# Patient Record
Sex: FEMALE | Race: WHITE | Employment: OTHER | URBAN - METROPOLITAN AREA
[De-identification: names, ages, dates, MRNs, and addresses within clinical notes are randomized per-mention and may not be internally consistent; named-entity substitution may affect disease eponyms.]

---

## 2020-01-26 ENCOUNTER — APPOINTMENT (OUTPATIENT)
Dept: CT IMAGING | Age: 70
DRG: 347 | End: 2020-01-26
Attending: INTERNAL MEDICINE
Payer: COMMERCIAL

## 2020-01-26 ENCOUNTER — APPOINTMENT (OUTPATIENT)
Dept: GENERAL RADIOLOGY | Age: 70
DRG: 347 | End: 2020-01-26
Attending: INTERNAL MEDICINE
Payer: COMMERCIAL

## 2020-01-26 ENCOUNTER — APPOINTMENT (OUTPATIENT)
Dept: ULTRASOUND IMAGING | Age: 70
DRG: 347 | End: 2020-01-26
Attending: INTERNAL MEDICINE
Payer: COMMERCIAL

## 2020-01-26 ENCOUNTER — HOSPITAL ENCOUNTER (INPATIENT)
Age: 70
LOS: 24 days | Discharge: NURSING, REHAB OR LONG-TERM CARE | DRG: 347 | End: 2020-02-19
Attending: INTERNAL MEDICINE | Admitting: INTERNAL MEDICINE
Payer: COMMERCIAL

## 2020-01-26 PROBLEM — K76.9 LIVER LESION: Status: ACTIVE | Noted: 2020-01-26

## 2020-01-26 PROBLEM — D64.9 ANEMIA: Status: ACTIVE | Noted: 2020-01-26

## 2020-01-26 PROBLEM — A41.9 SEPTIC SHOCK (HCC): Status: ACTIVE | Noted: 2020-01-26

## 2020-01-26 PROBLEM — R65.21 SEPTIC SHOCK (HCC): Status: ACTIVE | Noted: 2020-01-26

## 2020-01-26 LAB
ABSOLUTE EOS #: 0 K/UL (ref 0–0.4)
ABSOLUTE IMMATURE GRANULOCYTE: 0.54 K/UL (ref 0–0.3)
ABSOLUTE LYMPH #: 1.22 K/UL (ref 1–4.8)
ABSOLUTE MONO #: 0.81 K/UL (ref 0.1–0.8)
ANION GAP SERPL CALCULATED.3IONS-SCNC: 11 MMOL/L (ref 9–17)
BASOPHILS # BLD: 0 % (ref 0–2)
BASOPHILS ABSOLUTE: 0 K/UL (ref 0–0.2)
BILIRUBIN URINE: NEGATIVE
BUN BLDV-MCNC: 28 MG/DL (ref 8–23)
BUN/CREAT BLD: ABNORMAL (ref 9–20)
CALCIUM SERPL-MCNC: 8 MG/DL (ref 8.6–10.4)
CHLORIDE BLD-SCNC: 104 MMOL/L (ref 98–107)
CO2: 20 MMOL/L (ref 20–31)
COLOR: YELLOW
COMMENT UA: ABNORMAL
CREAT SERPL-MCNC: 0.61 MG/DL (ref 0.5–0.9)
DIFFERENTIAL TYPE: ABNORMAL
EOSINOPHILS RELATIVE PERCENT: 0 % (ref 1–4)
GFR AFRICAN AMERICAN: >60 ML/MIN
GFR NON-AFRICAN AMERICAN: >60 ML/MIN
GFR SERPL CREATININE-BSD FRML MDRD: ABNORMAL ML/MIN/{1.73_M2}
GFR SERPL CREATININE-BSD FRML MDRD: ABNORMAL ML/MIN/{1.73_M2}
GLUCOSE BLD-MCNC: 201 MG/DL (ref 65–105)
GLUCOSE BLD-MCNC: 297 MG/DL (ref 65–105)
GLUCOSE BLD-MCNC: 378 MG/DL (ref 70–99)
GLUCOSE URINE: ABNORMAL
HCT VFR BLD CALC: 22.4 % (ref 36.3–47.1)
HCT VFR BLD CALC: 24.6 % (ref 36.3–47.1)
HEMOGLOBIN: 7.4 G/DL (ref 11.9–15.1)
HEMOGLOBIN: 7.9 G/DL (ref 11.9–15.1)
IMMATURE GRANULOCYTES: 4 %
KETONES, URINE: NEGATIVE
LACTIC ACID, WHOLE BLOOD: 1.6 MMOL/L (ref 0.7–2.1)
LEUKOCYTE ESTERASE, URINE: NEGATIVE
LYMPHOCYTES # BLD: 9 % (ref 24–44)
MCH RBC QN AUTO: 28.8 PG (ref 25.2–33.5)
MCHC RBC AUTO-ENTMCNC: 32.1 G/DL (ref 28.4–34.8)
MCV RBC AUTO: 89.8 FL (ref 82.6–102.9)
MONOCYTES # BLD: 6 % (ref 1–7)
MORPHOLOGY: ABNORMAL
MORPHOLOGY: ABNORMAL
NITRITE, URINE: NEGATIVE
NRBC AUTOMATED: 1.5 PER 100 WBC
PDW BLD-RTO: 17.8 % (ref 11.8–14.4)
PH UA: 5 (ref 5–8)
PLATELET # BLD: 216 K/UL (ref 138–453)
PLATELET ESTIMATE: ABNORMAL
PMV BLD AUTO: 10.4 FL (ref 8.1–13.5)
POTASSIUM SERPL-SCNC: 4.1 MMOL/L (ref 3.7–5.3)
PROTEIN UA: NEGATIVE
RBC # BLD: 2.74 M/UL (ref 3.95–5.11)
RBC # BLD: ABNORMAL 10*6/UL
SEG NEUTROPHILS: 81 % (ref 36–66)
SEGMENTED NEUTROPHILS ABSOLUTE COUNT: 10.93 K/UL (ref 1.8–7.7)
SODIUM BLD-SCNC: 135 MMOL/L (ref 135–144)
SPECIFIC GRAVITY UA: 1.03 (ref 1–1.03)
TURBIDITY: CLEAR
URINE HGB: NEGATIVE
UROBILINOGEN, URINE: NORMAL
WBC # BLD: 13.5 K/UL (ref 3.5–11.3)
WBC # BLD: ABNORMAL 10*3/UL

## 2020-01-26 PROCEDURE — 85018 HEMOGLOBIN: CPT

## 2020-01-26 PROCEDURE — 81003 URINALYSIS AUTO W/O SCOPE: CPT

## 2020-01-26 PROCEDURE — 2580000003 HC RX 258: Performed by: STUDENT IN AN ORGANIZED HEALTH CARE EDUCATION/TRAINING PROGRAM

## 2020-01-26 PROCEDURE — 6360000002 HC RX W HCPCS: Performed by: STUDENT IN AN ORGANIZED HEALTH CARE EDUCATION/TRAINING PROGRAM

## 2020-01-26 PROCEDURE — 6370000000 HC RX 637 (ALT 250 FOR IP): Performed by: EMERGENCY MEDICINE

## 2020-01-26 PROCEDURE — 6360000004 HC RX CONTRAST MEDICATION: Performed by: INTERNAL MEDICINE

## 2020-01-26 PROCEDURE — 2000000000 HC ICU R&B

## 2020-01-26 PROCEDURE — 76770 US EXAM ABDO BACK WALL COMP: CPT

## 2020-01-26 PROCEDURE — 82947 ASSAY GLUCOSE BLOOD QUANT: CPT

## 2020-01-26 PROCEDURE — 6360000002 HC RX W HCPCS: Performed by: EMERGENCY MEDICINE

## 2020-01-26 PROCEDURE — 36415 COLL VENOUS BLD VENIPUNCTURE: CPT

## 2020-01-26 PROCEDURE — 74177 CT ABD & PELVIS W/CONTRAST: CPT

## 2020-01-26 PROCEDURE — 87641 MR-STAPH DNA AMP PROBE: CPT

## 2020-01-26 PROCEDURE — 85025 COMPLETE CBC W/AUTO DIFF WBC: CPT

## 2020-01-26 PROCEDURE — 80048 BASIC METABOLIC PNL TOTAL CA: CPT

## 2020-01-26 PROCEDURE — 71045 X-RAY EXAM CHEST 1 VIEW: CPT

## 2020-01-26 PROCEDURE — 6360000004 HC RX CONTRAST MEDICATION: Performed by: EMERGENCY MEDICINE

## 2020-01-26 PROCEDURE — 85014 HEMATOCRIT: CPT

## 2020-01-26 PROCEDURE — 2580000003 HC RX 258: Performed by: EMERGENCY MEDICINE

## 2020-01-26 PROCEDURE — 83605 ASSAY OF LACTIC ACID: CPT

## 2020-01-26 PROCEDURE — C9113 INJ PANTOPRAZOLE SODIUM, VIA: HCPCS | Performed by: EMERGENCY MEDICINE

## 2020-01-26 PROCEDURE — 6370000000 HC RX 637 (ALT 250 FOR IP): Performed by: STUDENT IN AN ORGANIZED HEALTH CARE EDUCATION/TRAINING PROGRAM

## 2020-01-26 RX ORDER — FENTANYL CITRATE 50 UG/ML
50 INJECTION, SOLUTION INTRAMUSCULAR; INTRAVENOUS ONCE
Status: COMPLETED | OUTPATIENT
Start: 2020-01-26 | End: 2020-01-26

## 2020-01-26 RX ORDER — NICOTINE POLACRILEX 4 MG
15 LOZENGE BUCCAL PRN
Status: DISCONTINUED | OUTPATIENT
Start: 2020-01-26 | End: 2020-02-19 | Stop reason: HOSPADM

## 2020-01-26 RX ORDER — LISINOPRIL 2.5 MG/1
2.5 TABLET ORAL DAILY
COMMUNITY

## 2020-01-26 RX ORDER — BISACODYL 10 MG
10 SUPPOSITORY, RECTAL RECTAL DAILY PRN
Status: DISCONTINUED | OUTPATIENT
Start: 2020-01-26 | End: 2020-02-19 | Stop reason: HOSPADM

## 2020-01-26 RX ORDER — ONDANSETRON 2 MG/ML
4 INJECTION INTRAMUSCULAR; INTRAVENOUS EVERY 6 HOURS PRN
Status: DISCONTINUED | OUTPATIENT
Start: 2020-01-26 | End: 2020-02-19 | Stop reason: HOSPADM

## 2020-01-26 RX ORDER — LEVOTHYROXINE SODIUM 0.1 MG/1
100 TABLET ORAL DAILY
COMMUNITY

## 2020-01-26 RX ORDER — PANTOPRAZOLE SODIUM 40 MG/10ML
40 INJECTION, POWDER, LYOPHILIZED, FOR SOLUTION INTRAVENOUS ONCE
Status: COMPLETED | OUTPATIENT
Start: 2020-01-26 | End: 2020-01-26

## 2020-01-26 RX ORDER — SODIUM CHLORIDE 0.9 % (FLUSH) 0.9 %
10 SYRINGE (ML) INJECTION PRN
Status: DISCONTINUED | OUTPATIENT
Start: 2020-01-26 | End: 2020-02-19 | Stop reason: HOSPADM

## 2020-01-26 RX ORDER — 0.9 % SODIUM CHLORIDE 0.9 %
500 INTRAVENOUS SOLUTION INTRAVENOUS ONCE
Status: COMPLETED | OUTPATIENT
Start: 2020-01-26 | End: 2020-01-26

## 2020-01-26 RX ORDER — OXYCODONE HYDROCHLORIDE 5 MG/1
5 TABLET ORAL EVERY 4 HOURS PRN
Status: DISCONTINUED | OUTPATIENT
Start: 2020-01-26 | End: 2020-02-19 | Stop reason: HOSPADM

## 2020-01-26 RX ORDER — SODIUM CHLORIDE 9 MG/ML
10 INJECTION INTRAVENOUS ONCE
Status: COMPLETED | OUTPATIENT
Start: 2020-01-26 | End: 2020-01-26

## 2020-01-26 RX ORDER — VENLAFAXINE HYDROCHLORIDE 150 MG/1
150 CAPSULE, EXTENDED RELEASE ORAL DAILY
COMMUNITY

## 2020-01-26 RX ORDER — OXYCODONE HYDROCHLORIDE 5 MG/1
10 TABLET ORAL EVERY 4 HOURS PRN
Status: DISCONTINUED | OUTPATIENT
Start: 2020-01-26 | End: 2020-02-19 | Stop reason: HOSPADM

## 2020-01-26 RX ORDER — CARVEDILOL 6.25 MG/1
6.25 TABLET ORAL 2 TIMES DAILY WITH MEALS
COMMUNITY

## 2020-01-26 RX ORDER — GLIMEPIRIDE 4 MG/1
4 TABLET ORAL 2 TIMES DAILY
COMMUNITY

## 2020-01-26 RX ORDER — SODIUM CHLORIDE 0.9 % (FLUSH) 0.9 %
10 SYRINGE (ML) INJECTION EVERY 12 HOURS SCHEDULED
Status: DISCONTINUED | OUTPATIENT
Start: 2020-01-26 | End: 2020-02-19 | Stop reason: HOSPADM

## 2020-01-26 RX ORDER — DEXTROSE MONOHYDRATE 50 MG/ML
100 INJECTION, SOLUTION INTRAVENOUS PRN
Status: DISCONTINUED | OUTPATIENT
Start: 2020-01-26 | End: 2020-02-19 | Stop reason: HOSPADM

## 2020-01-26 RX ORDER — ACETAMINOPHEN 325 MG/1
650 TABLET ORAL EVERY 4 HOURS PRN
Status: DISCONTINUED | OUTPATIENT
Start: 2020-01-26 | End: 2020-02-19 | Stop reason: HOSPADM

## 2020-01-26 RX ORDER — DEXTROSE MONOHYDRATE 25 G/50ML
12.5 INJECTION, SOLUTION INTRAVENOUS PRN
Status: DISCONTINUED | OUTPATIENT
Start: 2020-01-26 | End: 2020-02-19 | Stop reason: HOSPADM

## 2020-01-26 RX ORDER — POTASSIUM CHLORIDE 7.45 MG/ML
10 INJECTION INTRAVENOUS PRN
Status: DISCONTINUED | OUTPATIENT
Start: 2020-01-26 | End: 2020-02-14

## 2020-01-26 RX ORDER — SODIUM CHLORIDE 9 MG/ML
INJECTION, SOLUTION INTRAVENOUS CONTINUOUS
Status: DISCONTINUED | OUTPATIENT
Start: 2020-01-26 | End: 2020-01-28

## 2020-01-26 RX ADMIN — IOHEXOL 75 ML: 350 INJECTION, SOLUTION INTRAVENOUS at 17:36

## 2020-01-26 RX ADMIN — SODIUM CHLORIDE, PRESERVATIVE FREE 10 ML: 5 INJECTION INTRAVENOUS at 21:08

## 2020-01-26 RX ADMIN — SODIUM CHLORIDE: 9 INJECTION, SOLUTION INTRAVENOUS at 16:41

## 2020-01-26 RX ADMIN — OXYCODONE HYDROCHLORIDE 5 MG: 5 TABLET ORAL at 21:08

## 2020-01-26 RX ADMIN — IOHEXOL 50 ML: 240 INJECTION, SOLUTION INTRATHECAL; INTRAVASCULAR; INTRAVENOUS; ORAL at 16:05

## 2020-01-26 RX ADMIN — SODIUM CHLORIDE 500 ML: 9 INJECTION, SOLUTION INTRAVENOUS at 18:20

## 2020-01-26 RX ADMIN — PANTOPRAZOLE SODIUM 40 MG: 40 INJECTION, POWDER, FOR SOLUTION INTRAVENOUS at 16:37

## 2020-01-26 RX ADMIN — INSULIN LISPRO 6 UNITS: 100 INJECTION, SOLUTION INTRAVENOUS; SUBCUTANEOUS at 16:24

## 2020-01-26 RX ADMIN — INSULIN LISPRO 4 UNITS: 100 INJECTION, SOLUTION INTRAVENOUS; SUBCUTANEOUS at 21:15

## 2020-01-26 RX ADMIN — FENTANYL CITRATE 50 MCG: 50 INJECTION, SOLUTION INTRAMUSCULAR; INTRAVENOUS at 18:20

## 2020-01-26 RX ADMIN — PIPERACILLIN AND TAZOBACTAM 4.5 G: 4; .5 INJECTION, POWDER, LYOPHILIZED, FOR SOLUTION INTRAVENOUS; PARENTERAL at 16:41

## 2020-01-26 RX ADMIN — Medication 10 ML: at 16:38

## 2020-01-26 RX ADMIN — PIPERACILLIN AND TAZOBACTAM 4.5 G: 4; .5 INJECTION, POWDER, LYOPHILIZED, FOR SOLUTION INTRAVENOUS; PARENTERAL at 21:08

## 2020-01-26 ASSESSMENT — PAIN SCALES - GENERAL
PAINLEVEL_OUTOF10: 6
PAINLEVEL_OUTOF10: 6
PAINLEVEL_OUTOF10: 0
PAINLEVEL_OUTOF10: 0

## 2020-01-26 NOTE — H&P
change in vision or speech. She is asking for a meal and states she did eat this morning breakfast.  She states she does feel better since she has been admitted at the outside hospital.    Patient denies any smoking, drug use or alcohol use. Past Medical History:  No past medical history on file. Past Surgical History:  No past surgical history on file. Allergies:    No Known Allergies      Home Meds:   Prior to Admission medications    Not on File       Social History:   TOBACCO:   has no history on file for tobacco.  ETOH:   has no history on file for alcohol. DRUGS:  has no history on file for drug. OCCUPATION:      Family History:   No family history on file. REVIEW OF SYSTEMS (ROS):  Review of Systems -   General ROS: Positive for generalized weakness and fatigue  Psychological ROS: negative  Ophthalmic ROS: negative  ENT ROS: negative  Allergy and Immunology ROS: negative  Hematological and Lymphatic ROS: negative  Endocrine ROS: negative  Breast ROS: negative  Respiratory ROS: no cough, shortness of breath, or wheezing  Cardiovascular ROS: no chest pain or dyspnea on exertion  Gastrointestinal ROS:negative  Genito-Urinary ROS: negative  Musculoskeletal ROS: negative  Neurological ROS: negative  Dermatological ROS: negative      Physical Exam:    Vitals: BP (!) 112/50   Pulse 104   Temp 99 °F (37.2 °C) (Oral)   Resp 19   Ht 5' 2\" (1.575 m)   Wt 183 lb 13.8 oz (83.4 kg)   SpO2 94%   BMI 33.63 kg/m²     Last Body weight:   Wt Readings from Last 3 Encounters:   01/26/20 183 lb 13.8 oz (83.4 kg)       Body Mass Index : Body mass index is 33.63 kg/m².         PHYSICAL EXAMINATION :  Constitutional: Appears fatigued, mildly ill but not toxic whatsoever  EENT: PERRLA, EOMI, sclera clear  Neck: Supple, symmetrical, trachea midline, no adenopathy, thyroid symmetric  Respiratory: clear to auscultation, no wheezes or rales and unlabored breathing  Cardiovascular: Tachycardic rate and regular rhythm, normal S1, S2, no murmur noted and 2+ pulses throughout  Abdomen: soft, nondistended, tender in the epigastric region no rebound or guarding  Extremities:  peripheral pulses normal, no pedal edema, no clubbing or cyanosis    Any additional physical findings:      Laboratory findings:-    CBC:   Recent Labs     01/26/20  1429   WBC 13.5*   HGB 7.9*        BMP:    Recent Labs     01/26/20  1429      K 4.1      CO2 20   BUN 28*   CREATININE 0.61   GLUCOSE 378*     S. Calcium:  Recent Labs     01/26/20  1429   CALCIUM 8.0*     S. Ionized Calcium:No results for input(s): IONCA in the last 72 hours. S. Magnesium:No results for input(s): MG in the last 72 hours. S. Phosphorus:No results for input(s): PHOS in the last 72 hours. S. Glucose:No results for input(s): POCGLU in the last 72 hours. Glycosylated hemoglobin A1C: No results for input(s): LABA1C in the last 72 hours. INR: No results for input(s): INR in the last 72 hours. Hepatic functions: No results for input(s): ALKPHOS, ALT, AST, PROT, BILITOT, BILIDIR, LABALBU in the last 72 hours. Pancreatic functions:No results for input(s): LACTA, AMYLASE in the last 72 hours. S. Lactic Acid: No results for input(s): LACTA in the last 72 hours. Cardiac enzymes:No results for input(s): CKTOTAL, CKMB, CKMBINDEX, TROPONINI in the last 72 hours. BNP:No results for input(s): BNP in the last 72 hours. Lipid profile: No results for input(s): CHOL, TRIG, HDL, LDLCALC in the last 72 hours.     Invalid input(s): LDL  Blood Gases: No results found for: PH, PCO2, PO2, HCO3, O2SAT  Thyroid functions: No results found for: TSH     Urinalysis:     Microbiology:    Cultures during this admission:     Blood cultures:                 [] None drawn      [] Negative             []  Positive (Details:  )  Urine Culture:                   [] None drawn      [] Negative             []  Positive (Details:  )  Sputum Culture:               [] None drawn       [] MADDIE Castellon of Internal Medicine/ Critical care  [address] [facility] ([place]             1/26/2020, 3:42 PM

## 2020-01-26 NOTE — LETTER
Beneficiary Notification Letter  BPCI Advanced     Your Doctor or [address],    We wanted to let you know that your health care provider, Ha Castillo [facility], has volunteered to take part in our [facility] for [facility] Medicaid Services (CMS) Bundled Payments for [address] (BPCI Advanced). This doesnt change your Medicare rights or benefits and you dont need to do anything. What are bundled payments? A bundled payment combines, or bundles together, payments that Medicare makes to your health care providers for the many different kinds of medical services you might get in a specific time period. In BPCI Advanced, this time period could include a hospital inpatient stay or outpatient procedure, plus 90 days. Why would Medicare bundle payments? Bundled payments are thought of as a value-based way to pay because health care providers are responsible for both the quality and cost of medical care they give. This is a relatively new way of paying health care providers compared to thefee-for-service way Medicare has traditionally paid, where providers are paid separately for each service they provide. Bundled payments encourage these providers to work together to provide better, more coordinated care during your hospital stay, or outpatient procedure, and through your recovery. What does BPCI Advance mean for me? Youre more likely to get even better care when hospitals, doctors, and other health care providers work together. In BPCI Advanced, hospitals, doctors, and other health care providers may be rewarded for providing better, more coordinated health care. Medicare will watch BPCI Advanced participants closely to make sure that you and other patients keep getting efficient, high quality care. What do I need to know about BPCI Advanced? Whats most important for you to know is that your Medicare rights and benefits wont change because your health care provider is participating in [address]. Medicare will keep covering all of your medically necessary services. Even though Medicare will pay your doctor in a different way under BPCI Advanced, how much you have to pay wont change. Health care providers and suppliers who are enrolled in Medicare will submit their Medicare claims like they always have. Youll have all the same Medicare rights and protections, including the right to choose which hospital, doctor, or other health care provider you see. If you dont want to get care from a health care provider whos participating in [address], then youll have to choose a different health care provider whos not participating in the Model. How can I give feedback about my health care? Medicare might ask you to take a voluntary survey about the services and care you received from Psychiatric during your hospital stay or outpatient procedure and for a specific period of time afterwards. You can decide whether you want to take the voluntary survey, but if you do, itll help Medicare make BPCI Advanced and the care of other Medicare patients better. If you have concerns or complaints about your care, you can:   · Talk to your doctor or health care provider. · Contact your Beneficiary and Family Centered Care Quality Improvement   Organization ANGELO STEWARD [facility]). You can get your BFCC-QIOs phone number  at  Medicare.gov/contacts or by calling 1-800-MEDICARE. TTY users can call  3-446.379.7388. Where can I learn more about BPCI Advanced? Learn more about BPCI Advanced at https://innovation.cms.gov/initiatives/bpci-advanced/:  · A list of all the hospitals and physician group practices in the country participating in [address] [place] [address].   · All of the inpatient and outpatient Clinical Episodes that are currently

## 2020-01-26 NOTE — PROGRESS NOTES
SHIN GUZMAN, [facility]. Septic shock ([facility] [address] [A41.9, R65.21] . Vitals:    01/26/20 1530   BP: (!) 112/50   Pulse: 104   Resp: 19   Temp:    SpO2: 94%   . Patients home meds are   Prior to Admission medications    Not on File   .   Recent Surgical History: None = 0     Assessment     Peak Flow (asthma only)    Predicted: na  Personal Best: na  PEF na  % Predicted na  Peak Flow : not applicable = 0    LMH5/BAJ    FEV1 Predicted na      FEV1 na    FEV1 % Predicted na  FVC na  IS volume na  IBW na    RR 19  Breath Sounds: clear and diminished      · Bronchodilator assessment at level  1  No protocol needed  · Hyperinflation assessment at level   · Secretion Management assessment at level    ·   · []    Bronchodilator Assessment  BRONCHODILATOR ASSESSMENT SCORE  Score 0 1 2 3 4 5   Breath Sounds   [x]  Patient Baseline []  No Wheeze good aeration []  Faint, scattered wheezing, good aeration []  Expiratory Wheezing and or moderately diminished []  Insp/Exp wheeze and/or very diminished []  Insp/Exp and/ or marked distress   Respiratory Rate   [x]  Patient Baseline []  Less than 20 []  Less than 20 []  20-25 []  Greater than 25 []  Greater than 25   Peak flow % of Pred or PB []  NA   []  Greater than 90%  []  81-90% []  71-80% []  Less than or equal to 70%  or unable to perform []  Unable due to Respiratory Distress   Dyspnea re [x]  Patient Baseline []  No SOB []  No SOB []  SOB on exertion []  SOB min activity []  At rest/acute   e FEV% Predicted       []  NA []  Above 69%  []  Unable []  Above 60-69%  []  Unable []  Above 50-59%  []  Unable []  Above 35-49%  []  Unable []  Less than 35%  []  Unable                 []  Hyperinflation Assessment  Score 1 2 3   CXR and Breath Sounds   []  Clear []  No atelectasis  Basilar aeration []  Atelectasis or absent basilar breath sounds   Incentive Spirometry Volume  (Per IBW)   []  Greater than or equal to 15ml/Kg []  less than 15ml/Kg []  less than 15ml/Kg

## 2020-01-27 ENCOUNTER — APPOINTMENT (OUTPATIENT)
Dept: ULTRASOUND IMAGING | Age: 70
DRG: 347 | End: 2020-01-27
Attending: INTERNAL MEDICINE
Payer: COMMERCIAL

## 2020-01-27 PROBLEM — Z85.3 HISTORY OF BREAST CANCER: Status: ACTIVE | Noted: 2020-01-27

## 2020-01-27 LAB
ABSOLUTE EOS #: 0.27 K/UL (ref 0–0.44)
ABSOLUTE EOS #: 0.32 K/UL (ref 0–0.44)
ABSOLUTE IMMATURE GRANULOCYTE: 0.31 K/UL (ref 0–0.3)
ABSOLUTE IMMATURE GRANULOCYTE: 0.32 K/UL (ref 0–0.3)
ABSOLUTE LYMPH #: 1.93 K/UL (ref 1.1–3.7)
ABSOLUTE LYMPH #: 2.28 K/UL (ref 1.1–3.7)
ABSOLUTE MONO #: 1.2 K/UL (ref 0.1–1.2)
ABSOLUTE MONO #: 1.25 K/UL (ref 0.1–1.2)
ABSOLUTE RETIC #: 0.2 M/UL (ref 0.03–0.08)
AFP: 0.8 UG/L
ALBUMIN SERPL-MCNC: 2.3 G/DL (ref 3.5–5.2)
ALBUMIN/GLOBULIN RATIO: 1 (ref 1–2.5)
ALP BLD-CCNC: 91 U/L (ref 35–104)
ALT SERPL-CCNC: 11 U/L (ref 5–33)
ANION GAP SERPL CALCULATED.3IONS-SCNC: 10 MMOL/L (ref 9–17)
AST SERPL-CCNC: 19 U/L
BASOPHILS # BLD: 0 % (ref 0–2)
BASOPHILS # BLD: 0 % (ref 0–2)
BASOPHILS ABSOLUTE: 0.04 K/UL (ref 0–0.2)
BASOPHILS ABSOLUTE: 0.05 K/UL (ref 0–0.2)
BILIRUB SERPL-MCNC: 0.24 MG/DL (ref 0.3–1.2)
BILIRUBIN DIRECT: <0.08 MG/DL
BILIRUBIN, INDIRECT: ABNORMAL MG/DL (ref 0–1)
BUN BLDV-MCNC: 16 MG/DL (ref 8–23)
BUN/CREAT BLD: ABNORMAL (ref 9–20)
CA 125: 195 U/ML
CA 19-9: 6 U/ML (ref 0–35)
CALCIUM SERPL-MCNC: 7.6 MG/DL (ref 8.6–10.4)
CARCINOEMBRYONIC ANTIGEN: 52.7 NG/ML
CHLORIDE BLD-SCNC: 105 MMOL/L (ref 98–107)
CO2: 19 MMOL/L (ref 20–31)
CREAT SERPL-MCNC: 0.63 MG/DL (ref 0.5–0.9)
DIFFERENTIAL TYPE: ABNORMAL
DIFFERENTIAL TYPE: ABNORMAL
EOSINOPHILS RELATIVE PERCENT: 2 % (ref 1–4)
EOSINOPHILS RELATIVE PERCENT: 3 % (ref 1–4)
FERRITIN: 64 UG/L (ref 13–150)
FOLATE: 13 NG/ML
GFR AFRICAN AMERICAN: >60 ML/MIN
GFR NON-AFRICAN AMERICAN: >60 ML/MIN
GFR SERPL CREATININE-BSD FRML MDRD: ABNORMAL ML/MIN/{1.73_M2}
GFR SERPL CREATININE-BSD FRML MDRD: ABNORMAL ML/MIN/{1.73_M2}
GLOBULIN: ABNORMAL G/DL (ref 1.5–3.8)
GLUCOSE BLD-MCNC: 213 MG/DL (ref 65–105)
GLUCOSE BLD-MCNC: 219 MG/DL (ref 65–105)
GLUCOSE BLD-MCNC: 230 MG/DL (ref 65–105)
GLUCOSE BLD-MCNC: 232 MG/DL (ref 70–99)
GLUCOSE BLD-MCNC: 259 MG/DL (ref 65–105)
GLUCOSE BLD-MCNC: 310 MG/DL (ref 65–105)
HCT VFR BLD CALC: 22.2 % (ref 36.3–47.1)
HCT VFR BLD CALC: 23.5 % (ref 36.3–47.1)
HEMOGLOBIN: 7.4 G/DL (ref 11.9–15.1)
HEMOGLOBIN: 7.6 G/DL (ref 11.9–15.1)
IMMATURE GRANULOCYTES: 2 %
IMMATURE GRANULOCYTES: 3 %
IMMATURE RETIC FRACT: 41.1 % (ref 2.7–18.3)
IRON SATURATION: 8 % (ref 20–55)
IRON: 16 UG/DL (ref 37–145)
LYMPHOCYTES # BLD: 16 % (ref 24–43)
LYMPHOCYTES # BLD: 18 % (ref 24–43)
MCH RBC QN AUTO: 28.5 PG (ref 25.2–33.5)
MCH RBC QN AUTO: 28.9 PG (ref 25.2–33.5)
MCHC RBC AUTO-ENTMCNC: 32.3 G/DL (ref 28.4–34.8)
MCHC RBC AUTO-ENTMCNC: 33.3 G/DL (ref 28.4–34.8)
MCV RBC AUTO: 86.7 FL (ref 82.6–102.9)
MCV RBC AUTO: 88 FL (ref 82.6–102.9)
MONOCYTES # BLD: 10 % (ref 3–12)
MONOCYTES # BLD: 10 % (ref 3–12)
MRSA, DNA, NASAL: NORMAL
NRBC AUTOMATED: 0.8 PER 100 WBC
NRBC AUTOMATED: 0.8 PER 100 WBC
PDW BLD-RTO: 17.9 % (ref 11.8–14.4)
PDW BLD-RTO: 18.3 % (ref 11.8–14.4)
PLATELET # BLD: 204 K/UL (ref 138–453)
PLATELET # BLD: 256 K/UL (ref 138–453)
PLATELET ESTIMATE: ABNORMAL
PLATELET ESTIMATE: ABNORMAL
PMV BLD AUTO: 11 FL (ref 8.1–13.5)
PMV BLD AUTO: 11.2 FL (ref 8.1–13.5)
POTASSIUM SERPL-SCNC: 3.9 MMOL/L (ref 3.7–5.3)
RBC # BLD: 2.56 M/UL (ref 3.95–5.11)
RBC # BLD: 2.67 M/UL (ref 3.95–5.11)
RBC # BLD: ABNORMAL 10*6/UL
RBC # BLD: ABNORMAL 10*6/UL
RETIC %: 6.8 % (ref 0.5–1.9)
RETIC HEMOGLOBIN: 22.6 PG (ref 28.2–35.7)
SEG NEUTROPHILS: 67 % (ref 36–65)
SEG NEUTROPHILS: 68 % (ref 36–65)
SEGMENTED NEUTROPHILS ABSOLUTE COUNT: 8.1 K/UL (ref 1.5–8.1)
SEGMENTED NEUTROPHILS ABSOLUTE COUNT: 8.49 K/UL (ref 1.5–8.1)
SODIUM BLD-SCNC: 134 MMOL/L (ref 135–144)
SPECIMEN DESCRIPTION: NORMAL
TOTAL IRON BINDING CAPACITY: 211 UG/DL (ref 250–450)
TOTAL PROTEIN: 4.6 G/DL (ref 6.4–8.3)
UNSATURATED IRON BINDING CAPACITY: 195 UG/DL (ref 112–347)
VITAMIN B-12: 904 PG/ML (ref 232–1245)
WBC # BLD: 11.9 K/UL (ref 3.5–11.3)
WBC # BLD: 12.7 K/UL (ref 3.5–11.3)
WBC # BLD: ABNORMAL 10*3/UL
WBC # BLD: ABNORMAL 10*3/UL

## 2020-01-27 PROCEDURE — 86301 IMMUNOASSAY TUMOR CA 19-9: CPT

## 2020-01-27 PROCEDURE — 6370000000 HC RX 637 (ALT 250 FOR IP): Performed by: STUDENT IN AN ORGANIZED HEALTH CARE EDUCATION/TRAINING PROGRAM

## 2020-01-27 PROCEDURE — 85025 COMPLETE CBC W/AUTO DIFF WBC: CPT

## 2020-01-27 PROCEDURE — 76705 ECHO EXAM OF ABDOMEN: CPT

## 2020-01-27 PROCEDURE — 6360000002 HC RX W HCPCS: Performed by: EMERGENCY MEDICINE

## 2020-01-27 PROCEDURE — 84156 ASSAY OF PROTEIN URINE: CPT

## 2020-01-27 PROCEDURE — 82607 VITAMIN B-12: CPT

## 2020-01-27 PROCEDURE — 84165 PROTEIN E-PHORESIS SERUM: CPT

## 2020-01-27 PROCEDURE — 2580000003 HC RX 258: Performed by: EMERGENCY MEDICINE

## 2020-01-27 PROCEDURE — 82947 ASSAY GLUCOSE BLOOD QUANT: CPT

## 2020-01-27 PROCEDURE — 80076 HEPATIC FUNCTION PANEL: CPT

## 2020-01-27 PROCEDURE — 84155 ASSAY OF PROTEIN SERUM: CPT

## 2020-01-27 PROCEDURE — 99222 1ST HOSP IP/OBS MODERATE 55: CPT | Performed by: INTERNAL MEDICINE

## 2020-01-27 PROCEDURE — 84166 PROTEIN E-PHORESIS/URINE/CSF: CPT

## 2020-01-27 PROCEDURE — 83540 ASSAY OF IRON: CPT

## 2020-01-27 PROCEDURE — 2000000000 HC ICU R&B

## 2020-01-27 PROCEDURE — 86304 IMMUNOASSAY TUMOR CA 125: CPT

## 2020-01-27 PROCEDURE — 82105 ALPHA-FETOPROTEIN SERUM: CPT

## 2020-01-27 PROCEDURE — 6370000000 HC RX 637 (ALT 250 FOR IP): Performed by: EMERGENCY MEDICINE

## 2020-01-27 PROCEDURE — 83550 IRON BINDING TEST: CPT

## 2020-01-27 PROCEDURE — 6370000000 HC RX 637 (ALT 250 FOR IP): Performed by: INTERNAL MEDICINE

## 2020-01-27 PROCEDURE — 82728 ASSAY OF FERRITIN: CPT

## 2020-01-27 PROCEDURE — 36415 COLL VENOUS BLD VENIPUNCTURE: CPT

## 2020-01-27 PROCEDURE — 80048 BASIC METABOLIC PNL TOTAL CA: CPT

## 2020-01-27 PROCEDURE — 82378 CARCINOEMBRYONIC ANTIGEN: CPT

## 2020-01-27 PROCEDURE — 99223 1ST HOSP IP/OBS HIGH 75: CPT | Performed by: INTERNAL MEDICINE

## 2020-01-27 PROCEDURE — 85045 AUTOMATED RETICULOCYTE COUNT: CPT

## 2020-01-27 PROCEDURE — 82746 ASSAY OF FOLIC ACID SERUM: CPT

## 2020-01-27 RX ORDER — VENLAFAXINE HYDROCHLORIDE 150 MG/1
150 CAPSULE, EXTENDED RELEASE ORAL DAILY
Status: DISCONTINUED | OUTPATIENT
Start: 2020-01-27 | End: 2020-02-19 | Stop reason: HOSPADM

## 2020-01-27 RX ORDER — MIDODRINE HYDROCHLORIDE 2.5 MG/1
2.5 TABLET ORAL
Status: DISCONTINUED | OUTPATIENT
Start: 2020-01-27 | End: 2020-02-06

## 2020-01-27 RX ORDER — SENNA PLUS 8.6 MG/1
1 TABLET ORAL 2 TIMES DAILY
Status: DISCONTINUED | OUTPATIENT
Start: 2020-01-27 | End: 2020-02-19 | Stop reason: HOSPADM

## 2020-01-27 RX ORDER — LANOLIN ALCOHOL/MO/W.PET/CERES
325 CREAM (GRAM) TOPICAL 2 TIMES DAILY WITH MEALS
Status: DISCONTINUED | OUTPATIENT
Start: 2020-01-27 | End: 2020-02-19 | Stop reason: HOSPADM

## 2020-01-27 RX ORDER — ASPIRIN 81 MG/1
81 TABLET ORAL DAILY
Status: DISCONTINUED | OUTPATIENT
Start: 2020-01-27 | End: 2020-02-06

## 2020-01-27 RX ORDER — INSULIN GLARGINE 100 [IU]/ML
10 INJECTION, SOLUTION SUBCUTANEOUS NIGHTLY
Status: DISCONTINUED | OUTPATIENT
Start: 2020-01-27 | End: 2020-02-14

## 2020-01-27 RX ORDER — DOCUSATE SODIUM 100 MG/1
100 CAPSULE, LIQUID FILLED ORAL 2 TIMES DAILY
Status: DISCONTINUED | OUTPATIENT
Start: 2020-01-27 | End: 2020-02-19 | Stop reason: HOSPADM

## 2020-01-27 RX ORDER — LEVOTHYROXINE SODIUM 0.1 MG/1
100 TABLET ORAL DAILY
Status: DISCONTINUED | OUTPATIENT
Start: 2020-01-27 | End: 2020-02-19 | Stop reason: HOSPADM

## 2020-01-27 RX ADMIN — OXYCODONE HYDROCHLORIDE 10 MG: 5 TABLET ORAL at 20:11

## 2020-01-27 RX ADMIN — INSULIN LISPRO 4 UNITS: 100 INJECTION, SOLUTION INTRAVENOUS; SUBCUTANEOUS at 10:11

## 2020-01-27 RX ADMIN — PIPERACILLIN AND TAZOBACTAM 4.5 G: 4; .5 INJECTION, POWDER, LYOPHILIZED, FOR SOLUTION INTRAVENOUS; PARENTERAL at 21:24

## 2020-01-27 RX ADMIN — MIDODRINE HYDROCHLORIDE 2.5 MG: 2.5 TABLET ORAL at 13:47

## 2020-01-27 RX ADMIN — OXYCODONE HYDROCHLORIDE 5 MG: 5 TABLET ORAL at 03:15

## 2020-01-27 RX ADMIN — SODIUM CHLORIDE, PRESERVATIVE FREE 10 ML: 5 INJECTION INTRAVENOUS at 09:41

## 2020-01-27 RX ADMIN — LEVOTHYROXINE SODIUM 100 MCG: 100 TABLET ORAL at 13:46

## 2020-01-27 RX ADMIN — INSULIN GLARGINE 10 UNITS: 100 INJECTION, SOLUTION SUBCUTANEOUS at 21:24

## 2020-01-27 RX ADMIN — ENOXAPARIN SODIUM 40 MG: 40 INJECTION SUBCUTANEOUS at 12:31

## 2020-01-27 RX ADMIN — INSULIN LISPRO 4 UNITS: 100 INJECTION, SOLUTION INTRAVENOUS; SUBCUTANEOUS at 03:21

## 2020-01-27 RX ADMIN — SODIUM CHLORIDE: 9 INJECTION, SOLUTION INTRAVENOUS at 03:18

## 2020-01-27 RX ADMIN — PIPERACILLIN AND TAZOBACTAM 4.5 G: 4; .5 INJECTION, POWDER, LYOPHILIZED, FOR SOLUTION INTRAVENOUS; PARENTERAL at 09:49

## 2020-01-27 RX ADMIN — PIPERACILLIN AND TAZOBACTAM 4.5 G: 4; .5 INJECTION, POWDER, LYOPHILIZED, FOR SOLUTION INTRAVENOUS; PARENTERAL at 15:58

## 2020-01-27 RX ADMIN — DOCUSATE SODIUM 100 MG: 100 CAPSULE, LIQUID FILLED ORAL at 18:11

## 2020-01-27 RX ADMIN — INSULIN LISPRO 6 UNITS: 100 INJECTION, SOLUTION INTRAVENOUS; SUBCUTANEOUS at 21:24

## 2020-01-27 RX ADMIN — Medication 81 MG: at 13:46

## 2020-01-27 RX ADMIN — SODIUM CHLORIDE: 9 INJECTION, SOLUTION INTRAVENOUS at 05:02

## 2020-01-27 RX ADMIN — OXYCODONE HYDROCHLORIDE 10 MG: 5 TABLET ORAL at 09:42

## 2020-01-27 RX ADMIN — FERROUS SULFATE TAB EC 325 MG (65 MG FE EQUIVALENT) 325 MG: 325 (65 FE) TABLET DELAYED RESPONSE at 18:16

## 2020-01-27 RX ADMIN — INSULIN LISPRO 8 UNITS: 100 INJECTION, SOLUTION INTRAVENOUS; SUBCUTANEOUS at 16:51

## 2020-01-27 RX ADMIN — SODIUM CHLORIDE, PRESERVATIVE FREE 10 ML: 5 INJECTION INTRAVENOUS at 20:28

## 2020-01-27 RX ADMIN — SENNOSIDES 8.6 MG: 8.6 TABLET, FILM COATED ORAL at 18:13

## 2020-01-27 RX ADMIN — PIPERACILLIN AND TAZOBACTAM 4.5 G: 4; .5 INJECTION, POWDER, LYOPHILIZED, FOR SOLUTION INTRAVENOUS; PARENTERAL at 03:17

## 2020-01-27 RX ADMIN — MIDODRINE HYDROCHLORIDE 2.5 MG: 2.5 TABLET ORAL at 18:12

## 2020-01-27 ASSESSMENT — PAIN SCALES - GENERAL
PAINLEVEL_OUTOF10: 2
PAINLEVEL_OUTOF10: 9
PAINLEVEL_OUTOF10: 6
PAINLEVEL_OUTOF10: 8

## 2020-01-27 NOTE — CONSULTS
Relationship status: Not on file    Intimate partner violence:     Fear of current or ex partner: Not on file     Emotionally abused: Not on file     Physically abused: Not on file     Forced sexual activity: Not on file   Other Topics Concern    Not on file   Social History Narrative    Not on file       FAMILY HISTORY:   No family history on file. REVIEW OF SYSTEMS:  10 out of 14 systems were reviewed and otherwise negative per patient recall. PHYSICAL EXAM:    BP (!) 141/59   Pulse 102   Temp 99 °F (37.2 °C)   Resp 21   Ht 5' 2\" (1.575 m)   Wt 183 lb 13.8 oz (83.4 kg)   SpO2 94%   BMI 33.63 kg/m²     General: chronically ill apearing    Skin:   Pale, bruise on fight AC from blood draws       HEENT:  Sclera are anicteric and conjunctiva are normal.  Hearing is adequate. Oropharynx moist without thrush. Oral ulcers are not seen. Neck is supple without masses. There is no supraclavicular wasting    Heart:     Lightly tachycardic, regular rate and rhythm, no murmurs   lungs:    Clear to auscultation, with no rales, wheezes, or rhonchi. Unlabored breathing pattern with adequate aeration. Abdomen: Bowel sounds are normal.  The abdomen is soft and non distended. There is no tenderness, guarding, rebound, or rigidity. There are no masses, hepatosplenomegaly, or hernias. Peritoneal signs are not appreciated. Extemities: There is no clubbing or edema. Pulses are palpable. LABS and IMAGING:     CBC  Recent Labs     01/26/20  1429 01/26/20 2057 01/27/20  0541   WBC 13.5*  --  11.9*   HGB 7.9* 7.4* 7.4*   MCV 89.8  --  86.7   RDW 17.8*  --  17.9*     --  204       ANEMIA STUDIES  No results for input(s): LABIRON, TIBC, FERRITIN, [id number], FOLATE, OCCULTBLD in the last 72 hours.     BMP  Recent Labs     01/26/20  1429 01/27/20  0541    134*   K 4.1 3.9    105   CO2 20 19*   BUN 28* 16   CREATININE 0.61 0.63   GLUCOSE 378* 232*   CALCIUM 8.0* 7.6*       LFTS  No results for input(s): ALKPHOS, ALT, AST, BILITOT, BILIDIR, LABALBU in the last 72 hours. AMYLASE/LIPASE/AMMONIA  No results for input(s): AMYLASE, LIPASE, AMMONIA in the last 72 hours. PT/INR  No results for input(s): PROTIME, INR in the last 72 hours. IMPRESSION/PLAN  Sepsis  Anemia secondary to sepsis vs bone marrow infiltration  Malignancy concern- Ovarian? Breast?  History of breast cancer  Hyperglycemia  Distended gall bladder on CT      1. Follow up hematology/oncology consult  2. Hemoglobin appears stable at this time, no signs of overt GI bleed. No hematemesis, no dark tarry stools, no blood seen in any BM. 3. No immediate plan for EGD or colonoscopy, but can perform inpatient if requested by Hematology oncology. 4. 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 5. Follow-up right upper quadrant ultrasound due to gallbladder distention concern for possible cholecystitis. But labs are normal. If noral RUQ no objection to clear liquid diet. 6. Hyperglycemia per primary team  7. No active signs of bleeding at this time. 8. Currently  not on pressors during evaluation. 9. Sepsis per primary currently on zosyn, reportedly blood cultures were drawn prior to patient arriving. 10.  elevated 195, CEA elevated 52.7    Will discuss with attending    Rodri Cid MD          Department of Internal Medicine  [facility]         1/27/2020, 9:26 AM      Attending Physician Statement  I have discussed the care of Tabitha Collins and   I have examined the patient myselft independently, and taken ros and hpi , including pertinent history and exam findings,  with the author of this note . I have reviewed the key elements of all parts of the encounter with the nurse practitioner/resident.     I agree with the assessment, plan and orders as documented by the above health care provider     Case discussed with Dr. Ian Alston from oncology, plan for biopsy from the lytic lesions of the bone tomorrow, EGD colonoscopy the following day or so or even as an outpatient to rule out synchronous tumor, will discuss with the family and the patient  Electronically signed by María Artis MD

## 2020-01-27 NOTE — PROGRESS NOTES
Smoking Cessation - topics covered   []  Health Risks  []  Benefits of Quitting   []  Smoking Cessation  []  Patient has no history of tobacco use per note in significant history. [x]  Patient is currently does not smoke. Unsure as to if there is a history. [x]  No need for tobacco cessation education. []  Booklet given  []  Patient verbalizes understanding. []  Patient denies need for tobacco cessation education. []  Unable to meet with patient today. Will follow up as able.   Mary Johnson  7:34 AM

## 2020-01-27 NOTE — CARE COORDINATION
Case Management Initial Discharge Plan  Tabitha Collins,             Met with:patient to discuss discharge plans. Information verified: address, contacts, phone number, , insurance Yes  PCP: Liv Sousa MD  Date of last visit: 2019    Insurance Provider: Medicare    Discharge Planning    Living Arrangements:  Children   Support Systems:  Spouse/Significant Other, Family Members    Home has 2 stories  few stairs to climb to get into front door, stairs to climb to reach second floor  Location of bedroom/bathroom in home main    Patient able to perform ADL's:Independent    Current Services (outpatient & in home) none  DME equipment: walker  DME provider:       Potential Assistance Needed:  Home Care    Patient agreeable to home care: Yes  Freedom of choice provided:  yes    Prior SNF/Rehab Placement and Facility: no  Agreeable to SNF/Rehab: No  [place] of choice provided: no   Evaluation: n/a    Expected Discharge date:  20  Patient expects to be discharged to:  home with gonzalez  Follow Up Appointment: Best Day/ Time: Monday PM    Transportation provider: daughter  Transportation arrangements needed for discharge: No    Readmission Risk              Risk of Unplanned Readmission:        11             Does patient have a readmission risk score greater than 14?: No  If yes, follow-up appointment must be made within 7 days of discharge.      Goals of Care: to feel better    Discharge Plan: home with daughter, agreeable to [facility] [place]          Electronically signed by Cayden Amanda RN on 20 at 12:58 PM

## 2020-01-27 NOTE — PROGRESS NOTES
[id number] 24 Hour Total   INTAKE   I.V.(mL/kg) 1075(12.9)   1075(12.9)   Shift Total(mL/kg) 2329(39.0)   1075(12.9)   OUTPUT   Urine(mL/kg/hr) 750   750   Shift Total(mL/kg) 750(9)   750(9)   Weight (kg) 83.4 83.4 83.4 83.4     Wt Readings from Last 3 Encounters:   01/26/20 183 lb 13.8 oz (83.4 kg)     Body mass index is 33.63 kg/m². PHYSICAL EXAM:  GEN:  awake, alert, cooperative, no apparent distress and appears stated age  EYES:  pupils equal, round, and reactive to light, extra-ocular muscles intact  HEENT:  Normocepalic, without obvious abnormality  LUNGS:  no increased work of breathing, good air exchange, clear to auscultation and no crackles or wheezing  CV:    normal apical impulse, regular rate and rhythm and normal S1 and S2  ABDOMEN:   normal bowel sounds, soft, non-distended, non-tender and no masses palpated  NEURO[de-identified]   Moves all extremities,awake,alert,oriented to name, place and time  EXTREMITIES:  No pedal or leg edema, no calf tenderness/swelling, no erythema, distal pulses intact       MEDICATIONS:  Scheduled Meds:   sodium chloride flush  10 mL Intravenous 2 times per day    enoxaparin  40 mg Subcutaneous Daily    piperacillin-tazobactam  4.5 g Intravenous Q6H    insulin lispro  0-12 Units Subcutaneous Q6H     Continuous Infusions:   norepinephrine      sodium chloride 125 mL/hr at 01/27/20 0502    dextrose       PRN Meds:   sodium chloride flush, 10 mL, PRN  magnesium hydroxide, 30 mL, Daily PRN  ondansetron, 4 mg, Q6H PRN  potassium chloride, 10 mEq, PRN  bisacodyl, 10 mg, Daily PRN  acetaminophen, 650 mg, Q4H PRN  glucose, 15 g, PRN  dextrose, 12.5 g, PRN  glucagon (rDNA), 1 mg, PRN  dextrose, 100 mL/hr, PRN  oxyCODONE, 5 mg, Q4H PRN    Or  oxyCODONE, 10 mg, Q4H PRN        SUPPORT DEVICES: [] Ventilator [] BIPAP  [] Nasal Cannula [x] Room Air    VENT SETTINGS (Comprehensive) (if applicable):        Additional Respiratory  Assessments  Pulse: 96  Resp: 27  SpO2: 94 %    ABGs:     No

## 2020-01-27 NOTE — CONSULTS
Today's Date: 1/27/2020  Patient Name: Kami Bardales  Date of admission: 1/26/2020  1:38 PM  Patient's age: 77 y. o., 1950  Ad[facility] Dx: [facility] [A41.9, R65.21]    Reason for Consult: management recommendations  Requesting Physician: Davis Tamayo MD    CHIEF COMPLAINT: Abdominal pain. History of breast cancer. Back pain. History Obtained From:  patient, electronic medical record    HISTORY OF PRESENT ILLNESS:      The patient is a 77 y.o.  female who is admitted to the hospital for chief complaints of abdominal pain. According to the records patient has remote history of breast cancer diagnosed in 2007. There are no records available regarding treatment. Per patient she underwent left mastectomy and had around 13 lymph nodes involved. Subsequently patient underwent chemotherapy which she completed in 2008. Patient did not receive any radiation therapy. Patient was then given Arimidex for many years likely 5 years. Patient has not seen a oncologist for quite a few years now. Patient has been having back pain for a while likely many months. Patient started having bowel pain with a lot of nausea and vomiting around Reyna time which persisted and got worse eventually patient presented to the ER at [facility] CT scan done per report shows possible duodenitis and concern regarding bowel perforation. Subsequently patient was transferred to [facility].  Repeat CT does not show any concerning findings in the duodenum but does show blastic lesions involving bone especially T10. Patient denies any lower extremity weakness. Denies any saddle anesthesia. Denies any loss of control of bowel or bladder. Additional work-up also shows anemia and iron deficiency. Patient has never had a EGD or colonoscopy done.     Past Medical History: Breast cancer    Past Surgical History: Left mastectomy    Medications:    Prior to Admission medications    Medication Sig Start Marek Parra MD        [facility]) injection 4 mg  4 mg Intravenous Q6H PRN Marek Parra MD        enoxaparin (LOVENOX) injection 40 mg  40 mg Subcutaneous Daily Marek Parra MD   40 mg at 01/27/20 1231    0.9 % sodium chloride infusion   Intravenous Continuous Marek Parra  mL/hr at 01/27/20 0502      potassium chloride 10 mEq/100 mL IVPB (Peripheral Line)  10 mEq Intravenous PRN Marek Parra MD        bisacodyl (DULCOLAX) suppository 10 mg  10 mg Rectal Daily PRN Marek Parra MD        acetaminophen (TYLENOL) tablet 650 mg  650 mg Oral Q4H PRN Marek Parra MD        piperacillin-tazobactam (ZOSYN) 4.5 g in dextrose 5 % 100 mL IVPB (mini-bag)  4.5 g Intravenous Q6H aMrek Parra  mL/hr at 01/27/20 1558 4.5 g at 01/27/20 1558    glucose (GLUTOSE) 40 % oral gel 15 g  15 g Oral PRN Marek Parra MD        dextrose 50 % IV solution  12.5 g Intravenous PRN Marek Parra MD        glucagon (rDNA) injection 1 mg  1 mg Intramuscular PRN Marek Parra MD        dextrose 5 % solution  100 mL/hr Intravenous PRN Marek Parra MD        insulin lispro (HUMALOG) injection vial 0-12 Units  0-12 Units Subcutaneous Q6H Marek Parra MD   4 Units at 01/27/20 1011    oxyCODONE (ROXICODONE) immediate release tablet 5 mg  5 mg Oral Q4H PRN Laverne Van MD   5 mg at 01/27/20 0315    Or    oxyCODONE (ROXICODONE) immediate release tablet 10 mg  10 mg Oral Q4H PRN Laverne Van MD   10 mg at 01/27/20 5608       Allergies:  Patient has no known allergies. Social History:    Denies smoking. Denies taking alcohol. Family History: Hypertension and diabetes    REVIEW OF SYSTEMS:      Constitutional: No fever or chills.  No night sweats, no weight loss   Eyes: No eye discharge, double vision, or eye pain   HEENT: negative for sore mouth, sore throat, hoarseness and voice change   Respiratory: negative for cough , sputum, dyspnea, wheezing, disease or hepatic steatosis. Xr Chest Portable    Result Date: 1/26/2020  EXAMINATION: ONE XRAY VIEW OF THE CHEST 1/26/2020 2:53 pm COMPARISON: None. HISTORY: ORDERING SYSTEM PROVIDED HISTORY: PICC line placement TECHNOLOGIST PROVIDED HISTORY: PICC line placement FINDINGS: The lungs are without consolidation effusion. There is no pneumothorax. The heart is prominent. The upper abdomen is unremarkable. The extrathoracic soft tissues are unremarkable. There is a right subclavian port with the tip in the mid SVC. There is a right-sided PICC line with the tip in the distal mid aspect of the SVC. Mild cardiomegaly without acute pulmonary process. Right-sided PICC line with the tip in the distal mid aspect of the SVC. IMAGING DATA:          IMPRESSION:     Primary Problem  <principal problem not specified>    Active Hospital Problems    Diagnosis Date Noted    History of breast cancer [Z85.3] 01/27/2020    Sept[facility] shock ([facility] [A41.9, R65.21] 01/26/2020    Anemia [D64.9] 01/26/2020    Liver lesion [K76.9] 01/26/2020     History of breast cancer likely hormone receptor positive. Status post left mastectomy and chemotherapy and aromatase inhibitor therapy. Iron deficiency  Hypoproliferative anemia  Bone lesions concerning for malignancy  Back pain second to bone lesions      RECOMMENDATIONS:  I had a detailed discussion with the patient and her family members. Reviewed results of lab work-up and other relevant clinical data. Reviewed images of CT scans available  Given patient's history of hormone receptor positive breast cancer I am concerned about recurrent disease however patient will need biopsy to confirm diagnosis. We will also obtain CT chest if it does not been done. Patient denies any neurological symptoms from the T10 lesion. We will obtain MRI of his thoracic spine to further characterize relationship with the cord. We will also order IR guided biopsy.   Discussed with GI team. Given iron deficiency and severe anemia patient will also benefit from GI evaluation for etiology of iron deficiency. GI malignancy is also in the differential  Pain control  Symptom management  We will order iron supplemet        Discussed with patient and family and Nurse. Thank you for asking us to see this patient. Brien Aden MD        This note is created with the assistance of a speech recognition program.  While intending to generate a document that actually reflects the content of the visit, the document can still have some errors including those of syntax and sound a like substitutions which may escape proof reading. It such instances, actual meaning can be extrapolated by contextual diversion.

## 2020-01-28 ENCOUNTER — APPOINTMENT (OUTPATIENT)
Dept: CT IMAGING | Age: 70
DRG: 347 | End: 2020-01-28
Attending: INTERNAL MEDICINE
Payer: COMMERCIAL

## 2020-01-28 ENCOUNTER — APPOINTMENT (OUTPATIENT)
Dept: GENERAL RADIOLOGY | Age: 70
DRG: 347 | End: 2020-01-28
Attending: INTERNAL MEDICINE
Payer: COMMERCIAL

## 2020-01-28 ENCOUNTER — APPOINTMENT (OUTPATIENT)
Dept: MRI IMAGING | Age: 70
DRG: 347 | End: 2020-01-28
Attending: INTERNAL MEDICINE
Payer: COMMERCIAL

## 2020-01-28 PROBLEM — A41.9 SEPTIC SHOCK (HCC): Status: RESOLVED | Noted: 2020-01-26 | Resolved: 2020-01-28

## 2020-01-28 PROBLEM — R65.21 SEPTIC SHOCK (HCC): Status: RESOLVED | Noted: 2020-01-26 | Resolved: 2020-01-28

## 2020-01-28 LAB
ABSOLUTE EOS #: 0.32 K/UL (ref 0–0.44)
ABSOLUTE IMMATURE GRANULOCYTE: 0.16 K/UL (ref 0–0.3)
ABSOLUTE LYMPH #: 2.02 K/UL (ref 1.1–3.7)
ABSOLUTE MONO #: 1.21 K/UL (ref 0.1–1.2)
ALBUMIN (CALCULATED): 2.3 G/DL (ref 3.2–5.2)
ALBUMIN PERCENT: 51 % (ref 45–65)
ALPHA 1 PERCENT: 6 % (ref 3–6)
ALPHA 2 PERCENT: 16 % (ref 6–13)
ALPHA-1-GLOBULIN: 0.3 G/DL (ref 0.1–0.4)
ALPHA-2-GLOBULIN: 0.7 G/DL (ref 0.5–0.9)
ANION GAP SERPL CALCULATED.3IONS-SCNC: 9 MMOL/L (ref 9–17)
BASOPHILS # BLD: 0 % (ref 0–2)
BASOPHILS ABSOLUTE: 0.04 K/UL (ref 0–0.2)
BETA GLOBULIN: 0.7 G/DL (ref 0.5–1.1)
BETA PERCENT: 15 % (ref 11–19)
BUN BLDV-MCNC: 11 MG/DL (ref 8–23)
BUN/CREAT BLD: ABNORMAL (ref 9–20)
CALCIUM SERPL-MCNC: 7.5 MG/DL (ref 8.6–10.4)
CHLORIDE BLD-SCNC: 107 MMOL/L (ref 98–107)
CO2: 19 MMOL/L (ref 20–31)
CREAT SERPL-MCNC: 0.8 MG/DL (ref 0.5–0.9)
DIFFERENTIAL TYPE: ABNORMAL
EOSINOPHILS RELATIVE PERCENT: 3 % (ref 1–4)
GAMMA GLOBULIN %: 12 % (ref 9–20)
GAMMA GLOBULIN: 0.5 G/DL (ref 0.5–1.5)
GFR AFRICAN AMERICAN: >60 ML/MIN
GFR NON-AFRICAN AMERICAN: >60 ML/MIN
GFR SERPL CREATININE-BSD FRML MDRD: ABNORMAL ML/MIN/{1.73_M2}
GFR SERPL CREATININE-BSD FRML MDRD: ABNORMAL ML/MIN/{1.73_M2}
GLUCOSE BLD-MCNC: 164 MG/DL (ref 65–105)
GLUCOSE BLD-MCNC: 186 MG/DL (ref 65–105)
GLUCOSE BLD-MCNC: 190 MG/DL (ref 70–99)
GLUCOSE BLD-MCNC: 272 MG/DL (ref 65–105)
GLUCOSE BLD-MCNC: 285 MG/DL (ref 65–105)
HCT VFR BLD CALC: 24.1 % (ref 36.3–47.1)
HEMOGLOBIN: 7.7 G/DL (ref 11.9–15.1)
IMMATURE GRANULOCYTES: 2 %
LYMPHOCYTES # BLD: 19 % (ref 24–43)
MCH RBC QN AUTO: 28.3 PG (ref 25.2–33.5)
MCHC RBC AUTO-ENTMCNC: 32 G/DL (ref 28.4–34.8)
MCV RBC AUTO: 88.6 FL (ref 82.6–102.9)
MONOCYTES # BLD: 11 % (ref 3–12)
NRBC AUTOMATED: 0.7 PER 100 WBC
PATHOLOGIST: ABNORMAL
PDW BLD-RTO: 18.5 % (ref 11.8–14.4)
PLATELET # BLD: 253 K/UL (ref 138–453)
PLATELET ESTIMATE: ABNORMAL
PMV BLD AUTO: 10.9 FL (ref 8.1–13.5)
POTASSIUM SERPL-SCNC: 4.5 MMOL/L (ref 3.7–5.3)
PROTEIN ELECTROPHORESIS, SERUM: ABNORMAL
RBC # BLD: 2.72 M/UL (ref 3.95–5.11)
RBC # BLD: ABNORMAL 10*6/UL
SEG NEUTROPHILS: 65 % (ref 36–65)
SEGMENTED NEUTROPHILS ABSOLUTE COUNT: 7.04 K/UL (ref 1.5–8.1)
SODIUM BLD-SCNC: 135 MMOL/L (ref 135–144)
SURGICAL PATHOLOGY REPORT: NORMAL
TOTAL PROT. SUM,%: 100 % (ref 98–102)
TOTAL PROT. SUM: 4.5 G/DL (ref 6.3–8.2)
TOTAL PROTEIN: 4.5 G/DL (ref 6.4–8.3)
WBC # BLD: 10.8 K/UL (ref 3.5–11.3)
WBC # BLD: ABNORMAL 10*3/UL

## 2020-01-28 PROCEDURE — 6370000000 HC RX 637 (ALT 250 FOR IP): Performed by: STUDENT IN AN ORGANIZED HEALTH CARE EDUCATION/TRAINING PROGRAM

## 2020-01-28 PROCEDURE — 6360000002 HC RX W HCPCS: Performed by: EMERGENCY MEDICINE

## 2020-01-28 PROCEDURE — 85025 COMPLETE CBC W/AUTO DIFF WBC: CPT

## 2020-01-28 PROCEDURE — 0QB23ZX EXCISION OF RIGHT PELVIC BONE, PERCUTANEOUS APPROACH, DIAGNOSTIC: ICD-10-PCS | Performed by: RADIOLOGY

## 2020-01-28 PROCEDURE — 73552 X-RAY EXAM OF FEMUR 2/>: CPT

## 2020-01-28 PROCEDURE — 6360000002 HC RX W HCPCS: Performed by: RADIOLOGY

## 2020-01-28 PROCEDURE — 6370000000 HC RX 637 (ALT 250 FOR IP): Performed by: INTERNAL MEDICINE

## 2020-01-28 PROCEDURE — 2580000003 HC RX 258: Performed by: EMERGENCY MEDICINE

## 2020-01-28 PROCEDURE — 6370000000 HC RX 637 (ALT 250 FOR IP): Performed by: EMERGENCY MEDICINE

## 2020-01-28 PROCEDURE — 88311 DECALCIFY TISSUE: CPT

## 2020-01-28 PROCEDURE — 36415 COLL VENOUS BLD VENIPUNCTURE: CPT

## 2020-01-28 PROCEDURE — 1200000000 HC SEMI PRIVATE

## 2020-01-28 PROCEDURE — 80048 BASIC METABOLIC PNL TOTAL CA: CPT

## 2020-01-28 PROCEDURE — 99232 SBSQ HOSP IP/OBS MODERATE 35: CPT | Performed by: INTERNAL MEDICINE

## 2020-01-28 PROCEDURE — 73502 X-RAY EXAM HIP UNI 2-3 VIEWS: CPT

## 2020-01-28 PROCEDURE — 88341 IMHCHEM/IMCYTCHM EA ADD ANTB: CPT

## 2020-01-28 PROCEDURE — 82947 ASSAY GLUCOSE BLOOD QUANT: CPT

## 2020-01-28 PROCEDURE — 88305 TISSUE EXAM BY PATHOLOGIST: CPT

## 2020-01-28 PROCEDURE — 94761 N-INVAS EAR/PLS OXIMETRY MLT: CPT

## 2020-01-28 PROCEDURE — 2709999900 CT BIOPSY SUPERFICIAL BONE PERCUTANEOUS

## 2020-01-28 PROCEDURE — 99233 SBSQ HOSP IP/OBS HIGH 50: CPT | Performed by: INTERNAL MEDICINE

## 2020-01-28 PROCEDURE — 72190 X-RAY EXAM OF PELVIS: CPT

## 2020-01-28 PROCEDURE — 88342 IMHCHEM/IMCYTCHM 1ST ANTB: CPT

## 2020-01-28 RX ORDER — TRAMADOL HYDROCHLORIDE 50 MG/1
100 TABLET ORAL EVERY 6 HOURS PRN
Status: DISCONTINUED | OUTPATIENT
Start: 2020-01-28 | End: 2020-01-28

## 2020-01-28 RX ORDER — TRAMADOL HYDROCHLORIDE 50 MG/1
50 TABLET ORAL EVERY 6 HOURS PRN
Status: DISCONTINUED | OUTPATIENT
Start: 2020-01-28 | End: 2020-01-28

## 2020-01-28 RX ORDER — ACETAMINOPHEN 325 MG/1
650 TABLET ORAL EVERY 4 HOURS PRN
Status: DISCONTINUED | OUTPATIENT
Start: 2020-01-28 | End: 2020-02-19 | Stop reason: HOSPADM

## 2020-01-28 RX ORDER — FENTANYL CITRATE 50 UG/ML
INJECTION, SOLUTION INTRAMUSCULAR; INTRAVENOUS
Status: COMPLETED | OUTPATIENT
Start: 2020-01-28 | End: 2020-01-28

## 2020-01-28 RX ADMIN — OXYCODONE HYDROCHLORIDE 10 MG: 5 TABLET ORAL at 20:52

## 2020-01-28 RX ADMIN — INSULIN LISPRO 2 UNITS: 100 INJECTION, SOLUTION INTRAVENOUS; SUBCUTANEOUS at 09:11

## 2020-01-28 RX ADMIN — SENNOSIDES 8.6 MG: 8.6 TABLET, FILM COATED ORAL at 08:55

## 2020-01-28 RX ADMIN — FENTANYL CITRATE 50 MCG: 50 INJECTION INTRAMUSCULAR; INTRAVENOUS at 11:51

## 2020-01-28 RX ADMIN — PIPERACILLIN AND TAZOBACTAM 4.5 G: 4; .5 INJECTION, POWDER, LYOPHILIZED, FOR SOLUTION INTRAVENOUS; PARENTERAL at 09:08

## 2020-01-28 RX ADMIN — MIDODRINE HYDROCHLORIDE 2.5 MG: 2.5 TABLET ORAL at 08:55

## 2020-01-28 RX ADMIN — FERROUS SULFATE TAB EC 325 MG (65 MG FE EQUIVALENT) 325 MG: 325 (65 FE) TABLET DELAYED RESPONSE at 17:40

## 2020-01-28 RX ADMIN — DOCUSATE SODIUM 100 MG: 100 CAPSULE, LIQUID FILLED ORAL at 08:55

## 2020-01-28 RX ADMIN — INSULIN LISPRO 6 UNITS: 100 INJECTION, SOLUTION INTRAVENOUS; SUBCUTANEOUS at 20:52

## 2020-01-28 RX ADMIN — FERROUS SULFATE TAB EC 325 MG (65 MG FE EQUIVALENT) 325 MG: 325 (65 FE) TABLET DELAYED RESPONSE at 08:55

## 2020-01-28 RX ADMIN — LEVOTHYROXINE SODIUM 100 MCG: 100 TABLET ORAL at 08:55

## 2020-01-28 RX ADMIN — OXYCODONE HYDROCHLORIDE 10 MG: 5 TABLET ORAL at 14:12

## 2020-01-28 RX ADMIN — SENNOSIDES 8.6 MG: 8.6 TABLET, FILM COATED ORAL at 20:58

## 2020-01-28 RX ADMIN — DOCUSATE SODIUM 100 MG: 100 CAPSULE, LIQUID FILLED ORAL at 20:58

## 2020-01-28 RX ADMIN — INSULIN LISPRO 6 UNITS: 100 INJECTION, SOLUTION INTRAVENOUS; SUBCUTANEOUS at 17:39

## 2020-01-28 RX ADMIN — SODIUM CHLORIDE, PRESERVATIVE FREE 10 ML: 5 INJECTION INTRAVENOUS at 21:00

## 2020-01-28 RX ADMIN — INSULIN LISPRO 2 UNITS: 100 INJECTION, SOLUTION INTRAVENOUS; SUBCUTANEOUS at 03:26

## 2020-01-28 RX ADMIN — VENLAFAXINE HYDROCHLORIDE 150 MG: 150 CAPSULE, EXTENDED RELEASE ORAL at 08:55

## 2020-01-28 RX ADMIN — MIDODRINE HYDROCHLORIDE 2.5 MG: 2.5 TABLET ORAL at 17:40

## 2020-01-28 RX ADMIN — PIPERACILLIN AND TAZOBACTAM 4.5 G: 4; .5 INJECTION, POWDER, LYOPHILIZED, FOR SOLUTION INTRAVENOUS; PARENTERAL at 03:31

## 2020-01-28 RX ADMIN — INSULIN GLARGINE 10 UNITS: 100 INJECTION, SOLUTION SUBCUTANEOUS at 20:53

## 2020-01-28 RX ADMIN — OXYCODONE HYDROCHLORIDE 5 MG: 5 TABLET ORAL at 09:10

## 2020-01-28 ASSESSMENT — PAIN SCALES - GENERAL
PAINLEVEL_OUTOF10: 9
PAINLEVEL_OUTOF10: 4
PAINLEVEL_OUTOF10: 6
PAINLEVEL_OUTOF10: 9
PAINLEVEL_OUTOF10: 5

## 2020-01-28 ASSESSMENT — ENCOUNTER SYMPTOMS
COUGH: 0
EYE DISCHARGE: 0
NAUSEA: 0
VOMITING: 0
ABDOMINAL DISTENTION: 0
EYE PAIN: 0
BACK PAIN: 0
APNEA: 0
DIARRHEA: 0
CONSTIPATION: 0

## 2020-01-28 NOTE — PROGRESS NOTES
Diabetes Education  Rounded on patient - Patient stated not wanting to talk about diabetes self care this time. Going for testing and proc dues today. Education folder and contact phone number left at bedside.   Rebel Aguilera RN CDE

## 2020-01-28 NOTE — FLOWSHEET NOTE
MRI checklist completed with pt and family. Checklist faxed to MRI. Pt stated that she would like to wait till AM to go to MRI because she is tired and would like to rest. MRI notified.

## 2020-01-28 NOTE — PROGRESS NOTES
Gastroenterology Progress Note      Patient:   Socrates Collins   :    1950   Facility:   [address] [place] [address]   Date:     2020  Consultant:   Rodri Cid, Resident      Subjective:     77 y.o. female admitted 2020 with Septic shock ([facility] [A41.9, R65.21] and seen. No acute issues overnight. Review of Systems   Constitutional: Negative for activity change, chills and unexpected weight change. HENT: Negative for congestion and hearing loss. Eyes: Negative for pain and discharge. Respiratory: Negative for apnea and cough. Cardiovascular: Negative for chest pain and palpitations. Gastrointestinal: Negative for abdominal distention, constipation, diarrhea, nausea and vomiting. Genitourinary: Negative for difficulty urinating, dysuria and flank pain. Musculoskeletal: Negative for arthralgias and back pain. Neurological: Negative for dizziness and numbness. Psychiatric/Behavioral: Negative for agitation and behavioral problems. Objective:   Vital Signs:  BP (!) 132/54   Pulse 109   Temp 99.9 °F (37.7 °C) (Oral)   Resp 18   Ht 5' 2\" (1.575 m)   Wt 183 lb 13.8 oz (83.4 kg)   SpO2 94%   BMI 33.63 kg/m²      Physical Exam:   General appearance: Alert, NAD,   HEENT:atraumatic, normocephalic no scleral icterus  Lungs: CTA bilaterally    Heart:S1S2 tachycardic  Abdomen: Soft, NT, ND +BS, no masses  Skin:  No jaundice, no stigmata of chronic liver disease.   Neuro: moving upper extremities with no issue  Musculoskeletal: Grossly normal ROM  Psychiatitric: appears pleasant, not tearful, no agitation    LABS and IMAGING:     CBC  Recent Labs     20  1429 20  2057 20  0541 20  1207 20  0556   WBC 13.5*  --  11.9* 12.7* 10.8   HGB 7.9* 7.4* 7.4* 7.6* 7.7*   MCV 89.8  --  86.7 88.0 88.6   RDW 17.8*  --  17.9* 18.3* 18.5*     --  204 256 253       ANEMIA STUDIES  Recent Labs     20  1207   LABIRON 8*   TIBC 211* FERRITIN 64   [id number] 904   FOLATE 13.0       BMP  Recent Labs     01/26/20  1429 01/27/20  0541 01/28/20  0556    134* 135   K 4.1 3.9 4.5    105 107   CO2 20 19* 19*   BUN 28* 16 11   CREATININE 0.61 0.63 0.80   GLUCOSE 378* 232* 190*   CALCIUM 8.0* 7.6* 7.5*       LFTS  Recent Labs     01/27/20  0541   ALKPHOS 91   ALT 11   AST 19   BILITOT 0.24*   BILIDIR <0.08   LABALBU 2.3*       AMYLASE/LIPASE/AMMONIA  No results for input(s): AMYLASE, LIPASE, AMMONIA in the last 72 hours. PT/INR  No results for input(s): PROTIME, INR in the last 72 hours. Assessment/Plan   Active Problems:    Septic shock (HCC)    Anemia    Liver lesion    History of breast cancer  Resolved Problems:    * No resolved hospital problems. *    1. Plan for T10 biopsy per Heme Onc  2. Will plan for colonoscopy after biopsy, can be done also as outpatient  3. Iron deficiency anemia  4. Remains on Zosyn per pirmary  5. Venlafaxine started yesterday  6. Hypothyroidism per primary on synthroid 100 mcg  7. Proamitine 2.5 TID for hypotension. Per primary  8. Sinus tachycardia per primary  9. Plan EGD colonoscopy on Thursday. Clear liquid diet tomorrow NPO after midnight on Thursday 1/30/20, [place] for tomorrow      Steffany Waddell MD      Department of Internal Medicine  [facility]         1/28/2020, 7:00 AM    Attending Physician Statement  I have discussed the care of Elgin Collins and   I have examined the patient myselft independently, and taken ros and hpi , including pertinent history and exam findings,  with the author of this note . I have reviewed the key elements of all parts of the encounter with the nurse practitioner/resident.     I agree with the assessment, plan and orders as documented by the above health care provider         Electronically signed by Rafael Fagan MD

## 2020-01-28 NOTE — FLOWSHEET NOTE
[facility] was contacted in regards to results of CT imaging and Blood cultures. Results were faxed to MICU and were placed in pt chart.

## 2020-01-28 NOTE — CARE COORDINATION
Patient/family seen: Yes    Informed patient/family of [facility]PCI-A Medical Bundle Program with potential outreach by either Care Transitions Team or [facility]Health Team based on hospital admission and location. BPCI-A Notification Letter given: Yes    Current discharge plan: Home with Lonnie Rosas with Case Management: Yes  Spoke with Ros BRISCOE. Currently Naif Hammer to discharge home with home care, [facility].

## 2020-01-28 NOTE — PROGRESS NOTES
INTENSIVE CARE UNIT  Resident Physician Progress Note    Patient - Lindsey Collins  Date of Admission -  2020  1:38 PM  Date of Evaluation -  2020  Room and Bed Number -  0126/0126-01   Hospital Day - 2  Reason for ICU admission: Hypotension  CODE STATUS: Full code    SUBJECTIVE:     OVERNIGHT EVENTS:    No acute events reported. Tolerated general diet yesterday. No reports of melena or hematochezia, hematemesis. Blood pressure within acceptable limits. Made good amounts of urine. TODAY: Vital signs within normal limits. WBC trending down. Bicarb 19. Patient scheduled for spinal biopsy exam.  GI plans EGD/colonoscopy after biopsy and possibly as outpatient to rule out synchronous tumor.     AWAKE & FOLLOWING COMMANDS:  [] No   [x] Yes    SECRETIONS Amount:  [] Small [] Moderate  [] Large  [x] None  Color:     [] White [] Colored  [] Bloody    SEDATION:  RAAS Score:  [] Propofol gtt  [] Versed gtt  [] Ativan gtt   [x] No Sedation    PARALYZED:  [x] No    [] Yes    VASOPRESSORS:  [x] No    [] Yes  [] Levophed [] Dopamine [] Vasopressin  [] Dobutamine [] Phenylephrine [] Epinephrine      OBJECTIVE:     VITAL SIGNS:  BP (!) 109/58   Pulse 110   Temp 99.2 °F (37.3 °C) (Oral)   Resp 25   Ht 5' 2\" (1.575 m)   Wt 183 lb 13.8 oz (83.4 kg)   SpO2 99%   BMI 33.63 kg/m²   Tmax over 24 hours:  Temp (24hrs), Av.2 °F (37.3 °C), Min:98.6 °F (37 °C), Max:99.9 °F (37.7 °C)      Patient Vitals for the past 8 hrs:   BP Temp Temp src Pulse Resp SpO2   20 0858 -- -- -- -- 25 99 %   20 0800 (!) 109/58 99.2 °F (37.3 °C) Oral 110 (!) 51 96 %   20 0700 (!) 125/55 -- -- 112 18 97 %   20 0600 (!) 132/54 -- -- 109 18 --   20 0500 (!) 115/45 -- -- 106 18 94 %   20 0400 (!) 119/48 99.9 °F (37.7 °C) Oral 105 (!) 35 93 %         Intake/Output Summary (Last 24 hours) at 2020 1104  Last data filed at 2020 0900  Gross per 24 hour   Intake 4175 ml   Output 2620 ml   Net 1555 ml 650 mg, Q4H PRN  glucose, 15 g, PRN  dextrose, 12.5 g, PRN  glucagon (rDNA), 1 mg, PRN  dextrose, 100 mL/hr, PRN  oxyCODONE, 5 mg, Q4H PRN    Or  oxyCODONE, 10 mg, Q4H PRN        SUPPORT DEVICES: [] Ventilator [] BIPAP  [] Nasal Cannula [x] Room Air    VENT SETTINGS (Comprehensive) (if applicable):        Additional Respiratory  Assessments  Pulse: 110  Resp: 25  SpO2: 99 %    ABGs:     No results found for: PHART, PH, DNI1OYA, PCO2, PO2ART, PO2, FTB3MYC, HCO3, BEART, BE, THGBART, THB, VUY9UHC, S1HNTOIO, O2SAT, FIO2      DATA:  Complete Blood Count:   Recent Labs     01/27/20  0541 01/27/20  1207 01/28/20  0556   WBC 11.9* 12.7* 10.8   RBC 2.56* 2.67* 2.72*   HGB 7.4* 7.6* 7.7*   HCT 22.2* 23.5* 24.1*   MCV 86.7 88.0 88.6   MCH 28.9 28.5 28.3   MCHC 33.3 32.3 32.0   RDW 17.9* 18.3* 18.5*    256 253   MPV 11.2 11.0 10.9        Last 3 Blood Glucose:   Recent Labs     01/26/20  1429 01/27/20  0541 01/28/20  0556   GLUCOSE 378* 232* 190*        PT/INR:  No results found for: PROTIME, INR  PTT:  No results found for: APTT, PTT    Comprehensive Metabolic Profile:   Recent Labs     01/26/20  1429 01/27/20  0541 01/27/20  1207 01/28/20  0556    134*  --  135   K 4.1 3.9  --  4.5    105  --  107   CO2 20 19*  --  19*   BUN 28* 16  --  11   CREATININE 0.61 0.63  --  0.80   GLUCOSE 378* 232*  --  190*   CALCIUM 8.0* 7.6*  --  7.5*   PROT  --  4.6* 4.5*  --    LABALBU  --  2.3*  --   --    BILITOT  --  0.24*  --   --    ALKPHOS  --  91  --   --    AST  --  19  --   --    ALT  --  11  --   --       Magnesium: No results found for: MG  Phosphorus: No results found for: PHOS  Ionized Calcium: No results found for: CAION     Urinalysis:   Lab Results   Component Value Date    NITRU NEGATIVE 01/26/2020    COLORU YELLOW 01/26/2020    PHUR 5.0 01/26/2020    SPECGRAV 1.027 01/26/2020    LEUKOCYTESUR NEGATIVE 01/26/2020    UROBILINOGEN Normal 01/26/2020    BILIRUBINUR NEGATIVE 01/26/2020    GLUCOSEU 3+ 01/26/2020 [address] NEGATIVE 01/26/2020       HgBA1c:  No results found for: LABA1C  TSH:  No results found for: TSH  Lactic Acid: No results found for: LACTA   Troponin: No results for input(s): TROPONINI in the last 72 hours. Microbiology:  No growth on cultures      Other Labs:  CBC with Differential:    Lab Results   Component Value Date    WBC 10.8 01/28/2020    RBC 2.72 01/28/2020    HGB 7.7 01/28/2020    HCT 24.1 01/28/2020     01/28/2020    MCV 88.6 01/28/2020    MCH 28.3 01/28/2020    MCHC 32.0 01/28/2020    RDW 18.5 01/28/2020    LYMPHOPCT 19 01/28/2020    MONOPCT 11 01/28/2020    BASOPCT 0 01/28/2020    MONOSABS 1.21 01/28/2020    LYMPHSABS 2.02 01/28/2020    EOSABS 0.32 01/28/2020    BASOSABS 0.04 01/28/2020    DIFFTYPE NOT REPORTED 01/28/2020     BMP:    Lab Results   Component Value Date     01/28/2020    K 4.5 01/28/2020     01/28/2020    CO2 19 01/28/2020    BUN 11 01/28/2020    LABALBU 2.3 01/27/2020    CREATININE 0.80 01/28/2020    CALCIUM 7.5 01/28/2020    GFRAA >60 01/28/2020    LABGLOM >60 01/28/2020    GLUCOSE 190 01/28/2020         Radiology/Imaging:      ASSESSMENT:     Patient Active Problem List    Diagnosis Date Noted    History of breast cancer 01/27/2020    Anemia 01/26/2020    Liver lesion 01/26/2020          PLAN:     WEAN PER PROTOCOL:  [] No   [] Yes  [x] N/A    ICU PROPHYLAXIS:  Stress ulcer:  [] PPI Agent  [] [id number] [] Sucralfate  [] Other:  VTE:   [x] Enoxaparin  [] Unfract. Heparin Subcut  [] EPC Cuffs    NUTRITION:  [] NPO [] Tube Feeding (Specify: ) [] TPN  [x] PO    HOME MEDS RECONCILED: [] No  [x] Yes    CONSULTATION NEEDED:  [] No  [x] Yes    FAMILY UPDATED:    [x] No  [] Yes    TRANSFER OUT OF ICU:   [x] No  [] Yes            Plan:  · Antibiotics discontinued. Will monitor off antibiotics. Septic shock ruled out. · Heme-onc following. Recommendations appreciated. IR image guided biopsy of T10 vertebra today. Lovenox and aspirin held.   · Monitor hemoglobin daily.  Currently stable. · Blood pressure improved. Continue midodrine 2.5 mg 3 times daily. · Diet as tolerated. · Sugars improved. Continue Lantus 10 units nightly and continue insulin correctional scale. · Okay for transfer to [address] bed.       Sen Marrero MD  PGY-2 Resident  Internal Medicine  [facility]  1/28/2020 11:04 AM

## 2020-01-28 NOTE — PROGRESS NOTES
Oral Q4H PRN Kala Shook MD   10 mg at 20       Allergies:  Patient has no known allergies. Social History:    Denies smoking. Denies taking alcohol. Family History: Hypertension and diabetes    REVIEW OF SYSTEMS:      Constitutional: No fever or chills. No night sweats, no weight loss   Eyes: No eye discharge, double vision, or eye pain   HEENT: negative for sore mouth, sore throat, hoarseness and voice change   Respiratory: negative for cough , sputum, dyspnea, wheezing, hemoptysis, chest pain   Cardiovascular: negative for chest pain, dyspnea, palpitations, orthopnea, PND   Gastrointestinal: negative for nausea, vomiting, diarrhea, constipation, abdominal pain, Dysphagia, hematemesis and hematochezia   Genitourinary: negative for frequency, dysuria, nocturia, urinary incontinence, and hematuria   Integument: negative for rash, skin lesions, bruises.    Hematologic/Lymphatic: negative for easy bruising, bleeding, lymphadenopathy, or petechiae   Endocrine: negative for heat or cold intolerance,weight changes, change in bowel habits and hair loss   Musculoskeletal: Positive back pain  Neurological: negative for headaches, dizziness, seizures, weakness, numbness    PHYSICAL EXAM:        BP (!) 146/78   Pulse 120   Temp 99 °F (37.2 °C) (Oral)   Resp 22   Ht 5' 2\" (1.575 m)   Wt 183 lb 13.8 oz (83.4 kg)   SpO2 93%   BMI 33.63 kg/m²    Temp (24hrs), Av.2 °F (37.3 °C), Min:98.6 °F (37 °C), Max:99.9 °F (37.7 °C)      General appearance - well appearing, no in pain or distress   Mental status - alert and cooperative   Eyes - pupils equal and reactive, extraocular eye movements intact   Ears - bilateral TM's and external ear canals normal   Mouth - mucous membranes moist, pharynx normal without lesions   Neck - supple, no significant adenopathy   Lymphatics - no palpable lymphadenopathy, no hepatosplenomegaly   Chest - clear to auscultation, no wheezes, rales or rhonchi, symmetric air entry   Heart - normal rate, regular rhythm, normal S1, S2, no murmurs  Abdomen - soft, nontender, nondistended, no masses or organomegaly   Neurological - alert, oriented, normal speech, no focal findings or movement disorder noted   Musculoskeletal - no joint tenderness, deformity or swelling   Extremities - peripheral pulses normal, no pedal edema, no clubbing or cyanosis   Skin - normal coloration and turgor, no rashes, no suspicious skin lesions noted ,      DATA:      Labs:     CBC:   Recent Labs     01/27/20  1207 01/28/20  0556   WBC 12.7* 10.8   HGB 7.6* 7.7*   HCT 23.5* 24.1*    253     BMP:   Recent Labs     01/27/20  0541 01/28/20  0556   * 135   K 3.9 4.5   CO2 19* 19*   BUN 16 11   CREATININE 0.63 0.80   LABGLOM >60 >60   GLUCOSE 232* 190*     PT/INR: No results for input(s): PROTIME, INR in the last 72 hours. APTT:No results for input(s): APTT in the last 72 hours. LIVER PROFILE:  Recent Labs     01/27/20  0541   AST 19   ALT 11   LABALBU 2.3*   Us Renal Complete    Result Date: 1/26/2020  EXAMINATION: RETROPERITONEAL ULTRASOUND OF THE KIDNEYS AND URINARY BLADDER 1/26/2020 COMPARISON: None HISTORY: ORDERING SYSTEM PROVIDED HISTORY: left sided hydroureteronephrosis, eval kidneys TECHNOLOGIST PROVIDED HISTORY: left sided hydroureteronephrosis, eval kidneys FINDINGS: Kidneys: The right kidney measures 12.5 cm in length and the left kidney measures 12 cm in length. Kidneys demonstrate normal cortical echogenicity. Probable vascular calcifications are noted bilaterally. Mild left hydronephrosis. . Bladder: Bladder is decompressed with a Matt catheter.      Mild left hydronephrosis     Ct Abdomen Pelvis W Iv Contrast Additional Contrast? Oral    Result Date: 1/26/2020  EXAMINATION: CT OF THE ABDOMEN AND PELVIS WITH CONTRAST 1/26/2020 5:25 pm TECHNIQUE: CT of the abdomen and pelvis was performed with the administration of intravenous contrast. Multiplanar reformatted images are provided for

## 2020-01-29 ENCOUNTER — APPOINTMENT (OUTPATIENT)
Dept: MRI IMAGING | Age: 70
DRG: 347 | End: 2020-01-29
Attending: INTERNAL MEDICINE
Payer: COMMERCIAL

## 2020-01-29 ENCOUNTER — APPOINTMENT (OUTPATIENT)
Dept: CT IMAGING | Age: 70
DRG: 347 | End: 2020-01-29
Attending: INTERNAL MEDICINE
Payer: COMMERCIAL

## 2020-01-29 PROBLEM — E11.69 DIABETES MELLITUS TYPE 2 IN OBESE (HCC): Chronic | Status: ACTIVE | Noted: 2020-01-29

## 2020-01-29 PROBLEM — I10 ESSENTIAL HYPERTENSION: Chronic | Status: ACTIVE | Noted: 2020-01-29

## 2020-01-29 PROBLEM — E03.9 ACQUIRED HYPOTHYROIDISM: Chronic | Status: ACTIVE | Noted: 2020-01-29

## 2020-01-29 PROBLEM — C79.9 METASTATIC CARCINOMA (HCC): Status: ACTIVE | Noted: 2020-01-29

## 2020-01-29 PROBLEM — E66.9 DIABETES MELLITUS TYPE 2 IN OBESE (HCC): Chronic | Status: ACTIVE | Noted: 2020-01-29

## 2020-01-29 PROBLEM — F33.40 RECURRENT MAJOR DEPRESSIVE DISORDER, IN REMISSION (HCC): Chronic | Status: ACTIVE | Noted: 2020-01-29

## 2020-01-29 LAB
ABSOLUTE EOS #: 0.23 K/UL (ref 0–0.44)
ABSOLUTE IMMATURE GRANULOCYTE: 0.16 K/UL (ref 0–0.3)
ABSOLUTE LYMPH #: 1.63 K/UL (ref 1.1–3.7)
ABSOLUTE MONO #: 1.2 K/UL (ref 0.1–1.2)
ANION GAP SERPL CALCULATED.3IONS-SCNC: 10 MMOL/L (ref 9–17)
BASOPHILS # BLD: 1 % (ref 0–2)
BASOPHILS ABSOLUTE: 0.05 K/UL (ref 0–0.2)
BLOOD BANK SPECIMEN: NORMAL
BUN BLDV-MCNC: 11 MG/DL (ref 8–23)
BUN/CREAT BLD: ABNORMAL (ref 9–20)
CALCIUM SERPL-MCNC: 8 MG/DL (ref 8.6–10.4)
CHLORIDE BLD-SCNC: 106 MMOL/L (ref 98–107)
CO2: 19 MMOL/L (ref 20–31)
CREAT SERPL-MCNC: 0.47 MG/DL (ref 0.5–0.9)
DIFFERENTIAL TYPE: ABNORMAL
EOSINOPHILS RELATIVE PERCENT: 2 % (ref 1–4)
GFR AFRICAN AMERICAN: >60 ML/MIN
GFR NON-AFRICAN AMERICAN: >60 ML/MIN
GFR SERPL CREATININE-BSD FRML MDRD: ABNORMAL ML/MIN/{1.73_M2}
GFR SERPL CREATININE-BSD FRML MDRD: ABNORMAL ML/MIN/{1.73_M2}
GLUCOSE BLD-MCNC: 168 MG/DL (ref 65–105)
GLUCOSE BLD-MCNC: 189 MG/DL (ref 65–105)
GLUCOSE BLD-MCNC: 197 MG/DL (ref 65–105)
GLUCOSE BLD-MCNC: 226 MG/DL (ref 65–105)
GLUCOSE BLD-MCNC: 239 MG/DL (ref 70–99)
HCT VFR BLD CALC: 21.6 % (ref 36.3–47.1)
HCT VFR BLD CALC: 25.1 % (ref 36.3–47.1)
HEMOGLOBIN: 6.3 G/DL (ref 11.9–15.1)
HEMOGLOBIN: 7.7 G/DL (ref 11.9–15.1)
IMMATURE GRANULOCYTES: 2 %
LYMPHOCYTES # BLD: 16 % (ref 24–43)
MCH RBC QN AUTO: 27.9 PG (ref 25.2–33.5)
MCHC RBC AUTO-ENTMCNC: 29.2 G/DL (ref 28.4–34.8)
MCV RBC AUTO: 95.6 FL (ref 82.6–102.9)
MONOCYTES # BLD: 12 % (ref 3–12)
NRBC AUTOMATED: 0.6 PER 100 WBC
PATHOLOGIST REVIEW: NORMAL
PDW BLD-RTO: 18.5 % (ref 11.8–14.4)
PLATELET # BLD: 327 K/UL (ref 138–453)
PLATELET ESTIMATE: ABNORMAL
PMV BLD AUTO: 10.7 FL (ref 8.1–13.5)
POTASSIUM SERPL-SCNC: 4 MMOL/L (ref 3.7–5.3)
RBC # BLD: 2.26 M/UL (ref 3.95–5.11)
RBC # BLD: ABNORMAL 10*6/UL
SEG NEUTROPHILS: 68 % (ref 36–65)
SEGMENTED NEUTROPHILS ABSOLUTE COUNT: 6.99 K/UL (ref 1.5–8.1)
SODIUM BLD-SCNC: 135 MMOL/L (ref 135–144)
WBC # BLD: 10.3 K/UL (ref 3.5–11.3)
WBC # BLD: ABNORMAL 10*3/UL

## 2020-01-29 PROCEDURE — 1200000000 HC SEMI PRIVATE

## 2020-01-29 PROCEDURE — 6370000000 HC RX 637 (ALT 250 FOR IP): Performed by: STUDENT IN AN ORGANIZED HEALTH CARE EDUCATION/TRAINING PROGRAM

## 2020-01-29 PROCEDURE — 86920 COMPATIBILITY TEST SPIN: CPT

## 2020-01-29 PROCEDURE — A9576 INJ PROHANCE MULTIPACK: HCPCS | Performed by: INTERNAL MEDICINE

## 2020-01-29 PROCEDURE — 86901 BLOOD TYPING SEROLOGIC RH(D): CPT

## 2020-01-29 PROCEDURE — 85018 HEMOGLOBIN: CPT

## 2020-01-29 PROCEDURE — 80048 BASIC METABOLIC PNL TOTAL CA: CPT

## 2020-01-29 PROCEDURE — 99232 SBSQ HOSP IP/OBS MODERATE 35: CPT | Performed by: INTERNAL MEDICINE

## 2020-01-29 PROCEDURE — 6370000000 HC RX 637 (ALT 250 FOR IP): Performed by: EMERGENCY MEDICINE

## 2020-01-29 PROCEDURE — 86900 BLOOD TYPING SEROLOGIC ABO: CPT

## 2020-01-29 PROCEDURE — P9040 RBC LEUKOREDUCED IRRADIATED: HCPCS

## 2020-01-29 PROCEDURE — 36415 COLL VENOUS BLD VENIPUNCTURE: CPT

## 2020-01-29 PROCEDURE — 82947 ASSAY GLUCOSE BLOOD QUANT: CPT

## 2020-01-29 PROCEDURE — 85025 COMPLETE CBC W/AUTO DIFF WBC: CPT

## 2020-01-29 PROCEDURE — 36430 TRANSFUSION BLD/BLD COMPNT: CPT

## 2020-01-29 PROCEDURE — 2580000003 HC RX 258: Performed by: STUDENT IN AN ORGANIZED HEALTH CARE EDUCATION/TRAINING PROGRAM

## 2020-01-29 PROCEDURE — 6360000004 HC RX CONTRAST MEDICATION: Performed by: INTERNAL MEDICINE

## 2020-01-29 PROCEDURE — 86850 RBC ANTIBODY SCREEN: CPT

## 2020-01-29 PROCEDURE — 94761 N-INVAS EAR/PLS OXIMETRY MLT: CPT

## 2020-01-29 PROCEDURE — 6370000000 HC RX 637 (ALT 250 FOR IP): Performed by: INTERNAL MEDICINE

## 2020-01-29 PROCEDURE — 2580000003 HC RX 258: Performed by: EMERGENCY MEDICINE

## 2020-01-29 PROCEDURE — 85014 HEMATOCRIT: CPT

## 2020-01-29 PROCEDURE — 99223 1ST HOSP IP/OBS HIGH 75: CPT | Performed by: INTERNAL MEDICINE

## 2020-01-29 PROCEDURE — 72157 MRI CHEST SPINE W/O & W/DYE: CPT

## 2020-01-29 RX ORDER — 0.9 % SODIUM CHLORIDE 0.9 %
20 INTRAVENOUS SOLUTION INTRAVENOUS ONCE
Status: COMPLETED | OUTPATIENT
Start: 2020-01-29 | End: 2020-01-29

## 2020-01-29 RX ORDER — SODIUM CHLORIDE 0.9 % (FLUSH) 0.9 %
10 SYRINGE (ML) INJECTION PRN
Status: DISCONTINUED | OUTPATIENT
Start: 2020-01-29 | End: 2020-02-19 | Stop reason: HOSPADM

## 2020-01-29 RX ADMIN — INSULIN LISPRO 6 UNITS: 100 INJECTION, SOLUTION INTRAVENOUS; SUBCUTANEOUS at 08:45

## 2020-01-29 RX ADMIN — FERROUS SULFATE TAB EC 325 MG (65 MG FE EQUIVALENT) 325 MG: 325 (65 FE) TABLET DELAYED RESPONSE at 08:43

## 2020-01-29 RX ADMIN — SENNOSIDES 8.6 MG: 8.6 TABLET, FILM COATED ORAL at 08:44

## 2020-01-29 RX ADMIN — FERROUS SULFATE TAB EC 325 MG (65 MG FE EQUIVALENT) 325 MG: 325 (65 FE) TABLET DELAYED RESPONSE at 19:54

## 2020-01-29 RX ADMIN — LEVOTHYROXINE SODIUM 100 MCG: 100 TABLET ORAL at 08:43

## 2020-01-29 RX ADMIN — INSULIN LISPRO 2 UNITS: 100 INJECTION, SOLUTION INTRAVENOUS; SUBCUTANEOUS at 03:53

## 2020-01-29 RX ADMIN — SODIUM CHLORIDE 20 ML: 9 INJECTION, SOLUTION INTRAVENOUS at 08:44

## 2020-01-29 RX ADMIN — OXYCODONE HYDROCHLORIDE 10 MG: 5 TABLET ORAL at 21:28

## 2020-01-29 RX ADMIN — DOCUSATE SODIUM 100 MG: 100 CAPSULE, LIQUID FILLED ORAL at 08:44

## 2020-01-29 RX ADMIN — POLYETHYLENE GLYCOL 3350, SODIUM SULFATE ANHYDROUS, SODIUM BICARBONATE, SODIUM CHLORIDE, POTASSIUM CHLORIDE 4000 ML: 236; 22.74; 6.74; 5.86; 2.97 POWDER, FOR SOLUTION ORAL at 18:00

## 2020-01-29 RX ADMIN — OXYCODONE HYDROCHLORIDE 10 MG: 5 TABLET ORAL at 13:00

## 2020-01-29 RX ADMIN — OXYCODONE HYDROCHLORIDE 10 MG: 5 TABLET ORAL at 03:46

## 2020-01-29 RX ADMIN — OXYCODONE HYDROCHLORIDE 10 MG: 5 TABLET ORAL at 08:52

## 2020-01-29 RX ADMIN — OXYCODONE HYDROCHLORIDE 10 MG: 5 TABLET ORAL at 17:30

## 2020-01-29 RX ADMIN — VENLAFAXINE HYDROCHLORIDE 150 MG: 150 CAPSULE, EXTENDED RELEASE ORAL at 08:43

## 2020-01-29 RX ADMIN — DOCUSATE SODIUM 100 MG: 100 CAPSULE, LIQUID FILLED ORAL at 21:28

## 2020-01-29 RX ADMIN — SODIUM CHLORIDE, PRESERVATIVE FREE 10 ML: 5 INJECTION INTRAVENOUS at 21:33

## 2020-01-29 RX ADMIN — MIDODRINE HYDROCHLORIDE 2.5 MG: 2.5 TABLET ORAL at 08:43

## 2020-01-29 RX ADMIN — GADOTERIDOL 16 ML: 279.3 INJECTION, SOLUTION INTRAVENOUS at 18:53

## 2020-01-29 ASSESSMENT — PAIN SCALES - GENERAL
PAINLEVEL_OUTOF10: 8
PAINLEVEL_OUTOF10: 9
PAINLEVEL_OUTOF10: 8
PAINLEVEL_OUTOF10: 2
PAINLEVEL_OUTOF10: 7
PAINLEVEL_OUTOF10: 7

## 2020-01-29 ASSESSMENT — ENCOUNTER SYMPTOMS
DIARRHEA: 0
EYE ITCHING: 0
EYE DISCHARGE: 0
COLOR CHANGE: 0
CONSTIPATION: 0
BLOOD IN STOOL: 0
COUGH: 0
APNEA: 0
CHOKING: 0
RECTAL PAIN: 0
ABDOMINAL DISTENTION: 0
NAUSEA: 0
VOMITING: 0

## 2020-01-29 NOTE — PROGRESS NOTES
Today's Date: 1/29/2020  Patient Name: Deshaun Horner  Date of admission: 1/26/2020  1:38 PM  Patient's age: 77 y. o., 1950  Admission Dx: Septic shock ([facility] [A41.9, R65.21]    Reason for Consult: management recommendations  Requesting Physician: Arelis Iraheta MD    CHIEF COMPLAINT: Abdominal pain. History of breast cancer. Back pain. History Obtained From:  patient, electronic medical record    Interval history:    Patient seen and examined. Labs and vitals reviewed. Patient transferred out of the ICU. Hemoglobin dropped below 7. Patient noted to be somewhat confused. Denies fever chills. HISTORY OF PRESENT ILLNESS:      The patient is a 77 y.o.  female who is admitted to the hospital for chief complaints of abdominal pain. According to the records patient has remote history of breast cancer diagnosed in 2007. There are no records available regarding treatment. Per patient she underwent left mastectomy and had around 13 lymph nodes involved. Subsequently patient underwent chemotherapy which she completed in 2008. Patient did not receive any radiation therapy. Patient was then given Arimidex for many years likely 5 years. Patient has not seen a oncologist for quite a few years now. Patient has been having back pain for a while likely many months. Patient started having bowel pain with a lot of nausea and vomiting around [place] time which persisted and got worse eventually patient presented to the ER at [facility] CT scan done per report shows possible duodenitis and concern regarding bowel perforation. Subsequently patient was transferred to [place].  Repeat CT does not show any concerning findings in the duodenum but does show blastic lesions involving bone especially T10. Patient denies any lower extremity weakness. Denies any saddle anesthesia. Denies any loss of control of bowel or bladder. Additional work-up also shows anemia and iron deficiency. right-sided hydronephrosis. Nonvisualization of the pancreas. No gallbladder wall thickening. No gallstones. No sonographic Nunez sign. Common bile duct is within normal limits measuring 5 mm in diameter. Minimal amount of free fluid within the right upper quadrant. Minimal amount of free fluid within the upper abdomen. Heterogeneous increased liver echogenicity could be on the basis of hepatocellular disease or hepatic steatosis. Xr Chest Portable    Result Date: 1/26/2020  EXAMINATION: ONE XRAY VIEW OF THE CHEST 1/26/2020 2:53 pm COMPARISON: None. HISTORY: ORDERING SYSTEM PROVIDED HISTORY: PICC line placement TECHNOLOGIST PROVIDED HISTORY: PICC line placement FINDINGS: The lungs are without consolidation effusion. There is no pneumothorax. The heart is prominent. The upper abdomen is unremarkable. The extrathoracic soft tissues are unremarkable. There is a right subclavian port with the tip in the mid SVC. There is a right-sided PICC line with the tip in the distal mid aspect of the SVC. Mild cardiomegaly without acute pulmonary process. Right-sided PICC line with the tip in the distal mid aspect of the SVC. IMAGING DATA:          IMPRESSION:     Primary Problem  Anemia    Active Hospital Problems    Diagnosis Date Noted    Diabetes mellitus type 2 in obese ([facility] [E11.69, E66.9] 01/29/2020    Essential hypertension [I10] 01/29/2020    Acquired hypothyroidism [E03.9] 01/29/2020    Recurrent major depressive disorder, in remission ([facility] [F33.40] 01/29/2020    Metastatic carcinoma ([facility] [C79.9] 01/29/2020    History of breast cancer [Z85.3] 01/27/2020    Anemia [D64.9] 01/26/2020    Liver lesion [K76.9] 01/26/2020     History of breast cancer likely hormone receptor positive. Status post left mastectomy and chemotherapy and aromatase inhibitor therapy.   Iron deficiency  Hypoproliferative anemia  Bone lesions concerning for malignancy  Back pain second to bone

## 2020-01-29 NOTE — PROGRESS NOTES
Mattie  Occupational Therapy Not Seen Note    DATE: 2020  Name: Micheline Collins  : 1950  MRN: [id number]    Patient not available for Occupational Therapy due to:    [] Testing:    [] Hemodialysis    [] Blood Transfusion in Progress    []Refusal by Patient:    [] Surgery/Procedure:    [] Strict Bedrest    [] Sedation    [] Spine Precautions     [] Pt being transferred to palliative care at this time. Spoke with pt/family and OT services to be defered. [] Pt independent with functional mobility and functional tasks.  Pt with no OT acute care needs at this time, will defer OT eval.    [x] Other: likely need of stabilization/fixaiton to the left femoral neck lesion per Ortho note; Ck in pm to determine if surgical plan determined    Next Scheduled Treatment: Ck in pm as able or      JESSICA Bourgeois/ALONDRA

## 2020-01-29 NOTE — H&P
Distended gallbladder.  No radiodense gallstones noted but ultrasound is   recommended.       Extensive metastatic disease is a risk for fracture at T10.      Bone marrow biopsy was done yesterday.      GI consulted for anemia, EGD tomorrow     Ortho for femur fracture. \"    Past Medical History:     Past Medical History:   Diagnosis Date    Breast cancer ([facility] 2007    CAD (coronary atherosclerotic disease)     [facility]     Essential hypertension     Hypothyroidism (acquired)         Past Surgical History:     Past Surgical History:   Procedure Laterality Date    CORONARY ARTERY BYPASS GRAFT  2007    JOINT REPLACEMENT  2008    MASTECTOMY  2007        Medications Prior to Admission:     Prior to Admission medications    Medication Sig Start Date End Date Taking? Authorizing Provider   venlafaxine (EFFEXOR XR) 150 MG extended release capsule Take 150 mg by mouth daily   Yes Historical Provider, MD   metFORMIN (GLUCOPHAGE) 1000 MG tablet Take 1,000 mg by mouth 2 times daily (with meals)   Yes Historical Provider, MD   levothyroxine (SYNTHROID) 100 MCG tablet Take 100 mcg by mouth Daily   Yes Historical Provider, MD   glimepiride (AMARYL) 4 MG tablet Take 4 mg by mouth 2 times daily   Yes Historical Provider, MD   lisinopril (PRINIVIL;ZESTRIL) 2.5 MG tablet Take 2.5 mg by mouth daily   Yes Historical Provider, MD   carvedilol (COREG) 6.25 MG tablet Take 6.25 mg by mouth 2 times daily (with meals)   Yes Historical Provider, MD   SITagliptin (JANUVIA) 100 MG tablet Take 100 mg by mouth daily   Yes Historical Provider, MD   aspirin 81 MG tablet Take 81 mg by mouth daily   Yes Historical Provider, MD        Allergies:     Patient has no known allergies. Social History:     Tobacco:    has no history on file for tobacco.  Alcohol:      has no history on file for alcohol. Drug Use:  has no history on file for drug.     Family History:     Family History   Problem Relation Age of Onset    Hypertension Mother     Hypertension Father        Review of Systems:     Negative except for those highlighted:    CONSTITUTIONAL:  fevers, chills, sweats, fatigue, weight loss, generalized weakness  HEENT:  Vision changes, hearing changes, runny nose, throat pain  RESPIRATORY:  shortness of breath, cough, congestion, wheezing.   CARDIOVASCULAR:  chest pain, palpitations  GASTROINTESTINAL:  nausea, vomiting, diarrhea, constipation, change in bowel habits, abdominal pain   GENITOURINARY:  difficulty of urination, burning with urination, frequency   INTEGUMENT:  rash, skin lesions, easy bruising   HEMATOLOGIC/LYMPHATIC:  swelling/edema   ALLERGIC/IMMUNOLOGIC:  urticaria , itching  ENDOCRINE:  increase in drinking, increase in urination, hot or cold intolerance  MUSCULOSKELETAL:  joint pains, muscle aches, swelling of joints  NEUROLOGICAL:  headaches, dizziness, lightheadedness, numbness, pain, tingling extremities  BEHAVIOR/PSYCH:  depression, anxiety    Physical Exam:   /69   Pulse 71   Temp 98.4 °F (36.9 °C) (Oral)   Resp 15   Ht 5' 2\" (1.575 m)   Wt 183 lb 13.8 oz (83.4 kg)   SpO2 96%   BMI 33.63 kg/m²   Temp (24hrs), Av.6 °F (37 °C), Min:98.2 °F (36.8 °C), Max:99 °F (37.2 °C)    Recent Labs     20  1738 20  0225 20  0805   POCGLU 285* 272* 168* 226*       Intake/Output Summary (Last 24 hours) at 2020 1615  Last data filed at 2020 0925  Gross per 24 hour   Intake 800 ml   Output 100 ml   Net 700 ml       General Appearance: alert, pale in appearance, weak and fatigued  Mental status: oriented to person, place, and time, but occasionally becomes confused  Head: normocephalic, atraumatic  Eye: no icterus, redness, pupils equal and reactive, extraocular eye movements intact, conjunctiva clear  Ear: normal external ear, no discharge, hearing intact  Nose: no drainage noted  Mouth: mucous membranes moist  Neck: supple, no carotid bruits, thyroid not palpable  Lungs: Bilateral equal air entry, clear to ausculation, no wheezing, rales or rhonchi, normal effort  Cardiovascular: normal rate, regular rhythm, no murmur, gallop, rub  Abdomen: Soft, nontender, nondistended, normal bowel sounds, no hepatomegaly or splenomegaly  Neurologic: There are no new focal motor or sensory deficits, normal muscle tone and bulk, no abnormal sensation, normal speech, cranial nerves II through XII grossly intact  Skin: No gross lesions, rashes, bruising or bleeding on exposed skin area  Extremities: peripheral pulses palpable, no pedal edema or calf pain with palpation  Psych: normal affect    Investigations:      Laboratory Testing:  Recent Results (from the past 24 hour(s))   POC Glucose Fingerstick    Collection Time: 01/28/20  5:38 PM   Result Value Ref Range    POC Glucose 285 (H) 65 - 105 mg/dL   POC Glucose Fingerstick    Collection Time: 01/28/20  8:02 PM   Result Value Ref Range    POC Glucose 272 (H) 65 - 105 mg/dL   POC Glucose Fingerstick    Collection Time: 01/29/20  2:25 AM   Result Value Ref Range    POC Glucose 168 (H) 65 - 105 mg/dL   Basic Metabolic Panel w/ Reflex to MG    Collection Time: 01/29/20  5:02 AM   Result Value Ref Range    Glucose 239 (H) 70 - 99 mg/dL    BUN 11 8 - 23 mg/dL    CREATININE 0.47 (L) 0.50 - 0.90 mg/dL    Bun/Cre Ratio NOT REPORTED 9 - 20    Calcium 8.0 (L) 8.6 - 10.4 mg/dL    Sodium 135 135 - 144 mmol/L    Potassium 4.0 3.7 - 5.3 mmol/L    Chloride 106 98 - 107 mmol/L    CO2 19 (L) 20 - 31 mmol/L    Anion Gap 10 9 - 17 mmol/L    GFR Non-African American >60 >60 mL/min    GFR African American >60 >60 mL/min    GFR Comment          GFR Staging NOT REPORTED    CBC WITH AUTO DIFFERENTIAL    Collection Time: 01/29/20  5:02 AM   Result Value Ref Range    WBC 10.3 3.5 - 11.3 k/uL    RBC 2.26 (L) 3.95 - 5.11 m/uL    Hemoglobin 6.3 (LL) 11.9 - 15.1 g/dL    Hematocrit 21.6 (L) 36.3 - 47.1 %    MCV 95.6 82.6 - 102.9 fL    MCH 27.9 25.2 - 33.5 pg osseous abnormality of the right femur or the left femur     Xr Femur Left (min 2 Views)    Result Date: 1/28/2020  There is no acute fracture of the left hip There is no acute fracture in the pelvis. Detail of the sacrum is markedly limited. There is subtle double density along the margin of the superior pubic ramus on the left without definitive fracture line at patient's pain persists, consider CT exam No acute osseous abnormality of the right femur or the left femur     Xr Femur Right (min 2 Views)    Result Date: 1/28/2020  There is no acute fracture of the left hip There is no acute fracture in the pelvis. Detail of the sacrum is markedly limited. There is subtle double density along the margin of the superior pubic ramus on the left without definitive fracture line at patient's pain persists, consider CT exam No acute osseous abnormality of the right femur or the left femur     Us Renal Complete    Result Date: 1/26/2020  Mild left hydronephrosis     Ct Abdomen Pelvis W Iv Contrast Additional Contrast? Oral    Result Date: 1/26/2020  Distended gallbladder. No radiodense gallstones noted but ultrasound is recommended. Extensive metastatic disease is a risk for fracture at T10. Us Gallbladder Ruq    Result Date: 1/27/2020  Minimal amount of free fluid within the upper abdomen. Heterogeneous increased liver echogenicity could be on the basis of hepatocellular disease or hepatic steatosis. Xr Chest Portable    Result Date: 1/26/2020  Mild cardiomegaly without acute pulmonary process. Right-sided PICC line with the tip in the distal mid aspect of the SVC. Xr Pelvis (min 3 Views)    Result Date: 1/28/2020  There is no acute fracture of the left hip There is no acute fracture in the pelvis. Detail of the sacrum is markedly limited.   There is subtle double density along the margin of the superior pubic ramus on the left without definitive fracture line at patient's pain persists, consider CT exam No

## 2020-01-29 NOTE — PROGRESS NOTES
Critical care team - Resident sign-out to medicine service      Date and time: 1/29/2020 9:43 AM  Patient's name:  Perfecto Tobin  Medical Record Number: [id number]  Patient's account/billing number: [de-identified]  Patient's YOB: 1950  Age: 77 y.o. Date of Admission: 1/26/2020  1:38 PM  Length of stay during current admission: 3    Primary Care Physician: Autumn Fuentes MD    Code Status: Full Code    Mode of physician to physician communication:        [x] Via telephone   [] In person     Date and time of sign-out: 1/29/2020 9:43 AM    Accepting Internal Medicine resident: Dr. Yazmin Cornejo    Accepting Medicine team: intermed    Accepting team's attending: Dr. Edgar Andrews    Patient's current ICU Bed:  126     Patient's assigned bed on floor:  164        [] Med-Surg Monitored [x] Step-down       [] Psychiatry ICU       [] Psych floor     Reason for ICU admission:     77 y.o. female with history of diabetes, hypothyroidism, CAD status post CABG in 2008, breast cancer on the left side status post chemotherapy and mastectomy in 2007 that presented to outside [facility] on 1/23/2020 for complaints of generalized weakness and fatigue and flulike illness for about 1 month. She was admitted there for anemia and hyperglycemia and hyponatremia however developed hypotension and had CT imaging which showed concern for duodenitis versus contained duodenal perforation. Concern for intra-abdominal infection there and antibiotics were narrowed down to Zosyn, initially was on vancomycin as well. CT chest showed extensive osteoblastic lesions and numerous soft tissue nodules in the greater omentum and mesentery suspicious for carcinomatosis.   Also showed mild/moderate left side hydroureteronephrosis without any obstructing stone or mass and recommended CT urogram.     Ct abdomen-   Distended gallbladder.  No radiodense gallstones noted but ultrasound is   recommended.       Extensive metastatic disease is a risk for fracture at T10. Bone marrow biopsy was done yesterday. GI consulted for anemia, EGD tomorrow    Ortho for femur fracture. ICU course summary:         Procedures during patient's ICU stay:     none    Current Vitals:     BP (!) 146/58   Pulse 101   Temp 98.4 °F (36.9 °C) (Oral)   Resp 16   Ht 5' 2\" (1.575 m)   Wt 183 lb 13.8 oz (83.4 kg)   SpO2 97%   BMI 33.63 kg/m²       Cultures:     Blood cultures:                 [] None drawn      [] Negative             []  Positive (Details:  )  Urine Culture:                   [] None drawn      [] Negative             []  Positive (Details:  )  Sputum Culture:               [] None drawn       [] Negative             []  Positive (Details:  )   Endotracheal aspirate:     [] None drawn       [] Negative             []  Positive (Details:  )       Consults:     1. Ortho  2. GI  3. Heme onc      Assessment:     Patient Active Problem List    Diagnosis Date Noted    History of breast cancer 01/27/2020    Anemia 01/26/2020    Liver lesion 01/26/2020       Additional assessment:    1. Recommended Follow-up:     · Antibiotics discontinued. Will monitor off antibiotics. Septic shock ruled out. · Heme-onc following. Recommendations appreciated. IR image guided biopsy of T10 vertebra today. Lovenox and aspirin held. · Monitor hemoglobin daily. Currently stable. · Blood pressure improved. Continue midodrine 2.5 mg 3 times daily. · Diet as tolerated. · Sugars improved. Continue Lantus 10 units nightly and continue insulin correctional scale. Above mentioned assessment and plan was discussed by me with the admitting medicine resident. The medicine team assigned to the patient by medicine admitting resident will be following up the patient from now onwards on the floor. Moise Hopson M.D.   Critical care resident,  Department of Internal Medicine/ Critical care  [facility]) 1/29/2020, 9:43 AM

## 2020-01-29 NOTE — PROGRESS NOTES
[id number] 24 Hour Total   INTAKE   I.V.(mL/kg) 0(0)   0(0)   Blood(mL/kg)  500(6)  500(6)   IV Piggyback(mL/kg) 0(0)   0(0)   Shift Total(mL/kg) 0(0) 500(6)  500(6)   OUTPUT   Shift Total(mL/kg)       Weight (kg) 83.4 83.4 83.4 83.4     Wt Readings from Last 3 Encounters:   01/26/20 183 lb 13.8 oz (83.4 kg)     Body mass index is 33.63 kg/m².         PHYSICAL EXAM:  GEN:  awake, alert, cooperative, no apparent distress   EYES:     pupils equal, round, and reactive to light, extra-ocular muscles intact  HEENT:  Normocepalic, without obvious abnormality  LUNGS:  no increased work of breathing, clear to auscultation and no crackles or wheezing  CV:    regular rate and rhythm and normal S1 and S2  ABDOMEN:   soft, non-distended, non-tender and no masses palpated  NEURO[de-identified]   Moves all extremities,awake,alert,oriented to name, place and time  Extremities:     No pedal or leg edema, no calf tenderness/swelling, no erythema, distal pulses intact       MEDICATIONS:  Scheduled Meds:   polyethylene glycol  4,000 mL Oral Once    midodrine  2.5 mg Oral TID WC    [Held by provider] aspirin  81 mg Oral Daily    levothyroxine  100 mcg Oral Daily    venlafaxine  150 mg Oral Daily    insulin glargine  10 Units Subcutaneous Nightly    docusate sodium  100 mg Oral BID    senna  1 tablet Oral BID    ferrous sulfate  325 mg Oral BID     sodium chloride flush  10 mL Intravenous 2 times per day    [Held by provider] enoxaparin  40 mg Subcutaneous Daily    insulin lispro  0-12 Units Subcutaneous Q6H     Continuous Infusions:   dextrose       PRN Meds:   acetaminophen, 650 mg, Q4H PRN  sodium chloride flush, 10 mL, PRN  magnesium hydroxide, 30 mL, Daily PRN  ondansetron, 4 mg, Q6H PRN  potassium chloride, 10 mEq, PRN  bisacodyl, 10 mg, Daily PRN  acetaminophen, 650 mg, Q4H PRN  glucose, 15 g, PRN  dextrose, 12.5 g, PRN  glucagon (rDNA), 1 mg, PRN  dextrose, 100 mL/hr, PRN  oxyCODONE, 5 mg, Q4H PRN    Or  oxyCODONE, 10 mg, Q4H results found for: TSH  Lactic Acid: No results found for: LACTA   Troponin: No results for input(s): TROPONINI in the last 72 hours. Microbiology:  No growth on cultures      Other Labs:  CBC with Differential:    Lab Results   Component Value Date    WBC 10.3 01/29/2020    RBC 2.26 01/29/2020    HGB 7.7 01/29/2020    HCT 25.1 01/29/2020     01/29/2020    MCV 95.6 01/29/2020    MCH 27.9 01/29/2020    MCHC 29.2 01/29/2020    RDW 18.5 01/29/2020    LYMPHOPCT 16 01/29/2020    MONOPCT 12 01/29/2020    BASOPCT 1 01/29/2020    MONOSABS 1.20 01/29/2020    LYMPHSABS 1.63 01/29/2020    EOSABS 0.23 01/29/2020    BASOSABS 0.05 01/29/2020    DIFFTYPE NOT REPORTED 01/29/2020     BMP:    Lab Results   Component Value Date     01/29/2020    K 4.0 01/29/2020     01/29/2020    CO2 19 01/29/2020    BUN 11 01/29/2020    LABALBU 2.3 01/27/2020    CREATININE 0.47 01/29/2020    CALCIUM 8.0 01/29/2020    GFRAA >60 01/29/2020    LABGLOM >60 01/29/2020    GLUCOSE 239 01/29/2020         Radiology/Imaging:      ASSESSMENT:     Patient Active Problem List    Diagnosis Date Noted    History of breast cancer 01/27/2020    Anemia 01/26/2020    Liver lesion 01/26/2020          PLAN:     WEAN PER PROTOCOL:  [] No   [] Yes  [x] N/A    ICU PROPHYLAXIS:  Stress ulcer:  [] PPI Agent  [] [id number] [] Sucralfate  [] Other:  VTE:   [x] Enoxaparin (held today)  [] Unfract. Heparin Subcut  [] EPC Cuffs    NUTRITION:  [x] NPO [] Tube Feeding (Specify: ) [] TPN  [] PO    HOME MEDS RECONCILED: [] No  [x] Yes    CONSULTATION NEEDED:  [] No  [x] Yes    FAMILY UPDATED:    [x] No  [] Yes    TRANSFER OUT OF ICU:   [] No  [x] Yes      Plan:  · Continue to monitor off antibiotics. · Heme-onc following. IR biopsied right iliac bone yesterday, follow up results  · Monitor hemoglobin, s/p 1 u prbc this AM.   · Blood pressure improved. Continue midodrine 2.5 mg 3 times daily.   · Diet NPO as orthopedics may take to OR for left femoral neck lesion, CT pending for surgical guidance. Lovenox and asa held. · GI following for anemia with no source of acute bleeding, EGD likely tomorrow  · Continue Lantus 10 units nightly and continue insulin sliding scale. · Okay for transfer to [address] bed.       Ermias Barcenas MD  PGY-2 Resident  Internal Medicine  [facility]  1/29/2020 12:08 PM

## 2020-01-29 NOTE — PROGRESS NOTES
Orthopedic Progress Note    Patient:  Temo Callejas  YOB: 1950     77 y.o. female    Subjective:  Patient seen and examined  Complaining of pain in her left hip/leg this AM  No issue overnight, per nursing patient is more confused overnight  Pain relatively controlled  Denies fever, HA, CP, SOB, N/V, dysuria    Vitals reviewed, afebrile    Objective:   Vitals:    01/29/20 0200   BP: 133/66   Pulse: 108   Resp: 19   Temp:    SpO2:      Gen: NAD, cooperative    Cardiovascular: Regular rate, no dependent edema, distal pulses 2+    Respiratory: Chest symmetric, no accessory muscle use, normal respirations, no audible wheezes    LLE: mild pain with log roll, patient cannot perform a straight leg raise due to discomfort. Compartments soft and compressible. EHL/FHL/TA/GSC motor complex intact grossly. Sural/saphenous/SP/DP/Plantar sensory nerve SILT grossly. DP 2+. Recent Labs     01/28/20  0556 01/29/20  0502   WBC 10.8 10.3   HGB 7.7* 6.3*   HCT 24.1* 21.6*    327     --    K 4.5  --    BUN 11  --    CREATININE 0.80  --    GLUCOSE 190*  --       Meds: Lovenox, ASA,  See rec for complete list    Impression/plan: 77 y.o. female with multiple pelvic and left femur lesions concerning for metastatic disease    -Patient will likely require stabilization/fixaiton to the left femoral neck lesion  -Will obtain CT of the left femur to ensure lesions do not extend into the femoral diaphysis  -Critical care on, appreciate recommendations and surgical optimization  -Hgb 6.3 this AM  -Non-weightbearing to the left leg  -NPO now  -Pain control PO/IV Medication. Attempt to Wean IV medications.    -DVT ppx: Lovenox, ASA, please hold this AM  -Will discuss with Dr. Isabelle Dexter this AM once imaging is done and determination of surgical timing  -Please page DO ortho with any questions      Maddy Almodovar,    Orthopedic Surgery Resident PGY-3  [place], [place], [place]

## 2020-01-29 NOTE — PROGRESS NOTES
services. 9. Currently made n.p.o. by orthopedics. Rodri Cid MD      Department of Internal Medicine  [address] [facility]         1/29/2020, 8:19 AM  Attending Physician Statement  I have discussed the care of Socrates Collins and   I have examined the patient myselft independently, and taken ros and hpi , including pertinent history and exam findings,  with the author of this note . I have reviewed the key elements of all parts of the encounter with the nurse practitioner/resident.     I agree with the assessment, plan and orders as documented by the above health care provider       Discussed with Dr. Vanessa Moses, although the metastases most likely from breast cancer, but with the significant anemia this patient has we will rule out other source of bleeding or rule out synchronous tumors of the GI tract for which EGD colonoscopy tomorrow  Electronically signed by Art Rashid MD

## 2020-01-29 NOTE — CONSULTS
Orthopedic Surgery Consult      CC/Reason for consult: Blastic lesions in pelvis and left femur    HPI:    The patient is a 77 y.o. female admitted to the ICU for work up and evaluation of multiple metastatic lesions found on imaging studies. Patient presented as a transfer from [address] [place] [address] [address] on 1/26 for evaluation of weakness and fatigue. Patient was found to be anemic and transfused 3 uPRBC. Subsequent Imaging studies demonstrates multiple blastic lesions in the spine, pelvis, and left femur. Patient went for ilium bone biopsy today with IR. The patient reports that she does have a history of breast cancer s/p mastectomy in 2007. She notes she was in remission and has not seen an oncologist in many years. The patient notes that she has had bilateral hip pain for many months with the R being worse then the left. She ambulatory [place] [employer]. She had a right total hip approximately 10 years ago. Denies any falls or injuries. Past Medical History:    No past medical history on file. Past Surgical History:    No past surgical history on file. Medications Prior to Admission:   Prior to Admission medications    Medication Sig Start Date End Date Taking?  Authorizing Provider   venlafaxine (EFFEXOR XR) 150 MG extended release capsule Take 150 mg by mouth daily   Yes Historical Provider, MD   metFORMIN (GLUCOPHAGE) 1000 MG tablet Take 1,000 mg by mouth 2 times daily (with meals)   Yes Historical Provider, MD   levothyroxine (SYNTHROID) 100 MCG tablet Take 100 mcg by mouth Daily   Yes Historical Provider, MD   glimepiride (AMARYL) 4 MG tablet Take 4 mg by mouth 2 times daily   Yes Historical Provider, MD   lisinopril (PRINIVIL;ZESTRIL) 2.5 MG tablet Take 2.5 mg by mouth daily   Yes Historical Provider, MD   carvedilol (COREG) 6.25 MG tablet Take 6.25 mg by mouth 2 times daily (with meals)   Yes Historical Provider, MD   SITagliptin (JANUVIA) 100 MG tablet Take 100 mg by mouth daily   Yes Historical Provider, MD

## 2020-01-30 ENCOUNTER — APPOINTMENT (OUTPATIENT)
Dept: CT IMAGING | Age: 70
DRG: 347 | End: 2020-01-30
Attending: INTERNAL MEDICINE
Payer: COMMERCIAL

## 2020-01-30 ENCOUNTER — ANESTHESIA EVENT (OUTPATIENT)
Dept: ENDOSCOPY | Age: 70
DRG: 347 | End: 2020-01-30
Payer: COMMERCIAL

## 2020-01-30 ENCOUNTER — ANESTHESIA (OUTPATIENT)
Dept: ENDOSCOPY | Age: 70
DRG: 347 | End: 2020-01-30
Payer: COMMERCIAL

## 2020-01-30 VITALS
RESPIRATION RATE: 17 BRPM | SYSTOLIC BLOOD PRESSURE: 141 MMHG | DIASTOLIC BLOOD PRESSURE: 127 MMHG | OXYGEN SATURATION: 97 %

## 2020-01-30 PROBLEM — C50.919 METASTATIC BREAST CANCER (HCC): Status: ACTIVE | Noted: 2020-01-29

## 2020-01-30 LAB
-: NORMAL
ABSOLUTE EOS #: 0.25 K/UL (ref 0–0.44)
ABSOLUTE IMMATURE GRANULOCYTE: 0.17 K/UL (ref 0–0.3)
ABSOLUTE LYMPH #: 1.73 K/UL (ref 1.1–3.7)
ABSOLUTE MONO #: 1.03 K/UL (ref 0.1–1.2)
ANION GAP SERPL CALCULATED.3IONS-SCNC: 11 MMOL/L (ref 9–17)
BASOPHILS # BLD: 1 % (ref 0–2)
BASOPHILS ABSOLUTE: 0.05 K/UL (ref 0–0.2)
BUN BLDV-MCNC: 10 MG/DL (ref 8–23)
BUN/CREAT BLD: ABNORMAL (ref 9–20)
CALCIUM SERPL-MCNC: 8.6 MG/DL (ref 8.6–10.4)
CHLORIDE BLD-SCNC: 102 MMOL/L (ref 98–107)
CO2: 24 MMOL/L (ref 20–31)
CREAT SERPL-MCNC: 0.41 MG/DL (ref 0.5–0.9)
DIFFERENTIAL TYPE: ABNORMAL
EKG ATRIAL RATE: 112 BPM
EKG P AXIS: 15 DEGREES
EKG P-R INTERVAL: 134 MS
EKG Q-T INTERVAL: 326 MS
EKG QRS DURATION: 90 MS
EKG QTC CALCULATION (BAZETT): 444 MS
EKG R AXIS: -11 DEGREES
EKG T AXIS: 90 DEGREES
EKG VENTRICULAR RATE: 112 BPM
EOSINOPHILS RELATIVE PERCENT: 3 % (ref 1–4)
GFR AFRICAN AMERICAN: >60 ML/MIN
GFR NON-AFRICAN AMERICAN: >60 ML/MIN
GFR SERPL CREATININE-BSD FRML MDRD: ABNORMAL ML/MIN/{1.73_M2}
GFR SERPL CREATININE-BSD FRML MDRD: ABNORMAL ML/MIN/{1.73_M2}
GLUCOSE BLD-MCNC: 184 MG/DL (ref 65–105)
GLUCOSE BLD-MCNC: 220 MG/DL (ref 65–105)
GLUCOSE BLD-MCNC: 230 MG/DL (ref 65–105)
GLUCOSE BLD-MCNC: 243 MG/DL (ref 70–99)
GLUCOSE BLD-MCNC: 262 MG/DL (ref 65–105)
HCT VFR BLD CALC: 24.1 % (ref 36.3–47.1)
HCT VFR BLD CALC: 25.3 % (ref 36.3–47.1)
HCT VFR BLD CALC: 32.5 % (ref 36.3–47.1)
HEMOGLOBIN: 10 G/DL (ref 11.9–15.1)
HEMOGLOBIN: 7.3 G/DL (ref 11.9–15.1)
HEMOGLOBIN: 7.9 G/DL (ref 11.9–15.1)
IMMATURE GRANULOCYTES: 2 %
LYMPHOCYTES # BLD: 17 % (ref 24–43)
MCH RBC QN AUTO: 28.6 PG (ref 25.2–33.5)
MCHC RBC AUTO-ENTMCNC: 31.2 G/DL (ref 28.4–34.8)
MCV RBC AUTO: 91.7 FL (ref 82.6–102.9)
MONOCYTES # BLD: 10 % (ref 3–12)
NRBC AUTOMATED: 0.3 PER 100 WBC
PDW BLD-RTO: 17.4 % (ref 11.8–14.4)
PLATELET # BLD: ABNORMAL K/UL (ref 138–453)
PLATELET ESTIMATE: ABNORMAL
PLATELET, FLUORESCENCE: NORMAL K/UL (ref 138–453)
PMV BLD AUTO: ABNORMAL FL (ref 8.1–13.5)
POTASSIUM SERPL-SCNC: 4.3 MMOL/L (ref 3.7–5.3)
RBC # BLD: 2.76 M/UL (ref 3.95–5.11)
RBC # BLD: ABNORMAL 10*6/UL
REASON FOR REJECTION: NORMAL
SEG NEUTROPHILS: 68 % (ref 36–65)
SEGMENTED NEUTROPHILS ABSOLUTE COUNT: 6.69 K/UL (ref 1.5–8.1)
SODIUM BLD-SCNC: 137 MMOL/L (ref 135–144)
SURGICAL PATHOLOGY REPORT: NORMAL
WBC # BLD: 9.9 K/UL (ref 3.5–11.3)
WBC # BLD: ABNORMAL 10*3/UL
ZZ NTE CLEAN UP: ORDERED TEST: NORMAL
ZZ NTE WITH NAME CLEAN UP: SPECIMEN SOURCE: NORMAL

## 2020-01-30 PROCEDURE — 85014 HEMATOCRIT: CPT

## 2020-01-30 PROCEDURE — 99233 SBSQ HOSP IP/OBS HIGH 50: CPT | Performed by: INTERNAL MEDICINE

## 2020-01-30 PROCEDURE — 43255 EGD CONTROL BLEEDING ANY: CPT | Performed by: INTERNAL MEDICINE

## 2020-01-30 PROCEDURE — 85018 HEMOGLOBIN: CPT

## 2020-01-30 PROCEDURE — 6370000000 HC RX 637 (ALT 250 FOR IP): Performed by: INTERNAL MEDICINE

## 2020-01-30 PROCEDURE — 88305 TISSUE EXAM BY PATHOLOGIST: CPT

## 2020-01-30 PROCEDURE — 6370000000 HC RX 637 (ALT 250 FOR IP): Performed by: STUDENT IN AN ORGANIZED HEALTH CARE EDUCATION/TRAINING PROGRAM

## 2020-01-30 PROCEDURE — 3700000000 HC ANESTHESIA ATTENDED CARE: Performed by: INTERNAL MEDICINE

## 2020-01-30 PROCEDURE — 43239 EGD BIOPSY SINGLE/MULTIPLE: CPT | Performed by: INTERNAL MEDICINE

## 2020-01-30 PROCEDURE — 99232 SBSQ HOSP IP/OBS MODERATE 35: CPT | Performed by: INTERNAL MEDICINE

## 2020-01-30 PROCEDURE — 2720000010 HC SURG SUPPLY STERILE: Performed by: INTERNAL MEDICINE

## 2020-01-30 PROCEDURE — 73700 CT LOWER EXTREMITY W/O DYE: CPT

## 2020-01-30 PROCEDURE — 3609012400 HC EGD TRANSORAL BIOPSY SINGLE/MULTIPLE: Performed by: INTERNAL MEDICINE

## 2020-01-30 PROCEDURE — 3609013000 HC EGD TRANSORAL CONTROL BLEEDING ANY METHOD: Performed by: INTERNAL MEDICINE

## 2020-01-30 PROCEDURE — 3E0G8GC INTRODUCTION OF OTHER THERAPEUTIC SUBSTANCE INTO UPPER GI, VIA NATURAL OR ARTIFICIAL OPENING ENDOSCOPIC: ICD-10-PCS | Performed by: INTERNAL MEDICINE

## 2020-01-30 PROCEDURE — 85055 RETICULATED PLATELET ASSAY: CPT

## 2020-01-30 PROCEDURE — 2580000003 HC RX 258: Performed by: INTERNAL MEDICINE

## 2020-01-30 PROCEDURE — 7100000010 HC PHASE II RECOVERY - FIRST 15 MIN: Performed by: INTERNAL MEDICINE

## 2020-01-30 PROCEDURE — 88312 SPECIAL STAINS GROUP 1: CPT

## 2020-01-30 PROCEDURE — 6360000002 HC RX W HCPCS: Performed by: INTERNAL MEDICINE

## 2020-01-30 PROCEDURE — 80048 BASIC METABOLIC PNL TOTAL CA: CPT

## 2020-01-30 PROCEDURE — 93005 ELECTROCARDIOGRAM TRACING: CPT | Performed by: ANESTHESIOLOGY

## 2020-01-30 PROCEDURE — 2580000003 HC RX 258: Performed by: NURSE ANESTHETIST, CERTIFIED REGISTERED

## 2020-01-30 PROCEDURE — 1200000000 HC SEMI PRIVATE

## 2020-01-30 PROCEDURE — 2500000003 HC RX 250 WO HCPCS: Performed by: NURSE ANESTHETIST, CERTIFIED REGISTERED

## 2020-01-30 PROCEDURE — 85025 COMPLETE CBC W/AUTO DIFF WBC: CPT

## 2020-01-30 PROCEDURE — 3700000001 HC ADD 15 MINUTES (ANESTHESIA): Performed by: INTERNAL MEDICINE

## 2020-01-30 PROCEDURE — 93010 ELECTROCARDIOGRAM REPORT: CPT | Performed by: INTERNAL MEDICINE

## 2020-01-30 PROCEDURE — 2709999900 HC NON-CHARGEABLE SUPPLY: Performed by: INTERNAL MEDICINE

## 2020-01-30 PROCEDURE — 0DB38ZX EXCISION OF LOWER ESOPHAGUS, VIA NATURAL OR ARTIFICIAL OPENING ENDOSCOPIC, DIAGNOSTIC: ICD-10-PCS | Performed by: INTERNAL MEDICINE

## 2020-01-30 PROCEDURE — 36415 COLL VENOUS BLD VENIPUNCTURE: CPT

## 2020-01-30 PROCEDURE — 6360000002 HC RX W HCPCS: Performed by: NURSE ANESTHETIST, CERTIFIED REGISTERED

## 2020-01-30 PROCEDURE — 6370000000 HC RX 637 (ALT 250 FOR IP): Performed by: NURSE PRACTITIONER

## 2020-01-30 PROCEDURE — 7100000011 HC PHASE II RECOVERY - ADDTL 15 MIN: Performed by: INTERNAL MEDICINE

## 2020-01-30 PROCEDURE — 82947 ASSAY GLUCOSE BLOOD QUANT: CPT

## 2020-01-30 RX ORDER — FLUCONAZOLE 100 MG/1
100 TABLET ORAL DAILY
Status: DISCONTINUED | OUTPATIENT
Start: 2020-01-31 | End: 2020-02-03

## 2020-01-30 RX ORDER — PROPOFOL 10 MG/ML
INJECTION, EMULSION INTRAVENOUS PRN
Status: DISCONTINUED | OUTPATIENT
Start: 2020-01-30 | End: 2020-01-30 | Stop reason: SDUPTHER

## 2020-01-30 RX ORDER — FLUCONAZOLE 200 MG/1
200 TABLET ORAL ONCE
Status: COMPLETED | OUTPATIENT
Start: 2020-01-30 | End: 2020-01-30

## 2020-01-30 RX ORDER — PANTOPRAZOLE SODIUM 40 MG/1
40 TABLET, DELAYED RELEASE ORAL
Status: DISCONTINUED | OUTPATIENT
Start: 2020-01-30 | End: 2020-02-11

## 2020-01-30 RX ORDER — SODIUM CHLORIDE 9 MG/ML
INJECTION, SOLUTION INTRAVENOUS CONTINUOUS PRN
Status: DISCONTINUED | OUTPATIENT
Start: 2020-01-30 | End: 2020-01-30 | Stop reason: SDUPTHER

## 2020-01-30 RX ORDER — LIDOCAINE HYDROCHLORIDE 10 MG/ML
INJECTION, SOLUTION EPIDURAL; INFILTRATION; INTRACAUDAL; PERINEURAL PRN
Status: DISCONTINUED | OUTPATIENT
Start: 2020-01-30 | End: 2020-01-30 | Stop reason: SDUPTHER

## 2020-01-30 RX ORDER — MORPHINE SULFATE 15 MG/1
15 TABLET, FILM COATED, EXTENDED RELEASE ORAL EVERY 12 HOURS SCHEDULED
Status: DISCONTINUED | OUTPATIENT
Start: 2020-01-30 | End: 2020-02-19 | Stop reason: HOSPADM

## 2020-01-30 RX ADMIN — OXYCODONE HYDROCHLORIDE 10 MG: 5 TABLET ORAL at 18:18

## 2020-01-30 RX ADMIN — SODIUM CHLORIDE, PRESERVATIVE FREE 10 ML: 5 INJECTION INTRAVENOUS at 20:42

## 2020-01-30 RX ADMIN — PROPOFOL 100 MG: 10 INJECTION, EMULSION INTRAVENOUS at 08:24

## 2020-01-30 RX ADMIN — FLUCONAZOLE 200 MG: 200 TABLET ORAL at 16:29

## 2020-01-30 RX ADMIN — INSULIN LISPRO 3 UNITS: 100 INJECTION, SOLUTION INTRAVENOUS; SUBCUTANEOUS at 13:46

## 2020-01-30 RX ADMIN — MIDODRINE HYDROCHLORIDE 2.5 MG: 2.5 TABLET ORAL at 16:29

## 2020-01-30 RX ADMIN — OXYCODONE HYDROCHLORIDE 10 MG: 5 TABLET ORAL at 03:01

## 2020-01-30 RX ADMIN — SENNOSIDES 8.6 MG: 8.6 TABLET, FILM COATED ORAL at 20:41

## 2020-01-30 RX ADMIN — INSULIN LISPRO 9 UNITS: 100 INJECTION, SOLUTION INTRAVENOUS; SUBCUTANEOUS at 20:42

## 2020-01-30 RX ADMIN — PANTOPRAZOLE SODIUM 40 MG: 40 TABLET, DELAYED RELEASE ORAL at 16:28

## 2020-01-30 RX ADMIN — DOCUSATE SODIUM 100 MG: 100 CAPSULE, LIQUID FILLED ORAL at 20:41

## 2020-01-30 RX ADMIN — BISACODYL 20 MG: 5 TABLET, COATED ORAL at 16:29

## 2020-01-30 RX ADMIN — SODIUM CHLORIDE: 9 INJECTION, SOLUTION INTRAVENOUS at 08:16

## 2020-01-30 RX ADMIN — PROPOFOL 100 MG: 10 INJECTION, EMULSION INTRAVENOUS at 08:34

## 2020-01-30 RX ADMIN — INSULIN GLARGINE 10 UNITS: 100 INJECTION, SOLUTION SUBCUTANEOUS at 20:42

## 2020-01-30 RX ADMIN — OXYCODONE HYDROCHLORIDE 10 MG: 5 TABLET ORAL at 13:46

## 2020-01-30 RX ADMIN — PROPOFOL 100 MG: 10 INJECTION, EMULSION INTRAVENOUS at 08:29

## 2020-01-30 RX ADMIN — Medication 10 ML: at 20:42

## 2020-01-30 RX ADMIN — POLYETHYLENE GLYCOL 3350, SODIUM SULFATE ANHYDROUS, SODIUM BICARBONATE, SODIUM CHLORIDE, POTASSIUM CHLORIDE 4000 ML: 236; 22.74; 6.74; 5.86; 2.97 POWDER, FOR SOLUTION ORAL at 16:28

## 2020-01-30 RX ADMIN — INSULIN LISPRO 6 UNITS: 100 INJECTION, SOLUTION INTRAVENOUS; SUBCUTANEOUS at 17:52

## 2020-01-30 RX ADMIN — PROPOFOL 50 MG: 10 INJECTION, EMULSION INTRAVENOUS at 08:38

## 2020-01-30 RX ADMIN — LEVOTHYROXINE SODIUM 100 MCG: 100 TABLET ORAL at 06:40

## 2020-01-30 RX ADMIN — LIDOCAINE HYDROCHLORIDE 50 MG: 10 INJECTION, SOLUTION EPIDURAL; INFILTRATION; INTRACAUDAL at 08:23

## 2020-01-30 RX ADMIN — FERROUS SULFATE TAB EC 325 MG (65 MG FE EQUIVALENT) 325 MG: 325 (65 FE) TABLET DELAYED RESPONSE at 10:58

## 2020-01-30 RX ADMIN — MIDODRINE HYDROCHLORIDE 2.5 MG: 2.5 TABLET ORAL at 10:58

## 2020-01-30 ASSESSMENT — PAIN SCALES - GENERAL
PAINLEVEL_OUTOF10: 4
PAINLEVEL_OUTOF10: 0
PAINLEVEL_OUTOF10: 2
PAINLEVEL_OUTOF10: 0
PAINLEVEL_OUTOF10: 7
PAINLEVEL_OUTOF10: 2
PAINLEVEL_OUTOF10: 0
PAINLEVEL_OUTOF10: 8
PAINLEVEL_OUTOF10: 7
PAINLEVEL_OUTOF10: 8

## 2020-01-30 ASSESSMENT — PAIN - FUNCTIONAL ASSESSMENT: PAIN_FUNCTIONAL_ASSESSMENT: 0-10

## 2020-01-30 ASSESSMENT — PAIN DESCRIPTION - PAIN TYPE: TYPE: ACUTE PAIN;CHRONIC PAIN

## 2020-01-30 ASSESSMENT — PAIN DESCRIPTION - ORIENTATION: ORIENTATION: RIGHT

## 2020-01-30 ASSESSMENT — PAIN DESCRIPTION - ONSET: ONSET: ON-GOING

## 2020-01-30 ASSESSMENT — PAIN DESCRIPTION - LOCATION: LOCATION: BACK;HIP

## 2020-01-30 ASSESSMENT — PAIN DESCRIPTION - DESCRIPTORS: DESCRIPTORS: ACHING;DULL

## 2020-01-30 ASSESSMENT — PAIN DESCRIPTION - FREQUENCY: FREQUENCY: CONTINUOUS

## 2020-01-30 NOTE — PROGRESS NOTES
T10.  Patient denies any lower extremity weakness. Denies any saddle anesthesia. Denies any loss of control of bowel or bladder. Additional work-up also shows anemia and iron deficiency. Patient has never had a EGD or colonoscopy done. Past Medical History: Breast cancer    Past Surgical History: Left mastectomy    Medications:    Prior to Admission medications    Medication Sig Start Date End Date Taking?  Authorizing Provider   venlafaxine (EFFEXOR XR) 150 MG extended release capsule Take 150 mg by mouth daily   Yes Historical Provider, MD   metFORMIN (GLUCOPHAGE) 1000 MG tablet Take 1,000 mg by mouth 2 times daily (with meals)   Yes Historical Provider, MD   levothyroxine (SYNTHROID) 100 MCG tablet Take 100 mcg by mouth Daily   Yes Historical Provider, MD   glimepiride (AMARYL) 4 MG tablet Take 4 mg by mouth 2 times daily   Yes Historical Provider, MD   lisinopril (PRINIVIL;ZESTRIL) 2.5 MG tablet Take 2.5 mg by mouth daily   Yes Historical Provider, MD   carvedilol (COREG) 6.25 MG tablet Take 6.25 mg by mouth 2 times daily (with meals)   Yes Historical Provider, MD   SITagliptin (JANUVIA) 100 MG tablet Take 100 mg by mouth daily   Yes Historical Provider, MD   aspirin 81 MG tablet Take 81 mg by mouth daily   Yes Historical Provider, MD     Current Facility-Administered Medications   Medication Dose Route Frequency Provider Last Rate Last Dose    [START ON 1/31/2020] fluconazole (DIFLUCAN) tablet 100 mg  100 mg Oral Daily SERAFIN Simmons CNP        pantoprazole (PROTONIX) tablet 40 mg  40 mg Oral BID AC SERAFIN Simmons CNP   40 mg at 01/30/20 1628    insulin lispro (HUMALOG) injection vial 0-18 Units  0-18 Units Subcutaneous Q4H Comfort Tnog MD   6 Units at 01/30/20 1752    sodium chloride flush 0.9 % injection 10 mL  10 mL Intravenous PRN Comfort Tong MD        acetaminophen (TYLENOL) tablet 650 mg  650 mg Oral Q4H PRN Comfort Tong MD        midodrine (PROAMATINE) tablet 2.5 mg  2.5 mg Oral TID  Comfort Tong MD   2.5 mg at 01/30/20 1629    [Held by provider] aspirin EC tablet 81 mg  81 mg Oral Daily Jack Catalan MD   81 mg at 01/27/20 1346    levothyroxine (SYNTHROID) tablet 100 mcg  100 mcg Oral Daily Comfort Tong MD   100 mcg at 01/30/20 0640    venlafaxine (EFFEXOR XR) extended release capsule 150 mg  150 mg Oral Daily Comfort Tong MD   150 mg at 01/29/20 0843    insulin glargine (LANTUS) injection vial 10 Units  10 Units Subcutaneous Nightly Comfort Tong MD   10 Units at 01/28/20 2053    docusate sodium (COLACE) capsule 100 mg  100 mg Oral BID Comfort Tong MD   100 mg at 01/29/20 2128    senna (SENOKOT) tablet 8.6 mg  1 tablet Oral BID Comfort Tong MD   8.6 mg at 01/29/20 0844    ferrous sulfate EC tablet 325 mg  325 mg Oral BID  Comfort Tong MD   325 mg at 01/30/20 1058    sodium chloride flush 0.9 % injection 10 mL  10 mL Intravenous 2 times per day Naida Cortes MD   10 mL at 01/29/20 2133    sodium chloride flush 0.9 % injection 10 mL  10 mL Intravenous PRN Comfort Tong MD        magnesium hydroxide (MILK OF MAGNESIA) 400 MG/5ML suspension 30 mL  30 mL Oral Daily PRN Comfort Tong MD        ondansetron (ZOFRAN) injection 4 mg  4 mg Intravenous Q6H PRN Naida Cortes MD        [Held by provider] enoxaparin (LOVENOX) injection 40 mg  40 mg Subcutaneous Daily No James MD   40 mg at 01/27/20 1231    potassium chloride 10 mEq/100 mL IVPB (Peripheral Line)  10 mEq Intravenous PRN Comfort Tong MD        bisacodyl (DULCOLAX) suppository 10 mg  10 mg Rectal Daily PRN Comfort Tong MD        acetaminophen (TYLENOL) tablet 650 mg  650 mg Oral Q4H PRN Comfort Tong MD        glucose (GLUTOSE) 40 % oral gel 15 g  15 g Oral PRN Comfort Tong MD        dextrose 50 % IV solution  12.5 g Intravenous PRN Comfort Tong MD        glucagon (rDNA) injection 1 mg  1 mg Intramuscular PRN Comfort Tong MD        dextrose 5 % solution  100 mL/hr Intravenous PRN MD Karrie Spears oxyCODONE (ROXICODONE) immediate release tablet 5 mg  5 mg Oral Q4H PRN Comfort Tong MD   5 mg at 20 3669    Or    oxyCODONE (ROXICODONE) immediate release tablet 10 mg  10 mg Oral Q4H PRN Comfort Tong MD   10 mg at 20 1818       Allergies:  Patient has no known allergies. Social History:   reports that she has never smoked. She has never used smokeless tobacco. She reports that she does not use drugs. Denies smoking. Denies taking alcohol. Family History: Hypertension and diabetes    REVIEW OF SYSTEMS:      Constitutional: No fever or chills. No night sweats, no weight loss   Eyes: No eye discharge, double vision, or eye pain   HEENT: negative for sore mouth, sore throat, hoarseness and voice change   Respiratory: negative for cough , sputum, dyspnea, wheezing, hemoptysis, chest pain   Cardiovascular: negative for chest pain, dyspnea, palpitations, orthopnea, PND   Gastrointestinal: negative for nausea, vomiting, diarrhea, constipation, abdominal pain, Dysphagia, hematemesis and hematochezia   Genitourinary: negative for frequency, dysuria, nocturia, urinary incontinence, and hematuria   Integument: negative for rash, skin lesions, bruises.    Hematologic/Lymphatic: negative for easy bruising, bleeding, lymphadenopathy, or petechiae   Endocrine: negative for heat or cold intolerance,weight changes, change in bowel habits and hair loss   Musculoskeletal: Positive back pain  Neurological: negative for headaches, dizziness, seizures, weakness, numbness    PHYSICAL EXAM:        BP (!) 105/51   Pulse 108   Temp 97.1 °F (36.2 °C) (Oral)   Resp 16   Ht 5' 2\" (1.575 m)   Wt 183 lb 13.8 oz (83.4 kg)   SpO2 95%   BMI 33.63 kg/m²    Temp (24hrs), Av.9 °F (36.6 °C), Min:97.1 °F (36.2 °C), Max:98.2 °F (36.8 °C)      General appearance - well appearing, no in pain or distress   Mental status - alert and cooperative   Eyes - pupils equal and reactive, extraocular eye movements intact   Ears - bilateral TM's and external ear canals normal   Mouth - mucous membranes moist, pharynx normal without lesions   Neck - supple, no significant adenopathy   Lymphatics - no palpable lymphadenopathy, no hepatosplenomegaly   Chest - clear to auscultation, no wheezes, rales or rhonchi, symmetric air entry   Heart - normal rate, regular rhythm, normal S1, S2, no murmurs  Abdomen - soft, nontender, nondistended, no masses or organomegaly   Neurological - alert, oriented, normal speech, no focal findings or movement disorder noted   Musculoskeletal - no joint tenderness, deformity or swelling   Extremities - peripheral pulses normal, no pedal edema, no clubbing or cyanosis   Skin - normal coloration and turgor, no rashes, no suspicious skin lesions noted ,      DATA:      Labs:     CBC:   Recent Labs     01/29/20  0502  01/30/20  0037 01/30/20  0438   WBC 10.3  --   --  9.9   HGB 6.3*   < > 10.0* 7.9*   HCT 21.6*   < > 32.5* 25.3*     --   --  See Reflexed IPF Result    < > = values in this interval not displayed. BMP:   Recent Labs     01/29/20  0502 01/30/20  0438    137   K 4.0 4.3   CO2 19* 24   BUN 11 10   CREATININE 0.47* 0.41*   LABGLOM >60 >60   GLUCOSE 239* 243*     PT/INR: No results for input(s): PROTIME, INR in the last 72 hours. APTT:No results for input(s): APTT in the last 72 hours. LIVER PROFILE:  No results for input(s): AST, ALT, LABALBU in the last 72 hours. Us Renal Complete    Result Date: 1/26/2020  EXAMINATION: RETROPERITONEAL ULTRASOUND OF THE KIDNEYS AND URINARY BLADDER 1/26/2020 COMPARISON: None HISTORY: ORDERING SYSTEM PROVIDED HISTORY: left sided hydroureteronephrosis, eval kidneys TECHNOLOGIST PROVIDED HISTORY: left sided hydroureteronephrosis, eval kidneys FINDINGS: Kidneys: The right kidney measures 12.5 cm in length and the left kidney measures 12 cm in length. Kidneys demonstrate normal cortical echogenicity. Probable vascular calcifications are noted bilaterally.   Mild left hydronephrosis. . Bladder: Bladder is decompressed with a Matt catheter. Mild left hydronephrosis     Ct Abdomen Pelvis W Iv Contrast Additional Contrast? Oral    Result Date: 1/26/2020  EXAMINATION: CT OF THE ABDOMEN AND PELVIS WITH CONTRAST 1/26/2020 5:25 pm TECHNIQUE: CT of the abdomen and pelvis was performed with the administration of intravenous contrast. Multiplanar reformatted images are provided for review. Dose modulation, iterative reconstruction, and/or weight based adjustment of the mA/kV was utilized to reduce the radiation dose to as low as reasonably achievable. COMPARISON: January 25, 2020 CT abdomen and pelvis HISTORY: ORDERING SYSTEM PROVIDED HISTORY: possible duodenitis versus contained bowel perforation seen on CT chest at [address] [place] [address] on 1/25 TECHNOLOGIST PROVIDED HISTORY: possible duodenitis versus contained bowel perforation seen on CT chest at [address] [place] [address] on 1/25 Reason for Exam: possible duodenitis versus contained bowel perforation seen on CT chest at [address] [place] [address] on 1/25 Acuity: Acute Type of Exam: Subsequent/Follow-up FINDINGS: Lower Chest: Cardiomegaly. Calcific coronary artery disease. Pacer wire right heart. Organs: Fat containing umbilical hernia. The liver, spleen, pancreas, and adrenals appear normal.  Distended gallbladder. No radiodense gallstones. Kidneys appear normal. Matt catheter decompresses the bladder. GI/Bowel: The stomach,small bowel, and colon appear normal. Increased stool throughout the colon. Oral contrast is noted in the stomach, small bowel and colon. The appendix is not identified. Pelvis: Uterus normal. Peritoneum/Retroperitoneum: The abdominal aorta and iliac arteries are normal in caliber. There is no pathologic adenopathy. 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 Right total hip arthroplasty. Distended gallbladder.   No radiodense gallstones noted but ultrasound is recommended. Extensive metastatic disease is a risk for fracture at T10. Us Gallbladder Ruq    Result Date: 1/27/2020  EXAMINATION: RIGHT UPPER QUADRANT ULTRASOUND 1/27/2020 10:23 am COMPARISON: CT abdomen and pelvis January 26, 2020 HISTORY: ORDERING SYSTEM PROVIDED HISTORY: Rule out liver cirrhosis, concern for aclaclulus cholecystitis TECHNOLOGIST PROVIDED HISTORY: Rule out liver cirrhosis, concern for aclaclulus cholecystitis FINDINGS: Heterogeneous increased liver echotexture. No focal liver lesion. No right-sided hydronephrosis. Nonvisualization of the pancreas. No gallbladder wall thickening. No gallstones. No sonographic Nunez sign. Common bile duct is within normal limits measuring 5 mm in diameter. Minimal amount of free fluid within the right upper quadrant. Minimal amount of free fluid within the upper abdomen. Heterogeneous increased liver echogenicity could be on the basis of hepatocellular disease or hepatic steatosis. Xr Chest Portable    Result Date: 1/26/2020  EXAMINATION: ONE XRAY VIEW OF THE CHEST 1/26/2020 2:53 pm COMPARISON: None. HISTORY: ORDERING SYSTEM PROVIDED HISTORY: PICC line placement TECHNOLOGIST PROVIDED HISTORY: PICC line placement FINDINGS: The lungs are without consolidation effusion. There is no pneumothorax. The heart is prominent. The upper abdomen is unremarkable. The extrathoracic soft tissues are unremarkable. There is a right subclavian port with the tip in the mid SVC. There is a right-sided PICC line with the tip in the distal mid aspect of the SVC. Mild cardiomegaly without acute pulmonary process. Right-sided PICC line with the tip in the distal mid aspect of the SVC.          IMAGING DATA:          IMPRESSION:     Primary Problem  Metastatic breast cancer [facility])    Active Hospital Problems    Diagnosis Date Noted    Diabetes mellitus type 2 in obese ([facility] [address] [E11.69, E66.9] 01/29/2020    Essential hypertension [I10] 01/29/2020    Acquired

## 2020-01-30 NOTE — OP NOTE
descending duodenum. Post sedation note : The patient's SPO2 remained above 90% throughout the procedure. the vital signs remained stable , and no immediate complication form the procedure noted, patient will be ready for d/c when criteria is met . Findings:    Retropharyngeal area was grossly normal appearing    Significant inflammation and ulceration of the distal third of the esophagus with white thick discharge suspicious for Candida  Although malignancy is less likely but could not be ruled out gentle biopsies were taken        Deep 1/2 cm ulcer in the duodenal bulb just beyond the pyloric orifice, with edema causing some gastric outlet narrowing, was covered with large clotted blood iWatch most of the clotted blood, but there is adherent clot to the crater base which I could not clear I could not see any vessel protruding for which we went ahead and injected epinephrine around it 6 cc of the 1-10,000 epi, at this point there is no fresh blood to indicate any more bleeding which indicate that the bleeding has subsided    Retained food in the stomach related to the gastric outlet narrowing    The scope was removed and the patient tolerated the procedure well. Recommendations/Plan:   1. F/U Biopsies  2. PPI  3. Diflucan  4.  Colonoscopy in am ( not finished the prep )    Electronically signed by María Artis MD  on 1/30/2020 at 8:42 AM

## 2020-01-30 NOTE — PROGRESS NOTES
[facility]    Progress Note    1/30/2020    11:45 AM    Name:   Ashley Collins  MRN:     [id number]     Acct:      [de-identified]   Room:   [address] [place] [address]  IP Day:  4  Admit Date:  1/26/2020  1:38 PM    PCP:   Jcarlos Hernandez MD  Code Status:  Full Code    Subjective:     C/C: fatigue  Interval History Status: improved. Doesn't feel as confused today  Having hip pain-at first she told me right side then when I examined her left side hurt more    Still having back pain also    Brief History:     ICU course, per documentation:     \"69 y. o. female with history of diabetes, hypothyroidism, CAD status post CABG in 2008, breast cancer on the left side status post chemotherapy and mastectomy in 2007 that presented to outside hospital [facility] on 1/23/2020 for complaints of generalized weakness and fatigue and flulike illness for about 1 month.  She was admitted there for anemia and hyperglycemia and hyponatremia however developed hypotension and had CT imaging which showed concern for duodenitis versus contained duodenal perforation.  Concern for intra-abdominal infection there and antibiotics were narrowed down to Zosyn, initially was on vancomycin as well.      CT chest showed extensive osteoblastic lesions and numerous soft tissue nodules in the greater omentum and mesentery suspicious for carcinomatosis. Johanna Kallman showed mild/moderate left side hydroureteronephrosis without any obstructing stone or mass and recommended CT urogram.      Ct abdomen-   Distended gallbladder.  No radiodense gallstones noted but ultrasound is   recommended.       Extensive metastatic disease is a risk for fracture at T10.          Review of Systems:     Constitutional:  negative for chills, fevers, sweats  Respiratory:  negative for cough, dyspnea on exertion, hemoptysis, shortness of breath, wheezing  Cardiovascular:  negative for chest pain, chest pressure/discomfort, lower extremity edema, palpitations  Gastrointestinal:  negative for abdominal pain, constipation, diarrhea, nausea, vomiting  Neurological:  negative for dizziness, headache    Medications: Allergies:  No Known Allergies    Current Meds:   Scheduled Meds:    polyethylene glycol  4,000 mL Oral Once    bisacodyl  20 mg Oral Once    fluconazole  200 mg Oral Once    [START ON 2020] fluconazole  100 mg Oral Daily    insulin lispro  0-18 Units Subcutaneous Q4H    midodrine  2.5 mg Oral TID WC    [Held by provider] aspirin  81 mg Oral Daily    levothyroxine  100 mcg Oral Daily    venlafaxine  150 mg Oral Daily    insulin glargine  10 Units Subcutaneous Nightly    docusate sodium  100 mg Oral BID    senna  1 tablet Oral BID    ferrous sulfate  325 mg Oral BID WC    sodium chloride flush  10 mL Intravenous 2 times per day    [Held by provider] enoxaparin  40 mg Subcutaneous Daily     Continuous Infusions:    dextrose       PRN Meds: sodium chloride flush, acetaminophen, sodium chloride flush, magnesium hydroxide, ondansetron, potassium chloride, bisacodyl, acetaminophen, glucose, dextrose, glucagon (rDNA), dextrose, oxyCODONE **OR** oxyCODONE    Data:     Past Medical History:   has a past medical history of Breast cancer ([facility] [address] CAD (coronary atherosclerotic disease), Diabetes mellitus type 2 in [facility]), Essential hypertension, and Hypothyroidism (acquired). Social History:   reports that she has never smoked. She has never used smokeless tobacco. She reports that she does not use drugs.      Family History:   Family History   Problem Relation Age of Onset    Hypertension Mother     Hypertension Father        Vitals:  /63   Pulse 107   Temp 98.1 °F (36.7 °C) (Oral)   Resp 16   Ht 5' 2\" (1.575 m)   Wt 183 lb 13.8 oz (83.4 kg)   SpO2 95%   BMI 33.63 kg/m²   Temp (24hrs), Av °F (36.7 °C), Min:97.7 °F (36.5 °C), Max:98.2 °F (36.8 °C)    Recent Labs     20  1736 20 subtle double density along the margin of the superior pubic ramus on the left without definitive fracture line at patient's pain persists, consider CT exam No acute osseous abnormality of the right femur or the left femur     Ct Femur Left Wo Contrast    Result Date: 1/30/2020  Multiple osseous metastatic lesions in the left ischium, likely anterior left acetabular wall, and probably in the intertrochanteric and lesser trochanteric portions of the left femur. These could be further characterized with bone scan or MRI with contrast.     Ct Biopsy Superficial Bone Percutaneous    Addendum Date: 1/28/2020    ADDENDUM: Addendum is for billing purposes only. Automated dose modulation and/ or weight based adjustment of the mA/kv was utilized to reduce the radiation dose to as low as reasonably achievable. Result Date: 1/28/2020  Technically successful CT-guided targeted right iliac bone core biopsy. Physical Examination:        General appearance:  alert, cooperative and no distress  Mental Status:  oriented to person, place and time and normal affect  Lungs:  clear to auscultation bilaterally, normal effort  Heart:  regular rate and rhythm, no murmur  Abdomen:  soft, nontender, nondistended, normal bowel sounds, no masses, hepatomegaly, splenomegaly  Extremities:  no edema, redness, tenderness in the calves; og hip ttp  Skin:  no gross lesions, rashes, induration    Assessment:        Hospital Problems           Last Modified POA    * (Principal) Anemia 1/29/2020 Yes    Liver lesion 1/26/2020 Yes    History of breast cancer 1/27/2020 Yes    Diabetes mellitus type 2 in obese ([address] (Chronic) 1/29/2020 Yes    Essential hypertension (Chronic) 1/29/2020 Yes    Acquired hypothyroidism (Chronic) 1/29/2020 Yes    Recurrent major depressive disorder, in remission ([address] (Chronic) 1/29/2020 Yes    Metastatic carcinoma ([address] 1/29/2020 Yes      ?candida esophagitis    Plan:        1.  Get mri brain since she had confusion and has now documented mets to bone from prior breast ca  2. Clears today  3. Retry colonoscopy tomorrow  4. Will likely need prophylactic orthopedic stabilization of femur  5. Monitor hgb  6.  Started on antifungals per GI    Micheal MONTE Blood, DO  1/30/2020  11:45 AM

## 2020-01-30 NOTE — ANESTHESIA PRE PROCEDURE
Department of Anesthesiology  Preprocedure Note       Name:  Kami Bardales   Age:  77 y.o.  :  1950                                          MRN:  [id number]         Date:  2020      Surgeon: Gilda Carbajal):  Priyank Varner MD    Procedure: EGD DIAGNOSTIC ONLY (N/A )    Medications prior to admission:   Prior to Admission medications    Medication Sig Start Date End Date Taking?  Authorizing Provider   venlafaxine (EFFEXOR XR) 150 MG extended release capsule Take 150 mg by mouth daily   Yes Historical Provider, MD   metFORMIN (GLUCOPHAGE) 1000 MG tablet Take 1,000 mg by mouth 2 times daily (with meals)   Yes Historical Provider, MD   levothyroxine (SYNTHROID) 100 MCG tablet Take 100 mcg by mouth Daily   Yes Historical Provider, MD   glimepiride (AMARYL) 4 MG tablet Take 4 mg by mouth 2 times daily   Yes Historical Provider, MD   lisinopril (PRINIVIL;ZESTRIL) 2.5 MG tablet Take 2.5 mg by mouth daily   Yes Historical Provider, MD   carvedilol (COREG) 6.25 MG tablet Take 6.25 mg by mouth 2 times daily (with meals)   Yes Historical Provider, MD   SITagliptin (JANUVIA) 100 MG tablet Take 100 mg by mouth daily   Yes Historical Provider, MD   aspirin 81 MG tablet Take 81 mg by mouth daily   Yes Historical Provider, MD       Current medications:    Current Facility-Administered Medications   Medication Dose Route Frequency Provider Last Rate Last Dose    insulin lispro (HUMALOG) injection vial 0-18 Units  0-18 Units Subcutaneous Q4H Chikis Rea MD        sodium chloride flush 0.9 % injection 10 mL  10 mL Intravenous PRN Jose Alfredo Castellanos MD        acetaminophen (TYLENOL) tablet 650 mg  650 mg Oral Q4H PRN Sana Katz MD        midodrine (PROAMATINE) tablet 2.5 mg  2.5 mg Oral TID WC Amaya Olivares MD   2.5 mg at 20 0843    [Held by provider] aspirin EC tablet 81 mg  81 mg Oral Daily Amaya Olivares MD   81 mg at 20 1346    levothyroxine (SYNTHROID) tablet 100 mcg  100 mcg Oral Daily Vira Joshi Risa Padilla MD   100 mcg at 01/30/20 0640    venlafaxine (EFFEXOR XR) extended release capsule 150 mg  150 mg Oral Daily Amanda Zelaya MD   150 mg at 01/29/20 0843    insulin glargine (LANTUS) injection vial 10 Units  10 Units Subcutaneous Nightly Amanda Zealya MD   10 Units at 01/28/20 2053    docusate sodium (COLACE) capsule 100 mg  100 mg Oral BID Corona Momin MD   100 mg at 01/29/20 2128    senna (SENOKOT) tablet 8.6 mg  1 tablet Oral BID Corona Momin MD   8.6 mg at 01/29/20 0844    ferrous sulfate EC tablet 325 mg  325 mg Oral BID WC Courtney Infante MD   325 mg at 01/29/20 1954    sodium chloride flush 0.9 % injection 10 mL  10 mL Intravenous 2 times per day Corona Momin MD   10 mL at 01/29/20 2133    sodium chloride flush 0.9 % injection 10 mL  10 mL Intravenous PRN Corona Momin MD        magnesium hydroxide (MILK OF MAGNESIA) 400 MG/5ML suspension 30 mL  30 mL Oral Daily PRN Corona Momin MD        ondansetron [facility]) injection 4 mg  4 mg Intravenous Q6H PRN Corona Momin MD        [Held by provider] enoxaparin (LOVENOX) injection 40 mg  40 mg Subcutaneous Daily Corona Momin MD   40 mg at 01/27/20 1231    potassium chloride 10 mEq/100 mL IVPB (Peripheral Line)  10 mEq Intravenous PRN Corona Momin MD        bisacodyl (DULCOLAX) suppository 10 mg  10 mg Rectal Daily PRN Corona Momin MD        acetaminophen (TYLENOL) tablet 650 mg  650 mg Oral Q4H PRN Corona Momin MD        glucose (GLUTOSE) 40 % oral gel 15 g  15 g Oral PRN Corona Momin MD        dextrose 50 % IV solution  12.5 g Intravenous PRN Corona Momin MD        glucagon (rDNA) injection 1 mg  1 mg Intramuscular PRN Corona Momin MD        dextrose 5 % solution  100 mL/hr Intravenous PRN Corona Momin MD        oxyCODONE (ROXICODONE) immediate release tablet 5 mg  5 mg Oral Q4H PRN Stefani Perez MD   5 mg at 01/28/20 0910    Or    oxyCODONE (ROXICODONE) immediate release tablet 10 mg  10 mg Oral Q4H PRN Norma De Jesus MD   10 mg at 01/30/20 0301       Allergies:  No Known Allergies    Problem List:    Patient Active Problem List   Diagnosis Code    Anemia D64.9    Liver lesion K76.9    History of breast cancer Z85.3    Diabetes mellitus type 2 in obese ([facility] E11.69, E66.9    Essential hypertension I10    Acquired hypothyroidism E03.9    Recurrent major depressive disorder, [facility] ([facility] F33.40    Metastatic carcinoma ([facility] C79.9       Past Medical History:        Diagnosis Date    [facility] cancer ([facility] 2007    CAD (coronary atherosclerotic disease)     Diabetes mellitus type 2 in obese ([facility]     Essential hypertension     Hypothyroidism (acquired)        Past Surgical History:        Procedure Laterality Date    CORONARY ARTERY BYPASS GRAFT  2007    JOINT REPLACEMENT  2008    MASTECTOMY  2007       Social History:    Social History     Tobacco Use    Smoking status: Unknown If Ever Smoked   Substance Use Topics    Alcohol use: Not on file                                Counseling given: Not Answered      Vital Signs (Current):   Vitals:    01/29/20 1000 01/29/20 1103 01/29/20 1915 01/30/20 0045   BP: 106/69  (!) 124/59 (!) 133/57   Pulse: 71  115 122   Resp: 20 15 22 22   Temp:   97.7 °F (36.5 °C) 98.1 °F (36.7 °C)   TempSrc:   Oral Oral   SpO2:  96% 93%    Weight:       Height:                                                  BP Readings from Last 3 Encounters:   01/30/20 (!) 133/57       NPO Status:                                                                                 BMI:   Wt Readings from Last 3 Encounters:   01/26/20 183 lb 13.8 oz (83.4 kg)     Body mass index is 33.63 kg/m².     CBC:   Lab Results   Component Value Date    WBC 9.9 01/30/2020    RBC 2.76 01/30/2020    HGB 7.9 01/30/2020    HCT 25.3 01/30/2020    MCV 91.7 01/30/2020    RDW 17.4 01/30/2020    PLT See Reflexed IPF Result 01/30/2020       CMP:   Lab Results   Component Value Date

## 2020-01-30 NOTE — PROGRESS NOTES
Report called to RN for room 164  Informed of procdure, vital signs, and sedation of propofol 350 mg, lidocaine 50 mg and iv fluids 200 ml. Plan to have pt go to c t scan post egd.   Praveena Mathur RN

## 2020-01-30 NOTE — PROGRESS NOTES
Orthopedic Progress Note    Patient:  Marlen Collins  YOB: 1950     77 y.o. female    Subjective:  Patient seen and examined  Complaining of right hip pain this AM, states this has been persistent since surgery many years ago. Left hip pain mild this AM  No issue overnight, patient did receive 1u PRBC yesterday for low Hgb  Pain controlled on current regimen    Vitals reviewed, afebrile    Objective:   Vitals:    01/30/20 0045   BP: (!) 133/57   Pulse: 122   Resp: 22   Temp: 98.1 °F (36.7 °C)   SpO2:      Gen: NAD, cooperative    RLE: no erythema, warmth or swelling around the right hip, patient moves toes to command. Hesitant to move the right leg. Comparments soft and compressible. EHL/FHL/TA/GSC motor complex intact grossly. Sural/saphenous/SP/DP/Plantar sensory nerves SILT grossly. Foot warm and well perfused. LLE: no TTP to the left hip, no pain with log roll. Compartments soft and compressible. EHL/FHL/TS/GSC motor complex intact grossly. Sural/saphenous/SP/DP/Plantar sensory nerves SILT grossly. Foot warm and well perfused. Recent Labs     01/29/20  0502  01/30/20  0037   WBC 10.3  --   --    HGB 6.3*   < > 10.0*   HCT 21.6*   < > 32.5*     --   --      --   --    K 4.0  --   --    BUN 11  --   --    CREATININE 0.47*  --   --    GLUCOSE 239*  --   --     < > = values in this interval not displayed.       Meds: Lovenox   See rec for complete list    Impression/plan: 77 y.o. female with multiple pelvic and left femur lesions concerning for metastatic disease    -Patient will likely require stabilization/fixaiton to the left femoral neck lesion  -Please to btain CT of the left femur to ensure lesions do not extend into the femoral diaphysis today  -IM on as primary team, appreciate recommendations and continued optimization for possible surgery  -Oncology following, awaiting result of right pelvic lesion biopsy  -Hgb 10 this AM after 1u PRBC  -GI on and planning colonoscopy this AM  -Non-weightbearing to the left leg  -Pain control PO/IV Medication. Attempt to Wean IV medications.    -DVT ppx: Lovenox  -Will attempt discussion today with family regarding patient's care, prognosis if appropriate, and timing of intervention  -Consider brain MRI if patient's AMS continues due to concern for metastatic disease  -Please page DO ortho with any questions      Pam Miller DO   Orthopedic Surgery Resident PGY-3  [facility]

## 2020-01-31 ENCOUNTER — APPOINTMENT (OUTPATIENT)
Dept: MRI IMAGING | Age: 70
DRG: 347 | End: 2020-01-31
Attending: INTERNAL MEDICINE
Payer: COMMERCIAL

## 2020-01-31 ENCOUNTER — ANESTHESIA EVENT (OUTPATIENT)
Dept: OPERATING ROOM | Age: 70
DRG: 347 | End: 2020-01-31
Payer: COMMERCIAL

## 2020-01-31 LAB
ABSOLUTE EOS #: 0.28 K/UL (ref 0–0.44)
ABSOLUTE IMMATURE GRANULOCYTE: 0.2 K/UL (ref 0–0.3)
ABSOLUTE LYMPH #: 2.71 K/UL (ref 1.1–3.7)
ABSOLUTE MONO #: 1.24 K/UL (ref 0.1–1.2)
ANION GAP SERPL CALCULATED.3IONS-SCNC: 12 MMOL/L (ref 9–17)
BASOPHILS # BLD: 1 % (ref 0–2)
BASOPHILS ABSOLUTE: 0.06 K/UL (ref 0–0.2)
BUN BLDV-MCNC: 14 MG/DL (ref 8–23)
BUN/CREAT BLD: ABNORMAL (ref 9–20)
CALCIUM SERPL-MCNC: 8.5 MG/DL (ref 8.6–10.4)
CHLORIDE BLD-SCNC: 100 MMOL/L (ref 98–107)
CO2: 24 MMOL/L (ref 20–31)
CREAT SERPL-MCNC: 0.37 MG/DL (ref 0.5–0.9)
DIFFERENTIAL TYPE: ABNORMAL
EOSINOPHILS RELATIVE PERCENT: 2 % (ref 1–4)
GFR AFRICAN AMERICAN: >60 ML/MIN
GFR NON-AFRICAN AMERICAN: >60 ML/MIN
GFR SERPL CREATININE-BSD FRML MDRD: ABNORMAL ML/MIN/{1.73_M2}
GFR SERPL CREATININE-BSD FRML MDRD: ABNORMAL ML/MIN/{1.73_M2}
GLUCOSE BLD-MCNC: 142 MG/DL (ref 65–105)
GLUCOSE BLD-MCNC: 183 MG/DL (ref 65–105)
GLUCOSE BLD-MCNC: 192 MG/DL (ref 65–105)
GLUCOSE BLD-MCNC: 196 MG/DL (ref 65–105)
GLUCOSE BLD-MCNC: 203 MG/DL (ref 65–105)
GLUCOSE BLD-MCNC: 206 MG/DL (ref 70–99)
GLUCOSE BLD-MCNC: 223 MG/DL (ref 65–105)
HCT VFR BLD CALC: 22.2 % (ref 36.3–47.1)
HCT VFR BLD CALC: 24.2 % (ref 36.3–47.1)
HCT VFR BLD CALC: 26.2 % (ref 36.3–47.1)
HEMOGLOBIN: 6.6 G/DL (ref 11.9–15.1)
HEMOGLOBIN: 7.5 G/DL (ref 11.9–15.1)
HEMOGLOBIN: 8.1 G/DL (ref 11.9–15.1)
IMMATURE GRANULOCYTES: 2 %
LYMPHOCYTES # BLD: 21 % (ref 24–43)
MCH RBC QN AUTO: 27.8 PG (ref 25.2–33.5)
MCHC RBC AUTO-ENTMCNC: 29.7 G/DL (ref 28.4–34.8)
MCV RBC AUTO: 93.7 FL (ref 82.6–102.9)
MONOCYTES # BLD: 9 % (ref 3–12)
NRBC AUTOMATED: 0.4 PER 100 WBC
PDW BLD-RTO: 17.3 % (ref 11.8–14.4)
PLATELET # BLD: 423 K/UL (ref 138–453)
PLATELET ESTIMATE: ABNORMAL
PMV BLD AUTO: 9.9 FL (ref 8.1–13.5)
POTASSIUM SERPL-SCNC: 4.4 MMOL/L (ref 3.7–5.3)
RBC # BLD: 2.37 M/UL (ref 3.95–5.11)
RBC # BLD: ABNORMAL 10*6/UL
SEG NEUTROPHILS: 66 % (ref 36–65)
SEGMENTED NEUTROPHILS ABSOLUTE COUNT: 8.71 K/UL (ref 1.5–8.1)
SODIUM BLD-SCNC: 136 MMOL/L (ref 135–144)
SURGICAL PATHOLOGY REPORT: NORMAL
WBC # BLD: 13.2 K/UL (ref 3.5–11.3)
WBC # BLD: ABNORMAL 10*3/UL

## 2020-01-31 PROCEDURE — 6370000000 HC RX 637 (ALT 250 FOR IP): Performed by: INTERNAL MEDICINE

## 2020-01-31 PROCEDURE — 36415 COLL VENOUS BLD VENIPUNCTURE: CPT

## 2020-01-31 PROCEDURE — 85018 HEMOGLOBIN: CPT

## 2020-01-31 PROCEDURE — 2580000003 HC RX 258: Performed by: INTERNAL MEDICINE

## 2020-01-31 PROCEDURE — 99232 SBSQ HOSP IP/OBS MODERATE 35: CPT | Performed by: INTERNAL MEDICINE

## 2020-01-31 PROCEDURE — P9016 RBC LEUKOCYTES REDUCED: HCPCS

## 2020-01-31 PROCEDURE — 1200000000 HC SEMI PRIVATE

## 2020-01-31 PROCEDURE — 80048 BASIC METABOLIC PNL TOTAL CA: CPT

## 2020-01-31 PROCEDURE — 36430 TRANSFUSION BLD/BLD COMPNT: CPT

## 2020-01-31 PROCEDURE — 86900 BLOOD TYPING SEROLOGIC ABO: CPT

## 2020-01-31 PROCEDURE — 2580000003 HC RX 258: Performed by: NURSE PRACTITIONER

## 2020-01-31 PROCEDURE — 82947 ASSAY GLUCOSE BLOOD QUANT: CPT

## 2020-01-31 PROCEDURE — 85025 COMPLETE CBC W/AUTO DIFF WBC: CPT

## 2020-01-31 PROCEDURE — 6370000000 HC RX 637 (ALT 250 FOR IP): Performed by: NURSE PRACTITIONER

## 2020-01-31 PROCEDURE — 85014 HEMATOCRIT: CPT

## 2020-01-31 RX ORDER — 0.9 % SODIUM CHLORIDE 0.9 %
20 INTRAVENOUS SOLUTION INTRAVENOUS ONCE
Status: COMPLETED | OUTPATIENT
Start: 2020-01-31 | End: 2020-01-31

## 2020-01-31 RX ORDER — CALCIUM CARBONATE/VITAMIN D3 250-3.125
2 TABLET ORAL DAILY
Status: DISCONTINUED | OUTPATIENT
Start: 2020-01-31 | End: 2020-02-19 | Stop reason: HOSPADM

## 2020-01-31 RX ADMIN — SODIUM CHLORIDE 20 ML: 0.9 INJECTION, SOLUTION INTRAVENOUS at 06:07

## 2020-01-31 RX ADMIN — INSULIN LISPRO 3 UNITS: 100 INJECTION, SOLUTION INTRAVENOUS; SUBCUTANEOUS at 18:10

## 2020-01-31 RX ADMIN — CALCIUM CARBONATE-CHOLECALCIFEROL TAB 250 MG-125 UNIT 500 MG: 250-125 TAB at 20:42

## 2020-01-31 RX ADMIN — INSULIN GLARGINE 10 UNITS: 100 INJECTION, SOLUTION SUBCUTANEOUS at 20:43

## 2020-01-31 RX ADMIN — SENNOSIDES 8.6 MG: 8.6 TABLET, FILM COATED ORAL at 20:42

## 2020-01-31 RX ADMIN — MORPHINE SULFATE 15 MG: 15 TABLET, EXTENDED RELEASE ORAL at 11:49

## 2020-01-31 RX ADMIN — FERROUS SULFATE TAB EC 325 MG (65 MG FE EQUIVALENT) 325 MG: 325 (65 FE) TABLET DELAYED RESPONSE at 18:10

## 2020-01-31 RX ADMIN — MORPHINE SULFATE 15 MG: 15 TABLET, EXTENDED RELEASE ORAL at 20:42

## 2020-01-31 RX ADMIN — INSULIN LISPRO 3 UNITS: 100 INJECTION, SOLUTION INTRAVENOUS; SUBCUTANEOUS at 20:43

## 2020-01-31 RX ADMIN — DOCUSATE SODIUM 100 MG: 100 CAPSULE, LIQUID FILLED ORAL at 20:42

## 2020-01-31 RX ADMIN — PANTOPRAZOLE SODIUM 40 MG: 40 TABLET, DELAYED RELEASE ORAL at 18:10

## 2020-01-31 RX ADMIN — MIDODRINE HYDROCHLORIDE 2.5 MG: 2.5 TABLET ORAL at 18:10

## 2020-01-31 RX ADMIN — OXYCODONE HYDROCHLORIDE 10 MG: 5 TABLET ORAL at 00:17

## 2020-01-31 RX ADMIN — MORPHINE SULFATE 15 MG: 15 TABLET, EXTENDED RELEASE ORAL at 00:17

## 2020-01-31 RX ADMIN — OXYCODONE HYDROCHLORIDE 10 MG: 5 TABLET ORAL at 18:10

## 2020-01-31 RX ADMIN — INSULIN LISPRO 6 UNITS: 100 INJECTION, SOLUTION INTRAVENOUS; SUBCUTANEOUS at 13:59

## 2020-01-31 RX ADMIN — SODIUM CHLORIDE, PRESERVATIVE FREE 10 ML: 5 INJECTION INTRAVENOUS at 20:54

## 2020-01-31 ASSESSMENT — PAIN DESCRIPTION - ORIENTATION: ORIENTATION: RIGHT;LEFT

## 2020-01-31 ASSESSMENT — PAIN SCALES - GENERAL
PAINLEVEL_OUTOF10: 2
PAINLEVEL_OUTOF10: 2
PAINLEVEL_OUTOF10: 8
PAINLEVEL_OUTOF10: 7
PAINLEVEL_OUTOF10: 8
PAINLEVEL_OUTOF10: 2

## 2020-01-31 ASSESSMENT — PAIN DESCRIPTION - ONSET: ONSET: GRADUAL

## 2020-01-31 ASSESSMENT — PAIN DESCRIPTION - PAIN TYPE
TYPE: ACUTE PAIN;CHRONIC PAIN
TYPE: ACUTE PAIN

## 2020-01-31 ASSESSMENT — PAIN DESCRIPTION - FREQUENCY: FREQUENCY: CONTINUOUS

## 2020-01-31 ASSESSMENT — PAIN DESCRIPTION - LOCATION: LOCATION: BACK;HIP

## 2020-01-31 ASSESSMENT — PAIN DESCRIPTION - DESCRIPTORS: DESCRIPTORS: ACHING;DULL

## 2020-01-31 NOTE — PROGRESS NOTES
[facility] [address]    Progress Note    1/31/2020    2:13 PM    Name:   Macarena Collins  MRN:     [id number]     Acct:      [de-identified]   Room:   [address] [place] [address]  IP Day:  5  Admit Date:  1/26/2020  1:38 PM    PCP:   Korina Lopez MD  Code Status:  Full Code    Subjective:     C/C: anemia  Interval History Status: not changed. Tired today  Denies pain  Poor historian    Brief History:     ICU course, per documentation:     \"69 y. o. female with history of diabetes, hypothyroidism, CAD status post CABG in 2008, breast cancer on the left side status post chemotherapy and mastectomy in 2007 that presented to outside hospital [facility] on 1/23/2020 for complaints of generalized weakness and fatigue and flulike illness for about 1 month.  She was admitted there for anemia and hyperglycemia and hyponatremia however developed hypotension and had CT imaging which showed concern for duodenitis versus contained duodenal perforation.  Concern for intra-abdominal infection there and antibiotics were narrowed down to Zosyn, initially was on vancomycin as well.      CT chest showed extensive osteoblastic lesions and numerous soft tissue nodules in the greater omentum and mesentery suspicious for carcinomatosis. Gato Blase showed mild/moderate left side hydroureteronephrosis without any obstructing stone or mass and recommended CT urogram.      Ct abdomen-   Distended gallbladder.  No radiodense gallstones noted but ultrasound is   recommended.       Extensive metastatic disease is a risk for fracture at T10.          Review of Systems:     Constitutional:  negative for chills, fevers, sweats  Respiratory:  negative for cough, dyspnea on exertion, hemoptysis, shortness of breath, wheezing  Cardiovascular:  negative for chest pain, chest pressure/discomfort, lower extremity edema, palpitations  Gastrointestinal:  negative for abdominal pain, constipation, diarrhea, nausea, vomiting  Neurological:  negative for dizziness, headache    Medications: Allergies:  No Known Allergies    Current Meds:   Scheduled Meds:    alteplase  2 mg Intracatheter Once    fluconazole  100 mg Oral Daily    pantoprazole  40 mg Oral BID AC    ceFAZolin  2 g Intravenous On Call to OR    morphine  15 mg Oral 2 times per day    insulin lispro  0-18 Units Subcutaneous Q4H    midodrine  2.5 mg Oral TID WC    [Held by provider] aspirin  81 mg Oral Daily    levothyroxine  100 mcg Oral Daily    venlafaxine  150 mg Oral Daily    insulin glargine  10 Units Subcutaneous Nightly    docusate sodium  100 mg Oral BID    senna  1 tablet Oral BID    ferrous sulfate  325 mg Oral BID WC    sodium chloride flush  10 mL Intravenous 2 times per day    [Held by provider] enoxaparin  40 mg Subcutaneous Daily     Continuous Infusions:    dextrose       PRN Meds: sodium chloride flush, acetaminophen, sodium chloride flush, magnesium hydroxide, ondansetron, potassium chloride, bisacodyl, acetaminophen, glucose, dextrose, glucagon (rDNA), dextrose, oxyCODONE **OR** oxyCODONE    Data:     Past Medical History:   has a past medical history of Breast cancer ([facility] CAD (coronary atherosclerotic disease), Diabetes mellitus type 2 in [facility]), Essential hypertension, and Hypothyroidism (acquired). Social History:   reports that she has never smoked. She has never used smokeless tobacco. She reports that she does not use drugs.      Family History:   Family History   Problem Relation Age of Onset    Hypertension Mother     Hypertension Father        Vitals:  /66   Pulse 110   Temp 97.1 °F (36.2 °C) (Oral)   Resp 17   Ht 5' 2\" (1.575 m)   Wt 182 lb 1.6 oz (82.6 kg)   SpO2 99%   BMI 33.31 kg/m²   Temp (24hrs), Av °F (36.7 °C), Min:96.9 °F (36.1 °C), Max:99.4 °F (37.4 °C)    Recent Labs     20  0016 20  0432 20  0752 20  1219   POCGLU 183* 203* 196* 223*       I/O fracture of the left hip There is no acute fracture in the pelvis. Detail of the sacrum is markedly limited. There is subtle double density along the margin of the superior pubic ramus on the left without definitive fracture line at patient's pain persists, consider CT exam No acute osseous abnormality of the right femur or the left femur     Xr Femur Right (min 2 Views)    Result Date: 1/28/2020  There is no acute fracture of the left hip There is no acute fracture in the pelvis. Detail of the sacrum is markedly limited. There is subtle double density along the margin of the superior pubic ramus on the left without definitive fracture line at patient's pain persists, consider CT exam No acute osseous abnormality of the right femur or the left femur     Mri Thoracic Spine W Wo Contrast    Result Date: 1/29/2020  Osseous metastatic disease with diffuse marrow infiltration of T10. Mild extraosseous extension of tumor suspected along the left neural foramina a T10. Us Renal Complete    Result Date: 1/26/2020  Mild left hydronephrosis     Ct Abdomen Pelvis W Iv Contrast Additional Contrast? Oral    Result Date: 1/26/2020  Distended gallbladder. No radiodense gallstones noted but ultrasound is recommended. Extensive metastatic disease is a risk for fracture at T10. Us Gallbladder Ruq    Result Date: 1/27/2020  Minimal amount of free fluid within the upper abdomen. Heterogeneous increased liver echogenicity could be on the basis of hepatocellular disease or hepatic steatosis. Xr Chest Portable    Result Date: 1/26/2020  Mild cardiomegaly without acute pulmonary process. Right-sided PICC line with the tip in the distal mid aspect of the SVC. Xr Pelvis (min 3 Views)    Result Date: 1/28/2020  There is no acute fracture of the left hip There is no acute fracture in the pelvis. Detail of the sacrum is markedly limited.   There is subtle double density along the margin of the superior pubic ramus on the colonoscopy but did not drink prep, again. 3. Pall care eval  4. Receiving 1u prbc today  5.  Needs orthopedic stabilization of femur    Micheal MONTE Blood, DO  1/31/2020  2:13 PM

## 2020-01-31 NOTE — ANESTHESIA PRE PROCEDURE
Department of Anesthesiology  Preprocedure Note       Name:  Kay Serrano   Age:  77 y.o.  :  1950                                          MRN:  [id number]         Date:  2020      Surgeon: Glenna Ibrahim):  Isaiah Arboleda MD    Procedure: TFN FEMUR FRACTURE (SYNTHES, FRACTURE TABLE, C-ARM) (Left )    Medications prior to admission:   Prior to Admission medications    Medication Sig Start Date End Date Taking?  Authorizing Provider   venlafaxine (EFFEXOR XR) 150 MG extended release capsule Take 150 mg by mouth daily   Yes Historical Provider, MD   metFORMIN (GLUCOPHAGE) 1000 MG tablet Take 1,000 mg by mouth 2 times daily (with meals)   Yes Historical Provider, MD   levothyroxine (SYNTHROID) 100 MCG tablet Take 100 mcg by mouth Daily   Yes Historical Provider, MD   glimepiride (AMARYL) 4 MG tablet Take 4 mg by mouth 2 times daily   Yes Historical Provider, MD   lisinopril (PRINIVIL;ZESTRIL) 2.5 MG tablet Take 2.5 mg by mouth daily   Yes Historical Provider, MD   carvedilol (COREG) 6.25 MG tablet Take 6.25 mg by mouth 2 times daily (with meals)   Yes Historical Provider, MD   SITagliptin (JANUVIA) 100 MG tablet Take 100 mg by mouth daily   Yes Historical Provider, MD   aspirin 81 MG tablet Take 81 mg by mouth daily   Yes Historical Provider, MD       Current medications:    Current Facility-Administered Medications   Medication Dose Route Frequency Provider Last Rate Last Dose    alteplase (CATHFLO) injection 2 mg  2 mg Intracatheter Once SERAFIN Degroot CNP        fluconazole (DIFLUCAN) tablet 100 mg  100 mg Oral Daily SERAFIN Simmons CNP        pantoprazole (PROTONIX) tablet 40 mg  40 mg Oral BID AC SERAFIN Simmons CNP   40 mg at 20 1628    ceFAZolin (ANCEF) 2 g in dextrose 5 % 50 mL IVPB  2 g Intravenous On Call to [address] [place] [address] DO Esa        morphine (MS CONTIN) extended release tablet 15 mg  15 mg Oral 2 times per day Alida Sicard, MD   15 mg at 20 1149    (TYLENOL) tablet 650 mg  650 mg Oral Q4H PRN Comfort Tong MD        glucose (GLUTOSE) 40 % oral gel 15 g  15 g Oral PRN Comfort Tong MD        dextrose 50 % IV solution  12.5 g Intravenous PRN Comfort Tong MD        glucagon (rDNA) injection 1 mg  1 mg Intramuscular PRN Comfort Tong MD        dextrose 5 % solution  100 mL/hr Intravenous PRN Comfort Tong MD        oxyCODONE (ROXICODONE) immediate release tablet 5 mg  5 mg Oral Q4H PRN Comfort Tong MD   5 mg at 01/28/20 7568    Or    oxyCODONE (ROXICODONE) immediate release tablet 10 mg  10 mg Oral Q4H PRN Comfort Tong MD   10 mg at 01/31/20 0017       Allergies:  No Known Allergies    Problem List:    Patient Active Problem List   Diagnosis Code    Anemia D64.9    Liver lesion K76.9    History of breast cancer Z85.3    [facility] E11.69, E66.9    Essential hypertension I10    Acquired hypothyroidism E03.9    Recurrent major depressive disorder, in remission ([facility] F33.40    Metastatic breast cancer ([facility] C50.919    Bone lesion M89.9    Iron deficiency E61.1       Past Medical History:        Diagnosis Date    Breast cancer ([facility] 2007    CAD (coronary atherosclerotic disease)     Diabetes mellitus type 2 in obese ([facility]     Essential hypertension     Hypothyroidism (acquired)        Past Surgical History:        Procedure Laterality Date    CORONARY ARTERY BYPASS GRAFT  2007    JOINT REPLACEMENT  2008    MASTECTOMY  2007    UPPER GASTROINTESTINAL ENDOSCOPY N/A 1/30/2020    EGD BIOPSY performed by Camille Moser MD at [facility]    UPPER GASTROINTESTINAL ENDOSCOPY  1/30/2020    EGD CONTROL HEMORRHAGE performed by Camille Moser MD at [facility]       Social History:    Social History     Tobacco Use    Smoking status: Never Smoker    Smokeless tobacco: Never Used   Substance Use Topics    Alcohol use: Not on file     Comment: social                                Counseling given: Not Answered      Vital Signs Pulmonary:Negative Pulmonary ROS and normal exam                               Cardiovascular:    (+) hypertension: moderate, CAD: no interval change,       ECG reviewed  Rhythm: regular  Rate: normal           Beta Blocker:  Not on Beta Blocker      ROS comment: 30-JAN-2020 08:08:19 [facility]- ROUTINE RETRIEVAL  Sinus tachycardia with occasional Premature ventricular complexes  Possible Left atrial enlargement  Nonspecific T wave abnormality  Abnormal ECG  No previous ECGs available     Neuro/Psych:   (+) psychiatric history:depression/anxiety             GI/Hepatic/Renal:        (-) liver disease and no renal disease       Endo/Other:    (+) DiabetesType II DM, no interval change, , hypothyroidism::., malignancy/cancer. Abdominal:           Vascular:                                Anesthesia Plan      general     ASA 3       Induction: intravenous. MIPS: Postoperative opioids intended and Prophylactic antiemetics administered. Anesthetic plan and risks discussed with patient. Use of blood products discussed with patient whom consented to blood products. Plan discussed with CRNA.               SERAFIN Moreno - KEILA   1/31/2020

## 2020-01-31 NOTE — PROGRESS NOTES
Orthopedic Progress Note    Patient:  Elgin Collins  YOB: 1950     77 y.o. female    Subjective:  Patient seen and examined  No issues overnight. Not oriented. Pain controlled left hip. Vitals reviewed, afebrile    Objective:   Vitals:    01/31/20 0645   BP: (!) 100/53   Pulse: 106   Resp: 12   Temp: 98 °F (36.7 °C)   SpO2: 95%     Gen: NAD, cooperative  Cardiovascular: Regular rate, no dependent edema, distal pulses 2+  Respiratory: Chest symmetric, no accessory muscle use, normal respirations    RLE: no erythema, warmth or swelling around the right hip, patient moves toes to command. Comparments soft and compressible. EHL/FHL/TA/GSC motor complex intact grossly. Sural/saphenous/SP/DP/Plantar sensory nerves SILT grossly. Foot warm and well perfused. LLE: no TTP to the left hip, no pain with log roll. Compartments soft and compressible. EHL/FHL/TS/GSC motor complex intact grossly. Sural/saphenous/SP/DP/Plantar sensory nerves SILT grossly. Foot warm and well perfused. Recent Labs     01/31/20  0442   WBC 13.2*   HGB 6.6*   HCT 22.2*         K 4.4   BUN 14   CREATININE 0.37*   GLUCOSE 206*      Meds: Lovenox   See rec for complete list    Impression/plan: 77 y.o. female with multiple pelvic and left femur lesions concerning for metastatic disease    -Patient will likely require stabilization/fixaiton to the left femoral neck lesion.  -Possible fixation today after discussion with family. NPO for now, abx on call to OR  -Hgb 6.6 this morning. Type and crossed [facility] for OR. -CT left femur reviewed  -MRI brain pending  -IM on as primary team, appreciate recommendations and continued optimization for possible surgery  -Oncology following, f/u recs  -GI on and planning colonoscopy this AM  -Non-weightbearing to the left leg  -Pain control PO/IV Medication. Attempt to Wean IV medications.    -DVT ppx: Lovenox  -Will attempt discussion today with family regarding patient's care, prognosis if appropriate, and timing of intervention.  Please contact ortho when family arrives this morning.   -Please page DO ortho with any questions      Jessika Cabello,    Orthopedic Surgery Resident PGY-2  [facility]

## 2020-01-31 NOTE — CARE COORDINATION
Spoke with [facility], if patient will d/c over the weekend please fax orders and [address] to [phone] and call [phone]

## 2020-01-31 NOTE — PROGRESS NOTES
Nutrition Assessment    Type and Reason for Visit: Initial(NPO x day 5)    Nutrition Recommendations:   - Continue NPO - as able, restart an oral diet with a goal Carb Control diet. - As diet restarted, will provide Glucerna oral supplements with meals to boost intakes. - Monitor labs and bowel function. Nutrition Assessment: Pt seen based on NPO diet order x day 5 of admission. Pt currenlty NPO for a colonoscopy and possible fixation of left femoral neck lesion. EGD completed yesterday. Admitted with c/o weakness, fatigue, and abdominal pain. Noted pt with multiple pelvic and left femur lesions present. RN reports has not been eating well and could benefit from an oral supplement. Pt reports appetite decreased. Malnutrition Assessment:  · Malnutrition Status: At risk for malnutrition  · Context: Acute illness or injury  · Findings of the 6 clinical characteristics of malnutrition (Minimum of 2 out of 6 clinical characteristics is required to make the diagnosis of moderate or severe Protein Calorie Malnutrition based on AND/ASPEN Guidelines):  1. Energy Intake-Less than or equal to 75% of estimated energy requirement, Greater than or equal to 5 days    2. Weight Loss-No significant weight loss  3. Fat Loss-Unable to assess  4. Muscle Loss-Unable to assess  5. Fluid Accumulation-(Mild to moderate fluid accumulation), Extremities    Nutrition Risk Level: High    Nutrient Needs:  · Estimated Daily Total Kcal: 0397-6464 kcal/day  · Estimated Daily Protein (g): 60-80 gm pro/day    Nutrition Diagnosis:   · Problem: Inadequate oral intake  · Etiology: related to (Current Condition)     Signs and symptoms:  as evidenced by NPO status due to medical condition(variable PO intakes)    Objective Information:  · Nutrition-Focused Physical Findings: No recorded BM since admission.   · Wound Type: None  · Current Nutrition Therapies:  · Oral Diet Orders: NPO   · Oral Diet intake: NPO(recorded intake of more

## 2020-01-31 NOTE — PROGRESS NOTES
[facility]  Occupational Therapy Not Seen Note    DATE: 2020  Name: Arelis Cruz  : 1950  MRN: [id number]    Patient not available for Occupational Therapy due to:    [] Testing:    [] Hemodialysis    [] Blood Transfusion in Progress    []Refusal by Patient:    [x] Surgery/Procedure: Possible fixation today after discussion with family    [] Strict Bedrest    [] Sedation    [] Spine Precautions     [] Pt being transferred to palliative care at this time. Spoke with pt/family and OT services to be defered. [] Pt independent with functional mobility and functional tasks.  Pt with no OT acute care needs at this time, will defer OT eval.    [] Other    Next Scheduled Treatment: CK in pm if POC changes, Ck 2/    Yaritza Moreno OTR/L

## 2020-01-31 NOTE — PROGRESS NOTES
THE [facility] [place] [facility] Progress Note    Lakisha Tillman is a 77 y.o. female patient. Hospitalization Day:5      Chief consult reason:   Anemia    Subjective:  Pt seen and examined. Pt was scheduled for colonoscopy but refused prep and so procedure had to be cancelled. No active bleeding overnight. Hgb did drop slightly from 7.3 last night to 6.6 this am for which she was given 1 unit PBC's     Pt did have EGD yesterday that showed significant inflammation and ulceration of the third esophagus with white thick discharge suspicious for Candida-Diflucan was started-malignancy not ruled out to surface appearance. Additionally, a deep duodenal bulb ulcer was noted at the pyloric orific-with adherant clot not able to be removed. VITALS:  /66   Pulse 110   Temp 96.9 °F (36.1 °C) (Oral)   Resp 17   Ht 5' 2\" (1.575 m)   Wt 182 lb 1.6 oz (82.6 kg)   SpO2 99%   BMI 33.31 kg/m²   TEMPERATURE:  Current - Temp: 96.9 °F (36.1 °C);  Max - Temp  Av.1 °F (36.7 °C)  Min: 96.9 °F (36.1 °C)  Max: 99.4 °F (37.4 °C)    Physical Assessment:  General appearance:  alert, uncooperative and no distress  Mental Status:  oriented to person, place and time and normal affect  Lungs:  clear to auscultation bilaterally, normal effort  Heart:  regular rate and rhythm, no murmur  Abdomen:  soft, nontender, nondistended, normal bowel sounds, no masses, hepatomegaly, splenomegaly  Extremities:  no edema, redness, tenderness in the calves  Skin:  no gross lesions, rashes, induration    Data Review:  LABS and IMAGING:     CBC  Recent Labs     20  0502 20  1110 20  0037 20  0438 20  1841 20  0442   WBC 10.3  --   --  9.9  --  13.2*   HGB 6.3* 7.7* 10.0* 7.9* 7.3* 6.6*   MCV 95.6  --   --  91.7  --  93.7   RDW 18.5*  --   --  17.4*  --  17.3*     --   --  See Reflexed IPF Result  --  423       ANEMIA STUDIES  No results for input(s): LABIRON, TIBC, FERRITIN, FYRVRCID79, FOLATE, OCCULTBLD in the last 72 hours. BMP  Recent Labs     01/29/20  0502 01/30/20  0438 01/31/20  0442    137 136   K 4.0 4.3 4.4    102 100   CO2 19* 24 24   BUN 11 10 14   CREATININE 0.47* 0.41* 0.37*   GLUCOSE 239* 243* 206*   CALCIUM 8.0* 8.6 8.5*       LFTS  No results for input(s): ALKPHOS, ALT, AST, BILITOT, BILIDIR, LABALBU in the last 72 hours. AMYLASE/LIPASE/AMMONIA  No results for input(s): AMYLASE, LIPASE, AMMONIA in the last 72 hours. Acute Hepatitis Panel   No results found for: HEPBSAG, HEPCAB, HEPBIGM, HEPAIGM    HCV Genotype   No results found for: HEPATITISCGENOTYPE    HCV Quantitative   No results found for: HCVQNT    LIVER WORK UP:    AFP  Lab Results   Component Value Date    AFP 0.8 01/27/2020       Alpha 1 antitrypsin   No results found for: A1A    Anti - Liver/Kidney Ab  No results found for: LIVER-KIDNEYMICROSOMALAB    MONICO  No results found for: MONICO    AMA  No results found for: MITOAB    ASMA  No results found for: SMOOTHMUSCAB    Ceruloplasmin  No results found for: CERULOPLSM    Celiac panel  No results found for: TISSTRNTIIGG, TTGIGA, IGA    PT/INR  No results for input(s): PROTIME, INR in the last 72 hours. Cancer Markers:  CEA:  No results for input(s): CEA in the last 72 hours. Ca 125:  No results for input(s):  in the last 72 hours. Ca 19-9:   Invalid input(s):   AFP: No results for input(s): AFP in the last 72 hours. Lactic acid:Invalid input(s): LACTIC ACID    Radiology Review:    No results found. Principal Problem:    [facility]  Active Problems:    Anemia    Liver lesion    History of breast cancer    Diabetes mellitus type 2 in [facility])    Essential hypertension    Acquired hypothyroidism    Recurrent major depressive disorder, in remission ([facility]    Bone lesion    Iron deficiency  Resolved Problems:    Septic shock (HCC)       GI Assessment:  1.  Candida Esophagitis s/p EGD yesterday per Dr. Nicole Willis that showed significant inflammation and ulceration of the third esophagus with white thick discharge suspicious for Candida-Diflucan was started-malignancy not ruled out to surface appearance. 2. Deep duodenal bulb ulcer s/p EGD was noted at the pyloric orific-with adherant clot not able to be removed. 3. Acute anemia-could be combination of GIB that is now resolved and metastatic disease    4. Sepsis-pt on Zosyn--mgt per primary    5. Elevated tumor markers-mgt per HemOnc    Pt has now refused colonoscopy prep. As severe as the duodenal ulcer is, we are confident this is the source of the active bleeding. Since treatment, no further bleeding. We rec colonoscopy but pt refused twice. Plan of care:  1. Diet as tolerated  2. Cont Protonix 40 mg BID  3. Pt will need Diflucan for 3 weeks as ordered upon discharge please  4. Will defer further management of anemia to HemOnc please  5. Pt will need to follow up in the office in 2 weeks with Dr. Johnny Vásquez MD  6. GI will s/o    Thank you for allowing me to participate in the care of your patient. Please feel free to contact me with any questions or concerns. Melecio Saeed, APRN - [address] [facility]  [phone] Physician Statement  I have discussed the care of Tan Collins and   I have examined the patient myselft independently, and taken ros and hpi , including pertinent history and exam findings,  with the author of this note . I have reviewed the key elements of all parts of the encounter with the nurse practitioner/resident.     I agree with the assessment, plan and orders as documented by the above health care provider         Electronically signed by Sonja Espinoza MD

## 2020-01-31 NOTE — PROGRESS NOTES
Physical Therapy  DATE: 2020    NAME: Don Collins  MRN: [id number]   : 1950    Patient not seen this date for Physical Therapy due to:  [] Blood transfusion in progress  [] Hemodialysis  []  Patient Declined  [] Spine Precautions   [] Strict Bedrest  [] Surgery/ Procedure  [] Testing      [x] Other: Plan for OR today, after discussion with family. Ck PM as able. [] PT being discontinued at this time. Patient independent. No further needs. [] PT being discontinued at this time as the patient has been transferred to palliative care. No further needs.     Francisco Vasquez, PT

## 2020-02-01 ENCOUNTER — ANESTHESIA (OUTPATIENT)
Dept: OPERATING ROOM | Age: 70
DRG: 347 | End: 2020-02-01
Payer: COMMERCIAL

## 2020-02-01 ENCOUNTER — APPOINTMENT (OUTPATIENT)
Dept: GENERAL RADIOLOGY | Age: 70
DRG: 347 | End: 2020-02-01
Attending: INTERNAL MEDICINE
Payer: COMMERCIAL

## 2020-02-01 ENCOUNTER — APPOINTMENT (OUTPATIENT)
Dept: MRI IMAGING | Age: 70
DRG: 347 | End: 2020-02-01
Attending: INTERNAL MEDICINE
Payer: COMMERCIAL

## 2020-02-01 VITALS — DIASTOLIC BLOOD PRESSURE: 70 MMHG | TEMPERATURE: 98.1 F | SYSTOLIC BLOOD PRESSURE: 121 MMHG | OXYGEN SATURATION: 100 %

## 2020-02-01 LAB
ABSOLUTE EOS #: 0.26 K/UL (ref 0–0.44)
ABSOLUTE IMMATURE GRANULOCYTE: 0.18 K/UL (ref 0–0.3)
ABSOLUTE LYMPH #: 2.37 K/UL (ref 1.1–3.7)
ABSOLUTE MONO #: 1.27 K/UL (ref 0.1–1.2)
ANION GAP SERPL CALCULATED.3IONS-SCNC: 13 MMOL/L (ref 9–17)
BASOPHILS # BLD: 1 % (ref 0–2)
BASOPHILS ABSOLUTE: 0.09 K/UL (ref 0–0.2)
BUN BLDV-MCNC: 11 MG/DL (ref 8–23)
BUN/CREAT BLD: ABNORMAL (ref 9–20)
CALCIUM SERPL-MCNC: 8.6 MG/DL (ref 8.6–10.4)
CHLORIDE BLD-SCNC: 98 MMOL/L (ref 98–107)
CO2: 25 MMOL/L (ref 20–31)
CREAT SERPL-MCNC: 0.39 MG/DL (ref 0.5–0.9)
DIFFERENTIAL TYPE: ABNORMAL
EOSINOPHILS RELATIVE PERCENT: 2 % (ref 1–4)
GFR AFRICAN AMERICAN: >60 ML/MIN
GFR NON-AFRICAN AMERICAN: >60 ML/MIN
GFR SERPL CREATININE-BSD FRML MDRD: ABNORMAL ML/MIN/{1.73_M2}
GFR SERPL CREATININE-BSD FRML MDRD: ABNORMAL ML/MIN/{1.73_M2}
GLUCOSE BLD-MCNC: 159 MG/DL (ref 65–105)
GLUCOSE BLD-MCNC: 159 MG/DL (ref 65–105)
GLUCOSE BLD-MCNC: 168 MG/DL (ref 65–105)
GLUCOSE BLD-MCNC: 189 MG/DL (ref 65–105)
GLUCOSE BLD-MCNC: 191 MG/DL (ref 70–99)
GLUCOSE BLD-MCNC: 197 MG/DL (ref 65–105)
HCT VFR BLD CALC: 23.1 % (ref 36.3–47.1)
HCT VFR BLD CALC: 26.1 % (ref 36.3–47.1)
HCT VFR BLD CALC: 30.8 % (ref 36.3–47.1)
HEMOGLOBIN: 7.1 G/DL (ref 11.9–15.1)
HEMOGLOBIN: 7.7 G/DL (ref 11.9–15.1)
HEMOGLOBIN: 9.7 G/DL (ref 11.9–15.1)
IMMATURE GRANULOCYTES: 2 %
LYMPHOCYTES # BLD: 20 % (ref 24–43)
MCH RBC QN AUTO: 28.5 PG (ref 25.2–33.5)
MCHC RBC AUTO-ENTMCNC: 29.5 G/DL (ref 28.4–34.8)
MCV RBC AUTO: 96.7 FL (ref 82.6–102.9)
MONOCYTES # BLD: 11 % (ref 3–12)
NRBC AUTOMATED: 0.2 PER 100 WBC
PDW BLD-RTO: 17 % (ref 11.8–14.4)
PLATELET # BLD: 357 K/UL (ref 138–453)
PLATELET ESTIMATE: ABNORMAL
PMV BLD AUTO: 9.8 FL (ref 8.1–13.5)
POTASSIUM SERPL-SCNC: 4.3 MMOL/L (ref 3.7–5.3)
RBC # BLD: 2.7 M/UL (ref 3.95–5.11)
RBC # BLD: ABNORMAL 10*6/UL
SEG NEUTROPHILS: 65 % (ref 36–65)
SEGMENTED NEUTROPHILS ABSOLUTE COUNT: 7.84 K/UL (ref 1.5–8.1)
SODIUM BLD-SCNC: 136 MMOL/L (ref 135–144)
WBC # BLD: 12 K/UL (ref 3.5–11.3)
WBC # BLD: ABNORMAL 10*3/UL

## 2020-02-01 PROCEDURE — 73552 X-RAY EXAM OF FEMUR 2/>: CPT

## 2020-02-01 PROCEDURE — 0QH704Z INSERTION OF INTERNAL FIXATION DEVICE INTO LEFT UPPER FEMUR, OPEN APPROACH: ICD-10-PCS | Performed by: ORTHOPAEDIC SURGERY

## 2020-02-01 PROCEDURE — 6360000004 HC RX CONTRAST MEDICATION: Performed by: INTERNAL MEDICINE

## 2020-02-01 PROCEDURE — 86900 BLOOD TYPING SEROLOGIC ABO: CPT

## 2020-02-01 PROCEDURE — 88307 TISSUE EXAM BY PATHOLOGIST: CPT

## 2020-02-01 PROCEDURE — 6360000002 HC RX W HCPCS: Performed by: STUDENT IN AN ORGANIZED HEALTH CARE EDUCATION/TRAINING PROGRAM

## 2020-02-01 PROCEDURE — 85025 COMPLETE CBC W/AUTO DIFF WBC: CPT

## 2020-02-01 PROCEDURE — 99232 SBSQ HOSP IP/OBS MODERATE 35: CPT | Performed by: INTERNAL MEDICINE

## 2020-02-01 PROCEDURE — 2709999900 HC NON-CHARGEABLE SUPPLY: Performed by: ORTHOPAEDIC SURGERY

## 2020-02-01 PROCEDURE — 36415 COLL VENOUS BLD VENIPUNCTURE: CPT

## 2020-02-01 PROCEDURE — 6370000000 HC RX 637 (ALT 250 FOR IP): Performed by: ORTHOPAEDIC SURGERY

## 2020-02-01 PROCEDURE — C1769 GUIDE WIRE: HCPCS | Performed by: ORTHOPAEDIC SURGERY

## 2020-02-01 PROCEDURE — 0QB70ZX EXCISION OF LEFT UPPER FEMUR, OPEN APPROACH, DIAGNOSTIC: ICD-10-PCS | Performed by: ORTHOPAEDIC SURGERY

## 2020-02-01 PROCEDURE — 2580000003 HC RX 258: Performed by: NURSE ANESTHETIST, CERTIFIED REGISTERED

## 2020-02-01 PROCEDURE — 1200000000 HC SEMI PRIVATE

## 2020-02-01 PROCEDURE — 94761 N-INVAS EAR/PLS OXIMETRY MLT: CPT

## 2020-02-01 PROCEDURE — 6360000002 HC RX W HCPCS: Performed by: ORTHOPAEDIC SURGERY

## 2020-02-01 PROCEDURE — 2580000003 HC RX 258: Performed by: ORTHOPAEDIC SURGERY

## 2020-02-01 PROCEDURE — 6360000002 HC RX W HCPCS: Performed by: NURSE ANESTHETIST, CERTIFIED REGISTERED

## 2020-02-01 PROCEDURE — 6360000002 HC RX W HCPCS

## 2020-02-01 PROCEDURE — 2700000000 HC OXYGEN THERAPY PER DAY

## 2020-02-01 PROCEDURE — 2500000003 HC RX 250 WO HCPCS: Performed by: NURSE ANESTHETIST, CERTIFIED REGISTERED

## 2020-02-01 PROCEDURE — P9016 RBC LEUKOCYTES REDUCED: HCPCS

## 2020-02-01 PROCEDURE — 88311 DECALCIFY TISSUE: CPT

## 2020-02-01 PROCEDURE — 70553 MRI BRAIN STEM W/O & W/DYE: CPT

## 2020-02-01 PROCEDURE — 85014 HEMATOCRIT: CPT

## 2020-02-01 PROCEDURE — 3600000014 HC SURGERY LEVEL 4 ADDTL 15MIN: Performed by: ORTHOPAEDIC SURGERY

## 2020-02-01 PROCEDURE — 82947 ASSAY GLUCOSE BLOOD QUANT: CPT

## 2020-02-01 PROCEDURE — 3700000001 HC ADD 15 MINUTES (ANESTHESIA): Performed by: ORTHOPAEDIC SURGERY

## 2020-02-01 PROCEDURE — 2580000003 HC RX 258: Performed by: STUDENT IN AN ORGANIZED HEALTH CARE EDUCATION/TRAINING PROGRAM

## 2020-02-01 PROCEDURE — 85018 HEMOGLOBIN: CPT

## 2020-02-01 PROCEDURE — 3600000004 HC SURGERY LEVEL 4 BASE: Performed by: ORTHOPAEDIC SURGERY

## 2020-02-01 PROCEDURE — 7100000000 HC PACU RECOVERY - FIRST 15 MIN: Performed by: ORTHOPAEDIC SURGERY

## 2020-02-01 PROCEDURE — 6360000002 HC RX W HCPCS: Performed by: ANESTHESIOLOGY

## 2020-02-01 PROCEDURE — 2720000010 HC SURG SUPPLY STERILE: Performed by: ORTHOPAEDIC SURGERY

## 2020-02-01 PROCEDURE — 80048 BASIC METABOLIC PNL TOTAL CA: CPT

## 2020-02-01 PROCEDURE — 3700000000 HC ANESTHESIA ATTENDED CARE: Performed by: ORTHOPAEDIC SURGERY

## 2020-02-01 PROCEDURE — C1713 ANCHOR/SCREW BN/BN,TIS/BN: HCPCS | Performed by: ORTHOPAEDIC SURGERY

## 2020-02-01 PROCEDURE — 7100000001 HC PACU RECOVERY - ADDTL 15 MIN: Performed by: ORTHOPAEDIC SURGERY

## 2020-02-01 PROCEDURE — 6370000000 HC RX 637 (ALT 250 FOR IP): Performed by: INTERNAL MEDICINE

## 2020-02-01 PROCEDURE — A9576 INJ PROHANCE MULTIPACK: HCPCS | Performed by: INTERNAL MEDICINE

## 2020-02-01 DEVICE — IMPLANTABLE DEVICE: Type: IMPLANTABLE DEVICE | Site: FEMUR | Status: FUNCTIONAL

## 2020-02-01 RX ORDER — LIDOCAINE HYDROCHLORIDE 10 MG/ML
INJECTION, SOLUTION EPIDURAL; INFILTRATION; INTRACAUDAL; PERINEURAL PRN
Status: DISCONTINUED | OUTPATIENT
Start: 2020-02-01 | End: 2020-02-01 | Stop reason: SDUPTHER

## 2020-02-01 RX ORDER — PROPOFOL 10 MG/ML
INJECTION, EMULSION INTRAVENOUS PRN
Status: DISCONTINUED | OUTPATIENT
Start: 2020-02-01 | End: 2020-02-01 | Stop reason: SDUPTHER

## 2020-02-01 RX ORDER — MIDAZOLAM HYDROCHLORIDE 1 MG/ML
1 INJECTION INTRAMUSCULAR; INTRAVENOUS EVERY 10 MIN PRN
Status: CANCELLED | OUTPATIENT
Start: 2020-02-01

## 2020-02-01 RX ORDER — GLYCOPYRROLATE 1 MG/5 ML
SYRINGE (ML) INTRAVENOUS PRN
Status: DISCONTINUED | OUTPATIENT
Start: 2020-02-01 | End: 2020-02-01 | Stop reason: SDUPTHER

## 2020-02-01 RX ORDER — FENTANYL CITRATE 50 UG/ML
25 INJECTION, SOLUTION INTRAMUSCULAR; INTRAVENOUS EVERY 5 MIN PRN
Status: CANCELLED | OUTPATIENT
Start: 2020-02-01

## 2020-02-01 RX ORDER — MAGNESIUM HYDROXIDE 1200 MG/15ML
LIQUID ORAL CONTINUOUS PRN
Status: COMPLETED | OUTPATIENT
Start: 2020-02-01 | End: 2020-02-01

## 2020-02-01 RX ORDER — SODIUM CHLORIDE 9 MG/ML
INJECTION, SOLUTION INTRAVENOUS CONTINUOUS PRN
Status: DISCONTINUED | OUTPATIENT
Start: 2020-02-01 | End: 2020-02-01 | Stop reason: SDUPTHER

## 2020-02-01 RX ORDER — ROCURONIUM BROMIDE 10 MG/ML
INJECTION, SOLUTION INTRAVENOUS PRN
Status: DISCONTINUED | OUTPATIENT
Start: 2020-02-01 | End: 2020-02-01 | Stop reason: SDUPTHER

## 2020-02-01 RX ORDER — SODIUM CHLORIDE 0.9 % (FLUSH) 0.9 %
10 SYRINGE (ML) INJECTION PRN
Status: CANCELLED | OUTPATIENT
Start: 2020-02-01

## 2020-02-01 RX ORDER — SODIUM CHLORIDE 0.9 % (FLUSH) 0.9 %
10 SYRINGE (ML) INJECTION PRN
Status: DISCONTINUED | OUTPATIENT
Start: 2020-02-01 | End: 2020-02-19 | Stop reason: HOSPADM

## 2020-02-01 RX ORDER — SODIUM CHLORIDE, SODIUM LACTATE, POTASSIUM CHLORIDE, CALCIUM CHLORIDE 600; 310; 30; 20 MG/100ML; MG/100ML; MG/100ML; MG/100ML
INJECTION, SOLUTION INTRAVENOUS CONTINUOUS
Status: CANCELLED | OUTPATIENT
Start: 2020-02-01

## 2020-02-01 RX ORDER — FENTANYL CITRATE 50 UG/ML
INJECTION, SOLUTION INTRAMUSCULAR; INTRAVENOUS PRN
Status: DISCONTINUED | OUTPATIENT
Start: 2020-02-01 | End: 2020-02-01 | Stop reason: SDUPTHER

## 2020-02-01 RX ORDER — NEOSTIGMINE METHYLSULFATE 5 MG/5 ML
SYRINGE (ML) INTRAVENOUS PRN
Status: DISCONTINUED | OUTPATIENT
Start: 2020-02-01 | End: 2020-02-01 | Stop reason: SDUPTHER

## 2020-02-01 RX ORDER — FENTANYL CITRATE 50 UG/ML
25 INJECTION, SOLUTION INTRAMUSCULAR; INTRAVENOUS EVERY 5 MIN PRN
Status: DISCONTINUED | OUTPATIENT
Start: 2020-02-01 | End: 2020-02-19 | Stop reason: HOSPADM

## 2020-02-01 RX ORDER — ONDANSETRON 2 MG/ML
INJECTION INTRAMUSCULAR; INTRAVENOUS PRN
Status: DISCONTINUED | OUTPATIENT
Start: 2020-02-01 | End: 2020-02-01 | Stop reason: SDUPTHER

## 2020-02-01 RX ORDER — FENTANYL CITRATE 50 UG/ML
INJECTION, SOLUTION INTRAMUSCULAR; INTRAVENOUS
Status: COMPLETED
Start: 2020-02-01 | End: 2020-02-01

## 2020-02-01 RX ORDER — SODIUM CHLORIDE 0.9 % (FLUSH) 0.9 %
10 SYRINGE (ML) INJECTION EVERY 12 HOURS SCHEDULED
Status: CANCELLED | OUTPATIENT
Start: 2020-02-01

## 2020-02-01 RX ORDER — LIDOCAINE HYDROCHLORIDE 10 MG/ML
1 INJECTION, SOLUTION EPIDURAL; INFILTRATION; INTRACAUDAL; PERINEURAL
Status: CANCELLED | OUTPATIENT
Start: 2020-02-01 | End: 2020-02-01

## 2020-02-01 RX ADMIN — PHENYLEPHRINE HYDROCHLORIDE 100 MCG: 10 INJECTION INTRAVENOUS at 10:45

## 2020-02-01 RX ADMIN — INSULIN GLARGINE 10 UNITS: 100 INJECTION, SOLUTION SUBCUTANEOUS at 21:53

## 2020-02-01 RX ADMIN — LIDOCAINE HYDROCHLORIDE 50 MG: 10 INJECTION, SOLUTION EPIDURAL; INFILTRATION; INTRACAUDAL; PERINEURAL at 10:27

## 2020-02-01 RX ADMIN — FENTANYL CITRATE 100 MCG: 50 INJECTION, SOLUTION INTRAMUSCULAR; INTRAVENOUS at 10:27

## 2020-02-01 RX ADMIN — FLUCONAZOLE 100 MG: 100 TABLET ORAL at 18:46

## 2020-02-01 RX ADMIN — ONDANSETRON 4 MG: 2 INJECTION, SOLUTION INTRAMUSCULAR; INTRAVENOUS at 11:31

## 2020-02-01 RX ADMIN — INSULIN LISPRO 3 UNITS: 100 INJECTION, SOLUTION INTRAVENOUS; SUBCUTANEOUS at 21:53

## 2020-02-01 RX ADMIN — MORPHINE SULFATE 15 MG: 15 TABLET, EXTENDED RELEASE ORAL at 08:53

## 2020-02-01 RX ADMIN — SODIUM CHLORIDE, PRESERVATIVE FREE 10 ML: 5 INJECTION INTRAVENOUS at 21:49

## 2020-02-01 RX ADMIN — FERROUS SULFATE TAB EC 325 MG (65 MG FE EQUIVALENT) 325 MG: 325 (65 FE) TABLET DELAYED RESPONSE at 18:47

## 2020-02-01 RX ADMIN — CALCIUM CARBONATE-CHOLECALCIFEROL TAB 250 MG-125 UNIT 500 MG: 250-125 TAB at 18:52

## 2020-02-01 RX ADMIN — FENTANYL CITRATE 50 MCG: 50 INJECTION, SOLUTION INTRAMUSCULAR; INTRAVENOUS at 11:27

## 2020-02-01 RX ADMIN — SODIUM CHLORIDE: 0.9 INJECTION, SOLUTION INTRAVENOUS at 10:42

## 2020-02-01 RX ADMIN — PHENYLEPHRINE HYDROCHLORIDE 100 MCG: 10 INJECTION INTRAVENOUS at 10:32

## 2020-02-01 RX ADMIN — Medication 4 MG: at 11:42

## 2020-02-01 RX ADMIN — ROCURONIUM BROMIDE 40 MG: 10 INJECTION INTRAVENOUS at 10:27

## 2020-02-01 RX ADMIN — FENTANYL CITRATE 25 MCG: 50 INJECTION, SOLUTION INTRAMUSCULAR; INTRAVENOUS at 12:29

## 2020-02-01 RX ADMIN — DEXTROSE MONOHYDRATE 2 G: 50 INJECTION, SOLUTION INTRAVENOUS at 18:46

## 2020-02-01 RX ADMIN — PHENYLEPHRINE HYDROCHLORIDE 100 MCG: 10 INJECTION INTRAVENOUS at 11:16

## 2020-02-01 RX ADMIN — DOCUSATE SODIUM 100 MG: 100 CAPSULE, LIQUID FILLED ORAL at 21:49

## 2020-02-01 RX ADMIN — MORPHINE SULFATE 15 MG: 15 TABLET, EXTENDED RELEASE ORAL at 21:49

## 2020-02-01 RX ADMIN — LINAGLIPTIN 5 MG: 5 TABLET, FILM COATED ORAL at 21:53

## 2020-02-01 RX ADMIN — PHENYLEPHRINE HYDROCHLORIDE 100 MCG: 10 INJECTION INTRAVENOUS at 11:03

## 2020-02-01 RX ADMIN — OXYCODONE HYDROCHLORIDE 10 MG: 5 TABLET ORAL at 19:08

## 2020-02-01 RX ADMIN — OXYCODONE HYDROCHLORIDE 10 MG: 5 TABLET ORAL at 03:12

## 2020-02-01 RX ADMIN — METFORMIN HYDROCHLORIDE 1000 MG: 500 TABLET ORAL at 18:46

## 2020-02-01 RX ADMIN — SENNOSIDES 8.6 MG: 8.6 TABLET, FILM COATED ORAL at 21:49

## 2020-02-01 RX ADMIN — PHENYLEPHRINE HYDROCHLORIDE 100 MCG: 10 INJECTION INTRAVENOUS at 10:52

## 2020-02-01 RX ADMIN — FENTANYL CITRATE 25 MCG: 50 INJECTION, SOLUTION INTRAMUSCULAR; INTRAVENOUS at 12:49

## 2020-02-01 RX ADMIN — OXYCODONE HYDROCHLORIDE 10 MG: 5 TABLET ORAL at 15:43

## 2020-02-01 RX ADMIN — Medication 0.6 MG: at 11:42

## 2020-02-01 RX ADMIN — PROPOFOL 100 MG: 10 INJECTION, EMULSION INTRAVENOUS at 10:27

## 2020-02-01 RX ADMIN — OXYCODONE HYDROCHLORIDE 10 MG: 5 TABLET ORAL at 23:48

## 2020-02-01 RX ADMIN — PHENYLEPHRINE HYDROCHLORIDE 100 MCG: 10 INJECTION INTRAVENOUS at 10:36

## 2020-02-01 RX ADMIN — PHENYLEPHRINE HYDROCHLORIDE 100 MCG: 10 INJECTION INTRAVENOUS at 11:09

## 2020-02-01 RX ADMIN — GADOTERIDOL 16 ML: 279.3 INJECTION, SOLUTION INTRAVENOUS at 16:38

## 2020-02-01 RX ADMIN — DEXTROSE MONOHYDRATE 2 G: 50 INJECTION, SOLUTION INTRAVENOUS at 10:44

## 2020-02-01 RX ADMIN — INSULIN LISPRO 2 UNITS: 100 INJECTION, SOLUTION INTRAVENOUS; SUBCUTANEOUS at 18:56

## 2020-02-01 RX ADMIN — SODIUM CHLORIDE: 9 INJECTION, SOLUTION INTRAVENOUS at 09:46

## 2020-02-01 ASSESSMENT — PULMONARY FUNCTION TESTS
PIF_VALUE: 21
PIF_VALUE: 25
PIF_VALUE: 24
PIF_VALUE: 23
PIF_VALUE: 26
PIF_VALUE: 28
PIF_VALUE: 24
PIF_VALUE: 28
PIF_VALUE: 23
PIF_VALUE: 22
PIF_VALUE: 23
PIF_VALUE: 24
PIF_VALUE: 15
PIF_VALUE: 24
PIF_VALUE: 23
PIF_VALUE: 23
PIF_VALUE: 2
PIF_VALUE: 24
PIF_VALUE: 23
PIF_VALUE: 24
PIF_VALUE: 15
PIF_VALUE: 24
PIF_VALUE: 25
PIF_VALUE: 22
PIF_VALUE: 23
PIF_VALUE: 15
PIF_VALUE: 24
PIF_VALUE: 0
PIF_VALUE: 24
PIF_VALUE: 23
PIF_VALUE: 26
PIF_VALUE: 24
PIF_VALUE: 27
PIF_VALUE: 22
PIF_VALUE: 25
PIF_VALUE: 23
PIF_VALUE: 15
PIF_VALUE: 23
PIF_VALUE: 29
PIF_VALUE: 25
PIF_VALUE: 23
PIF_VALUE: 23
PIF_VALUE: 25
PIF_VALUE: 23
PIF_VALUE: 23
PIF_VALUE: 25
PIF_VALUE: 16
PIF_VALUE: 23
PIF_VALUE: 14
PIF_VALUE: 23
PIF_VALUE: 27
PIF_VALUE: 24
PIF_VALUE: 24
PIF_VALUE: 23
PIF_VALUE: 24
PIF_VALUE: 26
PIF_VALUE: 24
PIF_VALUE: 23
PIF_VALUE: 0
PIF_VALUE: 25
PIF_VALUE: 23
PIF_VALUE: 19
PIF_VALUE: 24
PIF_VALUE: 23
PIF_VALUE: 23
PIF_VALUE: 24
PIF_VALUE: 23
PIF_VALUE: 27
PIF_VALUE: 15
PIF_VALUE: 23
PIF_VALUE: 24
PIF_VALUE: 1
PIF_VALUE: 23
PIF_VALUE: 24
PIF_VALUE: 25
PIF_VALUE: 23
PIF_VALUE: 24
PIF_VALUE: 23
PIF_VALUE: 21
PIF_VALUE: 23
PIF_VALUE: 1

## 2020-02-01 ASSESSMENT — PAIN DESCRIPTION - LOCATION
LOCATION: BACK
LOCATION: HIP

## 2020-02-01 ASSESSMENT — PAIN SCALES - GENERAL
PAINLEVEL_OUTOF10: 0
PAINLEVEL_OUTOF10: 2
PAINLEVEL_OUTOF10: 5
PAINLEVEL_OUTOF10: 4
PAINLEVEL_OUTOF10: 10
PAINLEVEL_OUTOF10: 6
PAINLEVEL_OUTOF10: 10
PAINLEVEL_OUTOF10: 7
PAINLEVEL_OUTOF10: 8
PAINLEVEL_OUTOF10: 8
PAINLEVEL_OUTOF10: 5
PAINLEVEL_OUTOF10: 2

## 2020-02-01 ASSESSMENT — PAIN DESCRIPTION - DESCRIPTORS
DESCRIPTORS: ACHING;DULL
DESCRIPTORS: ACHING;DULL

## 2020-02-01 ASSESSMENT — PAIN DESCRIPTION - FREQUENCY
FREQUENCY: CONTINUOUS
FREQUENCY: INTERMITTENT

## 2020-02-01 ASSESSMENT — PAIN DESCRIPTION - ONSET
ONSET: GRADUAL
ONSET: ON-GOING

## 2020-02-01 ASSESSMENT — PAIN DESCRIPTION - PAIN TYPE
TYPE: ACUTE PAIN;CHRONIC PAIN
TYPE: ACUTE PAIN;CHRONIC PAIN;SURGICAL PAIN
TYPE: ACUTE PAIN;CHRONIC PAIN

## 2020-02-01 ASSESSMENT — PAIN DESCRIPTION - ORIENTATION: ORIENTATION: LEFT

## 2020-02-01 NOTE — FLOWSHEET NOTE
Assessment:   responding to request from previous shift  for continued follow-up. Patient seemed to be coping but perhaps slightly altered in some way. Hard to assess.  and daughter were bedside and seemed to have a hard time coping with the multiple health issues the patient has faced.  went through a couple times how many various conditions and struggles the patient has been through health wise including cancer and internal bleeding. Family receptive to spiritual care and welcomed prayer. After prayer,  began crying and stated that he feels like he has to remain strong for the family in this situation and clearly is overwhelmed. Daughter was tearful as well.  states that they have to keep pushing along and keep up a \"fighting spirit\". Intervention:   provided space for family to express feelings, thoughts, and concerns. Determined that family is not coping well. Outcome:  Family may need further support from both spiritual and palliative care.      Electronically signed by Thomas Mensah on 1/31/2020 at 10:41 PM

## 2020-02-01 NOTE — ANESTHESIA POSTPROCEDURE EVALUATION
Department of Anesthesiology  Postprocedure Note    Patient: Tammy Jacobo  MRN: [id number]  YOB: 1950  Date of evaluation: 2/1/2020  Time:  12:15 PM     Procedure Summary     Date:  02/01/20 Room / Location:  [address]    Anesthesia Start:  1022 Anesthesia Stop:  1207    Procedure:  TFNA  FEMUR FRACTURE IM NAILING AND LEFT FEMUR BIOPSY (SYNTHES, , C-ARM) (Left ) Diagnosis:  (IMPENDING PATHOLOGIC LEFT FEMUR FRACTURE)    Surgeon:  Lisa Garcia MD Responsible Provider:  Tammie Boswell MD    Anesthesia Type:  general ASA Status:  3          Anesthesia Type: general    Cyn Phase I: Cyn Score: 5    Cyn Phase II: Cyn Score: 10    Last vitals: Reviewed and per EMR flowsheets.        Anesthesia Post Evaluation    Patient location during evaluation: PACU  Patient participation: complete - patient participated  Level of consciousness: awake and alert  Pain score: 3  Airway patency: patent  Nausea & Vomiting: no vomiting and no nausea  Complications: no  Cardiovascular status: hemodynamically stable  Respiratory status: acceptable  Hydration status: stable

## 2020-02-01 NOTE — PROGRESS NOTES
[facility]    Progress Note    2/1/2020    7:50 AM    Name:   Lona Brar  MRN:     [id number]     Acct:      [de-identified]   Room:   [address] [place] [address]  IP Day:  6  Admit Date:  1/26/2020  1:38 PM    PCP:   Mindy Alvarado MD  Code Status:  Full Code    Subjective:     C/C: metastatic breast cancer  Interval History Status: not changed. Feels about the same  Denies cp/sob/n/v  Does give a vague history of some quigley with some activities  Had an MI many years ago and had cabg per records (details unavailable) and has not really had any workup since that she is aware of, but she is a poor and unreliable historian    Brief History:     ICU course, per documentation:     \"69 y. o. female with history of diabetes, hypothyroidism, CAD status post CABG in 2008, breast cancer on the left side status post chemotherapy and mastectomy in 2007 that presented to outside hospital [facility] on 1/23/2020 for complaints of generalized weakness and fatigue and flulike illness for about 1 month.  She was admitted there for anemia and hyperglycemia and hyponatremia however developed hypotension and had CT imaging which showed concern for duodenitis versus contained duodenal perforation.  Concern for intra-abdominal infection there and antibiotics were narrowed down to Zosyn, initially was on vancomycin as well.      CT chest showed extensive osteoblastic lesions and numerous soft tissue nodules in the greater omentum and mesentery suspicious for carcinomatosis. Jerolyn Dryer showed mild/moderate left side hydroureteronephrosis without any obstructing stone or mass and recommended CT urogram.      Ct abdomen-   Distended gallbladder.  No radiodense gallstones noted but ultrasound is   recommended.       Extensive metastatic disease is a risk for fracture at T10.          Review of Systems:     Constitutional:  negative for chills, fevers, sweats  Respiratory:  negative for cough, (1.575 m)   Wt 182 lb 12.8 oz (82.9 kg)   SpO2 98%   BMI 33.43 kg/m²   Temp (24hrs), Av.5 °F (36.4 °C), Min:96.9 °F (36.1 °C), Max:97.9 °F (36.6 °C)    Recent Labs     20  1219 20  1741 20  0025   POCGLU 223* 142* 192* 168*       I/O (24Hr): Intake/Output Summary (Last 24 hours) at 2020 0750  Last data filed at 2020 0700  Gross per 24 hour   Intake 1016.67 ml   Output 300 ml   Net 716.67 ml       Labs:  Hematology:  Recent Labs     20  04320  1158 20  0404   WBC 9.9  --  13.2*  --   --  12.0*   RBC 2.76*  --  2.37*  --   --  2.70*   HGB 7.9*   < > 6.6* 7.5* 8.1* 7.7*   HCT 25.3*   < > 22.2* 24.2* 26.2* 26.1*   MCV 91.7  --  93.7  --   --  96.7   MCH 28.6  --  27.8  --   --  28.5   MCHC 31.2  --  29.7  --   --  29.5   RDW 17.4*  --  17.3*  --   --  17.0*   PLT See Reflexed IPF Result  --  423  --   --  357   MPV NOT REPORTED  --  9.9  --   --  9.8    < > = values in this interval not displayed. Chemistry:  Recent Labs     20  040    136 136   K 4.3 4.4 4.3    100 98   CO2 24 24 25   GLUCOSE 243* 206* 191*   BUN 10 14 11   CREATININE 0.41* 0.37* 0.39*   ANIONGAP 11 12 13   LABGLOM >60 >60 >60   GFRAA >60 >60 >60   CALCIUM 8.6 8.5* 8.6     Recent Labs     20  0432 20  0752 20  1219 20  1741 20  0025   POCGLU 203* 196* 223* 142* 192* 168*     ABG:No results found for: POCPH, PHART, PH, POCPCO2, KDJ2FHP, PCO2, POCPO2, PO2ART, PO2, POCHCO3, PXU8CKT, HCO3, NBEA, PBEA, BEART, BE, THGBART, THB, FJZ6WMD, ORLE3YDZ, K2TKOVHJ, O2SAT, FIO2  No results found for: SPECIAL  No results found for: CULTURE    Radiology:  Xr Hip Left (2-3 Views)    Result Date: 2020  There is no acute fracture of the left hip There is no acute fracture in the pelvis. Detail of the sacrum is markedly limited.   There is subtle double density along the margin of the superior pubic ramus on the left without definitive fracture line at patient's pain persists, consider CT exam No acute osseous abnormality of the right femur or the left femur     Xr Femur Left (min 2 Views)    Result Date: 1/28/2020  There is no acute fracture of the left hip There is no acute fracture in the pelvis. Detail of the sacrum is markedly limited. There is subtle double density along the margin of the superior pubic ramus on the left without definitive fracture line at patient's pain persists, consider CT exam No acute osseous abnormality of the right femur or the left femur     Xr Femur Right (min 2 Views)    Result Date: 1/28/2020  There is no acute fracture of the left hip There is no acute fracture in the pelvis. Detail of the sacrum is markedly limited. There is subtle double density along the margin of the superior pubic ramus on the left without definitive fracture line at patient's pain persists, consider CT exam No acute osseous abnormality of the right femur or the left femur     Mri Thoracic Spine W Wo Contrast    Result Date: 1/29/2020  Osseous metastatic disease with diffuse marrow infiltration of T10. Mild extraosseous extension of tumor suspected along the left neural foramina a T10. Us Renal Complete    Result Date: 1/26/2020  Mild left hydronephrosis     Ct Abdomen Pelvis W Iv Contrast Additional Contrast? Oral    Result Date: 1/26/2020  Distended gallbladder. No radiodense gallstones noted but ultrasound is recommended. Extensive metastatic disease is a risk for fracture at T10. Us Gallbladder Ruq    Result Date: 1/27/2020  Minimal amount of free fluid within the upper abdomen. Heterogeneous increased liver echogenicity could be on the basis of hepatocellular disease or hepatic steatosis. Xr Chest Portable    Result Date: 1/26/2020  Mild cardiomegaly without acute pulmonary process. Right-sided PICC line with the tip in the distal mid aspect of the SVC. Xr Pelvis (min 3 Views)    Result Date: 1/28/2020  There is no acute fracture of the left hip There is no acute fracture in the pelvis. Detail of the sacrum is markedly limited. There is subtle double density along the margin of the superior pubic ramus on the left without definitive fracture line at patient's pain persists, consider CT exam No acute osseous abnormality of the right femur or the left femur     Ct Femur Left Wo Contrast    Result Date: 1/31/2020  Multiple osseous metastatic lesions in the left ischium, likely anterior left acetabular wall, and probably in the intertrochanteric and lesser trochanteric portions of the left femur. These could be further characterized with bone scan or MRI with contrast.     Ct Biopsy Superficial Bone Percutaneous    Addendum Date: 1/28/2020    ADDENDUM: Addendum is for billing purposes only. Automated dose modulation and/ or weight based adjustment of the mA/kv was utilized to reduce the radiation dose to as low as reasonably achievable. Result Date: 1/28/2020  Technically successful CT-guided targeted right iliac bone core biopsy.      Ekg: sinus tach with pvc, nothing acute      Physical Examination:        General appearance:  alert, cooperative and no distress  Mental Status:  oriented to person, place and time and normal affect  Lungs:  clear to auscultation bilaterally, normal effort  Heart:  tachy and reg rhythm, no murmur  Abdomen:  soft, nontender, nondistended, normal bowel sounds, no masses, hepatomegaly, splenomegaly; obese  Extremities:  no redness, tenderness in the calves; 2+ ble edema  Skin:  no gross lesions, rashes, induration    Assessment:        Hospital Problems           Last Modified POA    * (Principal) Metastatic breast cancer ([facility] 1/30/2020 Yes    Anemia 1/30/2020 Yes    Liver lesion 1/26/2020 Yes    History of breast cancer 1/27/2020 Yes    Diabetes mellitus type 2 in obese ([address] (Chronic) 1/29/2020 Yes    Essential hypertension (Chronic) 1/29/2020 Yes    Acquired hypothyroidism (Chronic) 1/29/2020 Yes    Recurrent major depressive disorder, in remission ([facility] (Chronic) 1/29/2020 Yes    Bone lesion 1/30/2020 Yes    Iron deficiency 1/30/2020 Yes          Plan:        I have discussed with the patient that every surgery carries some risk, inherent in surgery itself. The surgery is moderate risk, the patient is moderate risk. Proceed as scheduled.   Monitor hgb levels  Now to have outpatient colonoscopy as she refuses to have it done her  To go for femur stabilization today    Micheal Waite, DO  2/1/2020  7:50 AM

## 2020-02-01 NOTE — PROGRESS NOTES
Today's Date: 1/31/2020  Patient Name: Bernardo Chatman  Date of admission: 1/26/2020  1:38 PM  Patient's age: 77 y. o., 1950  Ad[facility] Dx: [facility] [A41.9, R65.21]    Reason for Consult: management recommendations  Requesting Physician: No admitting provider for patient encounter. CHIEF COMPLAINT: Abdominal pain. History of breast cancer. Back pain. History Obtained From:  patient, electronic medical record    Interval history:    Patient seen and examined. Labs and vitals reviewed. Pain is somewhat better with MS Contin. Patient still confused at times. MRI not done as machine is broken. Patient not able to take prep for colonoscopy. Denies fever chills. HISTORY OF PRESENT ILLNESS:      The patient is a 77 y.o.  female who is admitted to the hospital for chief complaints of abdominal pain. According to the records patient has remote history of breast cancer diagnosed in 2007. There are no records available regarding treatment. Per patient she underwent left mastectomy and had around 13 lymph nodes involved. Subsequently patient underwent chemotherapy which she completed in 2008. Patient did not receive any radiation therapy. Patient was then given Arimidex for many years likely 5 years. Patient has not seen a oncologist for quite a few years now. Patient has been having back pain for a while likely many months. Patient started having bowel pain with a lot of nausea and vomiting around [place] time which persisted and got worse eventually patient presented to the ER at [facility] CT scan done per report shows possible duodenitis and concern regarding bowel perforation. Subsequently patient was transferred to [place].  Repeat CT does not show any concerning findings in the duodenum but does show blastic lesions involving bone especially T10. Patient denies any lower extremity weakness. Denies any saddle anesthesia.   Denies any loss of those of syntax and sound a like substitutions which may escape proof reading. It such instances, actual meaning can be extrapolated by contextual diversion.

## 2020-02-02 LAB
ABO/RH: NORMAL
ANTIBODY SCREEN: NEGATIVE
ARM BAND NUMBER: NORMAL
BLD PROD TYP BPU: NORMAL
CROSSMATCH RESULT: NORMAL
DISPENSE STATUS BLOOD BANK: NORMAL
EXPIRATION DATE: NORMAL
GLUCOSE BLD-MCNC: 132 MG/DL (ref 65–105)
GLUCOSE BLD-MCNC: 132 MG/DL (ref 65–105)
GLUCOSE BLD-MCNC: 177 MG/DL (ref 65–105)
GLUCOSE BLD-MCNC: 177 MG/DL (ref 65–105)
GLUCOSE BLD-MCNC: 199 MG/DL (ref 65–105)
HCT VFR BLD CALC: 33.2 % (ref 36.3–47.1)
HEMOGLOBIN: 10.8 G/DL (ref 11.9–15.1)
TRANSFUSION STATUS: NORMAL
UNIT DIVISION: 0
UNIT NUMBER: NORMAL

## 2020-02-02 PROCEDURE — 2700000000 HC OXYGEN THERAPY PER DAY

## 2020-02-02 PROCEDURE — 99233 SBSQ HOSP IP/OBS HIGH 50: CPT | Performed by: INTERNAL MEDICINE

## 2020-02-02 PROCEDURE — 6370000000 HC RX 637 (ALT 250 FOR IP): Performed by: ORTHOPAEDIC SURGERY

## 2020-02-02 PROCEDURE — 94761 N-INVAS EAR/PLS OXIMETRY MLT: CPT

## 2020-02-02 PROCEDURE — 97162 PT EVAL MOD COMPLEX 30 MIN: CPT

## 2020-02-02 PROCEDURE — 1200000000 HC SEMI PRIVATE

## 2020-02-02 PROCEDURE — 6360000002 HC RX W HCPCS: Performed by: ORTHOPAEDIC SURGERY

## 2020-02-02 PROCEDURE — 97166 OT EVAL MOD COMPLEX 45 MIN: CPT

## 2020-02-02 PROCEDURE — 97535 SELF CARE MNGMENT TRAINING: CPT

## 2020-02-02 PROCEDURE — 2580000003 HC RX 258: Performed by: ORTHOPAEDIC SURGERY

## 2020-02-02 PROCEDURE — 97530 THERAPEUTIC ACTIVITIES: CPT

## 2020-02-02 PROCEDURE — 99232 SBSQ HOSP IP/OBS MODERATE 35: CPT | Performed by: INTERNAL MEDICINE

## 2020-02-02 PROCEDURE — 93005 ELECTROCARDIOGRAM TRACING: CPT | Performed by: INTERNAL MEDICINE

## 2020-02-02 PROCEDURE — 2580000003 HC RX 258: Performed by: INTERNAL MEDICINE

## 2020-02-02 PROCEDURE — 82947 ASSAY GLUCOSE BLOOD QUANT: CPT

## 2020-02-02 RX ORDER — 0.9 % SODIUM CHLORIDE 0.9 %
500 INTRAVENOUS SOLUTION INTRAVENOUS ONCE
Status: COMPLETED | OUTPATIENT
Start: 2020-02-02 | End: 2020-02-02

## 2020-02-02 RX ORDER — SODIUM CHLORIDE 9 MG/ML
INJECTION, SOLUTION INTRAVENOUS CONTINUOUS
Status: DISCONTINUED | OUTPATIENT
Start: 2020-02-02 | End: 2020-02-05

## 2020-02-02 RX ORDER — 0.9 % SODIUM CHLORIDE 0.9 %
1000 INTRAVENOUS SOLUTION INTRAVENOUS ONCE
Status: COMPLETED | OUTPATIENT
Start: 2020-02-02 | End: 2020-02-02

## 2020-02-02 RX ADMIN — MORPHINE SULFATE 15 MG: 15 TABLET, EXTENDED RELEASE ORAL at 08:00

## 2020-02-02 RX ADMIN — DOCUSATE SODIUM 100 MG: 100 CAPSULE, LIQUID FILLED ORAL at 21:32

## 2020-02-02 RX ADMIN — DOCUSATE SODIUM 100 MG: 100 CAPSULE, LIQUID FILLED ORAL at 07:59

## 2020-02-02 RX ADMIN — PANTOPRAZOLE SODIUM 40 MG: 40 TABLET, DELAYED RELEASE ORAL at 06:15

## 2020-02-02 RX ADMIN — INSULIN LISPRO 3 UNITS: 100 INJECTION, SOLUTION INTRAVENOUS; SUBCUTANEOUS at 12:38

## 2020-02-02 RX ADMIN — PANTOPRAZOLE SODIUM 40 MG: 40 TABLET, DELAYED RELEASE ORAL at 16:27

## 2020-02-02 RX ADMIN — MIDODRINE HYDROCHLORIDE 2.5 MG: 2.5 TABLET ORAL at 12:48

## 2020-02-02 RX ADMIN — FLUCONAZOLE 100 MG: 100 TABLET ORAL at 08:00

## 2020-02-02 RX ADMIN — LEVOTHYROXINE SODIUM 100 MCG: 100 TABLET ORAL at 06:15

## 2020-02-02 RX ADMIN — INSULIN LISPRO 3 UNITS: 100 INJECTION, SOLUTION INTRAVENOUS; SUBCUTANEOUS at 08:45

## 2020-02-02 RX ADMIN — MORPHINE SULFATE 15 MG: 15 TABLET, EXTENDED RELEASE ORAL at 21:32

## 2020-02-02 RX ADMIN — SODIUM CHLORIDE: 9 INJECTION, SOLUTION INTRAVENOUS at 21:32

## 2020-02-02 RX ADMIN — SENNOSIDES 8.6 MG: 8.6 TABLET, FILM COATED ORAL at 08:00

## 2020-02-02 RX ADMIN — CALCIUM CARBONATE-CHOLECALCIFEROL TAB 250 MG-125 UNIT 500 MG: 250-125 TAB at 08:01

## 2020-02-02 RX ADMIN — SENNOSIDES 8.6 MG: 8.6 TABLET, FILM COATED ORAL at 21:32

## 2020-02-02 RX ADMIN — DEXTROSE MONOHYDRATE 2 G: 50 INJECTION, SOLUTION INTRAVENOUS at 03:46

## 2020-02-02 RX ADMIN — MIDODRINE HYDROCHLORIDE 2.5 MG: 2.5 TABLET ORAL at 16:27

## 2020-02-02 RX ADMIN — MIDODRINE HYDROCHLORIDE 2.5 MG: 2.5 TABLET ORAL at 08:00

## 2020-02-02 RX ADMIN — INSULIN LISPRO 3 UNITS: 100 INJECTION, SOLUTION INTRAVENOUS; SUBCUTANEOUS at 16:27

## 2020-02-02 RX ADMIN — OXYCODONE HYDROCHLORIDE 10 MG: 5 TABLET ORAL at 16:25

## 2020-02-02 RX ADMIN — METFORMIN HYDROCHLORIDE 1000 MG: 500 TABLET ORAL at 16:27

## 2020-02-02 RX ADMIN — FERROUS SULFATE TAB EC 325 MG (65 MG FE EQUIVALENT) 325 MG: 325 (65 FE) TABLET DELAYED RESPONSE at 16:27

## 2020-02-02 RX ADMIN — VENLAFAXINE HYDROCHLORIDE 150 MG: 150 CAPSULE, EXTENDED RELEASE ORAL at 08:01

## 2020-02-02 RX ADMIN — INSULIN GLARGINE 10 UNITS: 100 INJECTION, SOLUTION SUBCUTANEOUS at 21:50

## 2020-02-02 RX ADMIN — OXYCODONE HYDROCHLORIDE 10 MG: 5 TABLET ORAL at 06:15

## 2020-02-02 RX ADMIN — FERROUS SULFATE TAB EC 325 MG (65 MG FE EQUIVALENT) 325 MG: 325 (65 FE) TABLET DELAYED RESPONSE at 08:00

## 2020-02-02 RX ADMIN — SODIUM CHLORIDE: 9 INJECTION, SOLUTION INTRAVENOUS at 17:48

## 2020-02-02 RX ADMIN — LINAGLIPTIN 5 MG: 5 TABLET, FILM COATED ORAL at 08:00

## 2020-02-02 RX ADMIN — FENTANYL CITRATE 25 MCG: 50 INJECTION, SOLUTION INTRAMUSCULAR; INTRAVENOUS at 11:43

## 2020-02-02 RX ADMIN — SODIUM CHLORIDE 500 ML: 9 INJECTION, SOLUTION INTRAVENOUS at 10:23

## 2020-02-02 RX ADMIN — METFORMIN HYDROCHLORIDE 1000 MG: 500 TABLET ORAL at 08:00

## 2020-02-02 RX ADMIN — SODIUM CHLORIDE 1000 ML: 9 INJECTION, SOLUTION INTRAVENOUS at 14:27

## 2020-02-02 ASSESSMENT — PAIN DESCRIPTION - ONSET: ONSET: GRADUAL

## 2020-02-02 ASSESSMENT — PAIN SCALES - GENERAL
PAINLEVEL_OUTOF10: 2
PAINLEVEL_OUTOF10: 2
PAINLEVEL_OUTOF10: 0
PAINLEVEL_OUTOF10: 8
PAINLEVEL_OUTOF10: 0
PAINLEVEL_OUTOF10: 8
PAINLEVEL_OUTOF10: 6
PAINLEVEL_OUTOF10: 7
PAINLEVEL_OUTOF10: 8

## 2020-02-02 ASSESSMENT — PAIN DESCRIPTION - ORIENTATION
ORIENTATION: LEFT

## 2020-02-02 ASSESSMENT — PAIN DESCRIPTION - DESCRIPTORS
DESCRIPTORS: DISCOMFORT
DESCRIPTORS: ACHING;DISCOMFORT

## 2020-02-02 ASSESSMENT — PAIN DESCRIPTION - PAIN TYPE
TYPE: SURGICAL PAIN
TYPE: ACUTE PAIN

## 2020-02-02 ASSESSMENT — PAIN DESCRIPTION - LOCATION
LOCATION: HIP
LOCATION: HIP
LOCATION: LEG

## 2020-02-02 ASSESSMENT — PAIN SCALES - WONG BAKER: WONGBAKER_NUMERICALRESPONSE: 6

## 2020-02-02 ASSESSMENT — PAIN DESCRIPTION - FREQUENCY: FREQUENCY: INTERMITTENT

## 2020-02-02 NOTE — PROGRESS NOTES
pantoprazole (PROTONIX) tablet 40 mg  40 mg Oral BID AC Patrici Antu, DO   40 mg at 02/02/20 1627    morphine (MS CONTIN) extended release tablet 15 mg  15 mg Oral 2 times per day Patrici Antu, DO   15 mg at 02/02/20 0800    insulin lispro (HUMALOG) injection vial 0-18 Units  0-18 Units Subcutaneous Q4H Patrici Antu, DO   3 Units at 02/02/20 1627    sodium chloride flush 0.9 % injection 10 mL  10 mL Intravenous PRN Patrici Antu, DO        acetaminophen (TYLENOL) tablet 650 mg  650 mg Oral Q4H PRN Patrici Antu, DO        midodrine (PROAMATINE) tablet 2.5 mg  2.5 mg Oral TID WC Patrici Antu, DO   2.5 mg at 02/02/20 1627    aspirin EC tablet 81 mg  81 mg Oral Daily Patrici Antu, DO   81 mg at 01/27/20 1346    levothyroxine (SYNTHROID) tablet 100 mcg  100 mcg Oral Daily Patrici Antu, DO   100 mcg at 02/02/20 0615    venlafaxine (EFFEXOR XR) extended release capsule 150 mg  150 mg Oral Daily Patrici Antu, DO   150 mg at 02/02/20 0801    insulin glargine (LANTUS) injection vial 10 Units  10 Units Subcutaneous Nightly Patrici Antu, DO   10 Units at 02/01/20 2153    docusate sodium (COLACE) capsule 100 mg  100 mg Oral BID Patrici Antu, DO   100 mg at 02/02/20 6518    senna (SENOKOT) tablet 8.6 mg  1 tablet Oral BID Patrici Antu, DO   8.6 mg at 02/02/20 0800    ferrous sulfate EC tablet 325 mg  325 mg Oral BID WC Patrici Antu, DO   325 mg at 02/02/20 1627    sodium chloride flush 0.9 % injection 10 mL  10 mL Intravenous 2 times per day Patrici Antu, DO   10 mL at 02/01/20 2149    sodium chloride flush 0.9 % injection 10 mL  10 mL Intravenous PRN Patrici Antu, DO   10 mL at 01/30/20 2042    magnesium hydroxide (MILK OF MAGNESIA) 400 MG/5ML suspension 30 mL  30 mL Oral Daily PRN Patrici Antu, DO        ondansetron [facility]) injection 4 mg  4 mg Intravenous Q6H PRN Patrici Antu, DO        enoxaparin (LOVENOX) injection 40 mg  40 mg Subcutaneous Endocrine: negative for heat or cold intolerance,weight changes, change in bowel habits and hair loss   Musculoskeletal: Positive back pain  Neurological: negative for headaches, dizziness, seizures, weakness, numbness    PHYSICAL EXAM:        BP (!) 95/51   Pulse 108   Temp 97 °F (36.1 °C) (Oral)   Resp 13   Ht 5' 2\" (1.575 m)   Wt 188 lb 0.8 oz (85.3 kg)   SpO2 98%   Breastfeeding No   BMI 34.40 kg/m²    Temp (24hrs), Av.5 °F (36.4 °C), Min:97 °F (36.1 °C), Max:98.4 °F (36.9 °C)      General appearance -slightly  lethargic but able to answer questions. Eyes - pupils equal and reactive, extraocular eye movements intact   Ears - bilateral TM's and external ear canals normal   Mouth - mucous membranes moist, pharynx normal without lesions   Neck - supple, no significant adenopathy   Lymphatics - no palpable lymphadenopathy, no hepatosplenomegaly   Chest - clear to auscultation, no wheezes, rales or rhonchi, symmetric air entry   Heart - normal rate, regular rhythm, normal S1, S2, no murmurs  Abdomen - soft, nontender, nondistended, no masses or organomegaly   Neurological - alert, oriented, normal speech, no focal findings or movement disorder noted   Musculoskeletal - no joint tenderness, deformity or swelling   Extremities - peripheral pulses normal, no pedal edema, no clubbing or cyanosis   Skin - normal coloration and turgor, no rashes, no suspicious skin lesions noted ,  Breast left-sided mastectomy no signs of local recurrence    DATA:      Labs:     CBC:   Recent Labs     20  0404  20  1657 20  2355   WBC 13.2*  --  12.0*  --   --   --    HGB 6.6*   < > 7.7*   < > 9.7* 10.8*   HCT 22.2*   < > 26.1*   < > 30.8* 33.2*     --  357  --   --   --     < > = values in this interval not displayed.      BMP:   Recent Labs     20  0404    136   K 4.4 4.3   CO2 24 25   BUN 14 11   CREATININE 0.37* 0.39*   LABGLOM >60 >60   GLUCOSE 206* 191* The liver, spleen, pancreas, and adrenals appear normal.  Distended gallbladder. No radiodense gallstones. Kidneys appear normal. Matt catheter decompresses the bladder. GI/Bowel: The stomach,small bowel, and colon appear normal. Increased stool throughout the colon. Oral contrast is noted in the stomach, small bowel and colon. The appendix is not identified. Pelvis: Uterus normal. Peritoneum/Retroperitoneum: The abdominal aorta and iliac arteries are normal in caliber. There is no pathologic adenopathy. 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 Right total hip arthroplasty. Distended gallbladder. No radiodense gallstones noted but ultrasound is recommended. Extensive metastatic disease is a risk for fracture at T10. Us Gallbladder Ruq    Result Date: 1/27/2020  EXAMINATION: RIGHT UPPER QUADRANT ULTRASOUND 1/27/2020 10:23 am COMPARISON: CT abdomen and pelvis January 26, 2020 HISTORY: ORDERING SYSTEM PROVIDED HISTORY: Rule out liver cirrhosis, concern for aclaclulus cholecystitis TECHNOLOGIST PROVIDED HISTORY: Rule out liver cirrhosis, concern for aclaclulus cholecystitis FINDINGS: Heterogeneous increased liver echotexture. No focal liver lesion. No right-sided hydronephrosis. Nonvisualization of the pancreas. No gallbladder wall thickening. No gallstones. No sonographic Nunez sign. Common bile duct is within normal limits measuring 5 mm in diameter. Minimal amount of free fluid within the right upper quadrant. Minimal amount of free fluid within the upper abdomen. Heterogeneous increased liver echogenicity could be on the basis of hepatocellular disease or hepatic steatosis. Xr Chest Portable    Result Date: 1/26/2020  EXAMINATION: ONE XRAY VIEW OF THE CHEST 1/26/2020 2:53 pm COMPARISON: None.  HISTORY: ORDERING SYSTEM PROVIDED HISTORY: PICC line placement TECHNOLOGIST PROVIDED HISTORY: PICC line placement FINDINGS: The lungs are without consolidation effusion. There is no pneumothorax. The heart is prominent. The upper abdomen is unremarkable. The extrathoracic soft tissues are unremarkable. There is a right subclavian port with the tip in the mid SVC. There is a right-sided PICC line with the tip in the distal mid aspect of the SVC. Mild cardiomegaly without acute pulmonary process. Right-sided PICC line with the tip in the distal mid aspect of the SVC. IMAGING DATA:          IMPRESSION:     Primary Problem  Metastatic breast cancer [facility])    Active Hospital Problems    Diagnosis Date Noted    Bone lesion [M89.9]     Iron deficiency [E61.1]     Diabetes mellitus type 2 in obese ([facility] [address] [E11.69, E66.9] 01/29/2020    Essential hypertension [I10] 01/29/2020    Acquired hypothyroidism [E03.9] 01/29/2020    Recurrent major depressive disorder, in remission ([facility] [address] [facility] [F33.40] 01/29/2020    Metastatic breast cancer ([facility] [address] Lilliana Marine 01/29/2020    History of breast cancer [Z85.3] 01/27/2020    Anemia [D64.9] 01/26/2020    Liver lesion [K76.9] 01/26/2020     History of breast cancer likely hormone receptor positive. Status post left mastectomy and chemotherapy and aromatase inhibitor therapy. Iron deficiency  Hypoproliferative anemia  Bone lesions concerning for malignancy  Back pain second to bone lesions      RECOMMENDATIONS:  Reviewed results of lab work-up and other relevant clinical data. Status post prophylactic rodding of the left femur  Plan for radiation as an outpatient to be followed by systemic therapy.   Discussed the MRI, she is lethargic, we will try again tomorrow           Pat Cooper MD  Hematologist/Medical Oncologist  Cell: [phone]        This note is created with the assistance of a speech recognition program.  While intending to generate a document that actually reflects the content of the visit, the document can still have some errors including those of syntax and sound a like substitutions which may escape proof reading. It such instances, actual meaning can be extrapolated by contextual diversion.

## 2020-02-02 NOTE — PROGRESS NOTES
Physical Therapy    Facility/Department: [address] BURN UNIT  Initial Assessment    NAME: José Miguel Collins  : 1950  MRN: [id number]    Date of Service: 2020    Discharge Recommendations:    Further therapy recommended at discharge. PT Equipment Recommendations  Equipment Needed: (CTA pending progression of mobilty)    Assessment   Body structures, Functions, Activity limitations: Decreased functional mobility ; Decreased safe awareness;Decreased balance;Decreased endurance;Decreased ROM; Decreased strength; Increased pain;Decreased cognition;Decreased posture;Decreased sensation  Assessment: Pt required maxAx2 to perform bed mobility, unable to perform functional transfers limited due to pain, pt's cognition and agitation. Pt would be unsafe to perform functional mobility without skilled assistance. Recommending continued skilled physical therapy to address functional mobility deficits and return pt to prior level of independence. Prognosis: Fair  Decision Making: Medium Complexity  PT Education: Goals;PT Role;Plan of Care  REQUIRES PT FOLLOW UP: Yes  Activity Tolerance  Activity Tolerance: Patient limited by endurance; Patient limited by cognitive status; Patient limited by pain       Patient Diagnosis(es): The encounter diagnosis was Septic shock ([facility] [address] has a past medical history of Breast cancer ([facility] [address] CAD (coronary atherosclerotic disease), Diabetes mellitus type 2 in [facility]), Essential hypertension, and Hypothyroidism (acquired). has a past surgical history that includes Coronary artery bypass graft (); Mastectomy (); Upper gastrointestinal endoscopy (N/A, 2020); Upper gastrointestinal endoscopy (2020); joint replacement (Right, ); Tunneled venous port placement; and Femur Surgery (2020).     Restrictions  Restrictions/Precautions  Restrictions/Precautions: Weight Bearing  Required Braces or Orthoses?: No  Lower Extremity Weight Bearing Restrictions  Left Lower Extremity Weight Bearing: Weight Bearing As Tolerated  Vision/Hearing  Vision: (YVONNE d/t pt's agitation)  Hearing: Within functional limits     Subjective  General  Patient assessed for rehabilitation services?: Yes  Response To Previous Treatment: Not applicable  Family / Caregiver Present: No  Follows Commands: Within Functional Limits  Subjective  Subjective: RN and pt in agreement for PT eval; pt very lethargic upon writer's arrival, agitated throughout session limiting evaluation this date. Pt on 2L NC upon writer's arrival   Pain Screening  Patient Currently in Pain: Yes  Pain Assessment  Pain Assessment: Faces  Hughes-Baker Pain Rating: Hurts even more  Pain Type: Surgical pain  Pain Location: Leg  Pain Orientation: Left  Pain Descriptors: Discomfort  Non-Pharmaceutical Pain Intervention(s): Ambulation/Increased Activity;Repositioned  Response to Pain Intervention: Patient Satisfied  Multiple Pain Sites: No  Vital Signs  Patient Currently in Pain: Yes       Orientation  Orientation  Overall Orientation Status: (YVONNE d/t pt's agitation and cognition)  Social/Functional History  Social/Functional History  Additional Comments: unable to obtain home information this date, patient not responding to specific questions and agitated  Cognition   Cognition  Overall Cognitive Status: Exceptions  Arousal/Alertness: Delayed responses to stimuli  Following Commands: Inconsistently follows commands; Follows one step commands with increased time  Attention Span: Attends with cues to redirect  Safety Judgement: Decreased awareness of need for safety  Initiation: Requires cues for all  Sequencing: Requires cues for all    Objective  Joint Mobility  Spine: WFL  ROM RLE: Unable to formally assess pt's ROM d/t agitation- pt able to obtain 90deg flexion of BLE knees while seated EOB  ROM LLE: Unable to formally assess pt's ROM d/t agitation- pt able to obtain 90deg flexion of BLE knees while seated EOB  ROM RUE: See OT assessment- PT/ OT coeval  ROM LUE: See OT assessment- PT/ OT coeval   Strength RLE  Strength RLE: Exception  Comment: Unable to formally assess d/t pt agitation  Strength LLE  Strength LLE: Exception  Comment: Unable to formally assess d/t pt agitation  Strength RUE  Comment: See OT assessment- PT/ OT coeval  Strength LUE  Comment: See OT assessment- PT/ OT coeval   Motor Control  Gross Motor?: WFL  Sensation  Overall Sensation Status: WFL  Bed mobility  Supine to Sit: 2 Person assistance;Maximum assistance  Sit to Supine: 2 Person assistance;Maximum assistance  Comment: Pt able to sit EOB ~6 minutes requiring modA to sit EOB, pt demonstrates significant fear of falling and agitation while seated EOB. Transfers  Comment: Unable to assess due to pt's agitation   Ambulation  Ambulation?: No     Balance  Posture: Fair  Sitting - Static: Fair;-  Sitting - Dynamic: Poor;+  Comments: Pt able to sit EOB min-modA.  Unable to assess standing balance due to agitation        Plan   Plan  Times per week: 5-6x/week  Current Treatment Recommendations: Strengthening, Endurance Training, Transfer Training, Patient/Caregiver Education & Training, ROM, Equipment Evaluation, Education, & procurement, Balance Training, Gait Training, Home Exercise Program, Functional Mobility Training, Stair training, Safety Education & Training  Safety Devices  Type of devices: Gait belt, Left in bed, Nurse notified, Bed alarm in place, Patient at risk for falls, Call light within reach  Restraints  Initially in place: No    AM-PAC Score     AM-PAC Inpatient Mobility without Stair Climbing Raw Score : 8 (02/02/20 1349)  AM-PAC Inpatient without Stair Climbing T-Scale Score : 30.65 (02/02/20 1349)  Mobility Inpatient CMS 0-100% Score: 80.91 (02/02/20 1349)  Mobility Inpatient without Stair CMS G-Code Modifier : CM (02/02/20 1349)       Goals  Short term goals  Time Frame for Short term goals: 14  Short term goal 1: Pt to perform bed mobility modA   Short term goal 2:

## 2020-02-02 NOTE — PROGRESS NOTES
Orthopedic Progress Note    Patient:  Don Collins  YOB: 1950     77 y.o. female    Subjective:  Patient seen and examined at bedside  No complaints or concerns  Patient had more confusion overnight. Otherwise no other issues    Objective:   Vitals:    02/02/20 0351   BP: 110/62   Pulse: 111   Resp: 17   Temp: 97.1 °F (36.2 °C)   SpO2: 100%     Gen: NAD, cooperative, not participating in examination     LLE: Dressings c/d/i. Compartments soft. 2+ DP pulse. EHL/FHL motor intact. Deep and Superficial Peroneal/Saphenous/Sural SILT. Recent Labs     02/01/20  0404  02/01/20  2355   WBC 12.0*  --   --    HGB 7.7*   < > 10.8*   HCT 26.1*   < > 33.2*     --   --      --   --    K 4.3  --   --    BUN 11  --   --    CREATININE 0.39*  --   --    GLUCOSE 191*  --   --     < > = values in this interval not displayed. Meds: See rec for complete list    Impression/plan: 77 y.o. female with multiple pelvic and left femur lesions concerning for metastatic disease s/p L femur cephalomedullary nailing and intra-operative biopsy POD#1     -Post op abx completed   -Maintain dressings. Reinforce as needed.    -WBAT to LLE  -IM on as primary team, medical management and pain control per primary   -Oncology following  -Follow up intra-operative pathology   -Plan for dressing change tomorrow  -DVT ppx: Managed per primary Ok from ortho standpoint.   -Please page DO ortho with any questions     Maurice Cameron, DO   Orthopedic Surgery Resident PGY-2  [address]

## 2020-02-02 NOTE — PROGRESS NOTES
Occupational Therapy   Occupational Therapy Initial Assessment  Date: 2020   Patient Name: Bernardo Chatman  MRN: [id number]     : 1950    Date of Service: 2020    Discharge Recommendations:    Further therapy recommended at discharge. Assessment   Performance deficits / Impairments: Decreased functional mobility ; Decreased high-level IADLs;Decreased ADL status; Decreased cognition;Decreased endurance;Decreased strength;Decreased posture;Decreased balance;Decreased safe awareness  Assessment: Patient is expected to need acute OT services at this time to address functional deficits impacting performance in ADL/IADL tasks and functional transfer/mobility tasks impacting overall independence. Pt unsafe to return home without 24 hour support. Prognosis: Fair  Decision Making: Medium Complexity  Patient Education: OT role, OT POC, purpose of evaluation, importance of OOB activity, proper positioning to reduce pain, safety awareness - good/fair return   REQUIRES OT FOLLOW UP: Yes  Activity Tolerance  Activity Tolerance: Patient limited by fatigue;Treatment limited secondary to agitation  Safety Devices  Safety Devices in place: Yes  Type of devices: All fall risk precautions in place; Left in bed;Nurse notified;Call light within reach; Patient at risk for falls(RN notified of bed alarm not working )  Restraints  Initially in place: No           Patient Diagnosis(es): The encounter diagnosis was Septic shock ([facility] [address] has a past medical history of Breast cancer ([facility] [address] CAD (coronary atherosclerotic disease), Diabetes mellitus type 2 in [facility]), Essential hypertension, and Hypothyroidism (acquired). has a past surgical history that includes Coronary artery bypass graft (); Mastectomy (); Upper gastrointestinal endoscopy (N/A, 2020); Upper gastrointestinal endoscopy (2020); joint replacement (Right, 2009); Tunneled venous port placement; and Femur Surgery (2020). Restrictions  Restrictions/Precautions  Restrictions/Precautions: Weight Bearing  Required Braces or Orthoses?: No  Lower Extremity Weight Bearing Restrictions  Left Lower Extremity Weight Bearing: Weight Bearing As Tolerated    Subjective   General  Patient assessed for rehabilitation services?: Yes  Family / Caregiver Present: No  General Comment  Comments: RN ok'd patient for therapy this date. Patient cooperative throughout. Patient Currently in Pain: Yes  Pain Assessment  Pain Assessment: Faces  Pain Level: 6  Pain Location: Hip  Pain Orientation: Left  Non-Pharmaceutical Pain Intervention(s): Repositioned; Therapeutic presence;Distraction  Response to Pain Intervention: Patient Satisfied  Vital Signs  O2 Device: Nasal cannula  O2 Flow Rate (L/min): 2 L/min  Social/Functional History  Social/Functional History  Additional Comments: unable to obtain home information this date, patient not responding to specific questions and agitated       Objective              Balance  Sitting Balance: Maximum assistance(~5-6 minutes; pt needing support for UE for improved posture EOB)  Standing Balance: Unable to assess(comment)(pt easily agitated and unsafe to attempt )  ADL  Feeding: Increased time to complete;Setup;Verbal cueing; Moderate assistance  Grooming: Maximum assistance;Verbal cueing; Increased time to complete;Setup  UE Bathing: Maximum assistance; Increased time to complete;Verbal cueing;Setup  LE Bathing: Maximum assistance; Increased time to complete;Setup;Verbal cueing  UE Dressing: Maximum assistance; Increased time to complete;Verbal cueing;Setup  LE Dressing: Maximum assistance; Increased time to complete;Setup;Verbal cueing  Toileting: Maximum assistance; Increased time to complete;Setup  Tone RUE  RUE Tone: Normotonic  Tone LUE  LUE Tone: Normotonic  Coordination  Movements Are Fluid And Coordinated: No  Coordination and Movement description: Left UE;Right UE;Decreased speed;Decreased accuracy  Quality Min A to engage in ADL tasks EOB  Short term goal 5: demonstrate ~10 min of functional dynamic sitting balance at Min A to engage in ADL tasks        Therapy Time   Individual Concurrent Group Co-treatment   Time In 0743         Time Out 0813         Minutes 30                 Corinne Rubi, OTR/L

## 2020-02-02 NOTE — PROGRESS NOTES
[facility]    Progress Note    2/2/2020    12:22 PM    Name:   Lashay Collins  MRN:     [id number]     Acct:      [de-identified]   Room:   [address] [place] [address]  IP Day:  7  Admit Date:  1/26/2020  1:38 PM    PCP:   Elise Bermeo MD  Code Status:  Full Code    Subjective:     C/C: metastatic breast cancer  Interval History Status: not changed. Sleepy, cannot provide any history    Brief History:     ICU course, per documentation:     \"69 y. o. female with history of diabetes, hypothyroidism, CAD status post CABG in 2008, breast cancer on the left side status post chemotherapy and mastectomy in 2007 that presented to outside hospital [facility] on 1/23/2020 for complaints of generalized weakness and fatigue and flulike illness for about 1 month.  She was admitted there for anemia and hyperglycemia and hyponatremia however developed hypotension and had CT imaging which showed concern for duodenitis versus contained duodenal perforation.  Concern for intra-abdominal infection there and antibiotics were narrowed down to Zosyn, initially was on vancomycin as well.      CT chest showed extensive osteoblastic lesions and numerous soft tissue nodules in the greater omentum and mesentery suspicious for carcinomatosis. Cullom Lente showed mild/moderate left side hydroureteronephrosis without any obstructing stone or mass and recommended CT urogram.      Ct abdomen-   Distended gallbladder.  No radiodense gallstones noted but ultrasound is   recommended.       Extensive metastatic disease is a risk for fracture at T10. Review of Systems:     unobtainable      Medications:      Allergies:  No Known Allergies    Current Meds:   Scheduled Meds:    sodium chloride  500 mL Intravenous Once    metFORMIN  1,000 mg Oral BID WC    linagliptin  5 mg Oral Daily    alteplase  2 mg Intracatheter Once    oyster shell calcium/vitamin D  2 tablet Oral Daily    fluconazole probably in the intertrochanteric and lesser trochanteric portions of the left femur. These could be further characterized with bone scan or MRI with contrast.     Ct Biopsy Superficial Bone Percutaneous    Addendum Date: 1/28/2020    ADDENDUM: Addendum is for billing purposes only. Automated dose modulation and/ or weight based adjustment of the mA/kv was utilized to reduce the radiation dose to as low as reasonably achievable. Result Date: 1/28/2020  Technically successful CT-guided targeted right iliac bone core biopsy.      Ekg: sinus tach with pvc, nothing acute      Physical Examination:        General appearance:  sleepy, cooperative and no distress  Mental Status:  Cannot assess, falls asleep repeatedly during eval  Lungs:  clear to auscultation bilaterally, normal effort  Heart:  tachy and reg rhythm, no murmur  Abdomen:  soft, nontender, nondistended, normal bowel sounds, no masses, hepatomegaly, splenomegaly; obese  Extremities:  no redness, tenderness in the calves; 1+ ble edema  Skin:  no gross lesions, rashes, induration    Assessment:        Hospital Problems           Last Modified POA    * (Principal) Metastatic breast cancer ([address] 1/30/2020 Yes    Anemia 1/30/2020 Yes    Liver lesion 1/26/2020 Yes    History of breast cancer 1/27/2020 Yes    Diabetes mellitus type 2 in obese ([address] (Chronic) 1/29/2020 Yes    Essential hypertension (Chronic) 1/29/2020 Yes    Acquired hypothyroidism (Chronic) 1/29/2020 Yes    Recurrent major depressive disorder, in remission ([address] (Chronic) 1/29/2020 Yes    Bone lesion 1/30/2020 Yes    Iron deficiency 1/30/2020 Yes          Plan:        Had femur stabilization yesterday  Plan now is outpatient colonoscopy as she refused prep here  ivf bolus today; check ekg for tachycardia but this is likely physiologic; her extensive metastatic disease may very well be the reason for her persistent tachycardia  Add lopressor for tachycardia  ekg now: done and the [employer] infarct\" is really just lead placement differences from ekg 1/30    Rhonda Cody Blood, DO  2/2/2020  12:22 PM

## 2020-02-02 NOTE — PROGRESS NOTES
colonoscopy done. Past Medical History: Breast cancer    Past Surgical History: Left mastectomy    Medications:    Prior to Admission medications    Medication Sig Start Date End Date Taking?  Authorizing Provider   venlafaxine (EFFEXOR XR) 150 MG extended release capsule Take 150 mg by mouth daily   Yes Historical Provider, MD   metFORMIN (GLUCOPHAGE) 1000 MG tablet Take 1,000 mg by mouth 2 times daily (with meals)   Yes Historical Provider, MD   levothyroxine (SYNTHROID) 100 MCG tablet Take 100 mcg by mouth Daily   Yes Historical Provider, MD   glimepiride (AMARYL) 4 MG tablet Take 4 mg by mouth 2 times daily   Yes Historical Provider, MD   lisinopril (PRINIVIL;ZESTRIL) 2.5 MG tablet Take 2.5 mg by mouth daily   Yes Historical Provider, MD   carvedilol (COREG) 6.25 MG tablet Take 6.25 mg by mouth 2 times daily (with meals)   Yes Historical Provider, MD   SITagliptin (JANUVIA) 100 MG tablet Take 100 mg by mouth daily   Yes Historical Provider, MD   aspirin 81 MG tablet Take 81 mg by mouth daily   Yes Historical Provider, MD     Current Facility-Administered Medications   Medication Dose Route Frequency Provider Last Rate Last Dose    metFORMIN (GLUCOPHAGE) tablet 1,000 mg  1,000 mg Oral BID WC Brittany Rausch DO   1,000 mg at 02/01/20 1846    linagliptin (TRADJENTA) tablet 5 mg  5 mg Oral Daily Brittany Rausch DO        ceFAZolin (ANCEF) 2 g in dextrose 5 % 50 mL IVPB  2 g Intravenous Q8H Brittany Rausch DO   Stopped at 02/01/20 1922    fentaNYL (SUBLIMAZE) injection 25 mcg  25 mcg Intravenous Q5 Min PRN Brittany Rausch DO   25 mcg at 02/01/20 1249    sodium chloride flush 0.9 % injection 10 mL  10 mL Intravenous PRN Micheal Waite, DO        alteplase (CATHFLO) injection 2 mg  2 mg Intracatheter Once Brittany Rausch DO        oyster shell calcium/vitamin D 250-125 MG-UNIT per tablet 500 mg  2 tablet Oral Daily Brittany Rausch DO   500 mg at 02/01/20 1852    fluconazole (DIFLUCAN) tablet 100 mg  100 mg Oral Daily [facility], DO   100 mg at 02/01/20 1846    pantoprazole (PROTONIX) tablet 40 mg  40 mg Oral BID AC [facility], DO   40 mg at 01/31/20 1810    morphine (MS CONTIN) extended release tablet 15 mg  15 mg Oral 2 times per day [facility], DO   15 mg at 02/01/20 3107    insulin lispro (HUMALOG) injection vial 0-18 Units  0-18 Units Subcutaneous Q4H [facility], DO   2 Units at 02/01/20 1856    sodium chloride flush 0.9 % injection 10 mL  10 mL Intravenous PRN [facility], DO        acetaminophen (TYLENOL) tablet 650 mg  650 mg Oral Q4H PRN [facility], DO        midodrine (PROAMATINE) tablet 2.5 mg  2.5 mg Oral TID [facility], DO   2.5 mg at 01/31/20 1810    [Held by provider] aspirin EC tablet 81 mg  81 mg Oral Daily [facility], DO   81 mg at 01/27/20 1346    levothyroxine (SYNTHROID) tablet 100 mcg  100 mcg Oral Daily [facility], DO   100 mcg at 01/30/20 0640    venlafaxine (EFFEXOR XR) extended release capsule 150 mg  150 mg Oral Daily [facility], DO   150 mg at 01/29/20 5534    insulin glargine (LANTUS) injection vial 10 Units  10 Units Subcutaneous Nightly [facility], DO   10 Units at 01/31/20 2043    docusate sodium (COLACE) capsule 100 mg  100 mg Oral BID [facility], DO   100 mg at 01/31/20 2042    senna (SENOKOT) tablet 8.6 mg  1 tablet Oral BID [facility], DO   8.6 mg at 01/31/20 2042    ferrous sulfate EC tablet 325 mg  325 mg Oral BID [facility], DO   325 mg at 02/01/20 1847    sodium chloride flush 0.9 % injection 10 mL  10 mL Intravenous 2 times per day [facility], DO   10 mL at 01/31/20 2054    sodium chloride flush 0.9 % injection 10 mL  10 mL Intravenous PRN [facility], DO   10 mL at 01/30/20 2042    magnesium hydroxide (MILK OF MAGNESIA) 400 MG/5ML suspension 30 mL  30 mL Oral Daily PRN Lewis Whitfield DO        ondansetron [facility]) injection 4 mg  4 mg Intravenous Q6H PRN Aurther Jennifer, DO        [Held by provider] enoxaparin (LOVENOX) injection 40 mg  40 mg Subcutaneous Daily Aurther Jennifer, DO   40 mg at 01/27/20 1231    potassium chloride 10 mEq/100 mL IVPB (Peripheral Line)  10 mEq Intravenous PRN Aurther Jennifer, DO        bisacodyl (DULCOLAX) suppository 10 mg  10 mg Rectal Daily PRN Aurther Jennifer, DO        acetaminophen (TYLENOL) tablet 650 mg  650 mg Oral Q4H PRN Aurther Jennifer, DO        glucose (GLUTOSE) 40 % oral gel 15 g  15 g Oral PRN Aurther Jennifer, DO        dextrose 50 % IV solution  12.5 g Intravenous PRN Aurther Jennifer, DO        glucagon (rDNA) injection 1 mg  1 mg Intramuscular PRN Aurther Jennifer, DO        dextrose 5 % solution  100 mL/hr Intravenous PRN Aurther Jennifer, DO        oxyCODONE (ROXICODONE) immediate release tablet 5 mg  5 mg Oral Q4H PRN Aurther Jennifer, DO   5 mg at 01/28/20 1054    Or    oxyCODONE (ROXICODONE) immediate release tablet 10 mg  10 mg Oral Q4H PRN Aurther Jennifer, DO   10 mg at 02/01/20 1908       Allergies:  Patient has no known allergies. Social History:   reports that she has never smoked. She has never used smokeless tobacco. She reports that she does not use drugs. Denies smoking. Denies taking alcohol. Family History: Hypertension and diabetes    REVIEW OF SYSTEMS:      Constitutional: No fever or chills.  No night sweats, no weight loss   Eyes: No eye discharge, double vision, or eye pain   HEENT: negative for sore mouth, sore throat, hoarseness and voice change   Respiratory: negative for cough , sputum, dyspnea, wheezing, hemoptysis, chest pain   Cardiovascular: negative for chest pain, dyspnea, palpitations, orthopnea, PND   Gastrointestinal: negative for nausea, vomiting, diarrhea, constipation, abdominal pain, Dysphagia, hematemesis and hematochezia   Genitourinary: negative for frequency, dysuria, nocturia, urinary incontinence, and hematuria   Integument: negative for rash,  136   K 4.4 4.3   CO2 24 25   BUN 14 11   CREATININE 0.37* 0.39*   LABGLOM >60 >60   GLUCOSE 206* 191*     PT/INR: No results for input(s): PROTIME, INR in the last 72 hours. APTT:No results for input(s): APTT in the last 72 hours. LIVER PROFILE:  No results for input(s): AST, ALT, LABALBU in the last 72 hours. Us Renal Complete    Result Date: 1/26/2020  EXAMINATION: RETROPERITONEAL ULTRASOUND OF THE KIDNEYS AND URINARY BLADDER 1/26/2020 COMPARISON: None HISTORY: ORDERING SYSTEM PROVIDED HISTORY: left sided hydroureteronephrosis, eval kidneys TECHNOLOGIST PROVIDED HISTORY: left sided hydroureteronephrosis, eval kidneys FINDINGS: Kidneys: The right kidney measures 12.5 cm in length and the left kidney measures 12 cm in length. Kidneys demonstrate normal cortical echogenicity. Probable vascular calcifications are noted bilaterally. Mild left hydronephrosis. . Bladder: Bladder is decompressed with a Matt catheter. Mild left hydronephrosis     Ct Abdomen Pelvis W Iv Contrast Additional Contrast? Oral    Result Date: 1/26/2020  EXAMINATION: CT OF THE ABDOMEN AND PELVIS WITH CONTRAST 1/26/2020 5:25 pm TECHNIQUE: CT of the abdomen and pelvis was performed with the administration of intravenous contrast. Multiplanar reformatted images are provided for review. Dose modulation, iterative reconstruction, and/or weight based adjustment of the mA/kV was utilized to reduce the radiation dose to as low as reasonably achievable.  COMPARISON: January 25, 2020 CT abdomen and pelvis HISTORY: ORDERING SYSTEM PROVIDED HISTORY: possible duodenitis versus contained bowel perforation seen on CT chest at [address] [place] [address] on 1/25 TECHNOLOGIST PROVIDED HISTORY: possible duodenitis versus contained bowel perforation seen on CT chest at [address] [place] [address] on 1/25 Reason for Exam: possible duodenitis versus contained bowel perforation seen on CT chest at [address] [place] [address] on 1/25 Acuity: Acute Type of Exam: Subsequent/Follow-up FINDINGS: Lower Chest:

## 2020-02-03 ENCOUNTER — TELEPHONE (OUTPATIENT)
Dept: ONCOLOGY | Age: 70
End: 2020-02-03

## 2020-02-03 PROBLEM — K31.1 ACUTE DUODENAL ULCER WITH GASTRIC OUTLET OBSTRUCTION: Status: ACTIVE | Noted: 2020-02-03

## 2020-02-03 PROBLEM — K26.3 ACUTE DUODENAL ULCER WITH GASTRIC OUTLET OBSTRUCTION: Status: ACTIVE | Noted: 2020-01-30

## 2020-02-03 PROBLEM — K31.1 ACUTE DUODENAL ULCER WITH GASTRIC OUTLET OBSTRUCTION: Status: ACTIVE | Noted: 2020-01-30

## 2020-02-03 PROBLEM — K26.3 ACUTE DUODENAL ULCER WITH GASTRIC OUTLET OBSTRUCTION: Status: ACTIVE | Noted: 2020-02-03

## 2020-02-03 PROBLEM — K22.10 ULCER OF ESOPHAGUS WITHOUT BLEEDING: Status: ACTIVE | Noted: 2020-01-30

## 2020-02-03 LAB
ABSOLUTE EOS #: 0.11 K/UL (ref 0–0.4)
ABSOLUTE IMMATURE GRANULOCYTE: 0 K/UL (ref 0–0.3)
ABSOLUTE LYMPH #: 2.03 K/UL (ref 1–4.8)
ABSOLUTE MONO #: 1.02 K/UL (ref 0.1–0.8)
ANION GAP SERPL CALCULATED.3IONS-SCNC: 9 MMOL/L (ref 9–17)
BASOPHILS # BLD: 0 % (ref 0–2)
BASOPHILS ABSOLUTE: 0 K/UL (ref 0–0.2)
BILIRUBIN URINE: NEGATIVE
BLOOD BANK SPECIMEN: NORMAL
BUN BLDV-MCNC: 25 MG/DL (ref 8–23)
BUN/CREAT BLD: ABNORMAL (ref 9–20)
CALCIUM SERPL-MCNC: 7.8 MG/DL (ref 8.6–10.4)
CHLORIDE BLD-SCNC: 101 MMOL/L (ref 98–107)
CO2: 25 MMOL/L (ref 20–31)
COLOR: YELLOW
COMMENT UA: ABNORMAL
CREAT SERPL-MCNC: 0.84 MG/DL (ref 0.5–0.9)
DIFFERENTIAL TYPE: ABNORMAL
EOSINOPHILS RELATIVE PERCENT: 1 % (ref 1–4)
GFR AFRICAN AMERICAN: >60 ML/MIN
GFR NON-AFRICAN AMERICAN: >60 ML/MIN
GFR SERPL CREATININE-BSD FRML MDRD: ABNORMAL ML/MIN/{1.73_M2}
GFR SERPL CREATININE-BSD FRML MDRD: ABNORMAL ML/MIN/{1.73_M2}
GLUCOSE BLD-MCNC: 139 MG/DL (ref 65–105)
GLUCOSE BLD-MCNC: 151 MG/DL (ref 70–99)
GLUCOSE BLD-MCNC: 152 MG/DL (ref 65–105)
GLUCOSE BLD-MCNC: 162 MG/DL (ref 65–105)
GLUCOSE BLD-MCNC: 186 MG/DL (ref 65–105)
GLUCOSE BLD-MCNC: 196 MG/DL (ref 65–105)
GLUCOSE BLD-MCNC: 202 MG/DL (ref 65–105)
GLUCOSE URINE: NEGATIVE
HCT VFR BLD CALC: 21.2 % (ref 36.3–47.1)
HCT VFR BLD CALC: 22.7 % (ref 36.3–47.1)
HCT VFR BLD CALC: 25 % (ref 36.3–47.1)
HCT VFR BLD CALC: 26.6 % (ref 36.3–47.1)
HEMOGLOBIN: 6.3 G/DL (ref 11.9–15.1)
HEMOGLOBIN: 6.9 G/DL (ref 11.9–15.1)
HEMOGLOBIN: 7.4 G/DL (ref 11.9–15.1)
HEMOGLOBIN: 8.3 G/DL (ref 11.9–15.1)
IMMATURE GRANULOCYTES: 0 %
KETONES, URINE: ABNORMAL
LACTIC ACID, SEPSIS WHOLE BLOOD: 0.8 MMOL/L (ref 0.5–1.9)
LACTIC ACID, SEPSIS: NORMAL MMOL/L (ref 0.5–1.9)
LEUKOCYTE ESTERASE, URINE: NEGATIVE
LYMPHOCYTES # BLD: 18 % (ref 24–44)
MCH RBC QN AUTO: 28 PG (ref 25.2–33.5)
MCHC RBC AUTO-ENTMCNC: 29.7 G/DL (ref 28.4–34.8)
MCV RBC AUTO: 94.2 FL (ref 82.6–102.9)
MONOCYTES # BLD: 9 % (ref 1–7)
MORPHOLOGY: ABNORMAL
NITRITE, URINE: NEGATIVE
NRBC AUTOMATED: 0 PER 100 WBC
PDW BLD-RTO: 16.8 % (ref 11.8–14.4)
PH UA: 5 (ref 5–8)
PLATELET # BLD: 266 K/UL (ref 138–453)
PLATELET ESTIMATE: ABNORMAL
PMV BLD AUTO: 9.8 FL (ref 8.1–13.5)
POTASSIUM SERPL-SCNC: 4.3 MMOL/L (ref 3.7–5.3)
PROTEIN UA: NEGATIVE
RBC # BLD: 2.25 M/UL (ref 3.95–5.11)
RBC # BLD: ABNORMAL 10*6/UL
SEG NEUTROPHILS: 72 % (ref 36–66)
SEGMENTED NEUTROPHILS ABSOLUTE COUNT: 8.14 K/UL (ref 1.8–7.7)
SODIUM BLD-SCNC: 135 MMOL/L (ref 135–144)
SPECIFIC GRAVITY UA: 1.02 (ref 1–1.03)
TURBIDITY: CLEAR
URINE HGB: NEGATIVE
UROBILINOGEN, URINE: NORMAL
WBC # BLD: 11.3 K/UL (ref 3.5–11.3)
WBC # BLD: ABNORMAL 10*3/UL

## 2020-02-03 PROCEDURE — 6360000002 HC RX W HCPCS: Performed by: INTERNAL MEDICINE

## 2020-02-03 PROCEDURE — 85025 COMPLETE CBC W/AUTO DIFF WBC: CPT

## 2020-02-03 PROCEDURE — 86900 BLOOD TYPING SEROLOGIC ABO: CPT

## 2020-02-03 PROCEDURE — 36430 TRANSFUSION BLD/BLD COMPNT: CPT

## 2020-02-03 PROCEDURE — P9040 RBC LEUKOREDUCED IRRADIATED: HCPCS

## 2020-02-03 PROCEDURE — 97110 THERAPEUTIC EXERCISES: CPT

## 2020-02-03 PROCEDURE — 86850 RBC ANTIBODY SCREEN: CPT

## 2020-02-03 PROCEDURE — 6370000000 HC RX 637 (ALT 250 FOR IP): Performed by: ORTHOPAEDIC SURGERY

## 2020-02-03 PROCEDURE — 86901 BLOOD TYPING SEROLOGIC RH(D): CPT

## 2020-02-03 PROCEDURE — 1200000000 HC SEMI PRIVATE

## 2020-02-03 PROCEDURE — 86920 COMPATIBILITY TEST SPIN: CPT

## 2020-02-03 PROCEDURE — 80048 BASIC METABOLIC PNL TOTAL CA: CPT

## 2020-02-03 PROCEDURE — 83605 ASSAY OF LACTIC ACID: CPT

## 2020-02-03 PROCEDURE — 99498 ADVNCD CARE PLAN ADDL 30 MIN: CPT | Performed by: FAMILY MEDICINE

## 2020-02-03 PROCEDURE — 99222 1ST HOSP IP/OBS MODERATE 55: CPT | Performed by: FAMILY MEDICINE

## 2020-02-03 PROCEDURE — 85018 HEMOGLOBIN: CPT

## 2020-02-03 PROCEDURE — 36415 COLL VENOUS BLD VENIPUNCTURE: CPT

## 2020-02-03 PROCEDURE — 2580000003 HC RX 258: Performed by: NURSE PRACTITIONER

## 2020-02-03 PROCEDURE — 99233 SBSQ HOSP IP/OBS HIGH 50: CPT | Performed by: INTERNAL MEDICINE

## 2020-02-03 PROCEDURE — 99232 SBSQ HOSP IP/OBS MODERATE 35: CPT | Performed by: INTERNAL MEDICINE

## 2020-02-03 PROCEDURE — 87040 BLOOD CULTURE FOR BACTERIA: CPT

## 2020-02-03 PROCEDURE — P9016 RBC LEUKOCYTES REDUCED: HCPCS

## 2020-02-03 PROCEDURE — 2580000003 HC RX 258: Performed by: ORTHOPAEDIC SURGERY

## 2020-02-03 PROCEDURE — 85014 HEMATOCRIT: CPT

## 2020-02-03 PROCEDURE — 99497 ADVNCD CARE PLAN 30 MIN: CPT | Performed by: FAMILY MEDICINE

## 2020-02-03 PROCEDURE — 2580000003 HC RX 258: Performed by: INTERNAL MEDICINE

## 2020-02-03 PROCEDURE — 82947 ASSAY GLUCOSE BLOOD QUANT: CPT

## 2020-02-03 PROCEDURE — 81003 URINALYSIS AUTO W/O SCOPE: CPT

## 2020-02-03 RX ORDER — 0.9 % SODIUM CHLORIDE 0.9 %
250 INTRAVENOUS SOLUTION INTRAVENOUS ONCE
Status: DISCONTINUED | OUTPATIENT
Start: 2020-02-03 | End: 2020-02-19 | Stop reason: HOSPADM

## 2020-02-03 RX ORDER — 0.9 % SODIUM CHLORIDE 0.9 %
250 INTRAVENOUS SOLUTION INTRAVENOUS ONCE
Status: COMPLETED | OUTPATIENT
Start: 2020-02-03 | End: 2020-02-03

## 2020-02-03 RX ADMIN — CEFEPIME HYDROCHLORIDE 1 G: 1 INJECTION, POWDER, FOR SOLUTION INTRAMUSCULAR; INTRAVENOUS at 14:30

## 2020-02-03 RX ADMIN — INSULIN LISPRO 3 UNITS: 100 INJECTION, SOLUTION INTRAVENOUS; SUBCUTANEOUS at 01:23

## 2020-02-03 RX ADMIN — DOCUSATE SODIUM 100 MG: 100 CAPSULE, LIQUID FILLED ORAL at 20:53

## 2020-02-03 RX ADMIN — INSULIN LISPRO 6 UNITS: 100 INJECTION, SOLUTION INTRAVENOUS; SUBCUTANEOUS at 12:08

## 2020-02-03 RX ADMIN — SENNOSIDES 8.6 MG: 8.6 TABLET, FILM COATED ORAL at 20:53

## 2020-02-03 RX ADMIN — SODIUM CHLORIDE 250 ML: 9 INJECTION, SOLUTION INTRAVENOUS at 09:01

## 2020-02-03 RX ADMIN — INSULIN GLARGINE 10 UNITS: 100 INJECTION, SOLUTION SUBCUTANEOUS at 20:53

## 2020-02-03 RX ADMIN — VANCOMYCIN HYDROCHLORIDE 1250 MG: 10 INJECTION, POWDER, LYOPHILIZED, FOR SOLUTION INTRAVENOUS at 18:37

## 2020-02-03 RX ADMIN — SODIUM CHLORIDE, PRESERVATIVE FREE 10 ML: 5 INJECTION INTRAVENOUS at 09:00

## 2020-02-03 RX ADMIN — OXYCODONE HYDROCHLORIDE 5 MG: 5 TABLET ORAL at 18:42

## 2020-02-03 ASSESSMENT — PAIN SCALES - GENERAL
PAINLEVEL_OUTOF10: 7
PAINLEVEL_OUTOF10: 2
PAINLEVEL_OUTOF10: 7
PAINLEVEL_OUTOF10: 4

## 2020-02-03 NOTE — CONSULTS
Principal Problem:    Metastatic breast cancer ([facility] [address]  Active Problems:    Anemia    Liver lesion    History of breast cancer    Diabetes mellitus type 2 in obese [facility])    Essential hypertension    Acquired hypothyroidism    Recurrent major depressive disorder, in remission ([facility] [address]    Bone lesion    Iron deficiency    Acute duodenal ulcer with gastric outlet obstruction    Ulcer of esophagus without bleeding    1- Symptom management/ pain control     Pain Assessment:  Pain is controlled with current analgesics. Medication(s) being used: acetaminophen, narcotic analgesics including fentanyl IV, morphine, oxycodone               Anxiety:  fatigue                          Dyspnea:  none                          Fatigue:  tiredness    We feel the patient symptoms are being controlled. her current regimen is reviewed by myself and discussed with the staff. We will follow up with the family once they are here    2- Goals of care evaluation   The patient goals of care are improve or maintain function/quality of life, spiritual needs, remain at home, strengthening relationships, preserve independence/autonomy/control and support for family/caregiver   Goals of care discussed with:    [] Patient independently    [] Patient and Family    [x] Family or Healthcare DPOA independently    [] Unable to discuss with patient, family/DPOA not present    3- Code Status  Full Code    4- Other recommendations   - We will continue to provide comfort and support to the patient and the family  Please call with any palliative questions or concerns. Palliative Care Team is available via perfect serve or via phone. Palliative Care will continue to follow Ms. Collins's care as needed. Thank you for allowing Palliative Care to participate in the care of Ms. Collins . This note has been dictated by dragon, typing errors may be a possibility.     The total time I spent in seeing the patient, discussing goals of care, advanced directives, code status and other major issues was more than 60 minutes      Electronically signed by   Anjana German MD  Palliative Care Team  on 2/3/2020 at 9:24 AM    Palliative care office: [phone]

## 2020-02-03 NOTE — PROGRESS NOTES
kg)  · Admission Body Wt: 183 lb 13.8 oz (83.4 kg)  · % Weight Change: Variable wts since admission noted. · Ideal Body Wt: 110 lb (49.9 kg), % Ideal Body 166%  · BMI Classification: BMI 30.0 - 34.9 Obese Class I    Nutrition Interventions:   Continue current diet, Continue current ONS  Continued Inpatient Monitoring, Education Not Indicated    Nutrition Evaluation:   · Evaluation: Progressing toward goals   · Goals: Oral intakes to meet at least 75% of estimated nutrition needs.     · Monitoring: Meal Intake, Supplement Intake, Diet Tolerance, Skin Integrity, I&O, Mental Status/Confusion, Weight, Pertinent Labs, Monitor Hemodynamic Status, Monitor Bowel Function    Electronically signed by Marcio Pope RD, LD on 2/3/20 at 2:21 PM    Contact Number: [phone]

## 2020-02-03 NOTE — PROGRESS NOTES
years likely 5 years. Patient has not seen a oncologist for quite a few years now. Patient has been having back pain for a while likely many months. Patient started having bowel pain with a lot of nausea and vomiting around [place] time which persisted and got worse eventually patient presented to the ER at [facility] CT scan done per report shows possible duodenitis and concern regarding bowel perforation. Subsequently patient was transferred to [place].  Repeat CT does not show any concerning findings in the duodenum but does show blastic lesions involving bone especially T10. Patient denies any lower extremity weakness. Denies any saddle anesthesia. Denies any loss of control of bowel or bladder. Additional work-up also shows anemia and iron deficiency. Patient has never had a EGD or colonoscopy done. Past Medical History: Breast cancer    Past Surgical History: Left mastectomy    Medications:    Prior to Admission medications    Medication Sig Start Date End Date Taking?  Authorizing Provider   venlafaxine (EFFEXOR XR) 150 MG extended release capsule Take 150 mg by mouth daily   Yes Historical Provider, MD   metFORMIN (GLUCOPHAGE) 1000 MG tablet Take 1,000 mg by mouth 2 times daily (with meals)   Yes Historical Provider, MD   levothyroxine (SYNTHROID) 100 MCG tablet Take 100 mcg by mouth Daily   Yes Historical Provider, MD   glimepiride (AMARYL) 4 MG tablet Take 4 mg by mouth 2 times daily   Yes Historical Provider, MD   lisinopril (PRINIVIL;ZESTRIL) 2.5 MG tablet Take 2.5 mg by mouth daily   Yes Historical Provider, MD   carvedilol (COREG) 6.25 MG tablet Take 6.25 mg by mouth 2 times daily (with meals)   Yes Historical Provider, MD   SITagliptin (JANUVIA) 100 MG tablet Take 100 mg by mouth daily   Yes Historical Provider, MD   aspirin 81 MG tablet Take 81 mg by mouth daily   Yes Historical Provider, MD     Current Facility-Administered Medications   Medication Dose Route (COLACE) capsule 100 mg  100 mg Oral BID Alloway Arbour, DO   100 mg at 02/02/20 2132    senna (SENOKOT) tablet 8.6 mg  1 tablet Oral BID Nicole Arbour, DO   8.6 mg at 02/02/20 2132    ferrous sulfate EC tablet 325 mg  325 mg Oral BID WC Alloway Arbour, DO   325 mg at 02/02/20 1627    sodium chloride flush 0.9 % injection 10 mL  10 mL Intravenous 2 times per day Alloway Arbour, DO   10 mL at 02/03/20 0900    sodium chloride flush 0.9 % injection 10 mL  10 mL Intravenous PRN Alloway Arbour, DO   10 mL at 01/30/20 2042    magnesium hydroxide (MILK OF MAGNESIA) 400 MG/5ML suspension 30 mL  30 mL Oral Daily PRN Nicole Arbour, DO        ondansetron [facility]) injection 4 mg  4 mg Intravenous Q6H PRN Nicole Arbour, DO        [Held by provider] enoxaparin (LOVENOX) injection 40 mg  40 mg Subcutaneous Daily Nicole Arbour, DO   40 mg at 01/27/20 1231    potassium chloride 10 mEq/100 mL IVPB (Peripheral Line)  10 mEq Intravenous PRN Alloway Arbour, DO        bisacodyl (DULCOLAX) suppository 10 mg  10 mg Rectal Daily PRN Nicole Arbour, DO        acetaminophen (TYLENOL) tablet 650 mg  650 mg Oral Q4H PRN Nicole Arbour, DO        glucose (GLUTOSE) 40 % oral gel 15 g  15 g Oral PRN Alloway Arbour, DO        dextrose 50 % IV solution  12.5 g Intravenous PRN Nicole Arbour, DO        glucagon (rDNA) injection 1 mg  1 mg Intramuscular PRN Alloway Arbour, DO        dextrose 5 % solution  100 mL/hr Intravenous PRN Alloway Arbour, DO        oxyCODONE (ROXICODONE) immediate release tablet 5 mg  5 mg Oral Q4H PRN Alloway Arbour, DO   5 mg at 01/28/20 6935    Or    oxyCODONE (ROXICODONE) immediate release tablet 10 mg  10 mg Oral Q4H PRN Alloway Arbour, DO   10 mg at 02/02/20 1625       Allergies:  Patient has no known allergies. Social History:   reports that she has never smoked. She has never used smokeless tobacco. She reports that she does not use drugs. Denies smoking.   Denies normal speech, no focal findings or movement disorder noted   Musculoskeletal - no joint tenderness, deformity or swelling   Extremities - peripheral pulses normal, no pedal edema, no clubbing or cyanosis   Skin - normal coloration and turgor, no rashes, no suspicious skin lesions noted ,  Breast left-sided mastectomy no signs of local recurrence    DATA:      Labs:     CBC:   Recent Labs     02/01/20  0404  02/01/20  2355 02/03/20  0349   WBC 12.0*  --   --  11.3   HGB 7.7*   < > 10.8* 6.3*   HCT 26.1*   < > 33.2* 21.2*     --   --  266    < > = values in this interval not displayed. BMP:   Recent Labs     02/01/20  0404 02/03/20  0349    135   K 4.3 4.3   CO2 25 25   BUN 11 25*   CREATININE 0.39* 0.84   LABGLOM >60 >60   GLUCOSE 191* 151*     PT/INR: No results for input(s): PROTIME, INR in the last 72 hours. APTT:No results for input(s): APTT in the last 72 hours. LIVER PROFILE:  No results for input(s): AST, ALT, LABALBU in the last 72 hours. Us Renal Complete    Result Date: 1/26/2020  EXAMINATION: RETROPERITONEAL ULTRASOUND OF THE KIDNEYS AND URINARY BLADDER 1/26/2020 COMPARISON: None HISTORY: ORDERING SYSTEM PROVIDED HISTORY: left sided hydroureteronephrosis, eval kidneys TECHNOLOGIST PROVIDED HISTORY: left sided hydroureteronephrosis, eval kidneys FINDINGS: Kidneys: The right kidney measures 12.5 cm in length and the left kidney measures 12 cm in length. Kidneys demonstrate normal cortical echogenicity. Probable vascular calcifications are noted bilaterally. Mild left hydronephrosis. . Bladder: Bladder is decompressed with a Matt catheter. Mild left hydronephrosis     Ct Abdomen Pelvis W Iv Contrast Additional Contrast? Oral    Result Date: 1/26/2020  EXAMINATION: CT OF THE ABDOMEN AND PELVIS WITH CONTRAST 1/26/2020 5:25 pm TECHNIQUE: CT of the abdomen and pelvis was performed with the administration of intravenous contrast. Multiplanar reformatted images are provided for review.  Dose modulation, iterative reconstruction, and/or weight based adjustment of the mA/kV was utilized to reduce the radiation dose to as low as reasonably achievable. COMPARISON: January 25, 2020 CT abdomen and pelvis HISTORY: ORDERING SYSTEM PROVIDED HISTORY: possible duodenitis versus contained bowel perforation seen on CT chest at [address] [place] [address] on 1/25 TECHNOLOGIST PROVIDED HISTORY: possible duodenitis versus contained bowel perforation seen on CT chest at [address] [place] [address] on 1/25 Reason for Exam: possible duodenitis versus contained bowel perforation seen on CT chest at [address] [place] [address] on 1/25 Acuity: Acute Type of Exam: Subsequent/Follow-up FINDINGS: Lower Chest: Cardiomegaly. Calcific coronary artery disease. Pacer wire right heart. Organs: Fat containing umbilical hernia. The liver, spleen, pancreas, and adrenals appear normal.  Distended gallbladder. No radiodense gallstones. Kidneys appear normal. Matt catheter decompresses the bladder. GI/Bowel: The stomach,small bowel, and colon appear normal. Increased stool throughout the colon. Oral contrast is noted in the stomach, small bowel and colon. The appendix is not identified. Pelvis: Uterus normal. Peritoneum/Retroperitoneum: The abdominal aorta and iliac arteries are normal in caliber. There is no pathologic adenopathy. 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 Right total hip arthroplasty. Distended gallbladder. No radiodense gallstones noted but ultrasound is recommended. Extensive metastatic disease is a risk for fracture at T10.      Us Gallbladder Ruq    Result Date: 1/27/2020  EXAMINATION: RIGHT UPPER QUADRANT ULTRASOUND 1/27/2020 10:23 am COMPARISON: CT abdomen and pelvis January 26, 2020 HISTORY: ORDERING SYSTEM PROVIDED HISTORY: Rule out liver cirrhosis, concern for aclaclulus cholecystitis TECHNOLOGIST PROVIDED HISTORY: Rule out liver cirrhosis, concern for aclaclulus cholecystitis Liver lesion [K76.9] 01/26/2020     History of breast cancer likely hormone receptor positive. Status post left mastectomy and chemotherapy and aromatase inhibitor therapy. Iron deficiency  Hypoproliferative anemia  Bone lesions concerning for malignancy  Back pain second to bone lesions      RECOMMENDATIONS:  Reviewed results of lab work-up and other relevant clinical data. Status post prophylactic rodding of the left femur  Plan for radiation as an outpatient to be followed by systemic therapy. Discussed the MRI, she is lethargic, we will try again tomorrow. MRI brain done and shared above. Katie Messina MD  PGY-3, Internal Medicine  [facility]  Attending Physician Statement  The patient was seen and examined during rounds, I have discussed the care of [address] [place] [address], including pertinent history and exam findings with the resident. I have reviewed the key elements of all parts of the encounter with the resident. I agree with the assessment, and status of the problem list as documented. Additional assessment/ plan  Discussed with family  Metastatic breast cancer to bone  Confused, cause?? Prognosis is poor. On letrozole. Ioana Odell MD  Hematology Oncology  [phone]  Electronically signed by Gladys Lu MD on 2/3/2020 at 10:58 PM          This note is created with the assistance of a speech recognition program.  While intending to generate a document that actually reflects the content of the visit, the document can still have some errors including those of syntax and sound a like substitutions which may escape proof reading. It such instances, actual meaning can be extrapolated by contextual diversion.

## 2020-02-03 NOTE — PROGRESS NOTES
[address] Blood- patient did not take an PO medication this morning. Patient lethargic. Writer explained which medications patient has ordered and patient stated understanding. Then patient stated \"not right now\" for PO medications. Writer will continue to monitor.

## 2020-02-03 NOTE — PROGRESS NOTES
[address]    Progress Note    2/3/2020    8:27 AM    Name:   Marvin Collins  MRN:     [id number]     [place]:      [de-identified]   Room:   [facility]80[facility]  IP Day:  8  Admit Date:  1/26/2020  1:38 PM    PCP:   Layton Perez MD  Code Status:  Full Code    Subjective:     C/C: anemia  Interval History Status: not changed. Remains lethargic and is not answering questions  Yesterday she had just received ativan but not this am    Got tachy and hypotensive yesterday-remains tachy but not hypotensive    Brief History:     ICU course, per documentation:     \"69 y. o. female with history of diabetes, hypothyroidism, CAD status post CABG in 2008, breast cancer on the left side status post chemotherapy and mastectomy in 2007 that presented to outside hospital [facility] on 1/23/2020 for complaints of generalized weakness and fatigue and flulike illness for about 1 month.  She was admitted there for anemia and hyperglycemia and hyponatremia however developed hypotension and had CT imaging which showed concern for duodenitis versus contained duodenal perforation.  Concern for intra-abdominal infection there and antibiotics were narrowed down to Zosyn, initially was on vancomycin as well.      CT chest showed extensive osteoblastic lesions and numerous soft tissue nodules in the greater omentum and mesentery suspicious for carcinomatosis. Remberto Borden showed mild/moderate left side hydroureteronephrosis without any obstructing stone or mass     Ct abdomen-   Distended gallbladder.  No radiodense gallstones noted but ultrasound is   recommended.       Extensive metastatic disease is a risk for fracture at T10.      Had egd 1/30:    Significant inflammation and ulceration of the distal third of the esophagus with white thick discharge suspicious for Candida (path negative for candida)  Although malignancy is less likely but could not be ruled out gentle biopsies were acetaminophen, glucose, dextrose, glucagon (rDNA), dextrose, oxyCODONE **OR** oxyCODONE    Data:     Past Medical History:   has a past medical history of Breast cancer ([address] CAD (coronary atherosclerotic disease), Diabetes mellitus type 2 in obese [facility]), Essential hypertension, and Hypothyroidism (acquired). Social History:   reports that she has never smoked. She has never used smokeless tobacco. She reports that she does not use drugs. Family History:   Family History   Problem Relation Age of Onset    Hypertension Mother     Hypertension Father        Vitals:  BP (!) 103/39   Pulse 122   Temp 98.6 °F (37 °C) (Oral)   Resp 13   Ht 5' 2\" (1.575 m)   Wt 188 lb 0.8 oz (85.3 kg)   SpO2 93%   Breastfeeding No   BMI 34.40 kg/m²   Temp (24hrs), Av.3 °F (36.8 °C), Min:97 °F (36.1 °C), Max:100.7 °F (38.2 °C)    Recent Labs     20  1602 208 20  0042 20  0456   POCGLU 177* 132* 162* 139*       I/O (24Hr): Intake/Output Summary (Last 24 hours) at 2/3/2020 0827  Last data filed at 2/3/2020 0132  Gross per 24 hour   Intake 2763 ml   Output 200 ml   Net 2563 ml       Labs:  Hematology:  Recent Labs     20  0404  20  1657 20  2355 20  0349   WBC 12.0*  --   --   --  11.3   RBC 2.70*  --   --   --  2.25*   HGB 7.7*   < > 9.7* 10.8* 6.3*   HCT 26.1*   < > 30.8* 33.2* 21.2*   MCV 96.7  --   --   --  94.2   MCH 28.5  --   --   --  28.0   MCHC 29.5  --   --   --  29.7   RDW 17.0*  --   --   --  16.8*     --   --   --  266   MPV 9.8  --   --   --  9.8    < > = values in this interval not displayed.      Chemistry:  Recent Labs     20  0404 20  0349    135   K 4.3 4.3   CL 98 101   CO2 25 25   GLUCOSE 191* 151*   BUN 11 25*   CREATININE 0.39* 0.84   ANIONGAP 13 9   LABGLOM >60 >60   GFRAA >60 >60   CALCIUM 8.6 7.8*     Recent Labs     20  0825 20  1232 20  1602 20  2028 20  0042 20  0456   POCGLU murmur  Abdomen:  soft, nontender, nondistended, normal bowel sounds, no masses, hepatomegaly, splenomegaly  Extremities:  no redness, tenderness in the calves; mild ble edema  Skin:  no gross lesions, rashes, induration    Assessment:        Hospital Problems           Last Modified POA    * (Principal) Metastatic breast cancer ([facility] 2/2/2020 Yes    Overview Signed 2/2/2020  1:13 PM by Pardeep Waite, DO     To bones, calvarium, dura,          Anemia 1/30/2020 Yes    Liver lesion 1/26/2020 Yes    History of breast cancer 1/27/2020 Yes    Diabetes mellitus type 2 in obese ([facility] (Chronic) 1/29/2020 Yes    Essential hypertension (Chronic) 1/29/2020 Yes    Acquired hypothyroidism (Chronic) 1/29/2020 Yes    Recurrent major depressive disorder, in remission ([facility] (Chronic) 1/29/2020 Yes    Bone lesion 1/30/2020 Yes    Iron deficiency 1/30/2020 Yes    Acute duodenal ulcer with gastric outlet obstruction 2/3/2020 Yes    Ulcer of esophagus without bleeding 2/3/2020 Yes          Plan:        1. Hold ms contin-she is lethargic; also with low grade fever overnight and ams, panculture and workup for possible sepsis; empiric cefepime and vancomycin  2. Dc diflucan: pathology from esophageal biopsy showed no fungal elements  3. Hold lovenox with the acute drop in hgb  4. Receiving another unit prbc today  5. May need repeat egd if hgb cont to drop  6. Outpatient colonoscopy as she refused prep here  7. Guarded prognosis  8.  D/w  over phone    Pardeep Waite DO  2/3/2020  8:27 AM

## 2020-02-03 NOTE — PATIENT CARE CONFERENCE
Palliative Care Family Conference    Patient: Santigao Collins  Room: 4574/7196-48    Attended By: Dr. Cardoza [facility] attending, patient's  Clareakua Maki, patient's daughter Mary Castellanos    Reason for meeting:Routine meeting  Disease progression  Functional decline  Discuss goals of care  Provide clinical updates and answer questions  Share information and provider education for the family  Listen to patient/family concerns  Assess family understanding, concerns, and coping  Build trust  Provide emotional support to the family  Elicit patient's goals and values, and use these to establish or modify goals of care  Other major care decisions  Code status     Conference Summary:  The meeting was held in the patient's room on the first floor  The meeting was conducted by me and attended by patient's  Clare Maki and patient's daughter Mary Castellanos  I discussed patient's current medical conditions with the family  I explained to the family that patient's cancer has spread to different parts of her body  Patient's  told that they had met with patient's cancer doctor and he has told them that,\" the treatments have improved and now there are chemotherapy medications that can cure patient's cancer\"  I tried to explain to the family that patient has metastatic cancer disease and chemotherapy may be palliative and not curative  I felt that patient's family continues to believe that a chemotherapy treatment will cure the patient  I told the family that they can discuss this with patient's cancer doctors and they agreed with it  I explained the different types of codes to patient's  as well as daughter Mary Castellanos  Both patient's  Clare Maki and daughter Mary Oglesbyalcon of told that they wanted to know the results of patient's MRI scans and I told him that patient's cancer doctor can discuss these with them and they agreed with it    The family was very emotional and tearful and I offered emotional support to them  Patient's

## 2020-02-03 NOTE — PROGRESS NOTES
Patient too lethargic for informed consent for blood transfusion. Called  Janine Loredo for informed consent at 0700, and he confirmed consent for transfusion.

## 2020-02-04 LAB
ABO/RH: NORMAL
ABSOLUTE EOS #: 0.18 K/UL (ref 0–0.44)
ABSOLUTE IMMATURE GRANULOCYTE: 0.11 K/UL (ref 0–0.3)
ABSOLUTE LYMPH #: 1.75 K/UL (ref 1.1–3.7)
ABSOLUTE MONO #: 0.98 K/UL (ref 0.1–1.2)
ANION GAP SERPL CALCULATED.3IONS-SCNC: 9 MMOL/L (ref 9–17)
ANTIBODY SCREEN: NEGATIVE
ARM BAND NUMBER: NORMAL
BASOPHILS # BLD: 1 % (ref 0–2)
BASOPHILS ABSOLUTE: 0.08 K/UL (ref 0–0.2)
BLD PROD TYP BPU: NORMAL
BLD PROD TYP BPU: NORMAL
BUN BLDV-MCNC: 21 MG/DL (ref 8–23)
BUN/CREAT BLD: ABNORMAL (ref 9–20)
CALCIUM SERPL-MCNC: 8.5 MG/DL (ref 8.6–10.4)
CHLORIDE BLD-SCNC: 100 MMOL/L (ref 98–107)
CO2: 28 MMOL/L (ref 20–31)
CREAT SERPL-MCNC: 0.5 MG/DL (ref 0.5–0.9)
CROSSMATCH RESULT: NORMAL
CROSSMATCH RESULT: NORMAL
DIFFERENTIAL TYPE: ABNORMAL
DISPENSE STATUS BLOOD BANK: NORMAL
DISPENSE STATUS BLOOD BANK: NORMAL
EKG ATRIAL RATE: 125 BPM
EKG P AXIS: 41 DEGREES
EKG P-R INTERVAL: 132 MS
EKG Q-T INTERVAL: 314 MS
EKG QRS DURATION: 82 MS
EKG QTC CALCULATION (BAZETT): 453 MS
EKG R AXIS: -20 DEGREES
EKG T AXIS: 96 DEGREES
EKG VENTRICULAR RATE: 125 BPM
EOSINOPHILS RELATIVE PERCENT: 2 % (ref 1–4)
EXPIRATION DATE: NORMAL
GFR AFRICAN AMERICAN: >60 ML/MIN
GFR NON-AFRICAN AMERICAN: >60 ML/MIN
GFR SERPL CREATININE-BSD FRML MDRD: ABNORMAL ML/MIN/{1.73_M2}
GFR SERPL CREATININE-BSD FRML MDRD: ABNORMAL ML/MIN/{1.73_M2}
GLUCOSE BLD-MCNC: 105 MG/DL (ref 65–105)
GLUCOSE BLD-MCNC: 140 MG/DL (ref 65–105)
GLUCOSE BLD-MCNC: 148 MG/DL (ref 65–105)
GLUCOSE BLD-MCNC: 163 MG/DL (ref 65–105)
GLUCOSE BLD-MCNC: 200 MG/DL (ref 70–99)
HCT VFR BLD CALC: 25.9 % (ref 36.3–47.1)
HCT VFR BLD CALC: 26.6 % (ref 36.3–47.1)
HCT VFR BLD CALC: 28 % (ref 36.3–47.1)
HEMOGLOBIN: 8.2 G/DL (ref 11.9–15.1)
HEMOGLOBIN: 8.3 G/DL (ref 11.9–15.1)
HEMOGLOBIN: 8.7 G/DL (ref 11.9–15.1)
IMMATURE GRANULOCYTES: 1 %
LYMPHOCYTES # BLD: 17 % (ref 24–43)
MCH RBC QN AUTO: 28 PG (ref 25.2–33.5)
MCHC RBC AUTO-ENTMCNC: 32 G/DL (ref 28.4–34.8)
MCV RBC AUTO: 87.5 FL (ref 82.6–102.9)
MONOCYTES # BLD: 10 % (ref 3–12)
NRBC AUTOMATED: 0.2 PER 100 WBC
PDW BLD-RTO: 15.2 % (ref 11.8–14.4)
PLATELET # BLD: ABNORMAL K/UL (ref 138–453)
PLATELET ESTIMATE: ABNORMAL
PLATELET, FLUORESCENCE: NORMAL K/UL (ref 138–453)
PMV BLD AUTO: ABNORMAL FL (ref 8.1–13.5)
POTASSIUM SERPL-SCNC: 4 MMOL/L (ref 3.7–5.3)
RBC # BLD: 2.96 M/UL (ref 3.95–5.11)
RBC # BLD: ABNORMAL 10*6/UL
SEG NEUTROPHILS: 70 % (ref 36–65)
SEGMENTED NEUTROPHILS ABSOLUTE COUNT: 7.22 K/UL (ref 1.5–8.1)
SODIUM BLD-SCNC: 137 MMOL/L (ref 135–144)
SURGICAL PATHOLOGY REPORT: NORMAL
TRANSFUSION STATUS: NORMAL
TRANSFUSION STATUS: NORMAL
UNIT DIVISION: 0
UNIT DIVISION: 0
UNIT NUMBER: NORMAL
UNIT NUMBER: NORMAL
WBC # BLD: 10.3 K/UL (ref 3.5–11.3)
WBC # BLD: ABNORMAL 10*3/UL

## 2020-02-04 PROCEDURE — 93010 ELECTROCARDIOGRAM REPORT: CPT | Performed by: INTERNAL MEDICINE

## 2020-02-04 PROCEDURE — 82947 ASSAY GLUCOSE BLOOD QUANT: CPT

## 2020-02-04 PROCEDURE — 6370000000 HC RX 637 (ALT 250 FOR IP): Performed by: ORTHOPAEDIC SURGERY

## 2020-02-04 PROCEDURE — 2580000003 HC RX 258: Performed by: INTERNAL MEDICINE

## 2020-02-04 PROCEDURE — 85025 COMPLETE CBC W/AUTO DIFF WBC: CPT

## 2020-02-04 PROCEDURE — 80048 BASIC METABOLIC PNL TOTAL CA: CPT

## 2020-02-04 PROCEDURE — 99222 1ST HOSP IP/OBS MODERATE 55: CPT | Performed by: INTERNAL MEDICINE

## 2020-02-04 PROCEDURE — 97530 THERAPEUTIC ACTIVITIES: CPT

## 2020-02-04 PROCEDURE — 36415 COLL VENOUS BLD VENIPUNCTURE: CPT

## 2020-02-04 PROCEDURE — 97535 SELF CARE MNGMENT TRAINING: CPT

## 2020-02-04 PROCEDURE — 97110 THERAPEUTIC EXERCISES: CPT

## 2020-02-04 PROCEDURE — 99233 SBSQ HOSP IP/OBS HIGH 50: CPT | Performed by: INTERNAL MEDICINE

## 2020-02-04 PROCEDURE — 1200000000 HC SEMI PRIVATE

## 2020-02-04 PROCEDURE — 85014 HEMATOCRIT: CPT

## 2020-02-04 PROCEDURE — 6360000002 HC RX W HCPCS: Performed by: INTERNAL MEDICINE

## 2020-02-04 PROCEDURE — 2580000003 HC RX 258: Performed by: ORTHOPAEDIC SURGERY

## 2020-02-04 PROCEDURE — 85018 HEMOGLOBIN: CPT

## 2020-02-04 PROCEDURE — 85055 RETICULATED PLATELET ASSAY: CPT

## 2020-02-04 RX ADMIN — CALCIUM CARBONATE-CHOLECALCIFEROL TAB 250 MG-125 UNIT 500 MG: 250-125 TAB at 08:48

## 2020-02-04 RX ADMIN — OXYCODONE HYDROCHLORIDE 10 MG: 5 TABLET ORAL at 20:18

## 2020-02-04 RX ADMIN — INSULIN LISPRO 3 UNITS: 100 INJECTION, SOLUTION INTRAVENOUS; SUBCUTANEOUS at 08:58

## 2020-02-04 RX ADMIN — MIDODRINE HYDROCHLORIDE 2.5 MG: 2.5 TABLET ORAL at 16:56

## 2020-02-04 RX ADMIN — PANTOPRAZOLE SODIUM 40 MG: 40 TABLET, DELAYED RELEASE ORAL at 07:03

## 2020-02-04 RX ADMIN — METFORMIN HYDROCHLORIDE 1000 MG: 500 TABLET ORAL at 16:56

## 2020-02-04 RX ADMIN — OXYCODONE HYDROCHLORIDE 10 MG: 5 TABLET ORAL at 02:44

## 2020-02-04 RX ADMIN — SENNOSIDES 8.6 MG: 8.6 TABLET, FILM COATED ORAL at 20:19

## 2020-02-04 RX ADMIN — SODIUM CHLORIDE: 9 INJECTION, SOLUTION INTRAVENOUS at 17:40

## 2020-02-04 RX ADMIN — PANTOPRAZOLE SODIUM 40 MG: 40 TABLET, DELAYED RELEASE ORAL at 16:56

## 2020-02-04 RX ADMIN — METFORMIN HYDROCHLORIDE 1000 MG: 500 TABLET ORAL at 08:48

## 2020-02-04 RX ADMIN — INSULIN LISPRO 3 UNITS: 100 INJECTION, SOLUTION INTRAVENOUS; SUBCUTANEOUS at 16:54

## 2020-02-04 RX ADMIN — SODIUM CHLORIDE, PRESERVATIVE FREE 10 ML: 5 INJECTION INTRAVENOUS at 08:50

## 2020-02-04 RX ADMIN — FERROUS SULFATE TAB EC 325 MG (65 MG FE EQUIVALENT) 325 MG: 325 (65 FE) TABLET DELAYED RESPONSE at 08:48

## 2020-02-04 RX ADMIN — ACETAMINOPHEN 650 MG: 325 TABLET ORAL at 16:56

## 2020-02-04 RX ADMIN — DOCUSATE SODIUM 100 MG: 100 CAPSULE, LIQUID FILLED ORAL at 08:48

## 2020-02-04 RX ADMIN — DOCUSATE SODIUM 100 MG: 100 CAPSULE, LIQUID FILLED ORAL at 20:19

## 2020-02-04 RX ADMIN — FERROUS SULFATE TAB EC 325 MG (65 MG FE EQUIVALENT) 325 MG: 325 (65 FE) TABLET DELAYED RESPONSE at 16:56

## 2020-02-04 RX ADMIN — MIDODRINE HYDROCHLORIDE 2.5 MG: 2.5 TABLET ORAL at 13:00

## 2020-02-04 RX ADMIN — SENNOSIDES 8.6 MG: 8.6 TABLET, FILM COATED ORAL at 08:47

## 2020-02-04 RX ADMIN — MIDODRINE HYDROCHLORIDE 2.5 MG: 2.5 TABLET ORAL at 08:48

## 2020-02-04 RX ADMIN — VENLAFAXINE HYDROCHLORIDE 150 MG: 150 CAPSULE, EXTENDED RELEASE ORAL at 08:47

## 2020-02-04 RX ADMIN — CEFEPIME HYDROCHLORIDE 1 G: 1 INJECTION, POWDER, FOR SOLUTION INTRAMUSCULAR; INTRAVENOUS at 02:44

## 2020-02-04 RX ADMIN — LINAGLIPTIN 5 MG: 5 TABLET, FILM COATED ORAL at 08:48

## 2020-02-04 RX ADMIN — LEVOTHYROXINE SODIUM 100 MCG: 100 TABLET ORAL at 07:03

## 2020-02-04 RX ADMIN — SODIUM CHLORIDE, PRESERVATIVE FREE 10 ML: 5 INJECTION INTRAVENOUS at 20:19

## 2020-02-04 ASSESSMENT — ENCOUNTER SYMPTOMS
ABDOMINAL PAIN: 0
ABDOMINAL DISTENTION: 0
CHEST TIGHTNESS: 0
FACIAL SWELLING: 0
COLOR CHANGE: 0
EYE DISCHARGE: 0
APNEA: 0

## 2020-02-04 ASSESSMENT — PAIN DESCRIPTION - PAIN TYPE: TYPE: SURGICAL PAIN

## 2020-02-04 ASSESSMENT — PAIN DESCRIPTION - ORIENTATION
ORIENTATION: LEFT
ORIENTATION: LEFT

## 2020-02-04 ASSESSMENT — PAIN SCALES - GENERAL
PAINLEVEL_OUTOF10: 7
PAINLEVEL_OUTOF10: 6
PAINLEVEL_OUTOF10: 4
PAINLEVEL_OUTOF10: 5
PAINLEVEL_OUTOF10: 4

## 2020-02-04 ASSESSMENT — PAIN DESCRIPTION - FREQUENCY: FREQUENCY: INTERMITTENT

## 2020-02-04 ASSESSMENT — PAIN DESCRIPTION - LOCATION
LOCATION: BUTTOCKS
LOCATION: LEG
LOCATION: KNEE

## 2020-02-04 ASSESSMENT — PAIN DESCRIPTION - DESCRIPTORS: DESCRIPTORS: DISCOMFORT

## 2020-02-04 NOTE — PROGRESS NOTES
Assessment: 0-10  Pain Level: 4  Pain Type: Surgical   Pain Location: Hip  Pain Orientation: Left  Pain Descriptors: Ache, Discomfort, Sore  Non-Pharmaceutical Pain Intervention(s): Reposition, Distraction   General Comment     Orientation  Orientation  Overall Orientation Status: Impaired  Orientation Level: Oriented to person;Disoriented to place; Disoriented to time;Oriented to situation  Objective    ADL  Feeding: Increased time to complete;Setup;Verbal cueing(Pt sat on EOB to eat breakfast. )  Grooming: Set up, stand by assistance(Pt sat on EOB to wash face. )  Additional Comments: Pt sat on EOB to wash face and eat breakfast. Pt refused all other ADLs. Balance  Sitting Balance: Contact guard assistance(Pt demo slight posterior lean. Verbal/tactile cues for proper posture- good return. )  Standing Balance: Moderate assistance(Mod x2 using [facility]. )  Standing Balance  Time: 5 min   Activity: Pt stood in [facility] at [facility] mobility  Rolling to Left: Moderate assistance;2 Person assistance  Rolling to Right: Moderate assistance;2 Person assistance  Supine to Sit: Moderate assistance  Sit to Supine: Moderate assistance;2 Person assistance  Scooting: Stand by assistance  Comment: Pt sat on EOB ~ 10 min requiring verbal/tactile cues for initiation/sequencing and hand placement. Mod A required for LLE progression. Transfers  Sit to stand: Moderate assistance;2 Person assistance(Using [facility] )  Stand to sit: Moderate assistance;2 Person assistance  Transfer Comments: Pt stood in [facility] at [facility]. Cognition  Overall Cognitive Status: Exceptions  Arousal/Alertness: Delayed responses to stimuli  Following Commands: Inconsistently follows commands; Follows one step commands with increased time  Attention Span: Attends with cues to redirect  Safety Judgement: Decreased awareness of need for safety  Initiation: Requires cues for all  Sequencing: Requires cues for all    Plan   Plan  Times per week:

## 2020-02-04 NOTE — CONSULTS
[facility] -   Infectious diseases evaluation  admission date 1/26/2020    reason for consultation:   antibiotics management    Impression :   Current:  · Bone mets left pelvis / iliac - biopsy w ORIF done 2/1/20  · abd adeno carcinomatosis  · Duodenitis w ulcer  · EGD duodenal ulcer w clot  w gastric outlet narrowing  · EGD distal white esophagitis - candida neg on path  · Past breast CA post chemo and mastectomy   · DM  · Resolved septic shock  ·   Discussion / summary of stay / plan of care   ·   Recommendations   · Stop vancomycin   · Watch off antibiotics   · Palliative care on board    Infection Control Recommendations   · [place] Precautions      Antimicrobial Stewardship Recommendations   · Stop antibiotics     Coordination ofOutpatient Care:   · Estimated Length of IV antimicrobials: None  · Patient will need Midline / picc Catheter Insertion: No  · Patient will need SNF:   · Patient will need outpatient wound care: No    History of Present Illness:   Initial history:  Klaudia Lipscomb is a 77y.o.-year-old female with pmh of DM, hypothyroidism, CAD s/p CABG in 2008, breast cancer on left side s/p chemotherapy and mastectomy in 2007 who was admitted with abd pain, onset of vomiting for months. Found in septic shock that has resolved after 4 days of zosyn and on a week of vanco. Patient is no longer requiring antibiotic therapy. Pressors are all stopped - no fever or leukocytosis and no pos cx on record - CXR neg and CT AP showed possible perit carcinomatosis and Bone mets left pelvis / iliac and T10- -   biopsy w ORIF done 2/1/20 w nail in the femur to protect the bone. OR biopsy showed metastatic adenocarcinoma  CT in the other hospital suggested a possible duodenitis   Upon transfer to [address], EGD 1/30/20 showed ulcer with a clot and with gastric outlet narrowing. It also showed a white esophagitis yet no Candida on pathology.     ID is consulted for antibiotic management, she remains on vancomycin. On exam she has no abdominal pain and abdomen is benign. Otherwise her surgical wound on the left hip looks very dry and clean. No signs of active infection      Interval changes  2/4/2020   Seen and examined. Denies abd pain, N/V,D. Summary of relevant labs:  Labs:   WBC: 10.3     Micro:  Blood cx 2/3   no growth 23 hours. Imaging:    MRI Brain 2/1/20  1. Multiple calvarial metastases. 2. Abnormal dural thickening enhancement most evident overlying the left   parietal and occipital lobes concerning for metastatic involvement given   adjacent calvarial lesions.  Meningitis is in the differential diagnosis but   is thought unlikely. 3. Mild chronic white matter microvascular ischemic changes.  No acute   infarct or hemorrhage. I have personally reviewed the past medical history, past surgical history, medications, social history, and family history, and I haveupdated the database accordingly.   Past Medical History:     Past Medical History:   Diagnosis Date    [facility] [address] [address] 2007    CAD (coronary atherosclerotic disease)     Diabetes mellitus type 2 in obese ([facility]     Essential hypertension     Hypothyroidism (acquired)        Past Surgical  History:     Past Surgical History:   Procedure Laterality Date    CORONARY ARTERY BYPASS GRAFT  2007    FEMUR FRACTURE SURGERY Left 2/1/2020    [facility]  FEMUR FRACTURE IM NAILING AND LEFT FEMUR BIOPSY (SYNTHES, , C-ARM) performed by Ru Davis MD at [address] [place] [address]  02/01/2020    nailing    JOINT REPLACEMENT Right 2009    [facility]    MASTECTOMY  2007    TUNNELED VENOUS PORT PLACEMENT      UPPER GASTROINTESTINAL ENDOSCOPY N/A 1/30/2020    EGD BIOPSY performed by Art Rashid MD at [address] [place] [address]  1/30/2020    EGD CONTROL HEMORRHAGE performed by Art Rashid MD at [facility]       Medications:      sodium chloride  250 mL Intravenous Once    metFORMIN  1,000 mg Oral BID WC    linagliptin  5 mg Oral Daily    alteplase  2 mg Intracatheter Once    oyster shell calcium/vitamin D  2 tablet Oral Daily    pantoprazole  40 mg Oral BID AC    [Held by provider] morphine  15 mg Oral 2 times per day    insulin lispro  0-18 Units Subcutaneous Q4H    midodrine  2.5 mg Oral TID WC    [Held by provider] aspirin  81 mg Oral Daily    levothyroxine  100 mcg Oral Daily    venlafaxine  150 mg Oral Daily    insulin glargine  10 Units Subcutaneous Nightly    docusate sodium  100 mg Oral BID    senna  1 tablet Oral BID    ferrous sulfate  325 mg Oral BID     sodium chloride flush  10 mL Intravenous 2 times per day    [Held by provider] enoxaparin  40 mg Subcutaneous Daily       Social History:     Social History     Socioeconomic History    Marital status:      Spouse name: Not on file    Number of children: Not on file    Years of education: Not on file    Highest education level: Not on file   Occupational History    Not on file   Social Needs    Financial resource strain: Not on file    Food insecurity:     Worry: Not on file     Inability: Not on file    Transportation needs:     Medical: Not on file     Non-medical: Not on file   Tobacco Use    Smoking status: Never Smoker    Smokeless tobacco: Never Used   Substance and Sexual Activity    Alcohol use: Not on file     Comment: social    Drug use: Never    Sexual activity: Not on file   Lifestyle    Physical activity:     Days per week: Not on file     Minutes per session: Not on file    Stress: Not on file   Relationships    Social connections:     Talks on phone: Not on file     Gets together: Not on file     Attends Hinduism service: Not on file     Active member of club or organization: Not on file     Attends meetings of clubs or organizations: Not on file     Relationship status: Not on file    Intimate partner violence:     Fear of current or ex partner: Not on file Emotionally abused: Not on file     Physically abused: Not on file     Forced sexual activity: Not on file   Other Topics Concern    Not on file   Social History Narrative    Not on file       Family History:     Family History   Problem Relation Age of Onset    Hypertension Mother     Hypertension Father         Allergies:   Patient has no known allergies. Review of Systems:     Review of Systems   Constitutional: Negative for chills and diaphoresis. HENT: Negative for congestion and facial swelling. Eyes: Negative for discharge. Respiratory: Negative for apnea and chest tightness. Cardiovascular: Positive for leg swelling. Negative for chest pain. Gastrointestinal: Negative for abdominal distention and abdominal pain. Endocrine: Negative for cold intolerance. Genitourinary: Negative for difficulty urinating. Musculoskeletal: Positive for arthralgias. Negative for neck stiffness. Skin: Negative for color change. Neurological: Negative for dizziness and headaches. Psychiatric/Behavioral: Negative for agitation and confusion. Physical Examination :     Patient Vitals for the past 8 hrs:   BP Temp Temp src Pulse Resp SpO2   02/04/20 1200 (!) 124/55 97.3 °F (36.3 °C) Oral 101 14 96 %       Physical Exam  Constitutional:       General: She is not in acute distress. Appearance: Normal appearance. She is normal weight. She is not ill-appearing. HENT:      Head: Normocephalic and atraumatic. Nose: Nose normal. No congestion or rhinorrhea. Mouth/Throat:      Pharynx: No oropharyngeal exudate. Eyes:      Pupils: Pupils are equal, round, and reactive to light. Neck:      Musculoskeletal: Normal range of motion. No neck rigidity or muscular tenderness. Cardiovascular:      Rate and Rhythm: Normal rate and regular rhythm. Heart sounds: Normal heart sounds. No murmur. Pulmonary:      Effort: Pulmonary effort is normal. No respiratory distress.       Breath sounds: actually reflects the content of the visit, the document can still have some errors including those of syntax and sound a like substitutions which may escape proof reading. It such instances, actual meaningcan be extrapolated by contextual diversion. Bernardo Vazquez  Office: [phone]  Perfect serve / office [phone]      I have discussed the care of the patient, including pertinent history and exam findings,  with the student- I have seen and examined the patient and the key elements of all parts of the encounter have been performed by me. I agree with the assessment, plan and orders as documented by the student.     Kathleen Interiano, Infectious Diseases

## 2020-02-04 NOTE — PROGRESS NOTES
therapy. Patient was then given Arimidex for many years likely 5 years. Patient has not seen a oncologist for quite a few years now. Patient has been having back pain for a while likely many months. Patient started having bowel pain with a lot of nausea and vomiting around Reyna time which persisted and got worse eventually patient presented to the ER at [facility] CT scan done per report shows possible duodenitis and concern regarding bowel perforation. Subsequently patient was transferred to [place].  Repeat CT does not show any concerning findings in the duodenum but does show blastic lesions involving bone especially T10. Patient denies any lower extremity weakness. Denies any saddle anesthesia. Denies any loss of control of bowel or bladder. Additional work-up also shows anemia and iron deficiency. Patient has never had a EGD or colonoscopy done. Past Medical History: Breast cancer    Past Surgical History: Left mastectomy    Medications:    Prior to Admission medications    Medication Sig Start Date End Date Taking?  Authorizing Provider   venlafaxine (EFFEXOR XR) 150 MG extended release capsule Take 150 mg by mouth daily   Yes Historical Provider, MD   metFORMIN (GLUCOPHAGE) 1000 MG tablet Take 1,000 mg by mouth 2 times daily (with meals)   Yes Historical Provider, MD   levothyroxine (SYNTHROID) 100 MCG tablet Take 100 mcg by mouth Daily   Yes Historical Provider, MD   glimepiride (AMARYL) 4 MG tablet Take 4 mg by mouth 2 times daily   Yes Historical Provider, MD   lisinopril (PRINIVIL;ZESTRIL) 2.5 MG tablet Take 2.5 mg by mouth daily   Yes Historical Provider, MD   carvedilol (COREG) 6.25 MG tablet Take 6.25 mg by mouth 2 times daily (with meals)   Yes Historical Provider, MD   SITagliptin (JANUVIA) 100 MG tablet Take 100 mg by mouth daily   Yes Historical Provider, MD   aspirin 81 MG tablet Take 81 mg by mouth daily   Yes Historical Provider, MD     Current Facility-Administered Medications   Medication Dose Route Frequency Provider Last Rate Last Dose    cefepime (MAXIPIME) 1 g IVPB minibag  1 g Intravenous Q12H Micheal P Blood, DO   Stopped at 02/04/20 0620    vancomycin (VANCOCIN) intermittent dosing (placeholder)   Other RX Placeholder Micheal P Blood, DO        vancomycin (VANCOCIN) 1250 mg in dextrose 5 % 250 mL IVPB  1,250 mg Intravenous Q24H Micheal P Blood, DO   Stopped at 02/03/20 2030    0.9 % sodium chloride bolus  250 mL Intravenous Once Micheal P Blood, DO        0.9 % sodium chloride infusion   Intravenous Continuous Micheal P Blood, DO 50 mL/hr at 02/03/20 1408      metFORMIN (GLUCOPHAGE) tablet 1,000 mg  1,000 mg Oral BID WC Brittany Miracle, DO   1,000 mg at 02/04/20 0848    linagliptin (TRADJENTA) tablet 5 mg  5 mg Oral Daily Brittany Miracle, DO   5 mg at 02/04/20 0848    fentaNYL (SUBLIMAZE) injection 25 mcg  25 mcg Intravenous Q5 Min PRN Brittany Miracle, DO   25 mcg at 02/02/20 1143    sodium chloride flush 0.9 % injection 10 mL  10 mL Intravenous PRN Micheal P Blood, DO        alteplase (CATHFLO) injection 2 mg  2 mg Intracatheter Once Brittany Miracle, DO        oyster shell calcium/vitamin D 250-125 MG-UNIT per tablet 500 mg  2 tablet Oral Daily Brittany Miracle, DO   500 mg at 02/04/20 0848    pantoprazole (PROTONIX) tablet 40 mg  40 mg Oral BID AC Brittany Miracle, DO   40 mg at 02/04/20 0703    [Held by provider] morphine (MS CONTIN) extended release tablet 15 mg  15 mg Oral 2 times per day Brittany Miracle, DO   15 mg at 02/02/20 2132    insulin lispro (HUMALOG) injection vial 0-18 Units  0-18 Units Subcutaneous Q4H Brittany Miracle, DO   3 Units at 02/04/20 0858    sodium chloride flush 0.9 % injection 10 mL  10 mL Intravenous PRN Brittany Miracle, DO        acetaminophen (TYLENOL) tablet 650 mg  650 mg Oral Q4H PRN Brittany Miracle, DO        midodrine (PROAMATINE) tablet 2.5 mg  2.5 mg Oral TID  Brittany Rausch DO dextrose 5 % solution  100 mL/hr Intravenous PRN Leebrandon Mortimer, DO        oxyCODONE (ROXICODONE) immediate release tablet 5 mg  5 mg Oral Q4H PRN Brannonta Mortimer, DO   5 mg at 20 1842    Or    oxyCODONE (ROXICODONE) immediate release tablet 10 mg  10 mg Oral Q4H PRN Leeta Mortimer, DO   10 mg at 20 0244       Allergies:  Patient has no known allergies. Social History:   reports that she has never smoked. She has never used smokeless tobacco. She reports that she does not use drugs. Denies smoking. Denies taking alcohol. Family History: Hypertension and diabetes    REVIEW OF SYSTEMS:      Constitutional: No fever or chills. No night sweats, no weight loss   Eyes: No eye discharge, double vision, or eye pain   HEENT: negative for sore mouth, sore throat, hoarseness and voice change   Respiratory: negative for cough , sputum, dyspnea, wheezing, hemoptysis, chest pain   Cardiovascular: negative for chest pain, dyspnea, palpitations, orthopnea, PND   Gastrointestinal: negative for nausea, vomiting, diarrhea, constipation, abdominal pain, Dysphagia, hematemesis and hematochezia   Genitourinary: negative for frequency, dysuria, nocturia, urinary incontinence, and hematuria   Integument: negative for rash, skin lesions, bruises. Hematologic/Lymphatic: negative for easy bruising, bleeding, lymphadenopathy, or petechiae   Endocrine: negative for heat or cold intolerance,weight changes, change in bowel habits and hair loss   Musculoskeletal: Positive back pain  Neurological: negative for headaches, dizziness, seizures, weakness, numbness    PHYSICAL EXAM:        BP (!) 124/55   Pulse 101   Temp 97.3 °F (36.3 °C) (Oral)   Resp 14   Ht 5' 2\" (1.575 m)   Wt 188 lb 0.8 oz (85.3 kg)   SpO2 96%   Breastfeeding No   BMI 34.40 kg/m²    Temp (24hrs), Av.5 °F (36.9 °C), Min:97.3 °F (36.3 °C), Max:98.9 °F (37.2 °C)      General appearance -slightly  lethargic but able to answer questions.   Eyes - in length. Kidneys demonstrate normal cortical echogenicity. Probable vascular calcifications are noted bilaterally. Mild left hydronephrosis. . Bladder: Bladder is decompressed with a Matt catheter. Mild left hydronephrosis     Ct Abdomen Pelvis W Iv Contrast Additional Contrast? Oral    Result Date: 1/26/2020  EXAMINATION: CT OF THE ABDOMEN AND PELVIS WITH CONTRAST 1/26/2020 5:25 pm TECHNIQUE: CT of the abdomen and pelvis was performed with the administration of intravenous contrast. Multiplanar reformatted images are provided for review. Dose modulation, iterative reconstruction, and/or weight based adjustment of the mA/kV was utilized to reduce the radiation dose to as low as reasonably achievable. COMPARISON: January 25, 2020 CT abdomen and pelvis HISTORY: ORDERING SYSTEM PROVIDED HISTORY: possible duodenitis versus contained bowel perforation seen on CT chest at [address] [place] [address] on 1/25 TECHNOLOGIST PROVIDED HISTORY: possible duodenitis versus contained bowel perforation seen on CT chest at [address] [place] [address] on 1/25 Reason for Exam: possible duodenitis versus contained bowel perforation seen on CT chest at [address] [place] [address] on 1/25 Acuity: Acute Type of Exam: Subsequent/Follow-up FINDINGS: Lower Chest: Cardiomegaly. Calcific coronary artery disease. Pacer wire right heart. Organs: Fat containing umbilical hernia. The liver, spleen, pancreas, and adrenals appear normal.  Distended gallbladder. No radiodense gallstones. Kidneys appear normal. Matt catheter decompresses the bladder. GI/Bowel: The stomach,small bowel, and colon appear normal. Increased stool throughout the colon. Oral contrast is noted in the stomach, small bowel and colon. The appendix is not identified. Pelvis: Uterus normal. Peritoneum/Retroperitoneum: The abdominal aorta and iliac arteries are normal in caliber. There is no pathologic adenopathy.  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 Right total hip arthroplasty. Distended gallbladder. No radiodense gallstones noted but ultrasound is recommended. Extensive metastatic disease is a risk for fracture at T10. Us Gallbladder Ruq    Result Date: 1/27/2020  EXAMINATION: RIGHT UPPER QUADRANT ULTRASOUND 1/27/2020 10:23 am COMPARISON: CT abdomen and pelvis January 26, 2020 HISTORY: ORDERING SYSTEM PROVIDED HISTORY: Rule out liver cirrhosis, concern for aclaclulus cholecystitis TECHNOLOGIST PROVIDED HISTORY: Rule out liver cirrhosis, concern for aclaclulus cholecystitis FINDINGS: Heterogeneous increased liver echotexture. No focal liver lesion. No right-sided hydronephrosis. Nonvisualization of the pancreas. No gallbladder wall thickening. No gallstones. No sonographic Nunez sign. Common bile duct is within normal limits measuring 5 mm in diameter. Minimal amount of free fluid within the right upper quadrant. Minimal amount of free fluid within the upper abdomen. Heterogeneous increased liver echogenicity could be on the basis of hepatocellular disease or hepatic steatosis. Xr Chest Portable    Result Date: 1/26/2020  EXAMINATION: ONE XRAY VIEW OF THE CHEST 1/26/2020 2:53 pm COMPARISON: None. HISTORY: ORDERING SYSTEM PROVIDED HISTORY: PICC line placement TECHNOLOGIST PROVIDED HISTORY: PICC line placement FINDINGS: The lungs are without consolidation effusion. There is no pneumothorax. The heart is prominent. The upper abdomen is unremarkable. The extrathoracic soft tissues are unremarkable. There is a right subclavian port with the tip in the mid SVC. There is a right-sided PICC line with the tip in the distal mid aspect of the SVC. Mild cardiomegaly without acute pulmonary process. Right-sided PICC line with the tip in the distal mid aspect of the SVC.          IMAGING DATA:          IMPRESSION:     Primary Problem  Metastatic breast cancer [facility])    Active Hospital Problems    Diagnosis Date Noted    Encounter for palliative care [Z51.5]     Acute duodenal ulcer with gastric outlet obstruction [K26.3] 01/30/2020    Ulcer of esophagus without bleeding [K22.10] 01/30/2020    Bone lesion [M89.9]     Iron deficiency [E61.1]     Diabetes mellitus type 2 in obese ([facility] [E11.69, E66.9] 01/29/2020    Essential hypertension [I10] 01/29/2020    Acquired hypothyroidism [E03.9] 01/29/2020    Recurrent major depressive disorder, in remission ([facility] [F33.40] 01/29/2020    Metastatic breast cancer ([facility] Liv Gallegos 01/29/2020    History of breast cancer [Z85.3] 01/27/2020    Anemia [D64.9] 01/26/2020    Liver lesion [K76.9] 01/26/2020     History of breast cancer likely hormone receptor positive. Status post left mastectomy and chemotherapy and aromatase inhibitor therapy. Iron deficiency  Hypoproliferative anemia  Bone lesions 2/2 metastatic breast adenocarcinoma. Biopsy results showing adenocarcinoma  Back pain second to bone lesions. RECOMMENDATIONS:  Reviewed results of lab work-up and other relevant clinical data. Status post prophylactic rodding of the left femur  Plan for radiation as an outpatient to be followed by systemic therapy. Discussed the MRI, she is lethargic, we will try again tomorrow. MRI brain done and shared above. Discussed with family  Metastatic breast cancer to bone  Confused, cause?? Prognosis is poor. On letrozole. Reno Corado MD  PGY-3, Internal Medicine  [facility]      Attending Physician Statement  The patient was seen and examined during rounds, I have discussed the care of [address] [place] [address], including pertinent history and exam findings with the resident. I have reviewed the key elements of all parts of the encounter with the resident. I agree with the assessment, and status of the problem list as documented. Additional assessment/ plan  Family discussion today  Pt is doing better   Family had many questions.  About prognosis, management plans

## 2020-02-04 NOTE — PROGRESS NOTES
[facility]    Progress Note    2/4/2020    11:47 AM    Name:   Macarena Collins  MRN:     [id number]     Acct:      [de-identified]   Room:   [address] [place] [address]  IP Day:  9  Admit Date:  1/26/2020  1:38 PM    PCP:   Korina Lopez MD  Code Status:  Full Code    Subjective:     C/C: anemia  Interval History Status: not changed. Patient seen and examined  Patient feels weak  Patient denies fever chest pain   I am seeing the patient for extensive metastatic lesion    Brief History:     ICU course, per documentation:     \"69 y. o. female with history of diabetes, hypothyroidism, CAD status post CABG in 2008, breast cancer on the left side status post chemotherapy and mastectomy in 2007 that presented to outside hospital [facility] on 1/23/2020 for complaints of generalized weakness and fatigue and flulike illness for about 1 month.  She was admitted there for anemia and hyperglycemia and hyponatremia however developed hypotension and had CT imaging which showed concern for duodenitis versus contained duodenal perforation.  Concern for intra-abdominal infection there and antibiotics were narrowed down to Zosyn, initially was on vancomycin as well.      CT chest showed extensive osteoblastic lesions and numerous soft tissue nodules in the greater omentum and mesentery suspicious for carcinomatosis. Gato Blase showed mild/moderate left side hydroureteronephrosis without any obstructing stone or mass     Ct abdomen-   Distended gallbladder.  No radiodense gallstones noted but ultrasound is   recommended.       Extensive metastatic disease is a risk for fracture at T10.      Had egd 1/30:    Significant inflammation and ulceration of the distal third of the esophagus with white thick discharge suspicious for Candida (path negative for candida)  Although malignancy is less likely but could not be ruled out gentle biopsies were taken           Deep 1/2 cm ulcer in the CALCIUM 7.8* 8.5*     Recent Labs     02/03/20  0042 02/03/20  0456 02/03/20  0856 02/03/20  1114 02/03/20  1528 02/03/20  1929   POCGLU 162* 139* 186* 202* 152* 196*     ABG:No results found for: POCPH, PHART, PH, POCPCO2, OPV1GGJ, PCO2, POCPO2, PO2ART, PO2, POCHCO3, UTA2TBR, HCO3, NBEA, PBEA, BEART, BE, THGBART, THB, QQF2TIP, XAWK2HMU, P9ABHVVO, O2SAT, FIO2  Lab Results   Component Value Date/Time    SPECIAL R HAND 3MLS 02/03/2020 02:50 PM     Lab Results   Component Value Date/Time    CULTURE NO GROWTH 19 HOURS 02/03/2020 02:50 PM       Radiology:  Xr Hip Left (2-3 Views)    Result Date: 1/28/2020  There is no acute fracture of the left hip There is no acute fracture in the pelvis. Detail of the sacrum is markedly limited. There is subtle double density along the margin of the superior pubic ramus on the left without definitive fracture line at patient's pain persists, consider CT exam No acute osseous abnormality of the right femur or the left femur     Xr Femur Left (min 2 Views)    Result Date: 2/1/2020  Satisfactory postoperative appearance following ORIF of the left femur. No evident complication. Xr Femur Left (min 2 Views)    Result Date: 1/28/2020  There is no acute fracture of the left hip There is no acute fracture in the pelvis. Detail of the sacrum is markedly limited. There is subtle double density along the margin of the superior pubic ramus on the left without definitive fracture line at patient's pain persists, consider CT exam No acute osseous abnormality of the right femur or the left femur     Xr Femur Right (min 2 Views)    Result Date: 1/28/2020  There is no acute fracture of the left hip There is no acute fracture in the pelvis. Detail of the sacrum is markedly limited.   There is subtle double density along the margin of the superior pubic ramus on the left without definitive fracture line at patient's pain persists, consider CT exam No acute osseous abnormality of the right changes. No acute infarct or hemorrhage.        Physical Examination:        Physical exam     Alert  Chest: Clear to auscultation bilateral  Abdomen: Nontender nondistended  CVS: S1-S2  Neurology: Moves extremity    Assessment:        Hospital Problems           Last Modified POA    * (Principal) Metastatic breast cancer ([address] 2/2/2020 Yes    Overview Signed 2/2/2020  1:13 PM by Quintin Boeck Blood, DO     To bones, calvarium, dura,          Anemia 1/30/2020 Yes    Liver lesion 1/26/2020 Yes    History of breast cancer 1/27/2020 Yes    Diabetes mellitus type 2 in obese ([address] (Chronic) 1/29/2020 Yes    Essential hypertension (Chronic) 1/29/2020 Yes    Acquired hypothyroidism (Chronic) 1/29/2020 Yes    Recurrent major depressive disorder, in remission ([address] (Chronic) 1/29/2020 Yes    Bone lesion 1/30/2020 Yes    Iron deficiency 1/30/2020 Yes    Acute duodenal ulcer with gastric outlet obstruction 2/3/2020 Yes    Ulcer of esophagus without bleeding 2/3/2020 Yes    Encounter for palliative care 2/3/2020 Yes          Plan:          Principal Problem:  Probable bone metastatic cancer probably from the breast pathology oncology following  Active Problems:    Anemia    Liver lesion hematology following      History of breast cancer status post chemotherapy and mastectomy      Diabetes mellitus type 2 in obese (HCC) uncontrolled insulin sliding scale      Essential hypertension continue home medication      Acquired hypothyroidism levothyroxine      Recurrent major depressive disorder, in remission ([address] continue home medication    Acute blood loss anemia secondary to acute duodenal ulcer with gastric outlet obstruction status post endoscopy follow-up on biopsy results status post blood transfusion      Ulcer of esophagus without bleeding no evidence of fungal infection      Encounter for palliative care palliative care  Resolved Problems:    Septic shock developed during hospitalization ([address] status post IV antibiotics

## 2020-02-04 NOTE — PROGRESS NOTES
Physical Therapy  Facility/Department: [address] BURN UNIT  Daily Treatment Note  NAME: Santiago Collins  : 1950  MRN: [id number]    Date of Service: 2020    Discharge Recommendations:  Patient would benefit from continued therapy after discharge      Assessment   Body structures, Functions, Activity limitations: Decreased functional mobility ; Decreased safe awareness;Decreased balance;Decreased endurance;Decreased ROM; Decreased strength; Increased pain;Decreased cognition;Decreased posture;Decreased sensation  Assessment: Pt required modAx2 for bed mobility and STS transfers. VC's given for hand placement, HOB elevated and use of bed rails required to complete bed mobility and transfers. Pt able to sit ~20min EOB modA-CGA. Pt would be unsafe to perform functional mobility without skilled assistance. Recommending continued skilled physical therapy to address functional mobility deficits and return pt to prior level of independence. Prognosis: Fair  Decision Making: Medium Complexity  PT Education: Goals;PT Role;Plan of Care  REQUIRES PT FOLLOW UP: Yes  Activity Tolerance  Activity Tolerance: Patient limited by endurance; Patient limited by cognitive status; Patient limited by pain     Patient Diagnosis(es): The encounter diagnosis was Septic shock ([facility] [address] has a past medical history of Breast cancer ([facility] [address] CAD (coronary atherosclerotic disease), Diabetes mellitus type 2 in [facility]), Essential hypertension, and Hypothyroidism (acquired). has a past surgical history that includes Coronary artery bypass graft (); Mastectomy (); Upper gastrointestinal endoscopy (N/A, 2020); Upper gastrointestinal endoscopy (2020); joint replacement (Right, ); Tunneled venous port placement; Femur Surgery (2020); and Femur fracture surgery (Left, 2020).     Restrictions  Restrictions/Precautions  Restrictions/Precautions: Weight Bearing, Up as Tolerated  Required Braces or Orthoses?: No  Lower Plan  Times per week: 5-6x/week  Current Treatment Recommendations: Strengthening, Endurance Training, Transfer Training, Patient/Caregiver Education & Training, ROM, Equipment Evaluation, Education, & procurement, Balance Training, Gait Training, Home Exercise Program, Functional Mobility Training, Stair training, Safety Education & Training  Safety Devices  Type of devices: Gait belt, Left in bed, Nurse notified, Bed alarm in place, Patient at risk for falls, Call light within reach  Restraints  Initially in place: No     Therapy Time   Individual Concurrent Group Co-treatment   Time In       0920(co-treat with OT)   Time Out       1010   Minutes       50   Timed Code Treatment Minutes: 25 Minutes     Treatment performed by Student PTA under the supervision of co-signing PTA who agrees with all treatment and documentation.    PAOLA Landers  [place], [place]

## 2020-02-05 LAB
ABSOLUTE EOS #: 0.22 K/UL (ref 0–0.44)
ABSOLUTE IMMATURE GRANULOCYTE: 0.08 K/UL (ref 0–0.3)
ABSOLUTE LYMPH #: 1.96 K/UL (ref 1.1–3.7)
ABSOLUTE MONO #: 0.62 K/UL (ref 0.1–1.2)
ANION GAP SERPL CALCULATED.3IONS-SCNC: 11 MMOL/L (ref 9–17)
BASOPHILS # BLD: 1 % (ref 0–2)
BASOPHILS ABSOLUTE: 0.06 K/UL (ref 0–0.2)
BUN BLDV-MCNC: 14 MG/DL (ref 8–23)
BUN/CREAT BLD: ABNORMAL (ref 9–20)
CALCIUM SERPL-MCNC: 8.1 MG/DL (ref 8.6–10.4)
CHLORIDE BLD-SCNC: 103 MMOL/L (ref 98–107)
CO2: 24 MMOL/L (ref 20–31)
CREAT SERPL-MCNC: 0.43 MG/DL (ref 0.5–0.9)
DIFFERENTIAL TYPE: ABNORMAL
EOSINOPHILS RELATIVE PERCENT: 3 % (ref 1–4)
GFR AFRICAN AMERICAN: >60 ML/MIN
GFR NON-AFRICAN AMERICAN: >60 ML/MIN
GFR SERPL CREATININE-BSD FRML MDRD: ABNORMAL ML/MIN/{1.73_M2}
GFR SERPL CREATININE-BSD FRML MDRD: ABNORMAL ML/MIN/{1.73_M2}
GLUCOSE BLD-MCNC: 128 MG/DL (ref 65–105)
GLUCOSE BLD-MCNC: 145 MG/DL (ref 65–105)
GLUCOSE BLD-MCNC: 167 MG/DL (ref 65–105)
GLUCOSE BLD-MCNC: 84 MG/DL (ref 65–105)
GLUCOSE BLD-MCNC: 85 MG/DL (ref 65–105)
GLUCOSE BLD-MCNC: 89 MG/DL (ref 70–99)
GLUCOSE BLD-MCNC: 90 MG/DL (ref 65–105)
HCT VFR BLD CALC: 28.1 % (ref 36.3–47.1)
HCT VFR BLD CALC: 28.6 % (ref 36.3–47.1)
HCT VFR BLD CALC: 28.8 % (ref 36.3–47.1)
HEMOGLOBIN: 8.4 G/DL (ref 11.9–15.1)
HEMOGLOBIN: 8.6 G/DL (ref 11.9–15.1)
HEMOGLOBIN: 8.6 G/DL (ref 11.9–15.1)
IMMATURE GRANULOCYTES: 1 %
LYMPHOCYTES # BLD: 25 % (ref 24–43)
MCH RBC QN AUTO: 28 PG (ref 25.2–33.5)
MCHC RBC AUTO-ENTMCNC: 29.2 G/DL (ref 28.4–34.8)
MCV RBC AUTO: 96 FL (ref 82.6–102.9)
MONOCYTES # BLD: 8 % (ref 3–12)
NRBC AUTOMATED: 0 PER 100 WBC
PDW BLD-RTO: 15.5 % (ref 11.8–14.4)
PLATELET # BLD: ABNORMAL K/UL (ref 138–453)
PLATELET ESTIMATE: ABNORMAL
PLATELET, FLUORESCENCE: 193 K/UL (ref 138–453)
PLATELET, IMMATURE FRACTION: 3.8 % (ref 1.1–10.3)
PMV BLD AUTO: ABNORMAL FL (ref 8.1–13.5)
POTASSIUM SERPL-SCNC: 4.1 MMOL/L (ref 3.7–5.3)
RBC # BLD: 3 M/UL (ref 3.95–5.11)
RBC # BLD: ABNORMAL 10*6/UL
SEG NEUTROPHILS: 62 % (ref 36–65)
SEGMENTED NEUTROPHILS ABSOLUTE COUNT: 4.83 K/UL (ref 1.5–8.1)
SODIUM BLD-SCNC: 138 MMOL/L (ref 135–144)
WBC # BLD: 7.8 K/UL (ref 3.5–11.3)
WBC # BLD: ABNORMAL 10*3/UL

## 2020-02-05 PROCEDURE — 6370000000 HC RX 637 (ALT 250 FOR IP): Performed by: ORTHOPAEDIC SURGERY

## 2020-02-05 PROCEDURE — 6370000000 HC RX 637 (ALT 250 FOR IP): Performed by: INTERNAL MEDICINE

## 2020-02-05 PROCEDURE — 1200000000 HC SEMI PRIVATE

## 2020-02-05 PROCEDURE — 99232 SBSQ HOSP IP/OBS MODERATE 35: CPT | Performed by: INTERNAL MEDICINE

## 2020-02-05 PROCEDURE — 85018 HEMOGLOBIN: CPT

## 2020-02-05 PROCEDURE — 36415 COLL VENOUS BLD VENIPUNCTURE: CPT

## 2020-02-05 PROCEDURE — 85055 RETICULATED PLATELET ASSAY: CPT

## 2020-02-05 PROCEDURE — 80048 BASIC METABOLIC PNL TOTAL CA: CPT

## 2020-02-05 PROCEDURE — 97110 THERAPEUTIC EXERCISES: CPT

## 2020-02-05 PROCEDURE — 82947 ASSAY GLUCOSE BLOOD QUANT: CPT

## 2020-02-05 PROCEDURE — 2580000003 HC RX 258: Performed by: ORTHOPAEDIC SURGERY

## 2020-02-05 PROCEDURE — 2580000003 HC RX 258: Performed by: INTERNAL MEDICINE

## 2020-02-05 PROCEDURE — 85014 HEMATOCRIT: CPT

## 2020-02-05 PROCEDURE — 85025 COMPLETE CBC W/AUTO DIFF WBC: CPT

## 2020-02-05 RX ORDER — LACTULOSE 10 G/15ML
20 SOLUTION ORAL 3 TIMES DAILY
Status: DISCONTINUED | OUTPATIENT
Start: 2020-02-05 | End: 2020-02-16

## 2020-02-05 RX ADMIN — SENNOSIDES 8.6 MG: 8.6 TABLET, FILM COATED ORAL at 09:28

## 2020-02-05 RX ADMIN — DOCUSATE SODIUM 100 MG: 100 CAPSULE, LIQUID FILLED ORAL at 09:29

## 2020-02-05 RX ADMIN — PANTOPRAZOLE SODIUM 40 MG: 40 TABLET, DELAYED RELEASE ORAL at 09:28

## 2020-02-05 RX ADMIN — METFORMIN HYDROCHLORIDE 1000 MG: 500 TABLET ORAL at 16:53

## 2020-02-05 RX ADMIN — PANTOPRAZOLE SODIUM 40 MG: 40 TABLET, DELAYED RELEASE ORAL at 16:54

## 2020-02-05 RX ADMIN — INSULIN LISPRO 3 UNITS: 100 INJECTION, SOLUTION INTRAVENOUS; SUBCUTANEOUS at 14:00

## 2020-02-05 RX ADMIN — DOCUSATE SODIUM 100 MG: 100 CAPSULE, LIQUID FILLED ORAL at 20:23

## 2020-02-05 RX ADMIN — LACTULOSE 20 G: 10 SOLUTION ORAL at 20:24

## 2020-02-05 RX ADMIN — SENNOSIDES 8.6 MG: 8.6 TABLET, FILM COATED ORAL at 20:46

## 2020-02-05 RX ADMIN — OXYCODONE HYDROCHLORIDE 10 MG: 5 TABLET ORAL at 22:56

## 2020-02-05 RX ADMIN — LINAGLIPTIN 5 MG: 5 TABLET, FILM COATED ORAL at 09:29

## 2020-02-05 RX ADMIN — METFORMIN HYDROCHLORIDE 1000 MG: 500 TABLET ORAL at 09:28

## 2020-02-05 RX ADMIN — INSULIN LISPRO 3 UNITS: 100 INJECTION, SOLUTION INTRAVENOUS; SUBCUTANEOUS at 17:29

## 2020-02-05 RX ADMIN — OXYCODONE HYDROCHLORIDE 10 MG: 5 TABLET ORAL at 09:27

## 2020-02-05 RX ADMIN — CALCIUM CARBONATE-CHOLECALCIFEROL TAB 250 MG-125 UNIT 500 MG: 250-125 TAB at 09:28

## 2020-02-05 RX ADMIN — SODIUM CHLORIDE, PRESERVATIVE FREE 10 ML: 5 INJECTION INTRAVENOUS at 20:23

## 2020-02-05 RX ADMIN — OXYCODONE HYDROCHLORIDE 10 MG: 5 TABLET ORAL at 14:31

## 2020-02-05 RX ADMIN — SODIUM CHLORIDE: 9 INJECTION, SOLUTION INTRAVENOUS at 14:04

## 2020-02-05 RX ADMIN — SODIUM CHLORIDE, PRESERVATIVE FREE 10 ML: 5 INJECTION INTRAVENOUS at 09:29

## 2020-02-05 RX ADMIN — MIDODRINE HYDROCHLORIDE 2.5 MG: 2.5 TABLET ORAL at 16:54

## 2020-02-05 RX ADMIN — FERROUS SULFATE TAB EC 325 MG (65 MG FE EQUIVALENT) 325 MG: 325 (65 FE) TABLET DELAYED RESPONSE at 09:29

## 2020-02-05 RX ADMIN — LEVOTHYROXINE SODIUM 100 MCG: 100 TABLET ORAL at 09:29

## 2020-02-05 RX ADMIN — VENLAFAXINE HYDROCHLORIDE 150 MG: 150 CAPSULE, EXTENDED RELEASE ORAL at 09:28

## 2020-02-05 RX ADMIN — FERROUS SULFATE TAB EC 325 MG (65 MG FE EQUIVALENT) 325 MG: 325 (65 FE) TABLET DELAYED RESPONSE at 16:53

## 2020-02-05 ASSESSMENT — ENCOUNTER SYMPTOMS
APNEA: 0
ABDOMINAL DISTENTION: 0
CONSTIPATION: 0
COLOR CHANGE: 0
EYE DISCHARGE: 0
PHOTOPHOBIA: 1
ABDOMINAL PAIN: 0
CHEST TIGHTNESS: 0
SINUS PAIN: 0

## 2020-02-05 ASSESSMENT — PAIN SCALES - GENERAL
PAINLEVEL_OUTOF10: 8
PAINLEVEL_OUTOF10: 4
PAINLEVEL_OUTOF10: 7
PAINLEVEL_OUTOF10: 7
PAINLEVEL_OUTOF10: 4

## 2020-02-05 ASSESSMENT — PAIN DESCRIPTION - PAIN TYPE: TYPE: ACUTE PAIN;CHRONIC PAIN;SURGICAL PAIN

## 2020-02-05 ASSESSMENT — PAIN DESCRIPTION - PROGRESSION: CLINICAL_PROGRESSION: NOT CHANGED

## 2020-02-05 ASSESSMENT — PAIN DESCRIPTION - LOCATION
LOCATION: BACK;HIP
LOCATION: HIP

## 2020-02-05 ASSESSMENT — PAIN DESCRIPTION - ORIENTATION
ORIENTATION: LEFT
ORIENTATION: LEFT

## 2020-02-05 ASSESSMENT — PAIN DESCRIPTION - FREQUENCY
FREQUENCY: CONTINUOUS
FREQUENCY: CONTINUOUS

## 2020-02-05 ASSESSMENT — PAIN DESCRIPTION - DESCRIPTORS
DESCRIPTORS: ACHING
DESCRIPTORS: ACHING;DULL

## 2020-02-05 ASSESSMENT — PAIN DESCRIPTION - ONSET: ONSET: ON-GOING

## 2020-02-05 NOTE — PROGRESS NOTES
[facility]  Occupational Therapy Not Seen Note    DATE: 2020  Name: Ady Collins  : 1950  MRN: [id number]    Patient not available for Occupational Therapy due to:    [] Testing:    [] Hemodialysis    [] Blood Transfusion in Progress    [x]Refusal by Patient: reports just finishing with PT and her hip is bothering her. Requests OT checks back tomorrow     [] Surgery/Procedure:    [] Strict Bedrest    [] Sedation    [] Spine Precautions     [] Pt being transferred to palliative care at this time. Spoke with pt/family and OT services to be defered. [] Pt independent with functional mobility and functional tasks.  Pt with no OT acute care needs at this time, will defer OT eval.    [] Other    Next Scheduled Treatment: Ck       Amanda Snow, OTR/L

## 2020-02-05 NOTE — PROGRESS NOTES
duodenal bulb just beyond the pyloric orifice, with edema causing some gastric outlet narrowing, was covered with large clotted blood iWatch most of the clotted blood, but there is adherent clot to the crater base which I could not clear I could not see any vessel protruding for which we went ahead and injected epinephrine around it 6 cc of the 1-10,000 epi, at this point there is no fresh blood to indicate any more bleeding which indicate that the bleeding has subsided     Retained food in the stomach related to the gastric outlet narrowing    Had iliac biopsy which was positive for metastatic breast ca    On 2/1 had   Procedure(s):  1. Long TFN of the left femur  2. Femoral neck biopsy  For impending femoral neck fracture due to mets        Medications:      Allergies:  No Known Allergies    Current Meds:   Scheduled Meds:    sodium chloride  250 mL Intravenous Once    metFORMIN  1,000 mg Oral BID WC    linagliptin  5 mg Oral Daily    alteplase  2 mg Intracatheter Once    oyster shell calcium/vitamin D  2 tablet Oral Daily    pantoprazole  40 mg Oral BID AC    [Held by provider] morphine  15 mg Oral 2 times per day    insulin lispro  0-18 Units Subcutaneous Q4H    midodrine  2.5 mg Oral TID WC    [Held by provider] aspirin  81 mg Oral Daily    levothyroxine  100 mcg Oral Daily    venlafaxine  150 mg Oral Daily    insulin glargine  10 Units Subcutaneous Nightly    docusate sodium  100 mg Oral BID    senna  1 tablet Oral BID    ferrous sulfate  325 mg Oral BID WC    sodium chloride flush  10 mL Intravenous 2 times per day    [Held by provider] enoxaparin  40 mg Subcutaneous Daily     Continuous Infusions:    sodium chloride 50 mL/hr at 02/05/20 1404    dextrose       PRN Meds: fentaNYL, sodium chloride flush, sodium chloride flush, acetaminophen, sodium chloride flush, magnesium hydroxide, ondansetron, potassium chloride, bisacodyl, acetaminophen, glucose, dextrose, glucagon (rDNA), dextrose, oxyCODONE **OR** oxyCODONE    Data:     Past Medical History:   has a past medical history of Breast cancer ([address] CAD (coronary atherosclerotic disease), Diabetes mellitus type 2 in obese [facility]), Essential hypertension, and Hypothyroidism (acquired). Social History:   reports that she has never smoked. She has never used smokeless tobacco. She reports that she does not use drugs. Family History:   Family History   Problem Relation Age of Onset    Hypertension Mother     Hypertension Father        Vitals:  BP (!) 136/53   Pulse 102   Temp 97 °F (36.1 °C) (Oral)   Resp 15   Ht 5' 2\" (1.575 m)   Wt 188 lb 0.8 oz (85.3 kg)   SpO2 93%   Breastfeeding No   BMI 34.40 kg/m²   Temp (24hrs), Av.4 °F (36.3 °C), Min:97 °F (36.1 °C), Max:97.9 °F (36.6 °C)    Recent Labs     20  0009 20  0433 20  0727 20  1136   POCGLU 84 85 128* 167*       I/O (24Hr): Intake/Output Summary (Last 24 hours) at 2020 1453  Last data filed at 2020 1400  Gross per 24 hour   Intake 851 ml   Output 1350 ml   Net -499 ml       Labs:  Hematology:  Recent Labs     20  0349  20  0420 20  1646 20  0010 20  0433   WBC 11.3  --  10.3  --   --  7.8   RBC 2.25*  --  2.96*  --   --  3.00*   HGB 6.3*   < > 8.3* 8.7* 8.6* 8.4*   HCT 21.2*   < > 25.9* 28.0* 28.1* 28.8*   MCV 94.2  --  87.5  --   --  96.0   MCH 28.0  --  28.0  --   --  28.0   MCHC 29.7  --  32.0  --   --  29.2   RDW 16.8*  --  15.2*  --   --  15.5*     --  See Reflexed IPF Result  --   --  See Reflexed IPF Result   MPV 9.8  --  NOT REPORTED  --   --  NOT REPORTED    < > = values in this interval not displayed.      Chemistry:  Recent Labs     20  0349 20  0420 20  0433    137 138   K 4.3 4.0 4.1    100 103   CO2 25 28 24   GLUCOSE 151* 200* 89   BUN 25* 21 14   CREATININE 0.84 0.50 0.43*   ANIONGAP 9 9 11   LABGLOM >60 >60 >60   GFRAA >60 >60 >60   CALCIUM 7.8* 8.5* 8.1* Recent Labs     02/04/20  1633 02/04/20 2026 02/05/20  0009 02/05/20  0433 02/05/20  0727 02/05/20  1136   POCGLU 140* 105 84 85 128* 167*     ABG:No results found for: POCPH, PHART, PH, POCPCO2, XYN6FJS, PCO2, POCPO2, PO2ART, PO2, POCHCO3, OXN7BEC, HCO3, NBEA, PBEA, BEART, BE, THGBART, THB, OIS3XWZ, PJKO0KZT, Y1FGPHJZ, O2SAT, FIO2  Lab Results   Component Value Date/Time    SPECIAL R HAND 3MLS 02/03/2020 02:50 PM     Lab Results   Component Value Date/Time    CULTURE NO GROWTH 2 DAYS 02/03/2020 02:50 PM       Radiology:  Sakina Enrique Hip Left (2-3 Views)    Result Date: 1/28/2020  There is no acute fracture of the left hip There is no acute fracture in the pelvis. Detail of the sacrum is markedly limited. There is subtle double density along the margin of the superior pubic ramus on the left without definitive fracture line at patient's pain persists, consider CT exam No acute osseous abnormality of the right femur or the left femur     Xr Femur Left (min 2 Views)    Result Date: 2/1/2020  Satisfactory postoperative appearance following ORIF of the left femur. No evident complication. Xr Femur Left (min 2 Views)    Result Date: 1/28/2020  There is no acute fracture of the left hip There is no acute fracture in the pelvis. Detail of the sacrum is markedly limited. There is subtle double density along the margin of the superior pubic ramus on the left without definitive fracture line at patient's pain persists, consider CT exam No acute osseous abnormality of the right femur or the left femur     Xr Femur Right (min 2 Views)    Result Date: 1/28/2020  There is no acute fracture of the left hip There is no acute fracture in the pelvis. Detail of the sacrum is markedly limited.   There is subtle double density along the margin of the superior pubic ramus on the left without definitive fracture line at patient's pain persists, consider CT exam No acute osseous abnormality of the right femur or the left femur     Mri Thoracic Spine W Wo Contrast    Result Date: 1/29/2020  Osseous metastatic disease with diffuse marrow infiltration of T10. Mild extraosseous extension of tumor suspected along the left neural foramina a T10. Us Gallbladder Ruq    Result Date: 1/27/2020  Minimal amount of free fluid within the upper abdomen. Heterogeneous increased liver echogenicity could be on the basis of hepatocellular disease or hepatic steatosis. Xr Pelvis (min 3 Views)    Result Date: 1/28/2020  There is no acute fracture of the left hip There is no acute fracture in the pelvis. Detail of the sacrum is markedly limited. There is subtle double density along the margin of the superior pubic ramus on the left without definitive fracture line at patient's pain persists, consider CT exam No acute osseous abnormality of the right femur or the left femur     Ct Femur Left Wo Contrast    Result Date: 1/31/2020  Multiple osseous metastatic lesions in the left ischium, likely anterior left acetabular wall, and probably in the intertrochanteric and lesser trochanteric portions of the left femur. These could be further characterized with bone scan or MRI with contrast.     Ct Biopsy Superficial Bone Percutaneous    Addendum Date: 1/28/2020    ADDENDUM: Addendum is for billing purposes only. Automated dose modulation and/ or weight based adjustment of the mA/kv was utilized to reduce the radiation dose to as low as reasonably achievable. Result Date: 1/28/2020  Technically successful CT-guided targeted right iliac bone core biopsy. Mri Brain W Wo Contrast    Result Date: 2/1/2020  1. Multiple calvarial metastases. 2. Abnormal dural thickening enhancement most evident overlying the left parietal and occipital lobes concerning for metastatic involvement given adjacent calvarial lesions. Meningitis is in the differential diagnosis but is thought unlikely. 3. Mild chronic white matter microvascular ischemic changes.   No acute infarct or hemorrhage. Physical Examination:        Physical exam     Alert  Chest: Clear to auscultation bilateral  Abdomen: Nontender nondistended  CVS: S1-S2  Neurology: Moves extremity    Assessment:        Hospital Problems           Last Modified POA    * (Principal) Metastatic breast cancer ([address] 2/2/2020 Yes    Overview Signed 2/2/2020  1:13 PM by Cammy Brown Blood, DO     To bones, calvarium, dura,          Anemia 1/30/2020 Yes    Liver lesion 1/26/2020 Yes    History of breast cancer 1/27/2020 Yes    Diabetes mellitus type 2 in obese ([address] (Chronic) 1/29/2020 Yes    Essential hypertension (Chronic) 1/29/2020 Yes    Acquired hypothyroidism (Chronic) 1/29/2020 Yes    Recurrent major depressive disorder, in remission ([address] (Chronic) 1/29/2020 Yes    Bone lesion 1/30/2020 Yes    Iron deficiency 1/30/2020 Yes    Acute duodenal ulcer with gastric outlet obstruction 2/3/2020 Yes    Ulcer of esophagus without bleeding 2/3/2020 Yes    Encounter for palliative care 2/3/2020 Yes          Plan:          Principal Problem:  Probable bone metastatic cancer probably from the breast pathology oncology following  From hematology oncology note bone lesions 2/2 metastatic breast adenocarcinoma. Biopsy results showing adenocarcinoma  Probable chemotherapy as outpatient  Back pain second to bone lesions.       Active Problems:    Anemia    Liver lesion hematology following      History of breast cancer status post chemotherapy and mastectomy      Diabetes mellitus type 2 in obese (HCC) uncontrolled insulin sliding scale      Essential hypertension continue home medication      Acquired hypothyroidism levothyroxine      Recurrent major depressive disorder, in remission ([address] continue home medication    Acute blood loss anemia secondary to acute duodenal ulcer with gastric outlet obstruction status post endoscopy follow-up on biopsy results status post blood transfusion      Ulcer of esophagus without bleeding no evidence of fungal

## 2020-02-05 NOTE — ADT AUTH CERT
Resolved Problems:     * No resolved hospital problems. *       1. Plan for T10 biopsy per Heme Onc   2. Will plan for colonoscopy after biopsy, can be done also as outpatient   3. Iron deficiency anemia   4. Remains on Zosyn per pirmary   5. Venlafaxine started yesterday   6. Hypothyroidism per primary on synthroid 100 mcg   7. Proamitine 2.5 TID for hypotension.  Per primary   8. Sinus tachycardia per primary   9. Plan EGD colonoscopy on Thursday. Clear liquid diet tomorrow NPO after midnight on Thursday 1/30/20, [place] for tomorrow       LOC:Acute Adult-Anemia/Bleeding by Maria Rodriguez RN         Review Status Review Entered   In Primary 2/5/2020 07:13       Criteria Review   REVIEW SUMMARY     Patient: Puma Sloan  Review Number: [id number]  Review Status: In Primary     Condition Specific: Yes        OUTCOMES  Outcome Type: Primary           REVIEW DETAILS     Product: [employer] Adult  Subset: Anemia/Bleeding      (Symptom or finding within 24h)         (Excludes PO medications unless noted)          [X] Select Day, One:              [X] Episode Day 2, One:                  [X] ACUTE, One:                      [X] Partial responder, not clinically stable for discharge and requires continued stay, >= One:                          [X] Anemia unresolved, >= One:                              [X] Hct < 25%(0.25) or Hb < 8.3 g/dL(83 g/L) and blood product transfusion     Version: [employer]® 2019.1  [employer]®  © 2019 [employer] and/or one of its [place]. All Rights Reserved. CPT only © 2018 American Medical Association. All Rights Reserved. Additional Notes   Day 2  1/27      Tx Pt had no acute events overnight. The pt is tolerating clear liquid diet. Pt reports some left lower quad pain and well as back pain. Pt denies bleeding per rectum, no dark or bloody stools. No nausea or vomiting.  Hgb 7.4 this am.        VITAL SIGNS:   1/27/20 0500 (!) 120/52 99 °F (37.2 °C) Oral 99 22 96 %   01/27/20 0400 108/66 - - 102 22 95 %   01/27/20 0300 (!) 120/49 - - 101 22 96 %   01/27/20 0200 (!) 121/49 - - 99 (!) 36 95 %   01/27/20 0100 (!) 135/41 - - 96 26 94 %   01/27/20 0000 (!) 129/51 98.2 °F (36.8 °C) Oral 97 (!) 34 95 %    on room air. Temp max 99f. Labs wbc 11.9   Hgb 7.4. Hct 22.2    Plt 204. Glucose 232. Na 134. Co2 19   Calcium 7.6. Us Renal Complete       Result Date: 1/26/2020   EXAMINATION: RETROPERITONEAL ULTRASOUND OF THE KIDNEYS AND URINARY BLADDER 1/26/2020 COMPARISON: None HISTORY: ORDERING SYSTEM PROVIDED HISTORY: left sided hydroureteronephrosis, eval kidneys TECHNOLOGIST PROVIDED HISTORY: left sided hydroureteronephrosis, eval kidneys FINDINGS: Kidneys: The right kidney measures 12.5 cm in length and the left kidney measures 12 cm in length. Kidneys demonstrate normal cortical echogenicity.  Probable vascular calcifications are noted bilaterally.  Mild left hydronephrosis. . Bladder: Bladder is decompressed with a Matt catheter.        Mild left hydronephrosis        Ct Abdomen Pelvis W Iv Contrast Additional Contrast? Oral       Result Date: 1/26/2020   EXAMINATION: CT OF THE ABDOMEN AND PELVIS WITH CONTRAST 1/26/2020 5:25 pm TECHNIQUE: CT of the abdomen and pelvis was performed with the administration of intravenous contrast. Multiplanar reformatted images are provided for review. Dose modulation, iterative reconstruction, and/or weight based adjustment of the mA/kV was utilized to reduce the radiation dose to as low as reasonably achievable.  COMPARISON: January 25, 2020 CT abdomen and pelvis HISTORY: ORDERING SYSTEM PROVIDED HISTORY: possible duodenitis versus contained bowel perforation seen on CT chest at [address] [place] [address] on 1/25 TECHNOLOGIST PROVIDED HISTORY: possible duodenitis versus contained bowel perforation seen on CT chest at [address] [place] [address] on 1/25 Reason for Exam: possible duodenitis versus contained bowel perforation seen on CT chest Result Date: 1/26/2020   EXAMINATION: ONE XRAY VIEW OF THE CHEST 1/26/2020 2:53 pm COMPARISON: None. HISTORY: ORDERING SYSTEM PROVIDED HISTORY: PICC line placement TECHNOLOGIST PROVIDED HISTORY: PICC line placement FINDINGS: The lungs are without consolidation effusion.  There is no pneumothorax.  The heart is prominent.  The upper abdomen is unremarkable.  The extrathoracic soft tissues are unremarkable. Rozanna Sluder is a right subclavian port with the tip in the mid SVC.  There is a right-sided PICC line with the tip in the distal mid aspect of the SVC.        Mild cardiomegaly without acute pulmonary process. Right-sided PICC line with the tip in the distal mid aspect of the SVC.          Meds    Asa 81 mg po daily    Colace 100 mg bid    Ferrous sulfate po 325 mg po 2x daily    Launtus 10 units    Lovenox 40 mg sc daily nightly    Humalog per sliding scale q 6 hours used x 2 on 1/26 and x 4 on 1/27    Synthroid po 100 mcg given daily    Proamatine 2.5 mg bid started today   Zosyn iv 4.5. g iv q 5 hours. Iv at 125 ml per hour. Roxicodone ir 5 mg q 4 hours prn po used x 1 on 1/16 and 1/27     10 mg po prn used x 2 on 1/27     Iv levophed off          Orders strict I and o   Tele et cont pulse ox. Scd/epc cuffs on bilaterally    Pain assessment    Ambulate as tolerated   Hgb q 8 hours to q 12 hours. Daily wgts   Monitor for bleeding. Per int med notes    ASSESSMENT:       Patient Active Problem List     Diagnosis Date Noted   · Septic shock ([address] 01/26/2020   · Anemia 01/26/2020   · Liver lesion 01/26/2020     TRANSFER OUT OF ICU:                 ? No     Plan:   · Follow-up GI consult for possible endoscopy to rule out GI source of anemia   · Follow blood cultures.  Monitor WBC daily.  Continue IV Zosyn.  Will discontinue if no convincing evidence of septic process. · Monitor hemoglobin daily.  Currently stable. · Will start on midodrine 2.5 mg 3 times daily for low blood pressure.    ·

## 2020-02-05 NOTE — PROGRESS NOTES
Physical Therapy  Facility/Department: [address] BURN UNIT  Daily Treatment Note  NAME: Leanna Collins  : 1950  MRN: [id number]    Date of Service: 2020    Discharge Recommendations:  Patient would benefit from continued therapy after discharge      Assessment   Body structures, Functions, Activity limitations: Decreased functional mobility ; Decreased safe awareness;Decreased balance;Decreased endurance;Decreased ROM; Decreased strength; Increased pain;Decreased cognition;Decreased posture;Decreased sensation  Assessment: Pt required Linda for bed mobility, verbal/tactile cues for hand placement and sequencing. HOB elevated and use of bed rails required to complete bed mobility. Pt able to sit ~15min EOB CGA. Pt able to perform STS x3 with RW and Linda, totalling 4min of standing time. Recommend continued skilled physical therapy to address functional mobility deficits and to return pt to prior level of independence. Prognosis: Fair  Decision Making: Medium Complexity  PT Education: Goals;PT Role;Plan of Care  REQUIRES PT FOLLOW UP: Yes  Activity Tolerance  Activity Tolerance: Patient limited by endurance; Patient limited by cognitive status; Patient limited by pain     Patient Diagnosis(es): The encounter diagnosis was Septic shock ([facility] has a past medical history of Breast cancer ([facility] CAD (coronary atherosclerotic disease), Diabetes mellitus type 2 in [facility]), Essential hypertension, and Hypothyroidism (acquired). has a past surgical history that includes Coronary artery bypass graft (); Mastectomy (); Upper gastrointestinal endoscopy (N/A, 2020); Upper gastrointestinal endoscopy (2020); joint replacement (Right, ); Tunneled venous port placement; Femur Surgery (2020); and Femur fracture surgery (Left, 2020).     Restrictions  Restrictions/Precautions  Restrictions/Precautions: Weight Bearing, Up as Tolerated  Required Braces or Orthoses?: No  Lower Extremity Weight Hip ABD/ADD, Hip IR/ER, Quad Sets, SAQ, SLR. Reps: [id number]  (L LE SLR as tolerated d/t pain)     Goals  Short term goals  Time Frame for Short term goals: 15  Short term goal 1: Pt to perform bed mobility modA   Short term goal 2: Pt to demonstrate functional transfers mod A WBAT on LLE  Short term goal 3: Demonstrate dynamic sitting balance of fair + to decrease fall risk  Short term goal 4: Tolerate 30 minutes of therapy to demo increased endurance  Patient Goals   Patient goals : Did not state    Plan    Plan  Times per week: 5-6x/week  Current Treatment Recommendations: Strengthening, Endurance Training, Transfer Training, Patient/Caregiver Education & Training, ROM, Equipment Evaluation, Education, & procurement, Balance Training, Gait Training, Home Exercise Program, Functional Mobility Training, Stair training, Safety Education & Training  Safety Devices  Type of devices: Gait belt, Left in bed, Nurse notified, Patient at risk for falls, Call light within reach(RN present following therapy.)  Restraints  Initially in place: No     Therapy Time   Individual Concurrent Group Co-treatment   Time In 1329         Time Out 1400         Minutes 31            Variance: 29    Treatment performed by Student PTA under the supervision of co-signing PTA who agrees with all treatment and documentation.    PAOLA Landers  [place], [place]

## 2020-02-05 NOTE — PROGRESS NOTES
13.8 oz (83.4 kg)  · % Weight Change:  Variable wts since admission noted. · Ideal Body Wt: 110 lb (49.9 kg), % Ideal Body 166%  · BMI Classification: BMI 30.0 - 34.9 Obese Class I    Nutrition Interventions:   Continue current diet, Continue current ONS  Continued Inpatient Monitoring, Education Not Indicated    Nutrition Evaluation:   · Evaluation: Progressing toward goals   · Goals: Oral intakes to meet at least 75% of estimated nutrition needs.     · Monitoring: Meal Intake, Supplement Intake, Diet Tolerance, Skin Integrity, I&O, Mental Status/Confusion, Weight, Pertinent Labs, Monitor Hemodynamic Status, Monitor Bowel Function    Electronically signed by Trav Varela RD, MIKEY on 2/5/20 at 1:38 PM    Contact Number: [phone]

## 2020-02-05 NOTE — PROGRESS NOTES
seen a oncologist for quite a few years now. Patient has been having back pain for a while likely many months. Patient started having bowel pain with a lot of nausea and vomiting around Reyna time which persisted and got worse eventually patient presented to the ER at [facility] CT scan done per report shows possible duodenitis and concern regarding bowel perforation. Subsequently patient was transferred to [facility].  Repeat CT does not show any concerning findings in the duodenum but does show blastic lesions involving bone especially T10. Patient denies any lower extremity weakness. Denies any saddle anesthesia. Denies any loss of control of bowel or bladder. Additional work-up also shows anemia and iron deficiency. Patient has never had a EGD or colonoscopy done. Past Medical History: Breast cancer    Past Surgical History: Left mastectomy    Medications:    Prior to Admission medications    Medication Sig Start Date End Date Taking?  Authorizing Provider   venlafaxine (EFFEXOR XR) 150 MG extended release capsule Take 150 mg by mouth daily   Yes Historical Provider, MD   metFORMIN (GLUCOPHAGE) 1000 MG tablet Take 1,000 mg by mouth 2 times daily (with meals)   Yes Historical Provider, MD   levothyroxine (SYNTHROID) 100 MCG tablet Take 100 mcg by mouth Daily   Yes Historical Provider, MD   glimepiride (AMARYL) 4 MG tablet Take 4 mg by mouth 2 times daily   Yes Historical Provider, MD   lisinopril (PRINIVIL;ZESTRIL) 2.5 MG tablet Take 2.5 mg by mouth daily   Yes Historical Provider, MD   carvedilol (COREG) 6.25 MG tablet Take 6.25 mg by mouth 2 times daily (with meals)   Yes Historical Provider, MD   SITagliptin (JANUVIA) 100 MG tablet Take 100 mg by mouth daily   Yes Historical Provider, MD   aspirin 81 MG tablet Take 81 mg by mouth daily   Yes Historical Provider, MD     Current Facility-Administered Medications   Medication Dose Route Frequency Provider Last Rate Last Dose    0.9 % sodium chloride bolus  250 mL Intravenous Once Micheal P Blood, DO        0.9 % sodium chloride infusion   Intravenous Continuous Micheal P Blood, DO 50 mL/hr at 02/04/20 1740      metFORMIN (GLUCOPHAGE) tablet 1,000 mg  1,000 mg Oral BID  Jillian Reus, DO   1,000 mg at 02/05/20 0328    linagliptin (TRADJENTA) tablet 5 mg  5 mg Oral Daily Sadorus Reus, DO   5 mg at 02/05/20 9310    fentaNYL (SUBLIMAZE) injection 25 mcg  25 mcg Intravenous Q5 Min PRN Jillian Reus, DO   25 mcg at 02/02/20 1143    sodium chloride flush 0.9 % injection 10 mL  10 mL Intravenous PRN Micheal P Blood, DO        alteplase (CATHFLO) injection 2 mg  2 mg Intracatheter Once Sadorus Reus, DO        oyster shell calcium/vitamin D 250-125 MG-UNIT per tablet 500 mg  2 tablet Oral Daily Sadorus Reus, DO   500 mg at 02/05/20 1670    pantoprazole (PROTONIX) tablet 40 mg  40 mg Oral BID  Sadorus Reus, DO   40 mg at 02/05/20 5979    [Held by provider] morphine (MS CONTIN) extended release tablet 15 mg  15 mg Oral 2 times per day Jillian Reus, DO   15 mg at 02/02/20 2132    insulin lispro (HUMALOG) injection vial 0-18 Units  0-18 Units Subcutaneous Q4H Sadorus Reus, DO   3 Units at 02/04/20 1654    sodium chloride flush 0.9 % injection 10 mL  10 mL Intravenous PRN Sadorus Reus, DO        acetaminophen (TYLENOL) tablet 650 mg  650 mg Oral Q4H PRN Sadorus Reus, DO   650 mg at 02/04/20 1656    midodrine (PROAMATINE) tablet 2.5 mg  2.5 mg Oral TID  Sadorus Reus, DO   2.5 mg at 02/04/20 1656    [Held by provider] aspirin EC tablet 81 mg  81 mg Oral Daily Jillian Reus, DO   81 mg at 01/27/20 1346    levothyroxine (SYNTHROID) tablet 100 mcg  100 mcg Oral Daily Jillian Reus, DO   100 mcg at 02/05/20 2148    venlafaxine (EFFEXOR XR) extended release capsule 150 mg  150 mg Oral Daily Jillian Davis DO   150 mg at 02/05/20 7560    insulin glargine (LANTUS) injection vial 10 Units  10 has never used smokeless tobacco. She reports that she does not use drugs. Denies smoking. Denies taking alcohol. Family History: Hypertension and diabetes    REVIEW OF SYSTEMS:      Constitutional: No fever or chills. No night sweats, no weight loss   Eyes: No eye discharge, double vision, or eye pain   HEENT: negative for sore mouth, sore throat, hoarseness and voice change   Respiratory: negative for cough , sputum, dyspnea, wheezing, hemoptysis, chest pain   Cardiovascular: negative for chest pain, dyspnea, palpitations, orthopnea, PND   Gastrointestinal: negative for nausea, vomiting, diarrhea, constipation, abdominal pain, Dysphagia, hematemesis and hematochezia   Genitourinary: negative for frequency, dysuria, nocturia, urinary incontinence, and hematuria   Integument: negative for rash, skin lesions, bruises. Hematologic/Lymphatic: negative for easy bruising, bleeding, lymphadenopathy, or petechiae   Endocrine: negative for heat or cold intolerance,weight changes, change in bowel habits and hair loss   Musculoskeletal: Positive back pain  Neurological: negative for headaches, dizziness, seizures, weakness, numbness    PHYSICAL EXAM:        BP (!) 136/53   Pulse 102   Temp 97 °F (36.1 °C) (Oral)   Resp 15   Ht 5' 2\" (1.575 m)   Wt 188 lb 0.8 oz (85.3 kg)   SpO2 93%   Breastfeeding No   BMI 34.40 kg/m²    Temp (24hrs), Av.4 °F (36.3 °C), Min:97 °F (36.1 °C), Max:97.9 °F (36.6 °C)      General appearance -slightly  lethargic but able to answer questions.   Eyes - pupils equal and reactive, extraocular eye movements intact   Ears - bilateral TM's and external ear canals normal   Mouth - mucous membranes moist, pharynx normal without lesions   Neck - supple, no significant adenopathy   Lymphatics - no palpable lymphadenopathy, no hepatosplenomegaly   Chest - clear to auscultation, no wheezes, rales or rhonchi, symmetric air entry   Heart - normal rate, regular rhythm, normal S1, S2, no the abdomen and pelvis was performed with the administration of intravenous contrast. Multiplanar reformatted images are provided for review. Dose modulation, iterative reconstruction, and/or weight based adjustment of the mA/kV was utilized to reduce the radiation dose to as low as reasonably achievable. COMPARISON: January 25, 2020 CT abdomen and pelvis HISTORY: ORDERING SYSTEM PROVIDED HISTORY: possible duodenitis versus contained bowel perforation seen on CT chest at [address] [place] [address] on 1/25 TECHNOLOGIST PROVIDED HISTORY: possible duodenitis versus contained bowel perforation seen on CT chest at [address] [place] [address] on 1/25 Reason for Exam: possible duodenitis versus contained bowel perforation seen on CT chest at [address] [place] [address] on 1/25 Acuity: Acute Type of Exam: Subsequent/Follow-up FINDINGS: Lower Chest: Cardiomegaly. Calcific coronary artery disease. Pacer wire right heart. Organs: Fat containing umbilical hernia. The liver, spleen, pancreas, and adrenals appear normal.  Distended gallbladder. No radiodense gallstones. Kidneys appear normal. Matt catheter decompresses the bladder. GI/Bowel: The stomach,small bowel, and colon appear normal. Increased stool throughout the colon. Oral contrast is noted in the stomach, small bowel and colon. The appendix is not identified. Pelvis: Uterus normal. Peritoneum/Retroperitoneum: The abdominal aorta and iliac arteries are normal in caliber. There is no pathologic adenopathy. 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 Right total hip arthroplasty. Distended gallbladder. No radiodense gallstones noted but ultrasound is recommended. Extensive metastatic disease is a risk for fracture at T10.      Us Gallbladder Ruq    Result Date: 1/27/2020  EXAMINATION: RIGHT UPPER QUADRANT ULTRASOUND 1/27/2020 10:23 am COMPARISON: CT abdomen and pelvis January 26, 2020 HISTORY: ORDERING SYSTEM PROVIDED HISTORY: Rule out

## 2020-02-05 NOTE — PROGRESS NOTES
[facility] -   Infectious diseases evaluation  admission date 1/26/2020    reason for consultation:   antibiotics management    Impression :   Current:  · Bone mets left pelvis / iliac - biopsy w ORIF done 2/1/20  · abd adeno carcinomatosis  · Duodenitis w ulcer  · EGD duodenal ulcer w clot  w gastric outlet narrowing  · EGD distal white esophagitis - candida neg on path  · Past breast CA post chemo and mastectomy   · DM  · Resolved septic shock  Discussion / summary of stay / plan of care   · No longer requires antibiotic coverage  Recommendations      · Watch off antibiotics   · Palliative care on board  · Okay for discharge  · Discussed with nurse and patient    Infection Control Recommendations   · Eleva Precautions      Antimicrobial Stewardship Recommendations   · Stop antibiotics     Coordination ofOutpatient Care:   · Estimated Length of IV antimicrobials: None  · Patient will need Midline / picc Catheter Insertion: No  · Patient will need SNF:   · Patient will need outpatient wound care: No    History of Present Illness:   Initial history:  Lisandro Lucia is a 77y.o.-year-old female with pmh of DM, hypothyroidism, CAD s/p CABG in 2008, breast cancer on left side s/p chemotherapy and mastectomy in 2007 who was admitted with abd pain, onset of vomiting for months. Found in septic shock that has resolved after 4 days of zosyn and on a week of vanco. Patient is no longer requiring antibiotic therapy. Pressors are all stopped - no fever or leukocytosis and no pos cx on record - CXR neg and CT AP showed possible perit carcinomatosis and Bone mets left pelvis / iliac and T10- -   biopsy w ORIF done 2/1/20 w nail in the femur to protect the bone. OR biopsy showed metastatic adenocarcinoma  CT in the other hospital suggested a possible duodenitis   Upon transfer to [facility], EGD 1/30/20 showed ulcer with a clot and with gastric outlet narrowing.   It also showed a white esophagitis yet no Candida on pathology. ID is consulted for antibiotic management, she remains on vancomycin. On exam she has no abdominal pain and abdomen is benign. Otherwise her surgical wound on the left hip looks very dry and clean. No signs of active infection      Interval changes  2/5/2020   He is alert appropriate she has no fever chills, her wounds are clean, abdomen is soft nontender,  Eating little. Labs reviewed within range, to be discharged today  Has been tolerating being off antibiotics. No new positive cultures    Summary of relevant labs:  Labs:   WBC: 10.3 - 8.5    Micro:  Blood cx 2/3   no growth 23 hours. Imaging:    MRI Brain 2/1/20  1. Multiple calvarial metastases. 2. Abnormal dural thickening enhancement most evident overlying the left   parietal and occipital lobes concerning for metastatic involvement given   adjacent calvarial lesions.  Meningitis is in the differential diagnosis but   is thought unlikely. 3. Mild chronic white matter microvascular ischemic changes.  No acute   infarct or hemorrhage. I have personally reviewed the past medical history, past surgical history, medications, social history, and family history, and I haveupdated the database accordingly.   Past Medical History:     Past Medical History:   Diagnosis Date    Breast cancer ([facility] 2007    CAD (coronary atherosclerotic disease)     Diabetes mellitus type 2 in obese ([facility]     Essential hypertension     Hypothyroidism (acquired)        Past Surgical  History:     Past Surgical History:   Procedure Laterality Date    CORONARY ARTERY BYPASS GRAFT  2007    FEMUR FRACTURE SURGERY Left 2/1/2020    [facility]  FEMUR FRACTURE IM NAILING AND LEFT FEMUR BIOPSY (SYNTHES, , C-ARM) performed by Honorio Grande MD at [address] [place] [address] Rd  02/01/2020    nailing    JOINT REPLACEMENT Right 2009    MINDY    MASTECTOMY  2007    TUNNELED VENOUS PORT PLACEMENT      UPPER GASTROINTESTINAL congestion or rhinorrhea. Mouth/Throat:      Pharynx: No oropharyngeal exudate or posterior oropharyngeal erythema. Eyes:      Conjunctiva/sclera: Conjunctivae normal.      Pupils: Pupils are equal, round, and reactive to light. Neck:      Musculoskeletal: Normal range of motion. No neck rigidity or muscular tenderness. Cardiovascular:      Rate and Rhythm: Normal rate and regular rhythm. Heart sounds: Normal heart sounds. No murmur. No friction rub. Pulmonary:      Effort: Pulmonary effort is normal. No respiratory distress. Breath sounds: No wheezing. Comments: Left mastectomy  Abdominal:      General: Abdomen is flat. Palpations: Abdomen is soft. There is no mass. Tenderness: There is no abdominal tenderness. There is no guarding or rebound. Genitourinary:     Comments: Urine clear  Musculoskeletal:         General: Swelling present. Right lower leg: Edema present. Left lower leg: Edema present. Skin:     General: Skin is warm and dry. Coloration: Skin is not jaundiced or pale. Findings: No erythema. Comments: Surgical incision of left hip C/D/I    Neurological:      General: No focal deficit present. Mental Status: She is alert and oriented to person, place, and time. Psychiatric:         Mood and Affect: Mood normal.         Thought Content: Thought content normal.           Medical Decision Making:   I have independently reviewed/ordered the following labs:    CBC with Differential:   Recent Labs     02/04/20  0420  02/05/20  0010 02/05/20  0433   WBC 10.3  --   --  7.8   HGB 8.3*   < > 8.6* 8.4*   HCT 25.9*   < > 28.1* 28.8*   PLT See Reflexed IPF Result  --   --  See Reflexed IPF Result   LYMPHOPCT 17*  --   --  25   MONOPCT 10  --   --  8    < > = values in this interval not displayed.      BMP:  Recent Labs     02/04/20  0420 02/05/20  0433    138   K 4.0 4.1    103   CO2 28 24   BUN 21 14   CREATININE 0.50 0.43*

## 2020-02-06 LAB
ABSOLUTE EOS #: 0.17 K/UL (ref 0–0.44)
ABSOLUTE IMMATURE GRANULOCYTE: 0.07 K/UL (ref 0–0.3)
ABSOLUTE LYMPH #: 1.54 K/UL (ref 1.1–3.7)
ABSOLUTE MONO #: 0.63 K/UL (ref 0.1–1.2)
ANION GAP SERPL CALCULATED.3IONS-SCNC: 12 MMOL/L (ref 9–17)
BASOPHILS # BLD: 1 % (ref 0–2)
BASOPHILS ABSOLUTE: 0.05 K/UL (ref 0–0.2)
BUN BLDV-MCNC: 10 MG/DL (ref 8–23)
BUN/CREAT BLD: ABNORMAL (ref 9–20)
CALCIUM SERPL-MCNC: 8.2 MG/DL (ref 8.6–10.4)
CHLORIDE BLD-SCNC: 104 MMOL/L (ref 98–107)
CO2: 26 MMOL/L (ref 20–31)
CREAT SERPL-MCNC: 0.44 MG/DL (ref 0.5–0.9)
DIFFERENTIAL TYPE: ABNORMAL
EOSINOPHILS RELATIVE PERCENT: 3 % (ref 1–4)
GFR AFRICAN AMERICAN: >60 ML/MIN
GFR NON-AFRICAN AMERICAN: >60 ML/MIN
GFR SERPL CREATININE-BSD FRML MDRD: ABNORMAL ML/MIN/{1.73_M2}
GFR SERPL CREATININE-BSD FRML MDRD: ABNORMAL ML/MIN/{1.73_M2}
GLUCOSE BLD-MCNC: 124 MG/DL (ref 70–99)
GLUCOSE BLD-MCNC: 125 MG/DL (ref 65–105)
GLUCOSE BLD-MCNC: 125 MG/DL (ref 65–105)
GLUCOSE BLD-MCNC: 131 MG/DL (ref 65–105)
GLUCOSE BLD-MCNC: 157 MG/DL (ref 65–105)
GLUCOSE BLD-MCNC: 203 MG/DL (ref 65–105)
HCT VFR BLD CALC: 28.5 % (ref 36.3–47.1)
HCT VFR BLD CALC: 30.4 % (ref 36.3–47.1)
HCT VFR BLD CALC: 31.3 % (ref 36.3–47.1)
HEMOGLOBIN: 8.6 G/DL (ref 11.9–15.1)
HEMOGLOBIN: 9.4 G/DL (ref 11.9–15.1)
HEMOGLOBIN: 9.4 G/DL (ref 11.9–15.1)
IMMATURE GRANULOCYTES: 1 %
LYMPHOCYTES # BLD: 24 % (ref 24–43)
MCH RBC QN AUTO: 28.4 PG (ref 25.2–33.5)
MCHC RBC AUTO-ENTMCNC: 30.2 G/DL (ref 28.4–34.8)
MCV RBC AUTO: 94.1 FL (ref 82.6–102.9)
MONOCYTES # BLD: 10 % (ref 3–12)
NRBC AUTOMATED: 0 PER 100 WBC
PDW BLD-RTO: 15.7 % (ref 11.8–14.4)
PLATELET # BLD: 261 K/UL (ref 138–453)
PLATELET ESTIMATE: ABNORMAL
PMV BLD AUTO: 10 FL (ref 8.1–13.5)
POTASSIUM SERPL-SCNC: 3.8 MMOL/L (ref 3.7–5.3)
RBC # BLD: 3.03 M/UL (ref 3.95–5.11)
RBC # BLD: ABNORMAL 10*6/UL
SEG NEUTROPHILS: 61 % (ref 36–65)
SEGMENTED NEUTROPHILS ABSOLUTE COUNT: 3.86 K/UL (ref 1.5–8.1)
SODIUM BLD-SCNC: 142 MMOL/L (ref 135–144)
WBC # BLD: 6.3 K/UL (ref 3.5–11.3)
WBC # BLD: ABNORMAL 10*3/UL

## 2020-02-06 PROCEDURE — 85018 HEMOGLOBIN: CPT

## 2020-02-06 PROCEDURE — 36415 COLL VENOUS BLD VENIPUNCTURE: CPT

## 2020-02-06 PROCEDURE — 6370000000 HC RX 637 (ALT 250 FOR IP): Performed by: INTERNAL MEDICINE

## 2020-02-06 PROCEDURE — 6370000000 HC RX 637 (ALT 250 FOR IP): Performed by: ORTHOPAEDIC SURGERY

## 2020-02-06 PROCEDURE — 1200000000 HC SEMI PRIVATE

## 2020-02-06 PROCEDURE — 99231 SBSQ HOSP IP/OBS SF/LOW 25: CPT | Performed by: INTERNAL MEDICINE

## 2020-02-06 PROCEDURE — 85014 HEMATOCRIT: CPT

## 2020-02-06 PROCEDURE — 2580000003 HC RX 258: Performed by: ORTHOPAEDIC SURGERY

## 2020-02-06 PROCEDURE — 97110 THERAPEUTIC EXERCISES: CPT

## 2020-02-06 PROCEDURE — 85025 COMPLETE CBC W/AUTO DIFF WBC: CPT

## 2020-02-06 PROCEDURE — 6360000002 HC RX W HCPCS: Performed by: INTERNAL MEDICINE

## 2020-02-06 PROCEDURE — 2500000003 HC RX 250 WO HCPCS: Performed by: INTERNAL MEDICINE

## 2020-02-06 PROCEDURE — 97116 GAIT TRAINING THERAPY: CPT

## 2020-02-06 PROCEDURE — 99232 SBSQ HOSP IP/OBS MODERATE 35: CPT | Performed by: INTERNAL MEDICINE

## 2020-02-06 PROCEDURE — 82947 ASSAY GLUCOSE BLOOD QUANT: CPT

## 2020-02-06 PROCEDURE — 80048 BASIC METABOLIC PNL TOTAL CA: CPT

## 2020-02-06 RX ORDER — OXYCODONE HYDROCHLORIDE 5 MG/1
5 TABLET ORAL EVERY 4 HOURS PRN
Qty: 12 TABLET | Refills: 0 | Status: SHIPPED | OUTPATIENT
Start: 2020-02-06 | End: 2020-02-19

## 2020-02-06 RX ORDER — LANOLIN ALCOHOL/MO/W.PET/CERES
325 CREAM (GRAM) TOPICAL 2 TIMES DAILY WITH MEALS
Qty: 90 TABLET | Refills: 0 | Status: SHIPPED | OUTPATIENT
Start: 2020-02-06

## 2020-02-06 RX ORDER — FUROSEMIDE 10 MG/ML
40 INJECTION INTRAMUSCULAR; INTRAVENOUS ONCE
Status: COMPLETED | OUTPATIENT
Start: 2020-02-06 | End: 2020-02-06

## 2020-02-06 RX ORDER — METOPROLOL TARTRATE 50 MG/1
50 TABLET, FILM COATED ORAL 2 TIMES DAILY
Status: DISCONTINUED | OUTPATIENT
Start: 2020-02-06 | End: 2020-02-19 | Stop reason: HOSPADM

## 2020-02-06 RX ORDER — PANTOPRAZOLE SODIUM 40 MG/1
40 TABLET, DELAYED RELEASE ORAL
Qty: 30 TABLET | Refills: 3 | Status: SHIPPED | OUTPATIENT
Start: 2020-02-06

## 2020-02-06 RX ORDER — METOPROLOL TARTRATE 5 MG/5ML
5 INJECTION INTRAVENOUS EVERY 8 HOURS
Status: DISCONTINUED | OUTPATIENT
Start: 2020-02-06 | End: 2020-02-07

## 2020-02-06 RX ORDER — LACTULOSE 10 G/15ML
20 SOLUTION ORAL 3 TIMES DAILY PRN
Qty: 956 ML | Refills: 0 | Status: SHIPPED | OUTPATIENT
Start: 2020-02-06

## 2020-02-06 RX ADMIN — PANTOPRAZOLE SODIUM 40 MG: 40 TABLET, DELAYED RELEASE ORAL at 16:07

## 2020-02-06 RX ADMIN — OXYCODONE HYDROCHLORIDE 10 MG: 5 TABLET ORAL at 21:00

## 2020-02-06 RX ADMIN — METOPROLOL TARTRATE 5 MG: 1 INJECTION, SOLUTION INTRAVENOUS at 17:12

## 2020-02-06 RX ADMIN — MIDODRINE HYDROCHLORIDE 2.5 MG: 2.5 TABLET ORAL at 08:47

## 2020-02-06 RX ADMIN — DOCUSATE SODIUM 100 MG: 100 CAPSULE, LIQUID FILLED ORAL at 20:59

## 2020-02-06 RX ADMIN — OXYCODONE HYDROCHLORIDE 10 MG: 5 TABLET ORAL at 11:37

## 2020-02-06 RX ADMIN — SODIUM CHLORIDE, PRESERVATIVE FREE 10 ML: 5 INJECTION INTRAVENOUS at 21:00

## 2020-02-06 RX ADMIN — FUROSEMIDE 40 MG: 10 INJECTION, SOLUTION INTRAMUSCULAR; INTRAVENOUS at 16:05

## 2020-02-06 RX ADMIN — MIDODRINE HYDROCHLORIDE 2.5 MG: 2.5 TABLET ORAL at 11:38

## 2020-02-06 RX ADMIN — VENLAFAXINE HYDROCHLORIDE 150 MG: 150 CAPSULE, EXTENDED RELEASE ORAL at 08:48

## 2020-02-06 RX ADMIN — DOCUSATE SODIUM 100 MG: 100 CAPSULE, LIQUID FILLED ORAL at 08:46

## 2020-02-06 RX ADMIN — SODIUM CHLORIDE, PRESERVATIVE FREE 10 ML: 5 INJECTION INTRAVENOUS at 08:48

## 2020-02-06 RX ADMIN — PANTOPRAZOLE SODIUM 40 MG: 40 TABLET, DELAYED RELEASE ORAL at 06:40

## 2020-02-06 RX ADMIN — INSULIN GLARGINE 10 UNITS: 100 INJECTION, SOLUTION SUBCUTANEOUS at 21:00

## 2020-02-06 RX ADMIN — OXYCODONE HYDROCHLORIDE 10 MG: 5 TABLET ORAL at 16:07

## 2020-02-06 RX ADMIN — LACTULOSE 20 G: 10 SOLUTION ORAL at 14:44

## 2020-02-06 RX ADMIN — CALCIUM CARBONATE-CHOLECALCIFEROL TAB 250 MG-125 UNIT 500 MG: 250-125 TAB at 08:47

## 2020-02-06 RX ADMIN — FERROUS SULFATE TAB EC 325 MG (65 MG FE EQUIVALENT) 325 MG: 325 (65 FE) TABLET DELAYED RESPONSE at 08:46

## 2020-02-06 RX ADMIN — INSULIN LISPRO 3 UNITS: 100 INJECTION, SOLUTION INTRAVENOUS; SUBCUTANEOUS at 21:00

## 2020-02-06 RX ADMIN — MIDODRINE HYDROCHLORIDE 2.5 MG: 2.5 TABLET ORAL at 16:06

## 2020-02-06 RX ADMIN — METOPROLOL TARTRATE 50 MG: 50 TABLET, FILM COATED ORAL at 21:00

## 2020-02-06 RX ADMIN — LACTULOSE 20 G: 10 SOLUTION ORAL at 08:48

## 2020-02-06 RX ADMIN — LEVOTHYROXINE SODIUM 100 MCG: 100 TABLET ORAL at 06:40

## 2020-02-06 RX ADMIN — METFORMIN HYDROCHLORIDE 1000 MG: 500 TABLET ORAL at 08:46

## 2020-02-06 RX ADMIN — INSULIN LISPRO 6 UNITS: 100 INJECTION, SOLUTION INTRAVENOUS; SUBCUTANEOUS at 11:42

## 2020-02-06 RX ADMIN — LINAGLIPTIN 5 MG: 5 TABLET, FILM COATED ORAL at 08:48

## 2020-02-06 RX ADMIN — SENNOSIDES 8.6 MG: 8.6 TABLET, FILM COATED ORAL at 08:48

## 2020-02-06 RX ADMIN — SENNOSIDES 8.6 MG: 8.6 TABLET, FILM COATED ORAL at 21:00

## 2020-02-06 RX ADMIN — FERROUS SULFATE TAB EC 325 MG (65 MG FE EQUIVALENT) 325 MG: 325 (65 FE) TABLET DELAYED RESPONSE at 16:06

## 2020-02-06 RX ADMIN — METFORMIN HYDROCHLORIDE 1000 MG: 500 TABLET ORAL at 16:06

## 2020-02-06 ASSESSMENT — PAIN DESCRIPTION - PAIN TYPE
TYPE: SURGICAL PAIN
TYPE: ACUTE PAIN;CHRONIC PAIN

## 2020-02-06 ASSESSMENT — PAIN SCALES - GENERAL
PAINLEVEL_OUTOF10: 4
PAINLEVEL_OUTOF10: 3
PAINLEVEL_OUTOF10: 8
PAINLEVEL_OUTOF10: 8
PAINLEVEL_OUTOF10: 0
PAINLEVEL_OUTOF10: 8
PAINLEVEL_OUTOF10: 2

## 2020-02-06 ASSESSMENT — PAIN DESCRIPTION - LOCATION
LOCATION: HIP
LOCATION: HIP

## 2020-02-06 ASSESSMENT — PAIN DESCRIPTION - DESCRIPTORS
DESCRIPTORS: ACHING;CONSTANT
DESCRIPTORS: ACHING;DULL

## 2020-02-06 ASSESSMENT — PAIN DESCRIPTION - ORIENTATION
ORIENTATION: LEFT
ORIENTATION: LEFT

## 2020-02-06 ASSESSMENT — PAIN DESCRIPTION - FREQUENCY: FREQUENCY: INTERMITTENT

## 2020-02-06 ASSESSMENT — PAIN DESCRIPTION - ONSET: ONSET: GRADUAL

## 2020-02-06 NOTE — DISCHARGE INSTR - COC
Continuity of Care Form    Patient Name: Marleni Collins   :  1950  MRN:  [id number]    Admit date:  2020  Discharge date: 20    Code Status Order: DNR-CCA   Advance Directives:   Advance Care Flowsheet Documentation     Date/Time Healthcare Directive Type of Healthcare Directive Copy in [address] Agent's Name Healthcare Agent's Phone Number    20 8930  No, patient does not have an advance directive for healthcare treatment -- -- -- -- --    20 1345  No, patient does not have an advance directive for healthcare treatment -- -- -- -- --          Admitting Physician:  Shahrzad Elizabeth MD  PCP: Ottoniel Song MD    Discharging Nurse: [facility] Unit/Room#: 3027/3027-01  Discharging Unit Phone Number: [phone]    Emergency Contact:   Extended Emergency Contact Information  Primary Emergency Contact: [facility] Phone: [phone]  Mobile Phone: [phone]  Relation: Spouse  Secondary Emergency Contact: Flor Collins Phone: [phone]  Mobile Phone: [phone]  Relation: Child    Past Surgical History:  Past Surgical History:   Procedure Laterality Date    CORONARY ARTERY BYPASS GRAFT  2007    FEMUR FRACTURE SURGERY Left 2020    [facility]  FEMUR FRACTURE IM NAILING AND LEFT FEMUR BIOPSY (SYNTHES, , C-ARM) performed by Isaiah Arboleda MD at [address] [place] [address]  2020    nailing    JOINT REPLACEMENT Right 2009    MINDY    MASTECTOMY  2007    TUNNELED VENOUS PORT PLACEMENT      UPPER GASTROINTESTINAL ENDOSCOPY N/A 2020    EGD BIOPSY performed by Camille Moser MD at [address] [place] [address]  2020    EGD CONTROL HEMORRHAGE performed by Camille Moser MD at [address] [place] [address]  2020    EGD CONTROL HEMORRHAGE performed by Torres Fernando MD at [address] [place] [address]  2020    EGD ESOPHAGOGASTRODUODENOSCOPY SCLEROTHERAPY performed by Neo Sanchez MD at [facility] N/A 2/13/2020    EGD ESOPHAGOGASTRODUODENOSCOPY performed by Neo Sanchez MD at [facility]       Immunization History: There is no immunization history on file for this patient.     Active Problems:  Patient Active Problem List   Diagnosis Code    Anemia D64.9    Liver lesion K76.9    History of breast cancer Z85.3    Diabetes mellitus type 2 in obese (HCC) E11.69, E66.9    Essential hypertension I10    Acquired hypothyroidism E03.9    Recurrent major depressive disorder, in remission ([facility] F33.40    Metastatic breast cancer ([facility] C50.919    Bone lesion M89.9    Iron deficiency E61.1    Acute duodenal ulcer with gastric outlet obstruction K26.3    Ulcer of esophagus without bleeding K22.10    Encounter for palliative care Z51.5    Deep venous thrombosis (HCC) I82.409    Influenza A J10.1    Pneumonia involving right lung J18.9    Metastatic carcinoma (HCC) C79.9       Isolation/Infection:   Isolation          Droplet        Patient Infection Status     Infection Onset Added Last Indicated Last Indicated By Review Planned Expiration Resolved Resolved By    INFLUENZA 02/15/20 02/15/20 02/15/20 Respiratory Virus PCR Panel 02/22/20 03/16/20            Nurse Assessment:  Last Vital Signs: BP (!) 150/84   Pulse 88   Temp 98.2 °F (36.8 °C) (Oral)   Resp 18   Ht 5' 2\" (1.575 m)   Wt 182 lb (82.6 kg)   SpO2 95%   Breastfeeding No   BMI 33.29 kg/m²     Last documented pain score (0-10 scale): Pain Level: 7  Last Weight:   Wt Readings from Last 1 Encounters:   02/19/20 182 lb (82.6 kg)     Mental Status:  oriented, alert and coherent    IV Access:  - None    Nursing Mobility/ADLs:  Walking   Independent  Transfer  Independent  Bathing  Assisted  Dressing  Assisted  Toileting  Assisted  Feeding  Independent  Med Admin  Independent  Med Delivery   whole    Wound Care Documentation and Therapy: Elimination:  Continence:   · Bowel: Yes  · Bladder: Yes  Urinary Catheter: None   Colostomy/Ileostomy/Ileal Conduit: No       Date of Last BM: ***    Intake/Output Summary (Last 24 hours) at 2/19/2020 0956  Last data filed at 2/19/2020 0653  Gross per 24 hour   Intake 876 ml   Output --   Net 876 ml     I/O last 3 completed shifts: In: 5786 [P.O.:1150; I.V.:26]  Out: -     Safety Concerns: At Risk for Falls    Impairments/Disabilities:      Speech, Vision and Hearing    Nutrition Therapy:  Current Nutrition Therapy:   - Oral Diet:  Carb Control 4 carbs/meal (1800kcals/day) and Renal    Routes of Feeding: Oral  Liquids: Thin Liquids  Daily Fluid Restriction: yes - amount 2000ml/24hrs  Last Modified Barium Swallow with Video (Video Swallowing Test): not done    Treatments at the Time of Hospital Discharge:   Respiratory Treatments: AS needed  Oxygen Therapy:  is not on home oxygen therapy.   Ventilator:    - No ventilator support    Rehab Therapies: Physical Therapy and Occupational Therapy  Weight Bearing Status/Restrictions: No weight bearing restirctions  Other Medical Equipment (for information only, NOT a DME order):  bath bench and hospital bed  Other Treatments: ***    Patient's personal belongings (please select all that are sent with patient):  None    RN SIGNATURE:  Electronically signed by Soham Rubi on 2/18/20 at 5:35 PM    CASE MANAGEMENT/SOCIAL WORK SECTION    Inpatient Status Date: 01/26/20    Readmission Risk Assessment Score:  Readmission Risk              Risk of Unplanned Readmission:        32           Discharging to Facility/ Agency   Name:   [facility]           [address] [address]       Phone: [phone]       Fax: [phone]          Dialysis Facility (if applicable)   · Name:  · Address:  · Dialysis Schedule:  · Phone:  · Fax:    / signature: Electronically signed by Trang Milton

## 2020-02-06 NOTE — PROGRESS NOTES
Medical History: Breast cancer    Past Surgical History: Left mastectomy    Medications:    Prior to Admission medications    Medication Sig Start Date End Date Taking? Authorizing Provider   aspirin 81 MG tablet Take 1 tablet by mouth daily 2/13/20  Yes Bhavna Self MD   ferrous sulfate 325 (65 Fe) MG EC tablet Take 1 tablet by mouth 2 times daily (with meals) 2/6/20  Yes Bhavna Self MD   lactulose (CHRONULAC) 10 GM/15ML solution Take 30 mLs by mouth 3 times daily as needed (constipation) 2/6/20  Yes Bhavna Self MD   oxyCODONE (ROXICODONE) 5 MG immediate release tablet Take 1 tablet by mouth every 4 hours as needed for Pain for up to 12 doses.  2/6/20 2/9/20 Yes Bhavna Self MD   pantoprazole (PROTONIX) 40 MG tablet Take 1 tablet by mouth 2 times daily (before meals) 2/6/20  Yes Bhavna Self MD   venlafaxine (EFFEXOR XR) 150 MG extended release capsule Take 150 mg by mouth daily   Yes Historical Provider, MD   metFORMIN (GLUCOPHAGE) 1000 MG tablet Take 1,000 mg by mouth 2 times daily (with meals)   Yes Historical Provider, MD   levothyroxine (SYNTHROID) 100 MCG tablet Take 100 mcg by mouth Daily   Yes Historical Provider, MD   glimepiride (AMARYL) 4 MG tablet Take 4 mg by mouth 2 times daily   Yes Historical Provider, MD   lisinopril (PRINIVIL;ZESTRIL) 2.5 MG tablet Take 2.5 mg by mouth daily   Yes Historical Provider, MD   carvedilol (COREG) 6.25 MG tablet Take 6.25 mg by mouth 2 times daily (with meals)   Yes Historical Provider, MD   SITagliptin (JANUVIA) 100 MG tablet Take 100 mg by mouth daily   Yes Historical Provider, MD     Current Facility-Administered Medications   Medication Dose Route Frequency Provider Last Rate Last Dose    furosemide (LASIX) injection 40 mg  40 mg Intravenous Once Bhavna Self MD        lactulose (CHRONULAC) 10 GM/15ML solution 20 g  20 g Oral TID Bhavna Self MD   20 g at 02/06/20 1444    0.9 % sodium chloride bolus  250 mL Intravenous mg at 02/06/20 0848    ferrous sulfate EC tablet 325 mg  325 mg Oral BID WC Letha Hof, DO   325 mg at 02/06/20 4778    sodium chloride flush 0.9 % injection 10 mL  10 mL Intravenous 2 times per day Letha Hof, DO   10 mL at 02/06/20 0848    sodium chloride flush 0.9 % injection 10 mL  10 mL Intravenous PRN Letha Hof, DO   10 mL at 01/30/20 2042    magnesium hydroxide (MILK OF MAGNESIA) 400 MG/5ML suspension 30 mL  30 mL Oral Daily PRN Letha Hof, DO        ondansetron [facility]) injection 4 mg  4 mg Intravenous Q6H PRN Letha Hof, DO        enoxaparin (LOVENOX) injection 40 mg  40 mg Subcutaneous Daily Letha Hof, DO   40 mg at 01/27/20 1231    potassium chloride 10 mEq/100 mL IVPB (Peripheral Line)  10 mEq Intravenous PRN Letha Hof, DO        bisacodyl (DULCOLAX) suppository 10 mg  10 mg Rectal Daily PRN Letha Hof, DO        acetaminophen (TYLENOL) tablet 650 mg  650 mg Oral Q4H PRN Letha Hof, DO        glucose (GLUTOSE) 40 % oral gel 15 g  15 g Oral PRN Letha Hof, DO        dextrose 50 % IV solution  12.5 g Intravenous PRN Letha Hof, DO        glucagon (rDNA) injection 1 mg  1 mg Intramuscular PRN Letha Hof, DO        dextrose 5 % solution  100 mL/hr Intravenous PRN Letha Hof, DO        oxyCODONE (ROXICODONE) immediate release tablet 5 mg  5 mg Oral Q4H PRN Letha Hof, DO   5 mg at 02/03/20 1842    Or    oxyCODONE (ROXICODONE) immediate release tablet 10 mg  10 mg Oral Q4H PRN Letha Hof, DO   10 mg at 02/06/20 1137       Allergies:  Patient has no known allergies. Social History:   reports that she has never smoked. She has never used smokeless tobacco. She reports that she does not use drugs. Denies smoking. Denies taking alcohol. Family History: Hypertension and diabetes    REVIEW OF SYSTEMS:      Constitutional: No fever or chills.  No night sweats, no weight loss   Eyes: No eye discharge, double vision, or eye pain   HEENT: negative for sore mouth, sore throat, hoarseness and voice change   Respiratory: negative for cough , sputum, dyspnea, wheezing, hemoptysis, chest pain   Cardiovascular: negative for chest pain, dyspnea, palpitations, orthopnea, PND   Gastrointestinal: negative for nausea, vomiting, diarrhea, constipation, abdominal pain, Dysphagia, hematemesis and hematochezia   Genitourinary: negative for frequency, dysuria, nocturia, urinary incontinence, and hematuria   Integument: negative for rash, skin lesions, bruises. Hematologic/Lymphatic: negative for easy bruising, bleeding, lymphadenopathy, or petechiae   Endocrine: negative for heat or cold intolerance,weight changes, change in bowel habits and hair loss   Musculoskeletal: Positive back pain  Neurological: negative for headaches, dizziness, seizures, weakness, numbness    PHYSICAL EXAM:        BP (!) 178/75   Pulse 109   Temp 97.7 °F (36.5 °C) (Oral)   Resp 20   Ht 5' 2\" (1.575 m)   Wt 188 lb 0.8 oz (85.3 kg)   SpO2 93%   Breastfeeding No   BMI 34.40 kg/m²    Temp (24hrs), Av.6 °F (36.4 °C), Min:97 °F (36.1 °C), Max:98 °F (36.7 °C)      General appearance -slightly  lethargic but able to answer questions.   Eyes - pupils equal and reactive, extraocular eye movements intact   Ears - bilateral TM's and external ear canals normal   Mouth - mucous membranes moist, pharynx normal without lesions   Neck - supple, no significant adenopathy   Lymphatics - no palpable lymphadenopathy, no hepatosplenomegaly   Chest - clear to auscultation, no wheezes, rales or rhonchi, symmetric air entry   Heart - normal rate, regular rhythm, normal S1, S2, no murmurs  Abdomen - soft, nontender, nondistended, no masses or organomegaly   Neurological - alert, oriented, normal speech, no focal findings or movement disorder noted   Musculoskeletal - no joint tenderness, deformity or swelling   Extremities - peripheral pulses normal, no pedal edema, no clubbing or cyanosis   Skin - normal coloration and turgor, no rashes, no suspicious skin lesions noted ,  Breast left-sided mastectomy no signs of local recurrence    DATA:      Labs:     CBC:   Recent Labs     02/05/20  0433  02/06/20  0020 02/06/20  0549   WBC 7.8  --   --  6.3   HGB 8.4*   < > 9.4* 8.6*   HCT 28.8*   < > 30.4* 28.5*   PLT See Reflexed IPF Result  --   --  261    < > = values in this interval not displayed. BMP:   Recent Labs     02/05/20  0433 02/06/20  0549    142   K 4.1 3.8   CO2 24 26   BUN 14 10   CREATININE 0.43* 0.44*   LABGLOM >60 >60   GLUCOSE 89 124*     PT/INR: No results for input(s): PROTIME, INR in the last 72 hours. APTT:No results for input(s): APTT in the last 72 hours. LIVER PROFILE:  No results for input(s): AST, ALT, LABALBU in the last 72 hours. Us Renal Complete    Result Date: 1/26/2020  EXAMINATION: RETROPERITONEAL ULTRASOUND OF THE KIDNEYS AND URINARY BLADDER 1/26/2020 COMPARISON: None HISTORY: ORDERING SYSTEM PROVIDED HISTORY: left sided hydroureteronephrosis, eval kidneys TECHNOLOGIST PROVIDED HISTORY: left sided hydroureteronephrosis, eval kidneys FINDINGS: Kidneys: The right kidney measures 12.5 cm in length and the left kidney measures 12 cm in length. Kidneys demonstrate normal cortical echogenicity. Probable vascular calcifications are noted bilaterally. Mild left hydronephrosis. . Bladder: Bladder is decompressed with a Matt catheter. Mild left hydronephrosis     Ct Abdomen Pelvis W Iv Contrast Additional Contrast? Oral    Result Date: 1/26/2020  EXAMINATION: CT OF THE ABDOMEN AND PELVIS WITH CONTRAST 1/26/2020 5:25 pm TECHNIQUE: CT of the abdomen and pelvis was performed with the administration of intravenous contrast. Multiplanar reformatted images are provided for review. Dose modulation, iterative reconstruction, and/or weight based adjustment of the mA/kV was utilized to reduce the radiation dose to as low as reasonably achievable. mastectomy and chemotherapy and aromatase inhibitor therapy. Iron deficiency  Hypoproliferative anemia  Bone lesions 2/2 metastatic breast adenocarcinoma. Biopsy results showing adenocarcinoma  Back pain second to bone lesions. RECOMMENDATIONS:  Reviewed results of lab work-up and other relevant clinical data. Status post prophylactic rodding of the left femur  Plan for radiation as an outpatient to be followed by systemic therapy. Plan for XRT in [place]. Discussed with family  Prognosis is poor. On letrozole. Family had many questions. About prognosis, management plans and logistics. Needs rehab/SNF on discharge. Will send records to [address] in [place]. Ok to discharge to SNF from my standpoint, needs follow up with radiation in [place]     Thank you for this interesting consult. Oncology will sign off at this time. Please don't hesitate to contact us with any further questions or concerns.        Ksenia Hoover MD  PGY-3, Internal Medicine  [address] [facility]

## 2020-02-06 NOTE — PROGRESS NOTES
Physical Therapy  Facility/Department: [address] BURN UNIT  Daily Treatment Note  NAME: Macarena Collins  : 1950  MRN: [id number]    Date of Service: 2020    Discharge Recommendations:  Patient would benefit from continued therapy after discharge        Assessment   Body structures, Functions, Activity limitations: Decreased functional mobility ; Decreased safe awareness;Decreased balance;Decreased endurance;Decreased ROM; Decreased strength; Increased pain;Decreased cognition;Decreased posture;Decreased sensation  Assessment: Pt a,b 10ft x2 and 8ft with RW and MIN A for safety, while demonstrating extremely slow joey and difficulty advancing BLEs. Pt would continue to require assistance with any funtional mobility at this time. Would benefit from more PT to address deficit. Prognosis: Fair  PT Education: Goals;Plan of Care;General Safety;Gait Training;Transfer Training  REQUIRES PT FOLLOW UP: Yes  Activity Tolerance  Activity Tolerance: Patient limited by endurance; Patient limited by pain; Patient limited by fatigue     Patient Diagnosis(es): The primary encounter diagnosis was Liver lesion. A diagnosis of Septic shock ([facility] was also pertinent to this visit. has a past medical history of Breast cancer ([facility] CAD (coronary atherosclerotic disease), Diabetes mellitus type 2 in [facility]), Essential hypertension, and Hypothyroidism (acquired). has a past surgical history that includes Coronary artery bypass graft (); Mastectomy (); Upper gastrointestinal endoscopy (N/A, 2020); Upper gastrointestinal endoscopy (2020); joint replacement (Right, ); Tunneled venous port placement; Femur Surgery (2020); and Femur fracture surgery (Left, 2020).     Restrictions  Restrictions/Precautions  Restrictions/Precautions: Weight Bearing, Up as Tolerated  Required Braces or Orthoses?: No  Lower Extremity Weight Bearing Restrictions  Left Lower Extremity Weight Bearing: Weight Bearing As

## 2020-02-06 NOTE — PROGRESS NOTES
[facility]    Progress Note    2/6/2020    11:13 AM    Name:   Sakina Collins  MRN:     [id number]     Acct:      [de-identified]   Room:   [address] [place] [address] Day:  11  Admit Date:  1/26/2020  1:38 PM    PCP:   Jolie Bingham MD  Code Status:  Full Code    Subjective:     C/C: anemia  Interval History Status: not changed. Patient seen and examined  Patient feels weak     Patient denies fever chest pain   I am seeing the patient for extensive metastatic lesion    Brief History:     ICU course, per documentation:     \"69 y. o. female with history of diabetes, hypothyroidism, CAD status post CABG in 2008, breast cancer on the left side status post chemotherapy and mastectomy in 2007 that presented to outside hospital [facility] on 1/23/2020 for complaints of generalized weakness and fatigue and flulike illness for about 1 month.  She was admitted there for anemia and hyperglycemia and hyponatremia however developed hypotension and had CT imaging which showed concern for duodenitis versus contained duodenal perforation.  Concern for intra-abdominal infection there and antibiotics were narrowed down to Zosyn, initially was on vancomycin as well.      CT chest showed extensive osteoblastic lesions and numerous soft tissue nodules in the greater omentum and mesentery suspicious for carcinomatosis. Costella Levy showed mild/moderate left side hydroureteronephrosis without any obstructing stone or mass     Ct abdomen-   Distended gallbladder.  No radiodense gallstones noted but ultrasound is   recommended.       Extensive metastatic disease is a risk for fracture at T10.      Had egd 1/30:    Significant inflammation and ulceration of the distal third of the esophagus with white thick discharge suspicious for Candida (path negative for candida)  Although malignancy is less likely but could not be ruled out gentle biopsies were taken           Deep 1/2 cm ulcer in the duodenal bulb just beyond the pyloric orifice, with edema causing some gastric outlet narrowing, was covered with large clotted blood iWatch most of the clotted blood, but there is adherent clot to the crater base which I could not clear I could not see any vessel protruding for which we went ahead and injected epinephrine around it 6 cc of the 1-10,000 epi, at this point there is no fresh blood to indicate any more bleeding which indicate that the bleeding has subsided     Retained food in the stomach related to the gastric outlet narrowing    Had iliac biopsy which was positive for metastatic breast ca    On 2/1 had   Procedure(s):  1. Long TFN of the left femur  2. Femoral neck biopsy  For impending femoral neck fracture due to mets        Medications:      Allergies:  No Known Allergies    Current Meds:   Scheduled Meds:    lactulose  20 g Oral TID    sodium chloride  250 mL Intravenous Once    metFORMIN  1,000 mg Oral BID WC    linagliptin  5 mg Oral Daily    alteplase  2 mg Intracatheter Once    oyster shell calcium/vitamin D  2 tablet Oral Daily    pantoprazole  40 mg Oral BID AC    [Held by provider] morphine  15 mg Oral 2 times per day    insulin lispro  0-18 Units Subcutaneous Q4H    midodrine  2.5 mg Oral TID WC    [Held by provider] aspirin  81 mg Oral Daily    levothyroxine  100 mcg Oral Daily    venlafaxine  150 mg Oral Daily    insulin glargine  10 Units Subcutaneous Nightly    docusate sodium  100 mg Oral BID    senna  1 tablet Oral BID    ferrous sulfate  325 mg Oral BID WC    sodium chloride flush  10 mL Intravenous 2 times per day    [Held by provider] enoxaparin  40 mg Subcutaneous Daily     Continuous Infusions:    dextrose       PRN Meds: fentaNYL, sodium chloride flush, sodium chloride flush, acetaminophen, sodium chloride flush, magnesium hydroxide, ondansetron, potassium chloride, bisacodyl, acetaminophen, glucose, dextrose, glucagon (rDNA), dextrose, oxyCODONE **OR** oxyCODONE    Data:     Past Medical History:   has a past medical history of Breast cancer ([address] CAD (coronary atherosclerotic disease), Diabetes mellitus type 2 in obese [facility]), Essential hypertension, and Hypothyroidism (acquired). Social History:   reports that she has never smoked. She has never used smokeless tobacco. She reports that she does not use drugs. Family History:   Family History   Problem Relation Age of Onset    Hypertension Mother     Hypertension Father        Vitals:  BP (!) 158/70   Pulse 102   Temp 97.7 °F (36.5 °C) (Oral)   Resp 15   Ht 5' 2\" (1.575 m)   Wt 188 lb 0.8 oz (85.3 kg)   SpO2 97%   Breastfeeding No   BMI 34.40 kg/m²   Temp (24hrs), Av.2 °F (36.2 °C), Min:97 °F (36.1 °C), Max:97.7 °F (36.5 °C)    Recent Labs     20  0019 20  0744 20  1106   POCGLU 90 125* 125* 203*       I/O (24Hr): Intake/Output Summary (Last 24 hours) at 2020 1113  Last data filed at 2020 1054  Gross per 24 hour   Intake 360 ml   Output 1450 ml   Net -1090 ml       Labs:  Hematology:  Recent Labs     20  0420  20  0433 20  1638 20  0020 20  0549   WBC 10.3  --  7.8  --   --  6.3   RBC 2.96*  --  3.00*  --   --  3.03*   HGB 8.3*   < > 8.4* 8.6* 9.4* 8.6*   HCT 25.9*   < > 28.8* 28.6* 30.4* 28.5*   MCV 87.5  --  96.0  --   --  94.1   MCH 28.0  --  28.0  --   --  28.4   MCHC 32.0  --  29.2  --   --  30.2   RDW 15.2*  --  15.5*  --   --  15.7*   PLT See Reflexed IPF Result  --  See Reflexed IPF Result  --   --  261   MPV NOT REPORTED  --  NOT REPORTED  --   --  10.0    < > = values in this interval not displayed.      Chemistry:  Recent Labs     20  0420 20  0433 20  0549    138 142   K 4.0 4.1 3.8    103 104   CO2 28 24 26   GLUCOSE 200* 89 124*   BUN 21 14 10   CREATININE 0.50 0.43* 0.44*   ANIONGAP 9 11 12   LABGLOM >60 >60 >60   GFRAA >60 >60 >60   CALCIUM 8.5* 8.1* 8.2*     Recent Labs 02/05/20  1136 02/05/20  1601 02/05/20 2023 02/06/20  0019 02/06/20  0744 02/06/20  1106   POCGLU 167* 145* 90 125* 125* 203*     ABG:No results found for: POCPH, PHART, PH, POCPCO2, PYI9QYO, PCO2, POCPO2, PO2ART, PO2, POCHCO3, KXA7MXJ, HCO3, NBEA, PBEA, BEART, BE, THGBART, THB, PFS8GWJ, BXBA0LLV, F4GIZLVO, O2SAT, FIO2  Lab Results   Component Value Date/Time    SPECIAL R HAND 3MLS 02/03/2020 02:50 PM     Lab Results   Component Value Date/Time    CULTURE NO GROWTH 3 DAYS 02/03/2020 02:50 PM       Radiology:  Radha Fanny Hip Left (2-3 Views)    Result Date: 1/28/2020  There is no acute fracture of the left hip There is no acute fracture in the pelvis. Detail of the sacrum is markedly limited. There is subtle double density along the margin of the superior pubic ramus on the left without definitive fracture line at patient's pain persists, consider CT exam No acute osseous abnormality of the right femur or the left femur     Xr Femur Left (min 2 Views)    Result Date: 2/1/2020  Satisfactory postoperative appearance following ORIF of the left femur. No evident complication. Xr Femur Left (min 2 Views)    Result Date: 1/28/2020  There is no acute fracture of the left hip There is no acute fracture in the pelvis. Detail of the sacrum is markedly limited. There is subtle double density along the margin of the superior pubic ramus on the left without definitive fracture line at patient's pain persists, consider CT exam No acute osseous abnormality of the right femur or the left femur     Xr Femur Right (min 2 Views)    Result Date: 1/28/2020  There is no acute fracture of the left hip There is no acute fracture in the pelvis. Detail of the sacrum is markedly limited.   There is subtle double density along the margin of the superior pubic ramus on the left without definitive fracture line at patient's pain persists, consider CT exam No acute osseous abnormality of the right femur or the left femur     Mri Thoracic Spine W Wo Contrast    Result Date: 1/29/2020  Osseous metastatic disease with diffuse marrow infiltration of T10. Mild extraosseous extension of tumor suspected along the left neural foramina a T10. Us Gallbladder Ruq    Result Date: 1/27/2020  Minimal amount of free fluid within the upper abdomen. Heterogeneous increased liver echogenicity could be on the basis of hepatocellular disease or hepatic steatosis. Xr Pelvis (min 3 Views)    Result Date: 1/28/2020  There is no acute fracture of the left hip There is no acute fracture in the pelvis. Detail of the sacrum is markedly limited. There is subtle double density along the margin of the superior pubic ramus on the left without definitive fracture line at patient's pain persists, consider CT exam No acute osseous abnormality of the right femur or the left femur     Ct Femur Left Wo Contrast    Result Date: 1/31/2020  Multiple osseous metastatic lesions in the left ischium, likely anterior left acetabular wall, and probably in the intertrochanteric and lesser trochanteric portions of the left femur. These could be further characterized with bone scan or MRI with contrast.     Ct Biopsy Superficial Bone Percutaneous    Addendum Date: 1/28/2020    ADDENDUM: Addendum is for billing purposes only. Automated dose modulation and/ or weight based adjustment of the mA/kv was utilized to reduce the radiation dose to as low as reasonably achievable. Result Date: 1/28/2020  Technically successful CT-guided targeted right iliac bone core biopsy. Mri Brain W Wo Contrast    Result Date: 2/1/2020  1. Multiple calvarial metastases. 2. Abnormal dural thickening enhancement most evident overlying the left parietal and occipital lobes concerning for metastatic involvement given adjacent calvarial lesions. Meningitis is in the differential diagnosis but is thought unlikely. 3. Mild chronic white matter microvascular ischemic changes. No acute infarct or hemorrhage. Physical Examination:        Physical exam     Alert  Chest: Clear to auscultation bilateral  Abdomen: Nontender nondistended  CVS: S1-S2  Neurology: Moves extremity    Assessment:        Hospital Problems           Last Modified POA    * (Principal) Metastatic breast cancer ([address] 2/2/2020 Yes    Overview Signed 2/2/2020  1:13 PM by Sergo Waite, DO     To bones, calvarium, dura,          Anemia 1/30/2020 Yes    Liver lesion 1/26/2020 Yes    History of breast cancer 1/27/2020 Yes    Diabetes mellitus type 2 in obese ([address] (Chronic) 1/29/2020 Yes    Essential hypertension (Chronic) 1/29/2020 Yes    Acquired hypothyroidism (Chronic) 1/29/2020 Yes    Recurrent major depressive disorder, in remission ([address] (Chronic) 1/29/2020 Yes    Bone lesion 1/30/2020 Yes    Iron deficiency 1/30/2020 Yes    Acute duodenal ulcer with gastric outlet obstruction 2/3/2020 Yes    Ulcer of esophagus without bleeding 2/3/2020 Yes    Encounter for palliative care 2/3/2020 Yes          Plan:          Principal Problem:  Probable bone metastatic cancer probably from the breast pathology oncology following  From hematology oncology note bone lesions 2/2 metastatic breast adenocarcinoma. Biopsy results showing adenocarcinoma  Probable chemotherapy as outpatient  Back pain second to bone lesions.       Active Problems:    Anemia    Liver lesion hematology following      History of breast cancer status post chemotherapy and mastectomy      Diabetes mellitus type 2 in obese (HCC) uncontrolled insulin sliding scale      Essential hypertension continue home medication      Acquired hypothyroidism levothyroxine      Recurrent major depressive disorder, in remission ([address] continue home medication    Acute blood loss anemia secondary to acute duodenal ulcer with gastric outlet obstruction status post endoscopy follow-up on biopsy results status post blood transfusion      Ulcer of esophagus without bleeding no evidence of fungal infection Encounter for palliative care palliative care  Resolved Problems:    Septic shock developed during hospitalization [facility]) status post IV antibiotics resolved ID recommendation appreciated watch ofF IV antibiotics if no sign or symptom of infection anticipate discharge once bed available         Sharee Bear MD  2/6/2020  11:13 AM

## 2020-02-07 LAB
ABSOLUTE EOS #: 0.16 K/UL (ref 0–0.44)
ABSOLUTE IMMATURE GRANULOCYTE: 0.06 K/UL (ref 0–0.3)
ABSOLUTE LYMPH #: 2.03 K/UL (ref 1.1–3.7)
ABSOLUTE MONO #: 0.7 K/UL (ref 0.1–1.2)
ANION GAP SERPL CALCULATED.3IONS-SCNC: 11 MMOL/L (ref 9–17)
BASOPHILS # BLD: 1 % (ref 0–2)
BASOPHILS ABSOLUTE: 0.07 K/UL (ref 0–0.2)
BUN BLDV-MCNC: 9 MG/DL (ref 8–23)
BUN/CREAT BLD: ABNORMAL (ref 9–20)
CALCIUM SERPL-MCNC: 8.2 MG/DL (ref 8.6–10.4)
CHLORIDE BLD-SCNC: 100 MMOL/L (ref 98–107)
CO2: 29 MMOL/L (ref 20–31)
CREAT SERPL-MCNC: 0.56 MG/DL (ref 0.5–0.9)
DIFFERENTIAL TYPE: ABNORMAL
EOSINOPHILS RELATIVE PERCENT: 2 % (ref 1–4)
GFR AFRICAN AMERICAN: >60 ML/MIN
GFR NON-AFRICAN AMERICAN: >60 ML/MIN
GFR SERPL CREATININE-BSD FRML MDRD: ABNORMAL ML/MIN/{1.73_M2}
GFR SERPL CREATININE-BSD FRML MDRD: ABNORMAL ML/MIN/{1.73_M2}
GLUCOSE BLD-MCNC: 110 MG/DL (ref 65–105)
GLUCOSE BLD-MCNC: 117 MG/DL (ref 65–105)
GLUCOSE BLD-MCNC: 82 MG/DL (ref 65–105)
GLUCOSE BLD-MCNC: 91 MG/DL (ref 70–99)
GLUCOSE BLD-MCNC: 96 MG/DL (ref 65–105)
GLUCOSE BLD-MCNC: 97 MG/DL (ref 65–105)
GLUCOSE BLD-MCNC: 97 MG/DL (ref 65–105)
HCT VFR BLD CALC: 28.1 % (ref 36.3–47.1)
HCT VFR BLD CALC: 28.7 % (ref 36.3–47.1)
HEMOGLOBIN: 8.6 G/DL (ref 11.9–15.1)
HEMOGLOBIN: 8.6 G/DL (ref 11.9–15.1)
IMMATURE GRANULOCYTES: 1 %
LYMPHOCYTES # BLD: 28 % (ref 24–43)
MCH RBC QN AUTO: 27.8 PG (ref 25.2–33.5)
MCHC RBC AUTO-ENTMCNC: 30 G/DL (ref 28.4–34.8)
MCV RBC AUTO: 92.9 FL (ref 82.6–102.9)
MONOCYTES # BLD: 10 % (ref 3–12)
NRBC AUTOMATED: 0 PER 100 WBC
PDW BLD-RTO: 16.1 % (ref 11.8–14.4)
PLATELET # BLD: 263 K/UL (ref 138–453)
PLATELET ESTIMATE: ABNORMAL
PMV BLD AUTO: 10 FL (ref 8.1–13.5)
POTASSIUM SERPL-SCNC: 3.8 MMOL/L (ref 3.7–5.3)
RBC # BLD: 3.09 M/UL (ref 3.95–5.11)
RBC # BLD: ABNORMAL 10*6/UL
SEG NEUTROPHILS: 58 % (ref 36–65)
SEGMENTED NEUTROPHILS ABSOLUTE COUNT: 4.37 K/UL (ref 1.5–8.1)
SODIUM BLD-SCNC: 140 MMOL/L (ref 135–144)
WBC # BLD: 7.4 K/UL (ref 3.5–11.3)
WBC # BLD: ABNORMAL 10*3/UL

## 2020-02-07 PROCEDURE — 99232 SBSQ HOSP IP/OBS MODERATE 35: CPT | Performed by: INTERNAL MEDICINE

## 2020-02-07 PROCEDURE — 97535 SELF CARE MNGMENT TRAINING: CPT

## 2020-02-07 PROCEDURE — 6370000000 HC RX 637 (ALT 250 FOR IP): Performed by: ORTHOPAEDIC SURGERY

## 2020-02-07 PROCEDURE — 99233 SBSQ HOSP IP/OBS HIGH 50: CPT | Performed by: FAMILY MEDICINE

## 2020-02-07 PROCEDURE — 85025 COMPLETE CBC W/AUTO DIFF WBC: CPT

## 2020-02-07 PROCEDURE — 51798 US URINE CAPACITY MEASURE: CPT

## 2020-02-07 PROCEDURE — 36415 COLL VENOUS BLD VENIPUNCTURE: CPT

## 2020-02-07 PROCEDURE — 2580000003 HC RX 258: Performed by: ORTHOPAEDIC SURGERY

## 2020-02-07 PROCEDURE — 97116 GAIT TRAINING THERAPY: CPT

## 2020-02-07 PROCEDURE — 6370000000 HC RX 637 (ALT 250 FOR IP): Performed by: INTERNAL MEDICINE

## 2020-02-07 PROCEDURE — 6360000002 HC RX W HCPCS: Performed by: ORTHOPAEDIC SURGERY

## 2020-02-07 PROCEDURE — 97110 THERAPEUTIC EXERCISES: CPT

## 2020-02-07 PROCEDURE — 1200000000 HC SEMI PRIVATE

## 2020-02-07 PROCEDURE — 82947 ASSAY GLUCOSE BLOOD QUANT: CPT

## 2020-02-07 PROCEDURE — 80048 BASIC METABOLIC PNL TOTAL CA: CPT

## 2020-02-07 RX ORDER — METOPROLOL TARTRATE 5 MG/5ML
5 INJECTION INTRAVENOUS EVERY 8 HOURS PRN
Status: DISCONTINUED | OUTPATIENT
Start: 2020-02-07 | End: 2020-02-19 | Stop reason: HOSPADM

## 2020-02-07 RX ADMIN — PANTOPRAZOLE SODIUM 40 MG: 40 TABLET, DELAYED RELEASE ORAL at 15:47

## 2020-02-07 RX ADMIN — VENLAFAXINE HYDROCHLORIDE 150 MG: 150 CAPSULE, EXTENDED RELEASE ORAL at 08:29

## 2020-02-07 RX ADMIN — METFORMIN HYDROCHLORIDE 1000 MG: 500 TABLET ORAL at 15:47

## 2020-02-07 RX ADMIN — SENNOSIDES 8.6 MG: 8.6 TABLET, FILM COATED ORAL at 23:26

## 2020-02-07 RX ADMIN — DOCUSATE SODIUM 100 MG: 100 CAPSULE, LIQUID FILLED ORAL at 23:26

## 2020-02-07 RX ADMIN — METFORMIN HYDROCHLORIDE 1000 MG: 500 TABLET ORAL at 08:25

## 2020-02-07 RX ADMIN — SENNOSIDES 8.6 MG: 8.6 TABLET, FILM COATED ORAL at 08:28

## 2020-02-07 RX ADMIN — DOCUSATE SODIUM 100 MG: 100 CAPSULE, LIQUID FILLED ORAL at 08:26

## 2020-02-07 RX ADMIN — SODIUM CHLORIDE, PRESERVATIVE FREE 10 ML: 5 INJECTION INTRAVENOUS at 23:27

## 2020-02-07 RX ADMIN — SODIUM CHLORIDE, PRESERVATIVE FREE 10 ML: 5 INJECTION INTRAVENOUS at 08:28

## 2020-02-07 RX ADMIN — OXYCODONE HYDROCHLORIDE 10 MG: 5 TABLET ORAL at 23:26

## 2020-02-07 RX ADMIN — METOPROLOL TARTRATE 50 MG: 50 TABLET, FILM COATED ORAL at 23:26

## 2020-02-07 RX ADMIN — FERROUS SULFATE TAB EC 325 MG (65 MG FE EQUIVALENT) 325 MG: 325 (65 FE) TABLET DELAYED RESPONSE at 08:27

## 2020-02-07 RX ADMIN — ENOXAPARIN SODIUM 40 MG: 40 INJECTION SUBCUTANEOUS at 08:26

## 2020-02-07 RX ADMIN — OXYCODONE HYDROCHLORIDE 10 MG: 5 TABLET ORAL at 09:26

## 2020-02-07 RX ADMIN — LINAGLIPTIN 5 MG: 5 TABLET, FILM COATED ORAL at 08:25

## 2020-02-07 RX ADMIN — ACETAMINOPHEN 650 MG: 325 TABLET ORAL at 08:23

## 2020-02-07 RX ADMIN — INSULIN GLARGINE 10 UNITS: 100 INJECTION, SOLUTION SUBCUTANEOUS at 23:32

## 2020-02-07 RX ADMIN — OXYCODONE HYDROCHLORIDE 5 MG: 5 TABLET ORAL at 15:48

## 2020-02-07 RX ADMIN — PANTOPRAZOLE SODIUM 40 MG: 40 TABLET, DELAYED RELEASE ORAL at 06:12

## 2020-02-07 RX ADMIN — OXYCODONE HYDROCHLORIDE 10 MG: 5 TABLET ORAL at 04:09

## 2020-02-07 RX ADMIN — CALCIUM CARBONATE-CHOLECALCIFEROL TAB 250 MG-125 UNIT 500 MG: 250-125 TAB at 08:29

## 2020-02-07 RX ADMIN — METOPROLOL TARTRATE 50 MG: 50 TABLET, FILM COATED ORAL at 08:25

## 2020-02-07 RX ADMIN — FERROUS SULFATE TAB EC 325 MG (65 MG FE EQUIVALENT) 325 MG: 325 (65 FE) TABLET DELAYED RESPONSE at 15:47

## 2020-02-07 RX ADMIN — LEVOTHYROXINE SODIUM 100 MCG: 100 TABLET ORAL at 06:12

## 2020-02-07 ASSESSMENT — PAIN SCALES - GENERAL
PAINLEVEL_OUTOF10: 5
PAINLEVEL_OUTOF10: 2
PAINLEVEL_OUTOF10: 0
PAINLEVEL_OUTOF10: 6
PAINLEVEL_OUTOF10: 8
PAINLEVEL_OUTOF10: 6
PAINLEVEL_OUTOF10: 2

## 2020-02-07 ASSESSMENT — PAIN DESCRIPTION - DESCRIPTORS
DESCRIPTORS: ACHING;DULL
DESCRIPTORS: ACHING
DESCRIPTORS: ACHING;DULL
DESCRIPTORS: ACHING

## 2020-02-07 ASSESSMENT — PAIN DESCRIPTION - PAIN TYPE
TYPE: ACUTE PAIN;SURGICAL PAIN
TYPE: ACUTE PAIN;CHRONIC PAIN
TYPE: ACUTE PAIN;SURGICAL PAIN
TYPE: ACUTE PAIN;SURGICAL PAIN

## 2020-02-07 ASSESSMENT — ENCOUNTER SYMPTOMS
EYE ITCHING: 0
COUGH: 0
BACK PAIN: 1
ABDOMINAL PAIN: 0
ABDOMINAL DISTENTION: 0
APNEA: 0
EYE DISCHARGE: 0
COLOR CHANGE: 0
DIARRHEA: 0

## 2020-02-07 ASSESSMENT — PAIN DESCRIPTION - LOCATION
LOCATION: HIP
LOCATION: KNEE
LOCATION: HIP

## 2020-02-07 ASSESSMENT — PAIN DESCRIPTION - ONSET: ONSET: GRADUAL

## 2020-02-07 ASSESSMENT — PAIN DESCRIPTION - ORIENTATION
ORIENTATION: LEFT

## 2020-02-07 ASSESSMENT — PAIN DESCRIPTION - FREQUENCY: FREQUENCY: INTERMITTENT

## 2020-02-07 NOTE — PROGRESS NOTES
[facility] -   Infectious diseases evaluation  admission date 1/26/2020    reason for consultation:   antibiotics management    Impression :   Current:  · Bone mets left pelvis / iliac - biopsy w ORIF done 2/1/20  · abd adeno carcinomatosis  · Duodenitis w ulcer  · EGD duodenal ulcer w clot  w gastric outlet narrowing  · EGD distal white esophagitis - candida neg on path  · Past breast CA post chemo and mastectomy   · DM  · Resolved septic shock  Discussion / summary of stay / plan of care   · No longer requires antibiotic coverage  Recommendations      · Watch off antibiotics   · Palliative care on board but pt unclear if still to receive chemotx? · Okay for discharge  · Discussed with nurse and patient    Infection Control Recommendations   · Herod Precautions      Antimicrobial Stewardship Recommendations   · Stop antibiotics     Coordination ofOutpatient Care:   · Estimated Length of IV antimicrobials: None  · Patient will need Midline / picc Catheter Insertion: No  · Patient will need SNF:   · Patient will need outpatient wound care: No    History of Present Illness:   Initial history:  John Arshad is a 77y.o.-year-old female with pmh of DM, hypothyroidism, CAD s/p CABG in 2008, breast cancer on left side s/p chemotherapy and mastectomy in 2007 who was admitted with abd pain, onset of vomiting for months. Found in septic shock that has resolved after 4 days of zosyn and on a week of vanco. Patient is no longer requiring antibiotic therapy. Pressors are all stopped - no fever or leukocytosis and no pos cx on record - CXR neg and CT AP showed possible perit carcinomatosis and Bone mets left pelvis / iliac and T10- -   biopsy w ORIF done 2/1/20 w nail in the femur to protect the bone.   OR biopsy showed metastatic adenocarcinoma  CT in the other hospital suggested a possible duodenitis   Upon transfer to [facility], EGD 1/30/20 showed ulcer with a clot and VENOUS PORT PLACEMENT      UPPER GASTROINTESTINAL ENDOSCOPY N/A 1/30/2020    EGD BIOPSY performed by Lemeul Aguilera MD at [address] [place] [address] ENDOSCOPY  1/30/2020    EGD CONTROL HEMORRHAGE performed by Lemuel Aguilera MD at [facility]       Medications:      metoprolol tartrate  50 mg Oral BID    metoprolol  5 mg Intravenous Q8H    lactulose  20 g Oral TID    sodium chloride  250 mL Intravenous Once    metFORMIN  1,000 mg Oral BID WC    linagliptin  5 mg Oral Daily    alteplase  2 mg Intracatheter Once    oyster shell calcium/vitamin D  2 tablet Oral Daily    pantoprazole  40 mg Oral BID AC    [Held by provider] morphine  15 mg Oral 2 times per day    insulin lispro  0-18 Units Subcutaneous Q4H    levothyroxine  100 mcg Oral Daily    venlafaxine  150 mg Oral Daily    insulin glargine  10 Units Subcutaneous Nightly    docusate sodium  100 mg Oral BID    senna  1 tablet Oral BID    ferrous sulfate  325 mg Oral BID WC    sodium chloride flush  10 mL Intravenous 2 times per day    enoxaparin  40 mg Subcutaneous Daily       Social History:     Social History     Socioeconomic History    Marital status:      Spouse name: Not on file    Number of children: Not on file    Years of education: Not on file    Highest education level: Not on file   Occupational History    Not on file   Social Needs    Financial resource strain: Not on file    Food insecurity:     Worry: Not on file     Inability: Not on file    Transportation needs:     Medical: Not on file     Non-medical: Not on file   Tobacco Use    Smoking status: Never Smoker    Smokeless tobacco: Never Used   Substance and Sexual Activity    Alcohol use: Not on file     Comment: social    Drug use: Never    Sexual activity: Not on file   Lifestyle    Physical activity:     Days per week: Not on file     Minutes per session: Not on file    Stress: Not on file   Relationships    Social connections:     Talks on

## 2020-02-07 NOTE — PROGRESS NOTES
(02/01/2020); and Femur fracture surgery (Left, 2/1/2020). Restrictions  Restrictions/Precautions  Restrictions/Precautions: Weight Bearing, Up as Tolerated  Required Braces or Orthoses?: No  Lower Extremity Weight Bearing Restrictions  Left Lower Extremity Weight Bearing: Weight Bearing As Tolerated  Subjective   General  Patient assessed for rehabilitation services?: Yes  Family / Caregiver Present: No  General Comment  Comments: RN ok'd patient for therapy this date. Patient cooperative throughout. Pain Assessment  Pain Assessment: 0-10  Pain Level: 6  Pain Type: Acute pain;Surgical pain  Pain Location: Hip  Pain Orientation: Left  Non-Pharmaceutical Pain Intervention(s): Ambulation/Increased Activity; Distraction; Other (Simple grooming ADLs)  Response to Pain Intervention: Patient Satisfied  Vital Signs  Patient Currently in Pain: Yes   Orientation  Orientation  Overall Orientation Status: Within Functional Limits  Objective    ADL  Feeding: Increased time to complete;Setup;Verbal cueing;Stand by assistance(Supine in bed at tray table )  Grooming: Minimal assistance;Setup;Verbal cueing; Increased time to complete(Facial hygiene and oral care while seated in recliner with SBA, hair washing with Min A )  Toileting: Contact guard assistance; Increased time to complete(Seated on BSC, pt able to complete yeyo care while seated on [facility])  Additional Comments: Pt declined completing bathing ADLs however, agreeable to simple grooming ADLs while seated in chair. See above for LOF. Increased time to complete all tasks on this date.          Balance  Sitting Balance: Contact guard assistance(Seated EOB, on BSC and on chair )  Standing Balance: Minimal assistance(w/SW)  Standing Balance  Time: ~5 minutes total  Activity: functional transfers  Comment: w/SW  Functional Mobility  Functional - Mobility Device: Standard Walker  Activity: Other(EOB>BSC>recliner )  Assist Level: Minimal assistance  Functional Mobility Comments: pt tasks on this date. Pt retired seated in [facility], BLE elevated, call light within reach and RN notified.      LANDEN Vaughn/ALONDRA

## 2020-02-07 NOTE — CARE COORDINATION
Patient/family seen: Yes       Informed patient/family of BPCI-A Medical Bundle Program with potential outreach by either Care Transitions Team or naviHealth Team based on hospital admission and location.        BPCI-A Notification Letter given: Yes         Current discharge plan: [employer]

## 2020-02-07 NOTE — DISCHARGE SUMMARY
[facility]    Discharge Summary     Patient ID: Maggie Fowler  :  1950   MRN: [id number]     ACCOUNT:  [de-identified]   Patient's PCP: Vy Parekh MD  Admit Date: 2020   Discharge Date: 2020     Length of Stay: 12  Code Status:  Full Code  Admitting Physician: Sharee Bear MD  Discharge Physician: Sahree Bear MD     Active Discharge Diagnoses:     Hospital Problem Lists:  Principal Problem:    Metastatic breast cancer [facility])  Active Problems:    Anemia    Liver lesion    History of breast cancer    Diabetes mellitus type 2 in obese [facility])    Essential hypertension    Acquired hypothyroidism    Recurrent major depressive disorder, in remission ([facility]    Bone lesion    Iron deficiency    Acute duodenal ulcer with gastric outlet obstruction    Ulcer of esophagus without bleeding    Encounter for palliative care  Resolved Problems:    Septic shock [facility])      Admission Condition:  poor     Discharged Condition: fair    Hospital Stay:     Hospital Course:  Maggie Fowler is a 77 y.o. female who was admitted for the management of  Metastatic breast cancer ([facility] , presented to ER with No chief complaint on file. ICU course, per documentation:     \"69 y. o. female with history of diabetes, hypothyroidism, CAD status post CABG in , breast cancer on the left side status post chemotherapy and mastectomy in  that presented to outside hospital [facility] on 2020 for complaints of generalized weakness and fatigue and flulike illness for about 1 month.  She was admitted there for anemia and hyperglycemia and hyponatremia however developed hypotension and had CT imaging which showed concern for duodenitis versus contained duodenal perforation.  Concern for intra-abdominal infection there and antibiotics were narrowed down to Zosyn, initially was on vancomycin as well.      CT chest showed extensive osteoblastic lesions home medication       Essential hypertension continue home medication       Acquired hypothyroidism levothyroxine       Recurrent major depressive disorder, in remission ([facility] continue home medication     Acute blood loss anemia secondary to acute duodenal ulcer with gastric outlet obstruction status post endoscopy follow-up on biopsy results status post blood transfusion repeat CBC in 1 week follow-up with GI in 2 weeks  Resume aspirin if no evidence of bleeding in 1 week       Ulcer of esophagus without bleeding no evidence of fungal infection follow-up with GI in 2 weeks       Encounter for palliative care palliative care  Resolved Problems:    Septic shock developed during hospitalization ([facility] status post IV antibiotics resolved ID recommendation appreciated watch ofF IV antibiotics  Physical exam     Alert  Chest: Clear to auscultation bilateral  Abdomen: Nontender nondistended  CVS: S1-S2  Neurology: Moves extremity  Significant therapeutic interventions:     Significant Diagnostic Studies:   Labs / Micro:  CBC:   Lab Results   Component Value Date    WBC 7.4 02/07/2020    RBC 3.09 02/07/2020    HGB 8.6 02/07/2020    HCT 28.7 02/07/2020    MCV 92.9 02/07/2020    MCH 27.8 02/07/2020    MCHC 30.0 02/07/2020    RDW 16.1 02/07/2020     02/07/2020     BMP:    Lab Results   Component Value Date    GLUCOSE 91 02/07/2020     02/07/2020    K 3.8 02/07/2020     02/07/2020    CO2 29 02/07/2020    ANIONGAP 11 02/07/2020    BUN 9 02/07/2020    CREATININE 0.56 02/07/2020    BUNCRER NOT REPORTED 02/07/2020    CALCIUM 8.2 02/07/2020    LABGLOM >60 02/07/2020    GFRAA >60 02/07/2020    GFR      02/07/2020    GFR NOT REPORTED 02/07/2020     HFP:    Lab Results   Component Value Date    PROT 4.5 01/27/2020     CMP:    Lab Results   Component Value Date    GLUCOSE 91 02/07/2020     02/07/2020    K 3.8 02/07/2020     02/07/2020    CO2 29 02/07/2020    BUN 9 02/07/2020    CREATININE 0.56 02/07/2020

## 2020-02-07 NOTE — PROGRESS NOTES
Physical Therapy  Facility/Department: [address] BURN UNIT  Daily Treatment Note  NAME: Macarena Collins  : 1950  MRN: [id number]    Date of Service: 2020    Discharge Recommendations:  Patient would benefit from continued therapy after discharge    Assessment   Body structures, Functions, Activity limitations: Decreased functional mobility ; Decreased safe awareness;Decreased balance;Decreased endurance;Decreased ROM; Decreased strength; Increased pain;Decreased cognition;Decreased posture;Decreased sensation  Assessment: Ptdemo great good effort and progress with transfer this date,  amb 8ft with RW and MIN A for safety, while demonstrating extremely slow joey and difficulty advancing BLEs. Pt would continue to require assistance with any funtional mobility at this time. Would benefit from more PT to address deficit. Prognosis: Fair  PT Education: Goals;Plan of Care;General Safety;Gait Training;Transfer Training  REQUIRES PT FOLLOW UP: Yes  Activity Tolerance  Activity Tolerance: Patient limited by endurance; Patient limited by pain; Patient limited by fatigue     Patient Diagnosis(es): The primary encounter diagnosis was Liver lesion. A diagnosis of Septic shock ([facility] was also pertinent to this visit. has a past medical history of Breast cancer ([facility] CAD (coronary atherosclerotic disease), Diabetes mellitus type 2 in [facility]), Essential hypertension, and Hypothyroidism (acquired). has a past surgical history that includes Coronary artery bypass graft (); Mastectomy (); Upper gastrointestinal endoscopy (N/A, 2020); Upper gastrointestinal endoscopy (2020); joint replacement (Right, ); Tunneled venous port placement; Femur Surgery (2020); and Femur fracture surgery (Left, 2020).     Restrictions  Restrictions/Precautions  Restrictions/Precautions: Weight Bearing, Up as Tolerated  Required Braces or Orthoses?: No  Lower Extremity Weight Bearing Restrictions  Left Lower Extremity Weight Bearing: Weight Bearing As Tolerated  Subjective   General  Response To Previous Treatment: Patient with no complaints from previous session. Family / Caregiver Present: No  Subjective  Subjective: Pt and RN agreeable to therapy. Pt up in chair upon arrival. Pleasant and cooperaive throughout. General Comment  Comments: Pt left supine in bed with call light   Pain Screening  Patient Currently in Pain: No  Vital Signs  Patient Currently in Pain: No       Orientation  Orientation  Overall Orientation Status: Within Functional Limits  Cognition      Objective   Bed mobility  Sit to Supine: Minimal assistance(assist with BLE.)  Scooting: Contact guard assistance  Transfers  Sit to Stand: Minimal Assistance  Stand to sit: Minimal Assistance  Bed to Chair: Minimal assistance(Chair to bed)  Comment: Pt requires vc's for hand placement. Ambulation  Ambulation?: Yes  More Ambulation?: Yes  Ambulation 1  Device: Zarate rail  Assistance: Minimal assistance  Quality of Gait: Increase reliance on RW, Difficulty to advance BLEs  Gait Deviations: Shuffles;Decreased step length;Decreased step height;Slow Cassie  Distance: 8ft   Comments: limited by fatigue. Balance  Posture: Fair  Sitting - Static: Good;-  Sitting - Dynamic: Fair  Standing - Static: Fair  Standing - Dynamic: Fair;-  Comments: Standing assessed with RW. Exercises  Comment:  Quad sets, glut sets, ankle pumps, heel slides, short arc quads, hip abduction slides.  Reps: 10      Goals  Short term goals  Time Frame for Short term goals: 15  Short term goal 1: Pt to perform bed mobility modA   Short term goal 2: Pt to demonstrate functional transfers mod A WBAT on LLE  Short term goal 3: Demonstrate dynamic sitting balance of fair + to decrease fall risk  Short term goal 4: Tolerate 30 minutes of therapy to demo increased endurance  Patient Goals   Patient goals : Did not state    Plan    Plan  Times per week: 5-6x/week  Current Treatment

## 2020-02-07 NOTE — PROGRESS NOTES
Palliative Care Progress Note    NAME:  Ashley Collins  MEDICAL RECORD NUMBER:  [id number]  AGE: 77 y.o. GENDER: female  : 1950  TODAY'S DATE:  2020    Reason for Consult:  goals of care  History of Present Illness     The patient is a 77 y.o. Non-/non  female who presents with No chief complaint on file. Referred to Palliative Care by  ?x Physician   ? Nursing  ? Family Request   ? Other:       She was admitted to the internal medicine service for [facility] ([facility] [A41.9, R65.21]. Her hospital course has been associated with abdominal pain, nausea and vomiting. The patient has a complicated medical history and has been hospitalized since 2020  1:38 PM.    OVERNIGHT EVENTS:  No acute events overnight  Patient hemodynamically stable  Patient doing well     BP (!) 147/69   Pulse 86   Temp 97 °F (36.1 °C) (Oral)   Resp 18   Ht 5' 2\" (1.575 m)   Wt 188 lb 0.8 oz (85.3 kg)   SpO2 94%   Breastfeeding No   BMI 34.40 kg/m²     Assessment        REVIEW OF SYSTEMS    ? UNABLE TO OBTAIN:     Constitutional:  ?   Chills   ?x  Fatigue   ? Fevers   ? Malaise   ? Weight loss   ?x Other: Weakness    Respiratory:   ?  Cough    ? Shortness of breath    ? Chest pain    ? Other:     Cardiovascular:   ?  Chest pain  ? Dyspnea    ? Exertional chest pressure/discomfort     ?x Fatigue      ? Palpitations    ? Syncope   ? Other:     Gastrointestinal:   ?  Abdominal pain   ? Constipation    ? Diarrhea    ? Dysphagia   ? Reflux             ? Vomiting   ? Other:     Genitourinary:  ?  Dysuria     ? Frequency   ? Hematuria   ? Nocturia   ? Urinary incontinence   ? Other:     Musculoskeletal:   ?x Back pain    ? Muscle weakness   ? Myalgias    ? Neck pain   ? Stiff joints   ? Other:     Behavioral/Psych:   ? Anxiety    ? Depression     ? Mood swings   ? Other:     PHYSICAL ASSESSMENT:     General: ?x  Oriented x3      ? well appearing      ?  Intubated      ? ill appearing      ? Other:    Mental Status: ?x normal mental status exam      ? drowsy      ? Confused      ? Other:     Cardiovascular: ?x  Regular rate/rhythm      ? Arrhythmia      ? Other:     Chest: ?x Effort normal      ?x lungs clear      ? respiratory distress      ? Tachypnea      ? Other:    Abdomen: ?x Soft/non-tender      ?x  Normal appearance      ? Distended      ? Ascites      ? Other:    Neurological: ?x Normal Speech      ?x Normal Sensation      ? Deficits present:      Extremity:  ?x normal skin color/temp      ? clubbing/cyanosis      ? No edema      ? Other:     Palliative Performance Scale:  ___60%  Ambulation reduced; Significant disease; Can't do hobbies/housework; intake normal or reduced; occasional assist; LOC full/confusion  __x_50%  Mainly sit/lie; Extensive disease; Can't do any work; Considerable assist; intake normal or reduced; LOC full/confusion  ___40%  Mainly in bed; Extensive disease; Mainly assist; intake normal or reduced; LOC full/confusion   ___30%  Bed Bound; Extensive disease; Total care; intake reduced; LOCfull/confusion  ___20%  Bed Bound; Extensive disease; Total care; intake minimal; Drowsy/coma  ___10%  Bed Bound; Extensive disease;  Total care; Mouth care only; Drowsy/coma  ___0       Death      Plan      Palliative Interaction:  The patient was seen today along with medical student Lizy Son, patient was sitting comfortably in the recliner  Patient stated that she is feeling much better but does have back pain and no abdominal pain  No family member was present in patient's room    I explained the significance of POA paperwork for health to the patient and also told her that if anytime she is not able to make her healthcare decisions then her  is her legal spokesperson and the patient told that she understands about it I    I explained the different types of codes to the patient and patient stated that she does not want to have CPR/chest compressions to be done if her heart stopped and also does not want to be intubated if her breathing stopped and further stated that she would like to pass away naturally    I changed patient's CODE STATUS to DNR CCA with no intubation which was witnessed by patient's nurse Crow Ahuja and medical student Rocco    I encouraged the patient to take active part in PT and OT therapy    I offered comfort and emotional support to the patient      Education/support to staff  Education/support to family  Education/support to patient  Discharge planning/helping to coordinate care  Communications with primary service  Caregiver support/education  Code status clarified: Full Code  Code status clarified: [facility]  Code status clarified: DNRCCA  Other major issues     Principle Problem/Diagnosis:  Metastatic breast cancer ([facility]    Additional Assessments:  Principal Problem:    Metastatic breast cancer ([facility]  Active Problems:    Anemia    Liver lesion    History of breast cancer    Diabetes mellitus type 2 in [facility] ([facility]    Essential hypertension    Acquired hypothyroidism    Recurrent major depressive disorder, in remission ([facility]    Bone lesion    Iron deficiency    Acute duodenal ulcer with gastric outlet obstruction    Ulcer of esophagus without bleeding    Encounter for palliative care  Resolved Problems:    Septic shock ([facility]    1- Symptom management/ pain control     Pain Assessment:  Pain is controlled with current analgesics. Medication(s) being used: acetaminophen, narcotic analgesics including fentanyl IV, morphine, oxycodone                Anxiety:  fatigue                          Dyspnea:  none                          Fatigue:  Tiredness and weakness    We feel the patient symptoms are being controlled. her current regimen is reviewed by myself and discussed with the staff.      CODE STATUS changed to DNR CCA with no intubation    2- Goals of care evaluation   The patient goals of care are improve or maintain function/quality of life, accomplish a particular personal goal, spiritual needs, strengthening relationships, preserve independence/autonomy/control and support for family/caregiver   Goals of care discussed with:    ?x Patient independently    ? Patient and Family    ? Family or Healthcare DPOA independently    ? Unable to discuss with patient, family/DPOA not present    3- Code Status  DNR-CCA    4- Other recommendations  - We will continue to provide comfort and support to the patient and the family    Please call with any palliative questions or concerns. Palliative Care Team is available via perfect serve or via phone. Palliative Care will continue to follow Ms. Collins's care as needed. The note has been dictated by dragon, typing errors may be a possibility     Thank you for allowing Palliative Care to participate in the care of Ms. Collins .        Electronically signed by   Carlos Eduardo Kohler MD  Palliative Care Team  on 2/7/2020 at 10:30 AM    Palliative care office: [phone]

## 2020-02-07 NOTE — PROGRESS NOTES
Nutrition Assessment    Type and Reason for Visit: Reassess    Nutrition Recommendations:   - Continue current diet with Glucerna oral supplements. Encourage/monitor intakes as tolerated. - Will monitor for bowel function, labs, and plan of care. - Suggest considering nutrition support if poor oral intakes continue. Will provide recommendations as needed. Nutrition Assessment: Pt sleeping at attempted visit. Continues to consume variable intakes of meals from 25-75%. Pt drinking Glucerna supplements at times. Noted last BM on 2/6 - pt has been started on lactulose. Malnutrition Assessment:  · Malnutrition Status: Meets the criteria for moderate malnutrition  · Context: Acute illness or injury  · Findings of the 6 clinical characteristics of malnutrition (Minimum of 2 out of 6 clinical characteristics is required to make the diagnosis of moderate or severe Protein Calorie Malnutrition based on AND/ASPEN Guidelines):  1. Energy Intake-Less than or equal to 50% of estimated energy requirement, Greater than or equal to 7 days    2. Weight Loss-No significant weight loss - Variable wt fluctuations since admission. 3. Fat Loss-Mild subcutaneous fat loss, Orbital  4. Muscle Loss-Unable to assess  5. Fluid Accumulation-Mild fluid accumulation, Extremities    Nutrition Risk Level: High    Nutrient Needs:  · Estimated Daily Total Kcal: 6663-1000 kcal/day  · Estimated Daily Protein (g): 60-80 gm pro/day    Nutrition Diagnosis:   · Problem: Inadequate oral intake  · Etiology: related to (Lethargy, Current Condition, Appetite)     Signs and symptoms:  as evidenced by (variable PO intakes, need for oral supplements)    Objective Information:  · Nutrition-Focused Physical Findings: Last BM 2/6 - started on lactulose.   · Wound Type: None  · Current Nutrition Therapies:  · Oral Diet Orders: General   · Oral Diet intake: (Variable intakes of 0-75%)  · Oral Nutrition Supplement (ONS) Orders: Diabetic Oral Supplement  · ONS intake: 0%, 1-25%, 26-50%  · Anthropometric Measures:  · Ht: 5' 2\" (157.5 cm)   · Current Body Wt: 188 lb 0.8 oz (85.3 kg)  · Admission Body Wt: 183 lb 13.8 oz (83.4 kg)  · % Weight Change:  Variable wts since admission noted. · Ideal Body Wt: 110 lb (49.9 kg), % Ideal Body 166%  · BMI Classification: BMI 30.0 - 34.9 Obese Class I    Nutrition Interventions:   Continue current diet, Continue current ONS  Continued Inpatient Monitoring, Education Not Indicated    Nutrition Evaluation:   · Evaluation: Progressing toward goals   · Goals: Oral intakes to meet at least 75% of estimated nutrition needs.     · Monitoring: Meal Intake, Supplement Intake, Diet Tolerance, Skin Integrity, I&O, Mental Status/Confusion, Weight, Pertinent Labs, Monitor Hemodynamic Status, Monitor Bowel Function    Electronically signed by Roel Santizo RD, MIKEY on 2/7/20 at 1:54 PM    Contact Number: [phone]

## 2020-02-08 LAB
ABSOLUTE EOS #: 0.21 K/UL (ref 0–0.44)
ABSOLUTE IMMATURE GRANULOCYTE: 0.08 K/UL (ref 0–0.3)
ABSOLUTE LYMPH #: 1.99 K/UL (ref 1.1–3.7)
ABSOLUTE MONO #: 0.78 K/UL (ref 0.1–1.2)
ANION GAP SERPL CALCULATED.3IONS-SCNC: 13 MMOL/L (ref 9–17)
BASOPHILS # BLD: 1 % (ref 0–2)
BASOPHILS ABSOLUTE: 0.08 K/UL (ref 0–0.2)
BUN BLDV-MCNC: 11 MG/DL (ref 8–23)
BUN/CREAT BLD: ABNORMAL (ref 9–20)
CALCIUM SERPL-MCNC: 7.8 MG/DL (ref 8.6–10.4)
CHLORIDE BLD-SCNC: 99 MMOL/L (ref 98–107)
CO2: 25 MMOL/L (ref 20–31)
CREAT SERPL-MCNC: 0.51 MG/DL (ref 0.5–0.9)
DIFFERENTIAL TYPE: ABNORMAL
EOSINOPHILS RELATIVE PERCENT: 3 % (ref 1–4)
GFR AFRICAN AMERICAN: >60 ML/MIN
GFR NON-AFRICAN AMERICAN: >60 ML/MIN
GFR SERPL CREATININE-BSD FRML MDRD: ABNORMAL ML/MIN/{1.73_M2}
GFR SERPL CREATININE-BSD FRML MDRD: ABNORMAL ML/MIN/{1.73_M2}
GLUCOSE BLD-MCNC: 110 MG/DL (ref 65–105)
GLUCOSE BLD-MCNC: 121 MG/DL (ref 70–99)
GLUCOSE BLD-MCNC: 124 MG/DL (ref 65–105)
GLUCOSE BLD-MCNC: 132 MG/DL (ref 65–105)
GLUCOSE BLD-MCNC: 138 MG/DL (ref 65–105)
HCT VFR BLD CALC: 32.6 % (ref 36.3–47.1)
HEMOGLOBIN: 9.3 G/DL (ref 11.9–15.1)
IMMATURE GRANULOCYTES: 1 %
LYMPHOCYTES # BLD: 27 % (ref 24–43)
MCH RBC QN AUTO: 28 PG (ref 25.2–33.5)
MCHC RBC AUTO-ENTMCNC: 28.5 G/DL (ref 28.4–34.8)
MCV RBC AUTO: 98.2 FL (ref 82.6–102.9)
MONOCYTES # BLD: 11 % (ref 3–12)
NRBC AUTOMATED: 0 PER 100 WBC
PDW BLD-RTO: 16.1 % (ref 11.8–14.4)
PLATELET # BLD: 298 K/UL (ref 138–453)
PLATELET ESTIMATE: ABNORMAL
PMV BLD AUTO: 10.3 FL (ref 8.1–13.5)
POTASSIUM SERPL-SCNC: 4 MMOL/L (ref 3.7–5.3)
RBC # BLD: 3.32 M/UL (ref 3.95–5.11)
RBC # BLD: ABNORMAL 10*6/UL
SEG NEUTROPHILS: 57 % (ref 36–65)
SEGMENTED NEUTROPHILS ABSOLUTE COUNT: 4.17 K/UL (ref 1.5–8.1)
SODIUM BLD-SCNC: 137 MMOL/L (ref 135–144)
WBC # BLD: 7.3 K/UL (ref 3.5–11.3)
WBC # BLD: ABNORMAL 10*3/UL

## 2020-02-08 PROCEDURE — 82947 ASSAY GLUCOSE BLOOD QUANT: CPT

## 2020-02-08 PROCEDURE — 99232 SBSQ HOSP IP/OBS MODERATE 35: CPT | Performed by: INTERNAL MEDICINE

## 2020-02-08 PROCEDURE — 6370000000 HC RX 637 (ALT 250 FOR IP): Performed by: INTERNAL MEDICINE

## 2020-02-08 PROCEDURE — 85025 COMPLETE CBC W/AUTO DIFF WBC: CPT

## 2020-02-08 PROCEDURE — 2580000003 HC RX 258: Performed by: ORTHOPAEDIC SURGERY

## 2020-02-08 PROCEDURE — 6370000000 HC RX 637 (ALT 250 FOR IP): Performed by: ORTHOPAEDIC SURGERY

## 2020-02-08 PROCEDURE — 36415 COLL VENOUS BLD VENIPUNCTURE: CPT

## 2020-02-08 PROCEDURE — 80048 BASIC METABOLIC PNL TOTAL CA: CPT

## 2020-02-08 PROCEDURE — 1200000000 HC SEMI PRIVATE

## 2020-02-08 PROCEDURE — 6360000002 HC RX W HCPCS: Performed by: ORTHOPAEDIC SURGERY

## 2020-02-08 RX ADMIN — SENNOSIDES 8.6 MG: 8.6 TABLET, FILM COATED ORAL at 20:05

## 2020-02-08 RX ADMIN — LACTULOSE 20 G: 10 SOLUTION ORAL at 14:02

## 2020-02-08 RX ADMIN — METOPROLOL TARTRATE 50 MG: 50 TABLET, FILM COATED ORAL at 08:59

## 2020-02-08 RX ADMIN — LINAGLIPTIN 5 MG: 5 TABLET, FILM COATED ORAL at 08:51

## 2020-02-08 RX ADMIN — METFORMIN HYDROCHLORIDE 1000 MG: 500 TABLET ORAL at 17:13

## 2020-02-08 RX ADMIN — SODIUM CHLORIDE, PRESERVATIVE FREE 10 ML: 5 INJECTION INTRAVENOUS at 20:25

## 2020-02-08 RX ADMIN — ENOXAPARIN SODIUM 40 MG: 40 INJECTION SUBCUTANEOUS at 08:51

## 2020-02-08 RX ADMIN — SODIUM CHLORIDE, PRESERVATIVE FREE 10 ML: 5 INJECTION INTRAVENOUS at 08:55

## 2020-02-08 RX ADMIN — FERROUS SULFATE TAB EC 325 MG (65 MG FE EQUIVALENT) 325 MG: 325 (65 FE) TABLET DELAYED RESPONSE at 17:13

## 2020-02-08 RX ADMIN — LEVOTHYROXINE SODIUM 100 MCG: 100 TABLET ORAL at 08:53

## 2020-02-08 RX ADMIN — OXYCODONE HYDROCHLORIDE 10 MG: 5 TABLET ORAL at 04:47

## 2020-02-08 RX ADMIN — CALCIUM CARBONATE-CHOLECALCIFEROL TAB 250 MG-125 UNIT 500 MG: 250-125 TAB at 08:51

## 2020-02-08 RX ADMIN — FERROUS SULFATE TAB EC 325 MG (65 MG FE EQUIVALENT) 325 MG: 325 (65 FE) TABLET DELAYED RESPONSE at 08:52

## 2020-02-08 RX ADMIN — INSULIN GLARGINE 10 UNITS: 100 INJECTION, SOLUTION SUBCUTANEOUS at 20:05

## 2020-02-08 RX ADMIN — DOCUSATE SODIUM 100 MG: 100 CAPSULE, LIQUID FILLED ORAL at 08:52

## 2020-02-08 RX ADMIN — OXYCODONE HYDROCHLORIDE 10 MG: 5 TABLET ORAL at 15:46

## 2020-02-08 RX ADMIN — PANTOPRAZOLE SODIUM 40 MG: 40 TABLET, DELAYED RELEASE ORAL at 08:53

## 2020-02-08 RX ADMIN — VENLAFAXINE HYDROCHLORIDE 150 MG: 150 CAPSULE, EXTENDED RELEASE ORAL at 08:52

## 2020-02-08 RX ADMIN — ACETAMINOPHEN 650 MG: 325 TABLET ORAL at 17:26

## 2020-02-08 RX ADMIN — METFORMIN HYDROCHLORIDE 1000 MG: 500 TABLET ORAL at 08:52

## 2020-02-08 RX ADMIN — DOCUSATE SODIUM 100 MG: 100 CAPSULE, LIQUID FILLED ORAL at 20:05

## 2020-02-08 RX ADMIN — SENNOSIDES 8.6 MG: 8.6 TABLET, FILM COATED ORAL at 08:52

## 2020-02-08 RX ADMIN — SODIUM CHLORIDE, PRESERVATIVE FREE 10 ML: 5 INJECTION INTRAVENOUS at 20:24

## 2020-02-08 RX ADMIN — PANTOPRAZOLE SODIUM 40 MG: 40 TABLET, DELAYED RELEASE ORAL at 17:13

## 2020-02-08 ASSESSMENT — ENCOUNTER SYMPTOMS
EYE DISCHARGE: 0
ABDOMINAL PAIN: 0
ABDOMINAL DISTENTION: 0
APNEA: 0
COUGH: 0
COLOR CHANGE: 0
EYE ITCHING: 0
BACK PAIN: 1
DIARRHEA: 0

## 2020-02-08 ASSESSMENT — PAIN SCALES - GENERAL
PAINLEVEL_OUTOF10: 2
PAINLEVEL_OUTOF10: 7
PAINLEVEL_OUTOF10: 2
PAINLEVEL_OUTOF10: 8
PAINLEVEL_OUTOF10: 6
PAINLEVEL_OUTOF10: 2

## 2020-02-08 ASSESSMENT — PAIN DESCRIPTION - PAIN TYPE: TYPE: ACUTE PAIN

## 2020-02-08 ASSESSMENT — PAIN DESCRIPTION - LOCATION: LOCATION: HIP

## 2020-02-08 ASSESSMENT — PAIN DESCRIPTION - DESCRIPTORS: DESCRIPTORS: ACHING;DULL

## 2020-02-08 ASSESSMENT — PAIN DESCRIPTION - FREQUENCY: FREQUENCY: INTERMITTENT

## 2020-02-08 ASSESSMENT — PAIN DESCRIPTION - ONSET: ONSET: GRADUAL

## 2020-02-08 ASSESSMENT — PAIN DESCRIPTION - ORIENTATION: ORIENTATION: LEFT

## 2020-02-08 NOTE — DISCHARGE SUMMARY
medication       Essential hypertension continue home medication       Acquired hypothyroidism levothyroxine       Recurrent major depressive disorder, in remission ([facility] continue home medication     Acute blood loss anemia secondary to acute duodenal ulcer with gastric outlet obstruction status post endoscopy follow-up on biopsy results status post blood transfusion repeat CBC in 1 week follow-up with GI in 2 weeks  Resume aspirin if no evidence of bleeding in 1 week       Ulcer of esophagus without bleeding no evidence of fungal infection follow-up with GI in 2 weeks       Encounter for palliative care palliative care  Patient currently DNR    Overnight patient was kept as her urine output was decreased currently urine output has improved  Resolved Problems:    Septic shock developed during hospitalization ([facility] status post IV antibiotics resolved ID recommendation appreciated watch ofF IV antibiotics  Physical exam     Alert  Chest: Clear to auscultation bilateral  Abdomen: Nontender nondistended  CVS: S1-S2  Neurology: Moves extremity  Significant therapeutic interventions:     Significant Diagnostic Studies:   Labs / Micro:  CBC:   Lab Results   Component Value Date    WBC 7.3 02/08/2020    RBC 3.32 02/08/2020    HGB 9.3 02/08/2020    HCT 32.6 02/08/2020    MCV 98.2 02/08/2020    MCH 28.0 02/08/2020    MCHC 28.5 02/08/2020    RDW 16.1 02/08/2020     02/08/2020     BMP:    Lab Results   Component Value Date    GLUCOSE 121 02/08/2020     02/08/2020    K 4.0 02/08/2020    CL 99 02/08/2020    CO2 25 02/08/2020    ANIONGAP 13 02/08/2020    BUN 11 02/08/2020    CREATININE 0.51 02/08/2020    BUNCRER NOT REPORTED 02/08/2020    CALCIUM 7.8 02/08/2020    LABGLOM >60 02/08/2020    GFRAA >60 02/08/2020    GFR      02/08/2020    GFR NOT REPORTED 02/08/2020     HFP:    Lab Results   Component Value Date    PROT 4.5 01/27/2020     CMP:    Lab Results   Component Value Date    GLUCOSE 121 02/08/2020

## 2020-02-08 NOTE — PROGRESS NOTES
[facility] -   Infectious diseases evaluation  admission date 1/26/2020    reason for consultation:   antibiotics management    Impression :   Current:  · Bone mets left pelvis / iliac - biopsy w ORIF done 2/1/20  · abd adeno carcinomatosis  · Duodenitis w ulcer  · EGD duodenal ulcer w clot  w gastric outlet narrowing  · EGD distal white esophagitis - candida neg on path  · Past breast CA post chemo and mastectomy   · DM  · Resolved septic shock  Discussion / summary of stay / plan of care   · No longer requires antibiotic coverage  Recommendations   ·   Monitor off antibiotics  · Palliative care on board but pt unclear if still to receive chemotx? · Okay for discharge  · Discussed with nurse and patient    Infection Control Recommendations   · [place] Precautions      Antimicrobial Stewardship Recommendations   · Stop antibiotics     Coordination ofOutpatient Care:   · Estimated Length of IV antimicrobials: None  · Patient will need Midline / picc Catheter Insertion: No  · Patient will need SNF:   · Patient will need outpatient wound care: No    History of Present Illness:   Initial history:  Arelis Cruz is a 77y.o.-year-old female with pmh of DM, hypothyroidism, CAD s/p CABG in 2008, breast cancer on left side s/p chemotherapy and mastectomy in 2007 who was admitted with abd pain, onset of vomiting for months. Found in septic shock that has resolved after 4 days of zosyn and on a week of vanco. Patient is no longer requiring antibiotic therapy. Pressors are all stopped - no fever or leukocytosis and no pos cx on record - CXR neg and CT AP showed possible perit carcinomatosis and Bone mets left pelvis / iliac and T10- -   biopsy w ORIF done 2/1/20 w nail in the femur to protect the bone.   OR biopsy showed metastatic adenocarcinoma  CT in the other hospital suggested a possible duodenitis   Upon transfer to [facility], EGD 1/30/20 showed ulcer with a clot and in the last 72 hours. No results for input(s): BC in the last 72 hours. Lab Results   Component Value Date    CREATININE 0.51 02/08/2020    GLUCOSE 121 02/08/2020       Detailed results: Thank you for allowing us to participate in the care of this patient. Please call with questions. This note is created with the assistance of a speech recognition program.  While intending to generate adocument that actually reflects the content of the visit, the document can still have some errors including those of syntax and sound a like substitutions which may escape proof reading. It such instances, actual meaningcan be extrapolated by contextual diversion. Kayley Cervantes MD  PGY-1, Internal Medicine Resident  Miriam Jennings.:    I have discussed the case, including pertinent history and exam findings with the residents and students. I have seen and examined the patient and the key elements of the encounter have been performed by me. I was present when the student obtained his information or examined the patient. I have reviewed the laboratory data, other diagnostic studies and discussed them with the residents. I have updated the medical record where necessary. I agree with the assessment, plan and orders as documented by the resident/ student.     Miguelina Pulido MD.    2/8/2020 1:18 PM

## 2020-02-08 NOTE — PROGRESS NOTES
Patient refuse to take her Lactulose solution. She states \" I don't want to take it. I only take when I want it\".

## 2020-02-08 NOTE — PROGRESS NOTES
done.    Past Medical History: Breast cancer    Past Surgical History: Left mastectomy    Medications:    Prior to Admission medications    Medication Sig Start Date End Date Taking? Authorizing Provider   aspirin 81 MG tablet Take 1 tablet by mouth daily 2/13/20  Yes Fariba Boogie MD   ferrous sulfate 325 (65 Fe) MG EC tablet Take 1 tablet by mouth 2 times daily (with meals) 2/6/20  Yes Fariba Boogie MD   lactulose (CHRONULAC) 10 GM/15ML solution Take 30 mLs by mouth 3 times daily as needed (constipation) 2/6/20  Yes Fariba Boogie MD   oxyCODONE (ROXICODONE) 5 MG immediate release tablet Take 1 tablet by mouth every 4 hours as needed for Pain for up to 12 doses.  2/6/20 2/9/20 Yes Fariba Boogie MD   pantoprazole (PROTONIX) 40 MG tablet Take 1 tablet by mouth 2 times daily (before meals) 2/6/20  Yes Fariba Boogie MD   venlafaxine (EFFEXOR XR) 150 MG extended release capsule Take 150 mg by mouth daily   Yes Historical Provider, MD   metFORMIN (GLUCOPHAGE) 1000 MG tablet Take 1,000 mg by mouth 2 times daily (with meals)   Yes Historical Provider, MD   levothyroxine (SYNTHROID) 100 MCG tablet Take 100 mcg by mouth Daily   Yes Historical Provider, MD   glimepiride (AMARYL) 4 MG tablet Take 4 mg by mouth 2 times daily   Yes Historical Provider, MD   lisinopril (PRINIVIL;ZESTRIL) 2.5 MG tablet Take 2.5 mg by mouth daily   Yes Historical Provider, MD   carvedilol (COREG) 6.25 MG tablet Take 6.25 mg by mouth 2 times daily (with meals)   Yes Historical Provider, MD   SITagliptin (JANUVIA) 100 MG tablet Take 100 mg by mouth daily   Yes Historical Provider, MD     Current Facility-Administered Medications   Medication Dose Route Frequency Provider Last Rate Last Dose    insulin lispro (HUMALOG) injection vial 0-18 Units  0-18 Units Subcutaneous 4x Daily AC & HS Katiuska Ashton MD        metoprolol (LOPRESSOR) injection 5 mg  5 mg Intravenous Q8H PRN Katiuska Ashton MD        metoprolol tartrate Green, DO   8.6 mg at 02/08/20 3161    ferrous sulfate EC tablet 325 mg  325 mg Oral BID WC Dinh Lemmings, DO   325 mg at 02/08/20 1713    sodium chloride flush 0.9 % injection 10 mL  10 mL Intravenous 2 times per day Dinh Lemmings, DO   10 mL at 02/08/20 0855    sodium chloride flush 0.9 % injection 10 mL  10 mL Intravenous PRN Dinh Lemmings, DO   10 mL at 01/30/20 2042    magnesium hydroxide (MILK OF MAGNESIA) 400 MG/5ML suspension 30 mL  30 mL Oral Daily PRN Dinh Lemmings, DO        ondansetron [facility]) injection 4 mg  4 mg Intravenous Q6H PRN Dinh Lemmings, DO        enoxaparin (LOVENOX) injection 40 mg  40 mg Subcutaneous Daily Dinh Lemmings, DO   40 mg at 02/08/20 5988    potassium chloride 10 mEq/100 mL IVPB (Peripheral Line)  10 mEq Intravenous PRN Dinh Lemmings, DO        bisacodyl (DULCOLAX) suppository 10 mg  10 mg Rectal Daily PRN Dinh Lemmings, DO        acetaminophen (TYLENOL) tablet 650 mg  650 mg Oral Q4H PRN Dinh Lemmings, DO        glucose (GLUTOSE) 40 % oral gel 15 g  15 g Oral PRN Dinh Lemmings, DO        dextrose 50 % IV solution  12.5 g Intravenous PRN Dinh Lemmings, DO        glucagon (rDNA) injection 1 mg  1 mg Intramuscular PRN Dinh Lemmings, DO        dextrose 5 % solution  100 mL/hr Intravenous PRN Dinh Lemmings, DO        oxyCODONE (ROXICODONE) immediate release tablet 5 mg  5 mg Oral Q4H PRN Dinh Lemmings, DO   5 mg at 02/07/20 1548    Or    oxyCODONE (ROXICODONE) immediate release tablet 10 mg  10 mg Oral Q4H PRN Dinh Lemmings, DO   10 mg at 02/08/20 1546       Allergies:  Patient has no known allergies. Social History:   reports that she has never smoked. She has never used smokeless tobacco. She reports that she does not use drugs. Denies smoking. Denies taking alcohol. Family History: Hypertension and diabetes    REVIEW OF SYSTEMS:      Constitutional: No fever or chills.  No night sweats, no weight loss   Eyes: No pedal edema, no clubbing or cyanosis   Skin - normal coloration and turgor, no rashes, no suspicious skin lesions noted ,  Breast left-sided mastectomy no signs of local recurrence    DATA:      Labs:     CBC:   Recent Labs     02/07/20  0532 02/08/20  0429   WBC 7.4 7.3   HGB 8.6* 9.3*   HCT 28.7* 32.6*    298     BMP:   Recent Labs     02/07/20  0532 02/08/20  0429    137   K 3.8 4.0   CO2 29 25   BUN 9 11   CREATININE 0.56 0.51   LABGLOM >60 >60   GLUCOSE 91 121*     PT/INR: No results for input(s): PROTIME, INR in the last 72 hours. APTT:No results for input(s): APTT in the last 72 hours. LIVER PROFILE:  No results for input(s): AST, ALT, LABALBU in the last 72 hours. Us Renal Complete    Result Date: 1/26/2020  EXAMINATION: RETROPERITONEAL ULTRASOUND OF THE KIDNEYS AND URINARY BLADDER 1/26/2020 COMPARISON: None HISTORY: ORDERING SYSTEM PROVIDED HISTORY: left sided hydroureteronephrosis, eval kidneys TECHNOLOGIST PROVIDED HISTORY: left sided hydroureteronephrosis, eval kidneys FINDINGS: Kidneys: The right kidney measures 12.5 cm in length and the left kidney measures 12 cm in length. Kidneys demonstrate normal cortical echogenicity. Probable vascular calcifications are noted bilaterally. Mild left hydronephrosis. . Bladder: Bladder is decompressed with a Matt catheter. Mild left hydronephrosis     Ct Abdomen Pelvis W Iv Contrast Additional Contrast? Oral    Result Date: 1/26/2020  EXAMINATION: CT OF THE ABDOMEN AND PELVIS WITH CONTRAST 1/26/2020 5:25 pm TECHNIQUE: CT of the abdomen and pelvis was performed with the administration of intravenous contrast. Multiplanar reformatted images are provided for review. Dose modulation, iterative reconstruction, and/or weight based adjustment of the mA/kV was utilized to reduce the radiation dose to as low as reasonably achievable.  COMPARISON: January 25, 2020 CT abdomen and pelvis HISTORY: ORDERING SYSTEM PROVIDED HISTORY: possible duodenitis versus metastatic breast adenocarcinoma. Biopsy results showing adenocarcinoma  Back pain second to bone lesions. RECOMMENDATIONS:  Reviewed results of lab work-up and other relevant clinical data. Status post prophylactic rodding of the left femur  Plan for radiation as an outpatient to be followed by systemic therapy. Plan for XRT in [place]. Discussed with family  Prognosis is poor. On letrozole. Family had many questions. About prognosis, management plans and logistics. Needs rehab/SNF on discharge. Will send records to [address] in [place]. Ok to discharge to SNF from my standpoint, needs follow up with radiation in [place]  PGY-3, Internal Medicine  [facility]      Attending Physician Statement   I have discussed the care of [facility], including pertinent history and exam findings with the resident. I have reviewed the key elements of all parts of the encounter with the resident. I have seen and examined the patient with the resident. I agree with the assessment and plan and status of the problem list as documented.                                [address] [place] [address] [facility]                          Cell: [phone]

## 2020-02-09 LAB
ABSOLUTE EOS #: 0.15 K/UL (ref 0–0.44)
ABSOLUTE IMMATURE GRANULOCYTE: 0.07 K/UL (ref 0–0.3)
ABSOLUTE LYMPH #: 1.77 K/UL (ref 1.1–3.7)
ABSOLUTE MONO #: 0.63 K/UL (ref 0.1–1.2)
ANION GAP SERPL CALCULATED.3IONS-SCNC: 11 MMOL/L (ref 9–17)
BASOPHILS # BLD: 1 % (ref 0–2)
BASOPHILS ABSOLUTE: 0.07 K/UL (ref 0–0.2)
BUN BLDV-MCNC: 11 MG/DL (ref 8–23)
BUN/CREAT BLD: ABNORMAL (ref 9–20)
CALCIUM SERPL-MCNC: 8.4 MG/DL (ref 8.6–10.4)
CHLORIDE BLD-SCNC: 101 MMOL/L (ref 98–107)
CO2: 26 MMOL/L (ref 20–31)
CREAT SERPL-MCNC: 0.48 MG/DL (ref 0.5–0.9)
CULTURE: NORMAL
CULTURE: NORMAL
DIFFERENTIAL TYPE: ABNORMAL
EOSINOPHILS RELATIVE PERCENT: 2 % (ref 1–4)
GFR AFRICAN AMERICAN: >60 ML/MIN
GFR NON-AFRICAN AMERICAN: >60 ML/MIN
GFR SERPL CREATININE-BSD FRML MDRD: ABNORMAL ML/MIN/{1.73_M2}
GFR SERPL CREATININE-BSD FRML MDRD: ABNORMAL ML/MIN/{1.73_M2}
GLUCOSE BLD-MCNC: 117 MG/DL (ref 65–105)
GLUCOSE BLD-MCNC: 127 MG/DL (ref 65–105)
GLUCOSE BLD-MCNC: 84 MG/DL (ref 65–105)
GLUCOSE BLD-MCNC: 87 MG/DL (ref 70–99)
HCT VFR BLD CALC: 30.9 % (ref 36.3–47.1)
HEMOGLOBIN: 9 G/DL (ref 11.9–15.1)
IMMATURE GRANULOCYTES: 1 %
LACTIC ACID, SEPSIS WHOLE BLOOD: 4 MMOL/L (ref 0.5–1.9)
LACTIC ACID, SEPSIS: ABNORMAL MMOL/L (ref 0.5–1.9)
LYMPHOCYTES # BLD: 25 % (ref 24–43)
Lab: NORMAL
Lab: NORMAL
MCH RBC QN AUTO: 28.4 PG (ref 25.2–33.5)
MCHC RBC AUTO-ENTMCNC: 29.1 G/DL (ref 28.4–34.8)
MCV RBC AUTO: 97.5 FL (ref 82.6–102.9)
MONOCYTES # BLD: 9 % (ref 3–12)
NRBC AUTOMATED: 0 PER 100 WBC
PDW BLD-RTO: 16 % (ref 11.8–14.4)
PLATELET # BLD: 389 K/UL (ref 138–453)
PLATELET ESTIMATE: ABNORMAL
PMV BLD AUTO: 10.6 FL (ref 8.1–13.5)
POTASSIUM SERPL-SCNC: 4.6 MMOL/L (ref 3.7–5.3)
RBC # BLD: 3.17 M/UL (ref 3.95–5.11)
RBC # BLD: ABNORMAL 10*6/UL
SEG NEUTROPHILS: 62 % (ref 36–65)
SEGMENTED NEUTROPHILS ABSOLUTE COUNT: 4.48 K/UL (ref 1.5–8.1)
SODIUM BLD-SCNC: 138 MMOL/L (ref 135–144)
SPECIMEN DESCRIPTION: NORMAL
SPECIMEN DESCRIPTION: NORMAL
WBC # BLD: 7.2 K/UL (ref 3.5–11.3)
WBC # BLD: ABNORMAL 10*3/UL

## 2020-02-09 PROCEDURE — 1200000000 HC SEMI PRIVATE

## 2020-02-09 PROCEDURE — 97530 THERAPEUTIC ACTIVITIES: CPT

## 2020-02-09 PROCEDURE — 6370000000 HC RX 637 (ALT 250 FOR IP): Performed by: ORTHOPAEDIC SURGERY

## 2020-02-09 PROCEDURE — 6370000000 HC RX 637 (ALT 250 FOR IP): Performed by: INTERNAL MEDICINE

## 2020-02-09 PROCEDURE — 99232 SBSQ HOSP IP/OBS MODERATE 35: CPT | Performed by: INTERNAL MEDICINE

## 2020-02-09 PROCEDURE — 97110 THERAPEUTIC EXERCISES: CPT

## 2020-02-09 PROCEDURE — 85025 COMPLETE CBC W/AUTO DIFF WBC: CPT

## 2020-02-09 PROCEDURE — 36415 COLL VENOUS BLD VENIPUNCTURE: CPT

## 2020-02-09 PROCEDURE — 82947 ASSAY GLUCOSE BLOOD QUANT: CPT

## 2020-02-09 PROCEDURE — 6360000002 HC RX W HCPCS: Performed by: ORTHOPAEDIC SURGERY

## 2020-02-09 PROCEDURE — 2580000003 HC RX 258: Performed by: ORTHOPAEDIC SURGERY

## 2020-02-09 PROCEDURE — 83605 ASSAY OF LACTIC ACID: CPT

## 2020-02-09 PROCEDURE — 80048 BASIC METABOLIC PNL TOTAL CA: CPT

## 2020-02-09 PROCEDURE — 97116 GAIT TRAINING THERAPY: CPT

## 2020-02-09 RX ADMIN — PANTOPRAZOLE SODIUM 40 MG: 40 TABLET, DELAYED RELEASE ORAL at 17:24

## 2020-02-09 RX ADMIN — SODIUM CHLORIDE, PRESERVATIVE FREE 10 ML: 5 INJECTION INTRAVENOUS at 08:46

## 2020-02-09 RX ADMIN — VENLAFAXINE HYDROCHLORIDE 150 MG: 150 CAPSULE, EXTENDED RELEASE ORAL at 08:44

## 2020-02-09 RX ADMIN — OXYCODONE HYDROCHLORIDE 10 MG: 5 TABLET ORAL at 15:46

## 2020-02-09 RX ADMIN — LACTULOSE 20 G: 10 SOLUTION ORAL at 08:45

## 2020-02-09 RX ADMIN — CALCIUM CARBONATE-CHOLECALCIFEROL TAB 250 MG-125 UNIT 500 MG: 250-125 TAB at 08:45

## 2020-02-09 RX ADMIN — METFORMIN HYDROCHLORIDE 1000 MG: 500 TABLET ORAL at 08:45

## 2020-02-09 RX ADMIN — OXYCODONE HYDROCHLORIDE 10 MG: 5 TABLET ORAL at 09:19

## 2020-02-09 RX ADMIN — INSULIN GLARGINE 10 UNITS: 100 INJECTION, SOLUTION SUBCUTANEOUS at 20:25

## 2020-02-09 RX ADMIN — SENNOSIDES 8.6 MG: 8.6 TABLET, FILM COATED ORAL at 08:44

## 2020-02-09 RX ADMIN — PANTOPRAZOLE SODIUM 40 MG: 40 TABLET, DELAYED RELEASE ORAL at 08:44

## 2020-02-09 RX ADMIN — OXYCODONE HYDROCHLORIDE 10 MG: 5 TABLET ORAL at 20:25

## 2020-02-09 RX ADMIN — LEVOTHYROXINE SODIUM 100 MCG: 100 TABLET ORAL at 08:45

## 2020-02-09 RX ADMIN — METOPROLOL TARTRATE 50 MG: 50 TABLET, FILM COATED ORAL at 08:45

## 2020-02-09 RX ADMIN — ACETAMINOPHEN 650 MG: 325 TABLET ORAL at 11:39

## 2020-02-09 RX ADMIN — SODIUM CHLORIDE, PRESERVATIVE FREE 10 ML: 5 INJECTION INTRAVENOUS at 20:22

## 2020-02-09 RX ADMIN — LINAGLIPTIN 5 MG: 5 TABLET, FILM COATED ORAL at 08:45

## 2020-02-09 RX ADMIN — ENOXAPARIN SODIUM 40 MG: 40 INJECTION SUBCUTANEOUS at 08:48

## 2020-02-09 RX ADMIN — FERROUS SULFATE TAB EC 325 MG (65 MG FE EQUIVALENT) 325 MG: 325 (65 FE) TABLET DELAYED RESPONSE at 17:24

## 2020-02-09 RX ADMIN — METFORMIN HYDROCHLORIDE 1000 MG: 500 TABLET ORAL at 17:24

## 2020-02-09 RX ADMIN — DOCUSATE SODIUM 100 MG: 100 CAPSULE, LIQUID FILLED ORAL at 08:44

## 2020-02-09 RX ADMIN — FERROUS SULFATE TAB EC 325 MG (65 MG FE EQUIVALENT) 325 MG: 325 (65 FE) TABLET DELAYED RESPONSE at 08:45

## 2020-02-09 RX ADMIN — ACETAMINOPHEN 650 MG: 325 TABLET ORAL at 17:24

## 2020-02-09 ASSESSMENT — PAIN DESCRIPTION - ORIENTATION
ORIENTATION: LEFT
ORIENTATION: LEFT

## 2020-02-09 ASSESSMENT — PAIN SCALES - GENERAL
PAINLEVEL_OUTOF10: 7
PAINLEVEL_OUTOF10: 7
PAINLEVEL_OUTOF10: 6
PAINLEVEL_OUTOF10: 7
PAINLEVEL_OUTOF10: 8
PAINLEVEL_OUTOF10: 8
PAINLEVEL_OUTOF10: 0
PAINLEVEL_OUTOF10: 8
PAINLEVEL_OUTOF10: 6
PAINLEVEL_OUTOF10: 8
PAINLEVEL_OUTOF10: 8

## 2020-02-09 ASSESSMENT — PAIN DESCRIPTION - PAIN TYPE
TYPE: CHRONIC PAIN
TYPE: CHRONIC PAIN

## 2020-02-09 ASSESSMENT — ENCOUNTER SYMPTOMS
ABDOMINAL PAIN: 0
EYE DISCHARGE: 0
COUGH: 0
ABDOMINAL DISTENTION: 0
COLOR CHANGE: 0
APNEA: 0
BACK PAIN: 1
DIARRHEA: 0
EYE ITCHING: 0

## 2020-02-09 ASSESSMENT — PAIN DESCRIPTION - LOCATION
LOCATION: HIP;KNEE
LOCATION: KNEE

## 2020-02-09 ASSESSMENT — PAIN DESCRIPTION - PROGRESSION: CLINICAL_PROGRESSION: NOT CHANGED

## 2020-02-09 ASSESSMENT — PAIN DESCRIPTION - DESCRIPTORS
DESCRIPTORS: ACHING
DESCRIPTORS: ACHING;SORE

## 2020-02-09 ASSESSMENT — PAIN DESCRIPTION - ONSET: ONSET: ON-GOING

## 2020-02-09 ASSESSMENT — PAIN DESCRIPTION - FREQUENCY: FREQUENCY: CONTINUOUS

## 2020-02-09 NOTE — PROGRESS NOTES
bowel or bladder. Additional work-up also shows anemia and iron deficiency. Patient has never had a EGD or colonoscopy done. Past Medical History: Breast cancer    Past Surgical History: Left mastectomy    Medications:    Prior to Admission medications    Medication Sig Start Date End Date Taking? Authorizing Provider   aspirin 81 MG tablet Take 1 tablet by mouth daily 2/13/20  Yes Júnior Roberson MD   ferrous sulfate 325 (65 Fe) MG EC tablet Take 1 tablet by mouth 2 times daily (with meals) 2/6/20  Yes Júnior Roberson MD   lactulose (CHRONULAC) 10 GM/15ML solution Take 30 mLs by mouth 3 times daily as needed (constipation) 2/6/20  Yes Júnior Roberson MD   oxyCODONE (ROXICODONE) 5 MG immediate release tablet Take 1 tablet by mouth every 4 hours as needed for Pain for up to 12 doses.  2/6/20 2/9/20 Yes Júnior Roberson MD   pantoprazole (PROTONIX) 40 MG tablet Take 1 tablet by mouth 2 times daily (before meals) 2/6/20  Yes Júnior Roberson MD   venlafaxine (EFFEXOR XR) 150 MG extended release capsule Take 150 mg by mouth daily   Yes Historical Provider, MD   metFORMIN (GLUCOPHAGE) 1000 MG tablet Take 1,000 mg by mouth 2 times daily (with meals)   Yes Historical Provider, MD   levothyroxine (SYNTHROID) 100 MCG tablet Take 100 mcg by mouth Daily   Yes Historical Provider, MD   glimepiride (AMARYL) 4 MG tablet Take 4 mg by mouth 2 times daily   Yes Historical Provider, MD   lisinopril (PRINIVIL;ZESTRIL) 2.5 MG tablet Take 2.5 mg by mouth daily   Yes Historical Provider, MD   carvedilol (COREG) 6.25 MG tablet Take 6.25 mg by mouth 2 times daily (with meals)   Yes Historical Provider, MD   SITagliptin (JANUVIA) 100 MG tablet Take 100 mg by mouth daily   Yes Historical Provider, MD     Current Facility-Administered Medications   Medication Dose Route Frequency Provider Last Rate Last Dose    insulin lispro (HUMALOG) injection vial 0-18 Units  0-18 Units Subcutaneous 4x Daily AC & HS Júnior Roberson MD  metoprolol (LOPRESSOR) injection 5 mg  5 mg Intravenous Q8H PRN Katiuska Hester MD        metoprolol tartrate (LOPRESSOR) tablet 50 mg  50 mg Oral BID Garrett Holland MD   50 mg at 02/09/20 0845    lactulose (CHRONULAC) 10 GM/15ML solution 20 g  20 g Oral TID Garrett Holland MD   20 g at 02/09/20 0845    0.9 % sodium chloride bolus  250 mL Intravenous Once Micheal MONTE Blood, DO        metFORMIN (GLUCOPHAGE) tablet 1,000 mg  1,000 mg Oral BID  Rosina Mello, DO   1,000 mg at 02/09/20 0845    linagliptin (TRADJENTA) tablet 5 mg  5 mg Oral Daily Rosina Mello, DO   5 mg at 02/09/20 0845    fentaNYL (SUBLIMAZE) injection 25 mcg  25 mcg Intravenous Q5 Min PRN Rosina Mello, DO   25 mcg at 02/02/20 1143    sodium chloride flush 0.9 % injection 10 mL  10 mL Intravenous PRN Micheal Waite DO        alteplase (CATHFLO) injection 2 mg  2 mg Intracatheter Once Rosina Melol DO        oyster shell calcium/vitamin D 250-125 MG-UNIT per tablet 500 mg  2 tablet Oral Daily Rosina Mello, DO   500 mg at 02/09/20 0845    pantoprazole (PROTONIX) tablet 40 mg  40 mg Oral BID AC Rosina Mello, DO   40 mg at 02/09/20 0844    [Held by provider] morphine (MS CONTIN) extended release tablet 15 mg  15 mg Oral 2 times per day Rosina Mello, DO   15 mg at 02/02/20 2132    sodium chloride flush 0.9 % injection 10 mL  10 mL Intravenous PRN Rosina Mello, DO   10 mL at 02/08/20 2025    acetaminophen (TYLENOL) tablet 650 mg  650 mg Oral Q4H PRN Rosina Mello, DO   650 mg at 02/09/20 1139    levothyroxine (SYNTHROID) tablet 100 mcg  100 mcg Oral Daily [facility]   100 mcg at 02/09/20 0845    venlafaxine (EFFEXOR XR) extended release capsule 150 mg  150 mg Oral Daily Mauriceon Riaz, DO   150 mg at 02/09/20 0844    insulin glargine (LANTUS) injection vial 10 Units  10 Units Subcutaneous Nightly Rosina Mello DO   10 Units at 02/08/20 2005    docusate sodium (COLACE) capsule 100 mg  100 mg Oral BID Sherill Abelson, DO   100 mg at 02/09/20 9209    senna (SENOKOT) tablet 8.6 mg  1 tablet Oral BID Sherill Abelson, DO   8.6 mg at 02/09/20 0201    ferrous sulfate EC tablet 325 mg  325 mg Oral BID WC Sherill Abelson, DO   325 mg at 02/09/20 0845    sodium chloride flush 0.9 % injection 10 mL  10 mL Intravenous 2 times per day Sherill Abelson, DO   10 mL at 02/09/20 0846    sodium chloride flush 0.9 % injection 10 mL  10 mL Intravenous PRN Sherill Abelson, DO   10 mL at 01/30/20 2042    magnesium hydroxide (MILK OF MAGNESIA) 400 MG/5ML suspension 30 mL  30 mL Oral Daily PRN Sherill Abelson, DO        ondansetron [facility]) injection 4 mg  4 mg Intravenous Q6H PRN Sherill Abelson, DO        enoxaparin (LOVENOX) injection 40 mg  40 mg Subcutaneous Daily Sherill Abelson, DO   40 mg at 02/09/20 0848    potassium chloride 10 mEq/100 mL IVPB (Peripheral Line)  10 mEq Intravenous PRN Sherill Abelson, DO        bisacodyl (DULCOLAX) suppository 10 mg  10 mg Rectal Daily PRN Sherill Abelson, DO        acetaminophen (TYLENOL) tablet 650 mg  650 mg Oral Q4H PRN Sherill Abelson, DO        glucose (GLUTOSE) 40 % oral gel 15 g  15 g Oral PRN Sherill Abelson, DO        dextrose 50 % IV solution  12.5 g Intravenous PRN Sherill Abelson, DO        glucagon (rDNA) injection 1 mg  1 mg Intramuscular PRN Sherill Abelson, DO        dextrose 5 % solution  100 mL/hr Intravenous PRN Sherill Abelson, DO        oxyCODONE (ROXICODONE) immediate release tablet 5 mg  5 mg Oral Q4H PRN Sherill Abelson, DO   5 mg at 02/07/20 1548    Or    oxyCODONE (ROXICODONE) immediate release tablet 10 mg  10 mg Oral Q4H PRN Sherill Abelson, DO   10 mg at 02/09/20 0919       Allergies:  Patient has no known allergies. Social History:   reports that she has never smoked. She has never used smokeless tobacco. She reports that she does not use drugs. Denies smoking. Denies taking alcohol.     Family or movement disorder noted   Musculoskeletal - no joint tenderness, deformity or swelling   Extremities - peripheral pulses normal, no pedal edema, no clubbing or cyanosis   Skin - normal coloration and turgor, no rashes, no suspicious skin lesions noted ,  Breast left-sided mastectomy no signs of local recurrence    DATA:      Labs:     CBC:   Recent Labs     02/08/20 0429 02/09/20  0543   WBC 7.3 7.2   HGB 9.3* 9.0*   HCT 32.6* 30.9*    389     BMP:   Recent Labs     02/08/20 0429 02/09/20  0543    138   K 4.0 4.6   CO2 25 26   BUN 11 11   CREATININE 0.51 0.48*   LABGLOM >60 >60   GLUCOSE 121* 87     PT/INR: No results for input(s): PROTIME, INR in the last 72 hours. APTT:No results for input(s): APTT in the last 72 hours. LIVER PROFILE:  No results for input(s): AST, ALT, LABALBU in the last 72 hours. Us Renal Complete    Result Date: 1/26/2020  EXAMINATION: RETROPERITONEAL ULTRASOUND OF THE KIDNEYS AND URINARY BLADDER 1/26/2020 COMPARISON: None HISTORY: ORDERING SYSTEM PROVIDED HISTORY: left sided hydroureteronephrosis, eval kidneys TECHNOLOGIST PROVIDED HISTORY: left sided hydroureteronephrosis, eval kidneys FINDINGS: Kidneys: The right kidney measures 12.5 cm in length and the left kidney measures 12 cm in length. Kidneys demonstrate normal cortical echogenicity. Probable vascular calcifications are noted bilaterally. Mild left hydronephrosis. . Bladder: Bladder is decompressed with a Matt catheter. Mild left hydronephrosis     Ct Abdomen Pelvis W Iv Contrast Additional Contrast? Oral    Result Date: 1/26/2020  EXAMINATION: CT OF THE ABDOMEN AND PELVIS WITH CONTRAST 1/26/2020 5:25 pm TECHNIQUE: CT of the abdomen and pelvis was performed with the administration of intravenous contrast. Multiplanar reformatted images are provided for review.  Dose modulation, iterative reconstruction, and/or weight based adjustment of the mA/kV was utilized to reduce the radiation dose to as low as pancreas. No gallbladder wall thickening. No gallstones. No sonographic Nunez sign. Common bile duct is within normal limits measuring 5 mm in diameter. Minimal amount of free fluid within the right upper quadrant. Minimal amount of free fluid within the upper abdomen. Heterogeneous increased liver echogenicity could be on the basis of hepatocellular disease or hepatic steatosis. Xr Chest Portable    Result Date: 1/26/2020  EXAMINATION: ONE XRAY VIEW OF THE CHEST 1/26/2020 2:53 pm COMPARISON: None. HISTORY: ORDERING SYSTEM PROVIDED HISTORY: PICC line placement TECHNOLOGIST PROVIDED HISTORY: PICC line placement FINDINGS: The lungs are without consolidation effusion. There is no pneumothorax. The heart is prominent. The upper abdomen is unremarkable. The extrathoracic soft tissues are unremarkable. There is a right subclavian port with the tip in the mid SVC. There is a right-sided PICC line with the tip in the distal mid aspect of the SVC. Mild cardiomegaly without acute pulmonary process. Right-sided PICC line with the tip in the distal mid aspect of the SVC.          IMAGING DATA:          IMPRESSION:     Primary Problem  Metastatic breast cancer [facility])    Active Hospital Problems    Diagnosis Date Noted    Encounter for palliative care [Z51.5]     Acute duodenal ulcer with gastric outlet obstruction [K26.3] 01/30/2020    Ulcer of esophagus without bleeding [K22.10] 01/30/2020    Bone lesion [M89.9]     Iron deficiency [E61.1]     Diabetes mellitus type 2 in obese ([facility] [address] [E11.69, E66.9] 01/29/2020    Essential hypertension [I10] 01/29/2020    Acquired hypothyroidism [E03.9] 01/29/2020    Recurrent major depressive disorder, in remission ([facility] [address] [F33.40] 01/29/2020    Metastatic breast cancer ([facility] [address] Evette Amaya 01/29/2020    History of breast cancer [Z85.3] 01/27/2020    Anemia [D64.9] 01/26/2020    Liver lesion [K76.9] 01/26/2020     History of breast cancer likely hormone receptor positive. Status post left mastectomy and chemotherapy and aromatase inhibitor therapy. Iron deficiency  Hypoproliferative anemia  Bone lesions 2/2 metastatic breast adenocarcinoma. Biopsy results showing adenocarcinoma  Back pain second to bone lesions. RECOMMENDATIONS:  Reviewed results of lab work-up and other relevant clinical data. Status post prophylactic rodding of the left femur  Plan for radiation as an outpatient to be followed by systemic therapy. Plan for XRT in [place]. Continue Femara. Needs rehab/SNF on discharge.   Ok to discharge to SNF from my standpoint, needs follow up with radiation in [facility]                          Cell: [phone]

## 2020-02-09 NOTE — PROGRESS NOTES
Occupational Therapy    Occupational Therapy Not Seen Note    DATE: 2020  Name: Deshaun Horner  : 1950  MRN: [id number]    Patient not available for Occupational Therapy due to:    Patient Declined: SCHUSTER utilized therapeutic use of self in attempt to increase pt participation. Max encouragement utilized, pt declined.     Next Scheduled Treatment: Attempt at later date    Electronically signed by GEGE Bronson on 2020 at 2:40 PM

## 2020-02-09 NOTE — PROGRESS NOTES
[facility]    Progress Note    2/9/2020    9:55 AM    Name:   Fadi Collins  MRN:     [id number]     Acct:      [de-identified]   Room:   [address] [place] [address] Day:  15  Admit Date:  1/26/2020  1:38 PM    PCP:   Kimball Libman, MD  Code Status:  DNR-CCA    Subjective:     C/C: anemia  Interval History Status: not changed. Patient seen and examined  Patient feels weak pending placement  Patient denies fever chest pain   I am seeing the patient for extensive metastatic lesion    Brief History:     ICU course, per documentation:     \"69 y. o. female with history of diabetes, hypothyroidism, CAD status post CABG in 2008, breast cancer on the left side status post chemotherapy and mastectomy in 2007 that presented to outside hospital [facility] on 1/23/2020 for complaints of generalized weakness and fatigue and flulike illness for about 1 month.  She was admitted there for anemia and hyperglycemia and hyponatremia however developed hypotension and had CT imaging which showed concern for duodenitis versus contained duodenal perforation.  Concern for intra-abdominal infection there and antibiotics were narrowed down to Zosyn, initially was on vancomycin as well.      CT chest showed extensive osteoblastic lesions and numerous soft tissue nodules in the greater omentum and mesentery suspicious for carcinomatosis. Benuel Rim showed mild/moderate left side hydroureteronephrosis without any obstructing stone or mass     Ct abdomen-   Distended gallbladder.  No radiodense gallstones noted but ultrasound is   recommended.       Extensive metastatic disease is a risk for fracture at T10.      Had egd 1/30:    Significant inflammation and ulceration of the distal third of the esophagus with white thick discharge suspicious for Candida (path negative for candida)  Although malignancy is less likely but could not be ruled out gentle biopsies were taken           Deep 1/2 cm ulcer in the duodenal bulb just beyond the pyloric orifice, with edema causing some gastric outlet narrowing, was covered with large clotted blood iWatch most of the clotted blood, but there is adherent clot to the crater base which I could not clear I could not see any vessel protruding for which we went ahead and injected epinephrine around it 6 cc of the 1-10,000 epi, at this point there is no fresh blood to indicate any more bleeding which indicate that the bleeding has subsided     Retained food in the stomach related to the gastric outlet narrowing    Had iliac biopsy which was positive for metastatic breast ca    On 2/1 had   Procedure(s):  1. Long TFN of the left femur  2. Femoral neck biopsy  For impending femoral neck fracture due to mets        Medications:      Allergies:  No Known Allergies    Current Meds:   Scheduled Meds:    insulin lispro  0-18 Units Subcutaneous 4x Daily AC & HS    metoprolol tartrate  50 mg Oral BID    lactulose  20 g Oral TID    sodium chloride  250 mL Intravenous Once    metFORMIN  1,000 mg Oral BID WC    linagliptin  5 mg Oral Daily    alteplase  2 mg Intracatheter Once    oyster shell calcium/vitamin D  2 tablet Oral Daily    pantoprazole  40 mg Oral BID AC    [Held by provider] morphine  15 mg Oral 2 times per day    levothyroxine  100 mcg Oral Daily    venlafaxine  150 mg Oral Daily    insulin glargine  10 Units Subcutaneous Nightly    docusate sodium  100 mg Oral BID    senna  1 tablet Oral BID    ferrous sulfate  325 mg Oral BID WC    sodium chloride flush  10 mL Intravenous 2 times per day    enoxaparin  40 mg Subcutaneous Daily     Continuous Infusions:    dextrose       PRN Meds: metoprolol, fentaNYL, sodium chloride flush, sodium chloride flush, acetaminophen, sodium chloride flush, magnesium hydroxide, ondansetron, potassium chloride, bisacodyl, acetaminophen, glucose, dextrose, glucagon (rDNA), dextrose, oxyCODONE **OR** oxyCODONE    Data:     Past Medical History:   has a past medical history of Breast cancer ([address] CAD (coronary atherosclerotic disease), Diabetes mellitus type 2 in obese [facility]), Essential hypertension, and Hypothyroidism (acquired). Social History:   reports that she has never smoked. She has never used smokeless tobacco. She reports that she does not use drugs. Family History:   Family History   Problem Relation Age of Onset    Hypertension Mother     Hypertension Father        Vitals:  /70   Pulse 102   Temp 98.3 °F (36.8 °C)   Resp 16   Ht 5' 2\" (1.575 m)   Wt 186 lb 3.2 oz (84.5 kg)   SpO2 93%   Breastfeeding No   BMI 34.06 kg/m²   Temp (24hrs), Av.1 °F (36.7 °C), Min:97.9 °F (36.6 °C), Max:98.3 °F (36.8 °C)    Recent Labs     20  1148 20  1722 20  1926 20  0637   POCGLU 138* 132* 124* 84       I/O (24Hr):     Intake/Output Summary (Last 24 hours) at 2020 0955  Last data filed at 2020 0726  Gross per 24 hour   Intake --   Output 350 ml   Net -350 ml       Labs:  Hematology:  Recent Labs     20  0532 20  04220  0543   WBC 7.4 7.3 7.2   RBC 3.09* 3.32* 3.17*   HGB 8.6* 9.3* 9.0*   HCT 28.7* 32.6* 30.9*   MCV 92.9 98.2 97.5   MCH 27.8 28.0 28.4   MCHC 30.0 28.5 29.1   RDW 16.1* 16.1* 16.0*    298 389   MPV 10.0 10.3 10.6     Chemistry:  Recent Labs     20  0532 20  04220  0543    137 138   K 3.8 4.0 4.6    99 101   CO2 29 25 26   GLUCOSE 91 121* 87   BUN 9 11 11   CREATININE 0.56 0.51 0.48*   ANIONGAP 11 13 11   LABGLOM >60 >60 >60   GFRAA >60 >60 >60   CALCIUM 8.2* 7.8* 8.4*     Recent Labs     20  2331 20  0717 20  1148 20  1722 20  1926 20  0637   POCGLU 82 110* 138* 132* 124* 84     ABG:No results found for: POCPH, PHART, PH, POCPCO2, ZWR4YYS, PCO2, POCPO2, PO2ART, PO2, POCHCO3, GLE6KTO, HCO3, NBEA, PBEA, BEART, BE, THGBART, THB, SJK7RTO, PYGZ2CUM, R6XPXYXH, O2SAT, FIO2  Lab Results Component Value Date/Time    SPECIAL R HAND 3MLS 02/03/2020 02:50 PM     Lab Results   Component Value Date/Time    CULTURE NO GROWTH 6 DAYS 02/03/2020 02:50 PM       Radiology:  Xr Hip Left (2-3 Views)    Result Date: 1/28/2020  There is no acute fracture of the left hip There is no acute fracture in the pelvis. Detail of the sacrum is markedly limited. There is subtle double density along the margin of the superior pubic ramus on the left without definitive fracture line at patient's pain persists, consider CT exam No acute osseous abnormality of the right femur or the left femur     Xr Femur Left (min 2 Views)    Result Date: 2/1/2020  Satisfactory postoperative appearance following ORIF of the left femur. No evident complication. Xr Femur Left (min 2 Views)    Result Date: 1/28/2020  There is no acute fracture of the left hip There is no acute fracture in the pelvis. Detail of the sacrum is markedly limited. There is subtle double density along the margin of the superior pubic ramus on the left without definitive fracture line at patient's pain persists, consider CT exam No acute osseous abnormality of the right femur or the left femur     Xr Femur Right (min 2 Views)    Result Date: 1/28/2020  There is no acute fracture of the left hip There is no acute fracture in the pelvis. Detail of the sacrum is markedly limited. There is subtle double density along the margin of the superior pubic ramus on the left without definitive fracture line at patient's pain persists, consider CT exam No acute osseous abnormality of the right femur or the left femur     Mri Thoracic Spine W Wo Contrast    Result Date: 1/29/2020  Osseous metastatic disease with diffuse marrow infiltration of T10. Mild extraosseous extension of tumor suspected along the left neural foramina a T10. Us Gallbladder Ruq    Result Date: 1/27/2020  Minimal amount of free fluid within the upper abdomen.  Heterogeneous increased liver echogenicity could be on the basis of hepatocellular disease or hepatic steatosis. Xr Pelvis (min 3 Views)    Result Date: 1/28/2020  There is no acute fracture of the left hip There is no acute fracture in the pelvis. Detail of the sacrum is markedly limited. There is subtle double density along the margin of the superior pubic ramus on the left without definitive fracture line at patient's pain persists, consider CT exam No acute osseous abnormality of the right femur or the left femur     Ct Femur Left Wo Contrast    Result Date: 1/31/2020  Multiple osseous metastatic lesions in the left ischium, likely anterior left acetabular wall, and probably in the intertrochanteric and lesser trochanteric portions of the left femur. These could be further characterized with bone scan or MRI with contrast.     Ct Biopsy Superficial Bone Percutaneous    Addendum Date: 1/28/2020    ADDENDUM: Addendum is for billing purposes only. Automated dose modulation and/ or weight based adjustment of the mA/kv was utilized to reduce the radiation dose to as low as reasonably achievable. Result Date: 1/28/2020  Technically successful CT-guided targeted right iliac bone core biopsy. Mri Brain W Wo Contrast    Result Date: 2/1/2020  1. Multiple calvarial metastases. 2. Abnormal dural thickening enhancement most evident overlying the left parietal and occipital lobes concerning for metastatic involvement given adjacent calvarial lesions. Meningitis is in the differential diagnosis but is thought unlikely. 3. Mild chronic white matter microvascular ischemic changes. No acute infarct or hemorrhage.        Physical Examination:        Physical exam     Alert  Chest: Clear to auscultation bilateral  Abdomen: Nontender nondistended  CVS: S1-S2  Neurology: Moves extremity    Assessment:        Hospital Problems           Last Modified POA    * (Principal) Metastatic breast cancer ([facility] [address] 2/2/2020 Yes    Overview Signed 2/2/2020 1:13 PM by Quintin Boeck Blood, DO     To bones, calvarium, dura,          Anemia 1/30/2020 Yes    Liver lesion 1/26/2020 Yes    History of breast cancer 1/27/2020 Yes    Diabetes mellitus type 2 in obese ([address] (Chronic) 1/29/2020 Yes    Essential hypertension (Chronic) 1/29/2020 Yes    Acquired hypothyroidism (Chronic) 1/29/2020 Yes    Recurrent major depressive disorder, in remission ([address] (Chronic) 1/29/2020 Yes    Bone lesion 1/30/2020 Yes    Iron deficiency 1/30/2020 Yes    Acute duodenal ulcer with gastric outlet obstruction 2/3/2020 Yes    Ulcer of esophagus without bleeding 2/3/2020 Yes    Encounter for palliative care 2/3/2020 Yes          Plan:          Principal Problem:  Probable bone metastatic cancer probably from the breast pathology oncology following  From hematology oncology note bone lesions 2/2 metastatic breast adenocarcinoma. Biopsy results showing adenocarcinoma  Probable chemotherapy as outpatient  Back pain second to bone lesions.    Pain control for now         Active Problems:    Anemia    Liver lesion hematology following      History of breast cancer status post chemotherapy and mastectomy      Diabetes mellitus type 2 in obese (HCC) uncontrolled insulin sliding scale      Essential hypertension continue home medication      Acquired hypothyroidism levothyroxine      Recurrent major depressive disorder, in remission ([address] continue home medication    Acute blood loss anemia secondary to acute duodenal ulcer with gastric outlet obstruction status post endoscopy follow-up on biopsy results status post blood transfusion      Ulcer of esophagus without bleeding no evidence of fungal infection      Encounter for palliative care palliative care  Resolved Problems:    Septic shock developed during hospitalization ([address] status post IV antibiotics resolved ID recommendation appreciated watch ofF IV antibiotics if no sign or symptom of infection anticipate discharge in a.m. to rehab once bed is available        Jose D Yen MD  2/9/2020  9:55 AM

## 2020-02-09 NOTE — PROGRESS NOTES
Physical Therapy  Facility/Department: [facility]  Daily Treatment Note  NAME: Ady Collins  : 1950  MRN: [id number]    Date of Service: 2020    Discharge Recommendations:  Patient would benefit from continued therapy after discharge   PT Equipment Recommendations  Equipment Needed: No    Assessment   Body structures, Functions, Activity limitations: Decreased functional mobility ; Decreased safe awareness;Decreased balance;Decreased endurance;Decreased ROM; Decreased strength; Increased pain;Decreased cognition;Decreased posture;Decreased sensation  Assessment: Pt able to amb 12ft x2 with RW and MIN A for safety, while demonstrating extremely slow joey. Pt would continue to require assistance with any funtional mobility at this time. Would benefit from more PT to address deficit. Prognosis: Fair  PT Education: Transfer Training;Functional Mobility Training;Gait Training;Energy Conservation;General Safety  REQUIRES PT FOLLOW UP: Yes  Activity Tolerance  Activity Tolerance: Patient limited by endurance; Patient limited by pain; Patient limited by fatigue     Patient Diagnosis(es): The primary encounter diagnosis was Liver lesion. A diagnosis of Septic shock ([facility] [address] was also pertinent to this visit. has a past medical history of Breast cancer ([facility] [address] CAD (coronary atherosclerotic disease), Diabetes mellitus type 2 in [facility]), Essential hypertension, and Hypothyroidism (acquired). has a past surgical history that includes Coronary artery bypass graft (); Mastectomy (); Upper gastrointestinal endoscopy (N/A, 2020); Upper gastrointestinal endoscopy (2020); joint replacement (Right, ); Tunneled venous port placement; Femur Surgery (2020); and Femur fracture surgery (Left, 2020).     Restrictions  Restrictions/Precautions  Restrictions/Precautions: Weight Bearing, Up as Tolerated  Required Braces or Orthoses?: No  Lower Extremity Weight Bearing Restrictions  Left Lower Extremity Weight Bearing: Weight Bearing As Tolerated  Subjective   General  Chart Reviewed: Yes  Response To Previous Treatment: Patient with no complaints from previous session. Family / Caregiver Present: No  Subjective  Subjective: Pt and RN agreeable to therapy. Pt alert in bed upon arrival. Pleasant and cooperaive throughout. General Comment  Comments: Pt left in recliner with call light within reach, alarm activated and B LEs elevated  Pain Screening  Patient Currently in Pain: Yes  Pain Assessment  Pain Assessment: 0-10  Pain Level: 6  Pain Type: Chronic pain  Pain Location: Hip;Knee  Pain Orientation: Left  Pain Descriptors: Aching; Sore  Pain Frequency: Continuous  Pain Onset: On-going  Clinical Progression: Not changed  Vital Signs  Patient Currently in Pain: Yes       Orientation  Orientation  Overall Orientation Status: Within Functional Limits  Cognition      Objective   Bed mobility  Rolling to Left: Minimal assistance  Rolling to Right: Minimal assistance  Supine to Sit: Minimal assistance  Sit to Supine: (pt left seated in recliner)  Scooting: Contact guard assistance  Comment: Increased time to complete, HOB elevated. Pt required assistance for L LE progression  Transfers  Sit to Stand: Minimal Assistance; Moderate Assistance(Evelyne from EOB, ModA from toilet)  Stand to sit: Minimal Assistance  Bed to Chair: Minimal assistance  Comment: Pt requires vc's for hand placement with good carryover  Ambulation  Ambulation?: Yes  Ambulation 1  Surface: level tile  Device: Rolling Walker  Assistance: Minimal assistance  Quality of Gait: Increase reliance on RW, slow joey, flexed posture  Gait Deviations: Shuffles;Decreased step length;Decreased step height;Slow Joey  Distance: 12ft x2  Comments: limited by fatigue and increased pain with mobility  Stairs/Curb  Stairs?: No     Balance  Posture: Fair  Sitting - Static: Good;-  Sitting - Dynamic: Fair;+  Standing - Static: Fair  Standing - Dynamic:

## 2020-02-09 NOTE — PROGRESS NOTES
[facility] -   Infectious diseases evaluation  admission date 1/26/2020    reason for consultation:   antibiotics management    Impression :   Current:  · Bone mets left pelvis / iliac - biopsy w ORIF done 2/1/20  · abd adeno carcinomatosis  · Duodenitis w ulcer  · EGD duodenal ulcer w clot  w gastric outlet narrowing  · EGD distal white esophagitis - candida neg on path  · Past breast CA post chemo and mastectomy   · DM  · Resolved septic shock  Discussion / summary of stay / plan of care   · No longer requires antibiotic coverage  Recommendations   ·   Monitor off antibiotics  · Palliative care on board but pt unclear if still to receive chemotx? · Okay for discharge  · Discussed with nurse and patient    Infection Control Recommendations   · [place] Precautions      Antimicrobial Stewardship Recommendations   · Stop antibiotics     Coordination ofOutpatient Care:   · Estimated Length of IV antimicrobials: None  · Patient will need Midline / picc Catheter Insertion: No  · Patient will need SNF:   · Patient will need outpatient wound care: No    History of Present Illness:   Initial history:  Arelis Cruz is a 77y.o.-year-old female with pmh of DM, hypothyroidism, CAD s/p CABG in 2008, breast cancer on left side s/p chemotherapy and mastectomy in 2007 who was admitted with abd pain, onset of vomiting for months. Found in septic shock that has resolved after 4 days of zosyn and on a week of vanco. Patient is no longer requiring antibiotic therapy. Pressors are all stopped - no fever or leukocytosis and no pos cx on record - CXR neg and CT AP showed possible perit carcinomatosis and Bone mets left pelvis / iliac and T10- -   biopsy w ORIF done 2/1/20 w nail in the femur to protect the bone.   OR biopsy showed metastatic adenocarcinoma  CT in the other hospital suggested a possible duodenitis   [facility], EGD 1/30/20 showed ulcer with a clot and MASTECTOMY  2007    TUNNELED VENOUS PORT PLACEMENT      UPPER GASTROINTESTINAL ENDOSCOPY N/A 1/30/2020    EGD BIOPSY performed by Aaliyah Morales MD at [facility]  1/30/2020    EGD CONTROL HEMORRHAGE performed by Aaliyah Morales MD at [facility]       Medications:      insulin lispro  0-18 Units Subcutaneous 4x Daily AC & HS    metoprolol tartrate  50 mg Oral BID    lactulose  20 g Oral TID    sodium chloride  250 mL Intravenous Once    metFORMIN  1,000 mg Oral BID WC    linagliptin  5 mg Oral Daily    alteplase  2 mg Intracatheter Once    oyster shell calcium/vitamin D  2 tablet Oral Daily    pantoprazole  40 mg Oral BID AC    [Held by provider] morphine  15 mg Oral 2 times per day    levothyroxine  100 mcg Oral Daily    venlafaxine  150 mg Oral Daily    insulin glargine  10 Units Subcutaneous Nightly    docusate sodium  100 mg Oral BID    senna  1 tablet Oral BID    ferrous sulfate  325 mg Oral BID WC    sodium chloride flush  10 mL Intravenous 2 times per day    enoxaparin  40 mg Subcutaneous Daily       Social History:     Social History     Socioeconomic History    Marital status:      Spouse name: Not on file    Number of children: Not on file    Years of education: Not on file    Highest education level: Not on file   Occupational History    Not on file   Social Needs    Financial resource strain: Not on file    Food insecurity:     Worry: Not on file     Inability: Not on file    Transportation needs:     Medical: Not on file     Non-medical: Not on file   Tobacco Use    Smoking status: Never Smoker    Smokeless tobacco: Never Used   Substance and Sexual Activity    Alcohol use: Not on file     Comment: social    Drug use: Never    Sexual activity: Not on file   Lifestyle    Physical activity:     Days per week: Not on file     Minutes per session: Not on file    Stress: Not on file   Relationships    Social connections: membranes are moist.      Pharynx: Oropharynx is clear. No oropharyngeal exudate. Eyes:      Conjunctiva/sclera: Conjunctivae normal.      Pupils: Pupils are equal, round, and reactive to light. Neck:      Musculoskeletal: Normal range of motion. No neck rigidity or muscular tenderness. Cardiovascular:      Rate and Rhythm: Normal rate and regular rhythm. Heart sounds: Normal heart sounds. No gallop. Pulmonary:      Effort: Pulmonary effort is normal. No respiratory distress. Breath sounds: No stridor. Comments: Left mastectomy  Abdominal:      General: Abdomen is flat. There is no distension. Palpations: Abdomen is soft. Tenderness: There is no abdominal tenderness. Genitourinary:     Comments: Urine clear  Musculoskeletal:         General: Swelling present. Right lower leg: Edema present. Left lower leg: Edema present. Skin:     General: Skin is warm and dry. Coloration: Skin is not jaundiced. Findings: No erythema or lesion. Comments: Surgical incision of left hip C/D/I    Neurological:      General: No focal deficit present. Mental Status: She is alert and oriented to person, place, and time. Psychiatric:         Mood and Affect: Mood normal.         Behavior: Behavior normal.           Medical Decision Making:   I have independently reviewed/ordered the following labs:    CBC with Differential:   Recent Labs     02/08/20  0429 02/09/20  0543   WBC 7.3 7.2   HGB 9.3* 9.0*   HCT 32.6* 30.9*    389   LYMPHOPCT 27 25   MONOPCT 11 9     BMP:  Recent Labs     02/08/20  0429 02/09/20  0543    138   K 4.0 4.6   CL 99 101   CO2 25 26   BUN 11 11   CREATININE 0.51 0.48*     Hepatic Function Panel: No results for input(s): PROT, LABALBU, BILIDIR, IBILI, BILITOT, ALKPHOS, ALT, AST in the last 72 hours. No results for input(s): RPR in the last 72 hours. No results for input(s): HIV in the last 72 hours.   No results for input(s): BC in the

## 2020-02-10 LAB
ABSOLUTE EOS #: 0.2 K/UL (ref 0–0.44)
ABSOLUTE IMMATURE GRANULOCYTE: 0.08 K/UL (ref 0–0.3)
ABSOLUTE LYMPH #: 2.08 K/UL (ref 1.1–3.7)
ABSOLUTE MONO #: 0.72 K/UL (ref 0.1–1.2)
ANION GAP SERPL CALCULATED.3IONS-SCNC: 13 MMOL/L (ref 9–17)
BASOPHILS # BLD: 1 % (ref 0–2)
BASOPHILS ABSOLUTE: 0.08 K/UL (ref 0–0.2)
BUN BLDV-MCNC: 10 MG/DL (ref 8–23)
BUN/CREAT BLD: ABNORMAL (ref 9–20)
CALCIUM SERPL-MCNC: 8.2 MG/DL (ref 8.6–10.4)
CHLORIDE BLD-SCNC: 98 MMOL/L (ref 98–107)
CO2: 27 MMOL/L (ref 20–31)
CREAT SERPL-MCNC: 0.65 MG/DL (ref 0.5–0.9)
DIFFERENTIAL TYPE: ABNORMAL
EOSINOPHILS RELATIVE PERCENT: 3 % (ref 1–4)
GFR AFRICAN AMERICAN: >60 ML/MIN
GFR NON-AFRICAN AMERICAN: >60 ML/MIN
GFR SERPL CREATININE-BSD FRML MDRD: ABNORMAL ML/MIN/{1.73_M2}
GFR SERPL CREATININE-BSD FRML MDRD: ABNORMAL ML/MIN/{1.73_M2}
GLUCOSE BLD-MCNC: 100 MG/DL (ref 70–99)
GLUCOSE BLD-MCNC: 121 MG/DL (ref 65–105)
GLUCOSE BLD-MCNC: 127 MG/DL (ref 65–105)
GLUCOSE BLD-MCNC: 127 MG/DL (ref 65–105)
GLUCOSE BLD-MCNC: 132 MG/DL (ref 65–105)
HCT VFR BLD CALC: 29.8 % (ref 36.3–47.1)
HEMOGLOBIN: 8.7 G/DL (ref 11.9–15.1)
IMMATURE GRANULOCYTES: 1 %
LYMPHOCYTES # BLD: 28 % (ref 24–43)
MCH RBC QN AUTO: 27.8 PG (ref 25.2–33.5)
MCHC RBC AUTO-ENTMCNC: 29.2 G/DL (ref 28.4–34.8)
MCV RBC AUTO: 95.2 FL (ref 82.6–102.9)
MONOCYTES # BLD: 10 % (ref 3–12)
NRBC AUTOMATED: 0 PER 100 WBC
PDW BLD-RTO: 16.2 % (ref 11.8–14.4)
PLATELET # BLD: 460 K/UL (ref 138–453)
PLATELET ESTIMATE: ABNORMAL
PMV BLD AUTO: 10.2 FL (ref 8.1–13.5)
POTASSIUM SERPL-SCNC: 4.3 MMOL/L (ref 3.7–5.3)
RBC # BLD: 3.13 M/UL (ref 3.95–5.11)
RBC # BLD: ABNORMAL 10*6/UL
SEG NEUTROPHILS: 57 % (ref 36–65)
SEGMENTED NEUTROPHILS ABSOLUTE COUNT: 4.35 K/UL (ref 1.5–8.1)
SODIUM BLD-SCNC: 138 MMOL/L (ref 135–144)
WBC # BLD: 7.5 K/UL (ref 3.5–11.3)
WBC # BLD: ABNORMAL 10*3/UL

## 2020-02-10 PROCEDURE — 1200000000 HC SEMI PRIVATE

## 2020-02-10 PROCEDURE — 6370000000 HC RX 637 (ALT 250 FOR IP): Performed by: ORTHOPAEDIC SURGERY

## 2020-02-10 PROCEDURE — 99231 SBSQ HOSP IP/OBS SF/LOW 25: CPT | Performed by: INTERNAL MEDICINE

## 2020-02-10 PROCEDURE — 99233 SBSQ HOSP IP/OBS HIGH 50: CPT | Performed by: INTERNAL MEDICINE

## 2020-02-10 PROCEDURE — 2580000003 HC RX 258: Performed by: ORTHOPAEDIC SURGERY

## 2020-02-10 PROCEDURE — 99232 SBSQ HOSP IP/OBS MODERATE 35: CPT | Performed by: INTERNAL MEDICINE

## 2020-02-10 PROCEDURE — 82947 ASSAY GLUCOSE BLOOD QUANT: CPT

## 2020-02-10 PROCEDURE — 6360000002 HC RX W HCPCS: Performed by: STUDENT IN AN ORGANIZED HEALTH CARE EDUCATION/TRAINING PROGRAM

## 2020-02-10 PROCEDURE — 85025 COMPLETE CBC W/AUTO DIFF WBC: CPT

## 2020-02-10 PROCEDURE — 36415 COLL VENOUS BLD VENIPUNCTURE: CPT

## 2020-02-10 PROCEDURE — 80048 BASIC METABOLIC PNL TOTAL CA: CPT

## 2020-02-10 PROCEDURE — 93971 EXTREMITY STUDY: CPT

## 2020-02-10 PROCEDURE — 97535 SELF CARE MNGMENT TRAINING: CPT

## 2020-02-10 PROCEDURE — 6360000002 HC RX W HCPCS: Performed by: ORTHOPAEDIC SURGERY

## 2020-02-10 PROCEDURE — 6370000000 HC RX 637 (ALT 250 FOR IP): Performed by: INTERNAL MEDICINE

## 2020-02-10 RX ADMIN — ACETAMINOPHEN 650 MG: 325 TABLET ORAL at 22:21

## 2020-02-10 RX ADMIN — DOCUSATE SODIUM 100 MG: 100 CAPSULE, LIQUID FILLED ORAL at 09:11

## 2020-02-10 RX ADMIN — ACETAMINOPHEN 650 MG: 325 TABLET ORAL at 03:51

## 2020-02-10 RX ADMIN — ENOXAPARIN SODIUM 40 MG: 40 INJECTION SUBCUTANEOUS at 09:11

## 2020-02-10 RX ADMIN — INSULIN GLARGINE 10 UNITS: 100 INJECTION, SOLUTION SUBCUTANEOUS at 20:35

## 2020-02-10 RX ADMIN — OXYCODONE HYDROCHLORIDE 10 MG: 5 TABLET ORAL at 06:35

## 2020-02-10 RX ADMIN — VENLAFAXINE HYDROCHLORIDE 150 MG: 150 CAPSULE, EXTENDED RELEASE ORAL at 09:11

## 2020-02-10 RX ADMIN — METFORMIN HYDROCHLORIDE 1000 MG: 500 TABLET ORAL at 09:10

## 2020-02-10 RX ADMIN — SODIUM CHLORIDE, PRESERVATIVE FREE 10 ML: 5 INJECTION INTRAVENOUS at 09:12

## 2020-02-10 RX ADMIN — FERROUS SULFATE TAB EC 325 MG (65 MG FE EQUIVALENT) 325 MG: 325 (65 FE) TABLET DELAYED RESPONSE at 18:04

## 2020-02-10 RX ADMIN — LEVOTHYROXINE SODIUM 100 MCG: 100 TABLET ORAL at 06:35

## 2020-02-10 RX ADMIN — CALCIUM CARBONATE-CHOLECALCIFEROL TAB 250 MG-125 UNIT 500 MG: 250-125 TAB at 09:11

## 2020-02-10 RX ADMIN — METOPROLOL TARTRATE 50 MG: 50 TABLET, FILM COATED ORAL at 09:11

## 2020-02-10 RX ADMIN — FERROUS SULFATE TAB EC 325 MG (65 MG FE EQUIVALENT) 325 MG: 325 (65 FE) TABLET DELAYED RESPONSE at 09:10

## 2020-02-10 RX ADMIN — OXYCODONE HYDROCHLORIDE 10 MG: 5 TABLET ORAL at 15:55

## 2020-02-10 RX ADMIN — PANTOPRAZOLE SODIUM 40 MG: 40 TABLET, DELAYED RELEASE ORAL at 06:35

## 2020-02-10 RX ADMIN — OXYCODONE HYDROCHLORIDE 10 MG: 5 TABLET ORAL at 01:06

## 2020-02-10 RX ADMIN — ENOXAPARIN SODIUM 80 MG: 80 INJECTION SUBCUTANEOUS at 20:35

## 2020-02-10 RX ADMIN — METFORMIN HYDROCHLORIDE 1000 MG: 500 TABLET ORAL at 18:04

## 2020-02-10 RX ADMIN — SENNOSIDES 8.6 MG: 8.6 TABLET, FILM COATED ORAL at 09:11

## 2020-02-10 RX ADMIN — LINAGLIPTIN 5 MG: 5 TABLET, FILM COATED ORAL at 09:11

## 2020-02-10 RX ADMIN — OXYCODONE HYDROCHLORIDE 10 MG: 5 TABLET ORAL at 11:47

## 2020-02-10 RX ADMIN — OXYCODONE HYDROCHLORIDE 10 MG: 5 TABLET ORAL at 20:35

## 2020-02-10 RX ADMIN — PANTOPRAZOLE SODIUM 40 MG: 40 TABLET, DELAYED RELEASE ORAL at 18:04

## 2020-02-10 ASSESSMENT — PAIN DESCRIPTION - LOCATION: LOCATION: HIP

## 2020-02-10 ASSESSMENT — ENCOUNTER SYMPTOMS
SINUS PRESSURE: 0
ABDOMINAL PAIN: 0
EYE REDNESS: 0
CONSTIPATION: 0
ROS SKIN COMMENTS: SURGICAL INCISIONS C/D/I
ABDOMINAL DISTENTION: 0
CHEST TIGHTNESS: 0
EYE DISCHARGE: 0
EYE PAIN: 0
EYE ITCHING: 0
DIARRHEA: 0
SHORTNESS OF BREATH: 0
BACK PAIN: 1
NAUSEA: 0
COLOR CHANGE: 0
COUGH: 0
APNEA: 0

## 2020-02-10 ASSESSMENT — PAIN SCALES - GENERAL
PAINLEVEL_OUTOF10: 8
PAINLEVEL_OUTOF10: 7
PAINLEVEL_OUTOF10: 8
PAINLEVEL_OUTOF10: 7
PAINLEVEL_OUTOF10: 7
PAINLEVEL_OUTOF10: 8
PAINLEVEL_OUTOF10: 7
PAINLEVEL_OUTOF10: 5
PAINLEVEL_OUTOF10: 8
PAINLEVEL_OUTOF10: 7
PAINLEVEL_OUTOF10: 9
PAINLEVEL_OUTOF10: 8

## 2020-02-10 ASSESSMENT — PAIN DESCRIPTION - ORIENTATION: ORIENTATION: LEFT

## 2020-02-10 ASSESSMENT — PAIN DESCRIPTION - PAIN TYPE: TYPE: SURGICAL PAIN;ACUTE PAIN

## 2020-02-10 NOTE — PROGRESS NOTES
Interval Follow-Up Note     Subjective:  Main problem PUD. She underwent EGD with hemostasis on 31 January. No gross bleeding since then. She was found to have upper extremity DVT. She is plan for blood thinners. We were requested to reevaluate prior to that. OBJECTIVE:   /76   Pulse 95   Temp 97.7 °F (36.5 °C) (Oral)   Resp 16   Ht 5' 2\" (1.575 m)   Wt 186 lb 3.2 oz (84.5 kg)   SpO2 96%   Breastfeeding No   BMI 34.06 kg/m²   Body mass index is 34.06 kg/m². GEN: A O x 3 in NAD   HEENT: Conjunctivae clear. Eyelids normal. No lymphadenopathy. No thyroid mass. CV: s1s2, RRR, no rubs. PULM: No accessory muscle use. Normal breath sounds bilaterally. ABD/GI: Soft NT ND +BS  EXT: No edema or rash. Warm skin. No joint swelling. Limited neuro exam intact.      Lab Results   Component Value Date    WBC 7.5 02/10/2020    HGB 8.7 (L) 02/10/2020    HCT 29.8 (L) 02/10/2020    MCV 95.2 02/10/2020     (H) 02/10/2020     02/10/2020    K 4.3 02/10/2020    CL 98 02/10/2020    CO2 27 02/10/2020    BUN 10 02/10/2020    CREATININE 0.65 02/10/2020    LABALBU 2.3 (L) 01/27/2020    BILITOT 0.24 (L) 01/27/2020    ALKPHOS 91 01/27/2020    AST 19 01/27/2020    ALT 11 01/27/2020      Lab Results   Component Value Date    RBC 3.13 (L) 02/10/2020    HGB 8.7 (L) 02/10/2020    MCV 95.2 02/10/2020    MCH 27.8 02/10/2020    MCHC 29.2 02/10/2020    RDW 16.2 (H) 02/10/2020    MPV 10.2 02/10/2020    BASOPCT 1 02/10/2020    LYMPHSABS 2.08 02/10/2020    MONOSABS 0.72 02/10/2020    NEUTROABS 4.35 02/10/2020    EOSABS 0.20 02/10/2020    BASOSABS 0.08 02/10/2020        Past Medical History:   Diagnosis Date    Breast cancer ([facility] [address] 2007    CAD (coronary atherosclerotic disease)     Diabetes mellitus type 2 in obese ([facility]     Essential hypertension     Hypothyroidism (acquired)           Current Facility-Administered Medications:     enoxaparin (LOVENOX) injection 80 mg, 1 mg/kg, Subcutaneous, BID, Dariela Last Alok Hines MD    insulin lispro (HUMALOG) injection vial 0-18 Units, 0-18 Units, Subcutaneous, 4x Daily AC & HS, Katiuska Metzger MD    metoprolol (LOPRESSOR) injection 5 mg, 5 mg, Intravenous, Q8H PRN, Raul Joshua MD    metoprolol tartrate (LOPRESSOR) tablet 50 mg, 50 mg, Oral, BID, Katiuska Metzger MD, 50 mg at 02/10/20 0911    lactulose (CHRONULAC) 10 GM/15ML solution 20 g, 20 g, Oral, TID, Katiuska Metzger MD, 20 g at 02/09/20 0845    0.9 % sodium chloride bolus, 250 mL, Intravenous, Once, Kami Curry Blood, DO    metFORMIN (GLUCOPHAGE) tablet 1,000 mg, 1,000 mg, Oral, BID WC, Gunner Ramirez DO, 1,000 mg at 02/10/20 7172    linagliptin (TRADJENTA) tablet 5 mg, 5 mg, Oral, Daily, Gunner Ramirez DO, 5 mg at 02/10/20 0911    fentaNYL (SUBLIMAZE) injection 25 mcg, 25 mcg, Intravenous, Q5 Min PRN, Gunner Ramirez DO, 25 mcg at 02/02/20 1143    sodium chloride flush 0.9 % injection 10 mL, 10 mL, Intravenous, PRN, Alvino MONTE Blood, DO    alteplase (CATHFLO) injection 2 mg, 2 mg, Intracatheter, Once, Gunner Ramirez DO    oyster shell calcium/vitamin D 250-125 MG-UNIT per tablet 500 mg, 2 tablet, Oral, Daily, Gunner Ramirez DO, 500 mg at 02/10/20 0911    pantoprazole (PROTONIX) tablet 40 mg, 40 mg, Oral, BID AC, Gunner Ramirez DO, 40 mg at 02/10/20 8454    [Held by provider] morphine (MS CONTIN) extended release tablet 15 mg, 15 mg, Oral, 2 times per day, Gunner Ramirez DO, 15 mg at 02/02/20 2132    sodium chloride flush 0.9 % injection 10 mL, 10 mL, Intravenous, PRN, Gunner Ramirez DO, 10 mL at 02/08/20 2025    acetaminophen (TYLENOL) tablet 650 mg, 650 mg, Oral, Q4H PRN, Gunner Ramirez DO, 650 mg at 02/10/20 0351    levothyroxine (SYNTHROID) tablet 100 mcg, 100 mcg, Oral, Daily, Gunner Ramirez DO, 100 mcg at 02/10/20 2501    venlafaxine (EFFEXOR XR) extended release capsule 150 mg, 150 mg, Oral, Daily, Gunner Ramirez DO, 150 mg at 02/10/20 0911    insulin glargine (LANTUS) injection vial 10 Units, 10 Units, Subcutaneous, Nightly, Nelida Kanner, DO, 10 Units at 02/09/20 2025    docusate sodium (COLACE) capsule 100 mg, 100 mg, Oral, BID, Nelida Kanner, DO, 100 mg at 02/10/20 3562    senna (SENOKOT) tablet 8.6 mg, 1 tablet, Oral, BID, Nelida Kanner, DO, 8.6 mg at 02/10/20 0911    ferrous sulfate EC tablet 325 mg, 325 mg, Oral, BID WC, Nelida Kanner, DO, 325 mg at 02/10/20 6697    sodium chloride flush 0.9 % injection 10 mL, 10 mL, Intravenous, 2 times per day, Nelida Kanner, DO, 10 mL at 02/10/20 0912    sodium chloride flush 0.9 % injection 10 mL, 10 mL, Intravenous, PRN, Dona Kanner, DO, 10 mL at 01/30/20 2042    magnesium hydroxide (MILK OF MAGNESIA) 400 MG/5ML suspension 30 mL, 30 mL, Oral, Daily PRN, Nelida Kanner, DO    ondansetron [facility]) injection 4 mg, 4 mg, Intravenous, Q6H PRN, Nelida Kanner, DO    potassium chloride 10 mEq/100 mL IVPB (Peripheral Line), 10 mEq, Intravenous, PRN, Nelida Kanner, DO    bisacodyl (DULCOLAX) suppository 10 mg, 10 mg, Rectal, Daily PRN, Nelida Kanner, DO    acetaminophen (TYLENOL) tablet 650 mg, 650 mg, Oral, Q4H PRN, Nelida Kanner, DO, 650 mg at 02/09/20 1724    glucose (GLUTOSE) 40 % oral gel 15 g, 15 g, Oral, PRN, Nelida Kanner, DO    dextrose 50 % IV solution, 12.5 g, Intravenous, PRN, Nelida Kanner, DO    glucagon (rDNA) injection 1 mg, 1 mg, Intramuscular, PRN, Nelida Kanner, DO    dextrose 5 % solution, 100 mL/hr, Intravenous, PRN, Nelida Kanner, DO    oxyCODONE (ROXICODONE) immediate release tablet 5 mg, 5 mg, Oral, Q4H PRN, 5 mg at 02/07/20 1548 **OR** oxyCODONE (ROXICODONE) immediate release tablet 10 mg, 10 mg, Oral, Q4H PRN, Dona Kanner, DO, 10 mg at 02/10/20 1147     Xr Hip Left (2-3 Views)    Result Date: 1/28/2020  EXAMINATION: TWO XRAY VIEWS OF THE LEFT HIP; 4 XRAY VIEWS OF THE RIGHT FEMUR; 4 XRAY VIEWS OF THE LEFT FEMUR; ONE XRAY VIEW OF THE PELVIS 1/28/2020 8:58 pm COMPARISON: None. HISTORY: ORDERING SYSTEM PROVIDED HISTORY: lesions TECHNOLOGIST PROVIDED HISTORY: AP and cross-table lateral please, thank you lesions Reason for Exam: lesions; ORDERING SYSTEM PROVIDED HISTORY: lesions TECHNOLOGIST PROVIDED HISTORY: lesions Reason for Exam: lesions; ORDERING SYSTEM PROVIDED HISTORY: Lesions TECHNOLOGIST PROVIDED HISTORY: Lesions Reason for Exam: lesions; ORDERING SYSTEM PROVIDED HISTORY: Ap, inlet, outlet TECHNOLOGIST PROVIDED HISTORY: Ap, inlet, outlet Reason for Exam: lesions FINDINGS: Left hip: 2 images were provided. Detail of the hip is limited. There is no gross malalignment. There is no acute displaced fracture. Mild degenerative changes along the superolateral acetabular margin are noted. Pelvis: 3 images were provided. Detail is limited. The sacrum is not well seen due to overlying stool. Postop changes in the right hip are noted status post total hip replacement. Degenerative changes in the pubic symphysis are noted. There are suggested degenerative changes along the superior left acetabular margin. Subtle double density along the superior aspect of the left superior pubic ramus is noted. This is seen on 2 of the 3 images. There is subtle double density along the inferior margin of the left superior pubic ramus on 1 of the images. There is no fracture noted in this location on the left hip images. Right femur: Postop changes in the right hip are noted status post hip replacement. There is no acute fracture. Degenerative changes at the knee are noted. There is evidence of chondrocalcinosis at the knee is well. There is no acute displaced fracture. There is no effusion at the level of the knee. Left femur: 4 images were provided. There is no gross malalignment. There is no fracture. Soft tissue calcifications are noted medial to the distal femur. Surgical clips in this region are noted. Degenerative changes at the knee are noted. There is no acute displaced fracture. There is no acute fracture of the left hip There is no acute fracture in the pelvis. Detail of the sacrum is markedly limited. There is subtle double density along the margin of the superior pubic ramus on the left without definitive fracture line at patient's pain persists, consider CT exam No acute osseous abnormality of the right femur or the left femur     Xr Femur Left (min 2 Views)    Result Date: 2/1/2020  EXAMINATION: 2 XRAY VIEWS OF THE LEFT FEMUR 2/1/2020 12:49 pm COMPARISON: None. HISTORY: ORDERING SYSTEM PROVIDED HISTORY: Post op in PACU please. Thank you. TECHNOLOGIST PROVIDED HISTORY: AP and cross-table lateral please, thank you Post op in PACU please. Thank you. Acuity: Unknown Type of Exam: Initial FINDINGS: 5 images submitted for review. There are immediate postoperative changes following antegrade intramedullary yesika and dynamic femoral neck pin fixation of the left femur in near anatomic alignment. No fracture or evident complication. Alignment of the hip and knee is maintained. Soft tissue gas and swelling over the left hip is consistent with recent postoperative state. There are vascular clips in the soft tissues about the knee. Skin staples are in place. Satisfactory postoperative appearance following ORIF of the left femur. No evident complication. Xr Femur Left (min 2 Views)    Result Date: 2/1/2020  Radiology exam is complete. No Radiologist dictation. Please follow up with ordering provider. Xr Femur Left (min 2 Views)    Result Date: 1/28/2020  EXAMINATION: TWO XRAY VIEWS OF THE LEFT HIP; 4 XRAY VIEWS OF THE RIGHT FEMUR; 4 XRAY VIEWS OF THE LEFT FEMUR; ONE XRAY VIEW OF THE PELVIS 1/28/2020 8:58 pm COMPARISON: None.  HISTORY: ORDERING SYSTEM PROVIDED HISTORY: lesions TECHNOLOGIST PROVIDED HISTORY: AP and cross-table lateral please, thank you lesions Reason for Exam: lesions; ORDERING SYSTEM PROVIDED HISTORY: lesions TECHNOLOGIST left without definitive fracture line at patient's pain persists, consider CT exam No acute osseous abnormality of the right femur or the left femur     Xr Femur Right (min 2 Views)    Result Date: 1/28/2020  EXAMINATION: TWO XRAY VIEWS OF THE LEFT HIP; 4 XRAY VIEWS OF THE RIGHT FEMUR; 4 XRAY VIEWS OF THE LEFT FEMUR; ONE XRAY VIEW OF THE PELVIS 1/28/2020 8:58 pm COMPARISON: None. HISTORY: ORDERING SYSTEM PROVIDED HISTORY: lesions TECHNOLOGIST PROVIDED HISTORY: AP and cross-table lateral please, thank you lesions Reason for Exam: lesions; ORDERING SYSTEM PROVIDED HISTORY: lesions TECHNOLOGIST PROVIDED HISTORY: lesions Reason for Exam: lesions; ORDERING SYSTEM PROVIDED HISTORY: Lesions TECHNOLOGIST PROVIDED HISTORY: Lesions Reason for Exam: lesions; ORDERING SYSTEM PROVIDED HISTORY: Ap, inlet, outlet TECHNOLOGIST PROVIDED HISTORY: Ap, inlet, outlet Reason for Exam: lesions FINDINGS: Left hip: 2 images were provided. Detail of the hip is limited. There is no gross malalignment. There is no acute displaced fracture. Mild degenerative changes along the superolateral acetabular margin are noted. Pelvis: 3 images were provided. Detail is limited. The sacrum is not well seen due to overlying stool. Postop changes in the right hip are noted status post total hip replacement. Degenerative changes in the pubic symphysis are noted. There are suggested degenerative changes along the superior left acetabular margin. Subtle double density along the superior aspect of the left superior pubic ramus is noted. This is seen on 2 of the 3 images. There is subtle double density along the inferior margin of the left superior pubic ramus on 1 of the images. There is no fracture noted in this location on the left hip images. Right femur: Postop changes in the right hip are noted status post hip replacement. There is no acute fracture. Degenerative changes at the knee are noted.   There is evidence of chondrocalcinosis at the The stomach,small bowel, and colon appear normal. Increased stool throughout the colon. Oral contrast is noted in the stomach, small bowel and colon. The appendix is not identified. Pelvis: Uterus normal. Peritoneum/Retroperitoneum: The abdominal aorta and iliac arteries are normal in caliber. There is no pathologic adenopathy. 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 Right total hip arthroplasty. Distended gallbladder. No radiodense gallstones noted but ultrasound is recommended. Extensive metastatic disease is a risk for fracture at T10. Us Gallbladder Ruq    Result Date: 1/27/2020  EXAMINATION: RIGHT UPPER QUADRANT ULTRASOUND 1/27/2020 10:23 am COMPARISON: CT abdomen and pelvis January 26, 2020 HISTORY: ORDERING SYSTEM PROVIDED HISTORY: Rule out liver cirrhosis, concern for aclaclulus cholecystitis TECHNOLOGIST PROVIDED HISTORY: Rule out liver cirrhosis, concern for aclaclulus cholecystitis FINDINGS: Heterogeneous increased liver echotexture. No focal liver lesion. No right-sided hydronephrosis. Nonvisualization of the pancreas. No gallbladder wall thickening. No gallstones. No sonographic Nunez sign. Common bile duct is within normal limits measuring 5 mm in diameter. Minimal amount of free fluid within the right upper quadrant. Minimal amount of free fluid within the upper abdomen. Heterogeneous increased liver echogenicity could be on the basis of hepatocellular disease or hepatic steatosis. Xr Chest Portable    Result Date: 1/26/2020  EXAMINATION: ONE XRAY VIEW OF THE CHEST 1/26/2020 2:53 pm COMPARISON: None. HISTORY: ORDERING SYSTEM PROVIDED HISTORY: PICC line placement TECHNOLOGIST PROVIDED HISTORY: PICC line placement FINDINGS: The lungs are without consolidation effusion. There is no pneumothorax. The heart is prominent. The upper abdomen is unremarkable.   The extrathoracic soft tissues are

## 2020-02-10 NOTE — PROGRESS NOTES
Nutrition Assessment    Type and Reason for Visit: Reassess    Nutrition Recommendations:   - Continue current General diet with Glucerna oral supplements  - Encourage/monitor intakes as tolerated. Nutrition Assessment: Pt reports appetite is improving. For breakfast had rice krispies and milk. Drinking some of Glucerna supplements. Noted last BM 2/9. Malnutrition Assessment:  · Malnutrition Status: Meets the criteria for moderate malnutrition  · Context: Acute illness or injury  · Findings of the 6 clinical characteristics of malnutrition (Minimum of 2 out of 6 clinical characteristics is required to make the diagnosis of moderate or severe Protein Calorie Malnutrition based on AND/ASPEN Guidelines):  1. Energy Intake-Less than or equal to 50% of estimated energy requirement, Greater than or equal to 7 days    2. Weight Loss-No significant weight loss  3. Fat Loss-Mild subcutaneous fat loss, Orbital  4. Muscle Loss-Unable to assess  5. Fluid Accumulation-Mild fluid accumulation, Extremities, Generalized    Nutrition Risk Level: High    Nutrient Needs:  · Estimated Daily Total Kcal: 6006-7144 kcal/day  · Estimated Daily Protein (g): 60-80 gm pro/day    Nutrition Diagnosis:   · Problem: Inadequate oral intake  · Etiology: related to (Current Condition, Appetite)     Signs and symptoms:  as evidenced by (variable PO intakes, need for oral supplements)    Objective Information:  · Wound Type: None  · Current Nutrition Therapies:  · Oral Diet Orders: General   · Oral Diet intake: 51-75%  · Oral Nutrition Supplement (ONS) Orders: Diabetic Oral Supplement  · ONS intake: 0%, 1-25%, 26-50%  · Anthropometric Measures:  · Ht: 5' 2\" (157.5 cm)   · Current Body Wt: 186 lb 3.2 oz (84.5 kg)  · Admission Body Wt: 183 lb 13.8 oz (83.4 kg)  · % Weight Change:  Variable wts since admission noted.   · Ideal Body Wt: 110 lb (49.9 kg), % Ideal Body 166%  · BMI Classification: BMI 30.0 - 34.9 Obese Class I    Nutrition Interventions:   Continue current diet, Continue current ONS  Continued Inpatient Monitoring, Education Not Indicated    Nutrition Evaluation:   · Evaluation: Progressing toward goals   · Goals: Oral intakes to meet at least 75% of estimated nutrition needs.     · Monitoring: Meal Intake, Supplement Intake, Diet Tolerance, Skin Integrity, I&O, Mental Status/Confusion, Weight, Pertinent Labs, Monitor Hemodynamic Status, Monitor Bowel Function    Electronically signed by Trav Varela RD, LD on 2/10/20 at 2:00 PM    Contact Number: [phone]

## 2020-02-10 NOTE — PROGRESS NOTES
FEMUR SURGERY  02/01/2020    nailing    JOINT REPLACEMENT Right 2009    MINDY    MASTECTOMY  2007    TUNNELED VENOUS PORT PLACEMENT      UPPER GASTROINTESTINAL ENDOSCOPY N/A 1/30/2020    EGD BIOPSY performed by Joana Blount MD at [address] [place] [address]  1/30/2020    EGD CONTROL HEMORRHAGE performed by Joana Blount MD at [facility]       Medications:      enoxaparin  1 mg/kg Subcutaneous BID    insulin lispro  0-18 Units Subcutaneous 4x Daily AC & HS    metoprolol tartrate  50 mg Oral BID    lactulose  20 g Oral TID    sodium chloride  250 mL Intravenous Once    metFORMIN  1,000 mg Oral BID WC    linagliptin  5 mg Oral Daily    alteplase  2 mg Intracatheter Once    oyster shell calcium/vitamin D  2 tablet Oral Daily    pantoprazole  40 mg Oral BID AC    [Held by provider] morphine  15 mg Oral 2 times per day    levothyroxine  100 mcg Oral Daily    venlafaxine  150 mg Oral Daily    insulin glargine  10 Units Subcutaneous Nightly    docusate sodium  100 mg Oral BID    senna  1 tablet Oral BID    ferrous sulfate  325 mg Oral BID WC    sodium chloride flush  10 mL Intravenous 2 times per day       Social History:     Social History     Socioeconomic History    Marital status:      Spouse name: Not on file    Number of children: Not on file    Years of education: Not on file    Highest education level: Not on file   Occupational History    Not on file   Social Needs    Financial resource strain: Not on file    Food insecurity:     Worry: Not on file     Inability: Not on file    Transportation needs:     Medical: Not on file     Non-medical: Not on file   Tobacco Use    Smoking status: Never Smoker    Smokeless tobacco: Never Used   Substance and Sexual Activity    Alcohol use: Not on file     Comment: social    Drug use: Never    Sexual activity: Not on file   Lifestyle    Physical activity:     Days per week: Not on file     Minutes per session: 101 98   CO2 26 27   BUN 11 10   CREATININE 0.48* 0.65     Hepatic Function Panel: No results for input(s): PROT, LABALBU, BILIDIR, IBILI, BILITOT, ALKPHOS, ALT, AST in the last 72 hours. No results for input(s): RPR in the last 72 hours. No results for input(s): HIV in the last 72 hours. No results for input(s): BC in the last 72 hours. Lab Results   Component Value Date    CREATININE 0.65 02/10/2020    GLUCOSE 100 02/10/2020       Detailed results: Thank you for allowing us to participate in the care of this patient. Please call with questions. This note is created with the assistance of a speech recognition program.  While intending to generate adocument that actually reflects the content of the visit, the document can still have some errors including those of syntax and sound a like substitutions which may escape proof reading. It such instances, actual meaningcan be extrapolated by contextual diversion. Nori Peña  Office: [phone]  Perfect serve / office [phone]    I have discussed the care of the patient, including pertinent history and exam findings,  with the student- I have seen and examined the patient and the key elements of all parts of the encounter have been performed by me. I agree with the assessment, plan and orders as documented by the student.     Kathleen Interiano, Infectious Diseases

## 2020-02-10 NOTE — CARE COORDINATION
Spoke with , Rell Gauthier. [facility] is Out of Network. Next choice is [address] Referral sent. Will call [facility]. [phone]- Called and left a VM with admissions at [facility] facility. Waiting on a return phone call.

## 2020-02-10 NOTE — PROGRESS NOTES
Occupational Therapy  [facility]  Daily Treatment Note  NAME: Germania Collins  : 1950  MRN: [id number]    Date of Service: 2/10/2020    Discharge Recommendations:  Patient would benefit from continued therapy after discharge. Assessment   Performance deficits / Impairments: Decreased functional mobility ; Decreased cognition;Decreased high-level IADLs;Decreased ADL status; Decreased endurance;Decreased balance;Decreased strength;Decreased safe awareness  Prognosis: Good  OT Education: OT Role;Plan of Care;Transfer Training  Patient Education: purpose of OT; proper hand and foot placement  Barriers to Learning: pt carlos SANTANA carry over  REQUIRES OT FOLLOW UP: Yes  Activity Tolerance  Activity Tolerance: Patient limited by fatigue;Patient limited by pain  Safety Devices  Safety Devices in place: Yes  Type of devices: Patient at risk for falls; Left in chair;Call light within reach; Chair alarm in place;Gait belt;Nurse notified         Patient Diagnosis(es): The primary encounter diagnosis was Liver lesion. A diagnosis of Septic shock ([facility] [address] was also pertinent to this visit. has a past medical history of Breast cancer ([facility] [address] CAD (coronary atherosclerotic disease), Diabetes mellitus type 2 in [facility]), Essential hypertension, and Hypothyroidism (acquired). has a past surgical history that includes Coronary artery bypass graft (); Mastectomy (); Upper gastrointestinal endoscopy (N/A, 2020); Upper gastrointestinal endoscopy (2020); joint replacement (Right, ); Tunneled venous port placement; Femur Surgery (2020); and Femur fracture surgery (Left, 2020).     Restrictions  Restrictions/Precautions  Restrictions/Precautions: Weight Bearing, Up as Tolerated, Fall Risk  Required Braces or Orthoses?: No  Lower Extremity Weight Bearing Restrictions  Left Lower Extremity Weight Bearing: Weight Bearing As Tolerated  Subjective   General  Patient assessed for rehabilitation services?: Yes  Family / Caregiver Present: No  General Comment  Comments: RN and pt agreeable to therapy  Pain Assessment  Pain Assessment: 0-10  Pain Level: 7  Pain Type: Surgical pain;Acute pain  Pain Location: Hip  Pain Orientation: Left  Non-Pharmaceutical Pain Intervention(s): Cold applied; Therapeutic presence;Repositioned  Pre Treatment Pain Screening  Comments / Details: Pt noted 0/10 pain at start of session increasing to 7/10 pain with movement  Vital Signs  Patient Currently in Pain: Yes   Orientation  Orientation  Overall Orientation Status: Within Functional Limits  Objective    ADL  UE Bathing: Stand by assistance;Setup; Increased time to complete  LE Bathing: Minimal assistance;Setup; Increased time to complete(req assistance with B feet sec to increased pain)  UE Dressing: Stand by assistance;Setup; Increased time to complete(to doff/don gown)  LE Dressing: Minimal assistance;Setup; Increased time to complete(to doff/don socks pt able to doff req assistance to don sec to increased pain)  Toileting: Stand by assistance(yeyo/post hygeine and gown management completed)  Additional Comments: ADLs completed seated in bathroom at sink  Balance  Sitting Balance: Stand by assistance  Standing Balance: Contact guard assistance  Standing Balance  Time: pt tolerated approx 4-6 min  Activity: during mobility and ADLs  Comment: utilizing RW  Functional Mobility  Functional - Mobility Device: Rolling Walker  Activity: To/from bathroom  Assist Level: Contact guard assistance  Functional Mobility Comments: 0 LOB noted; pt demo slow candence  Toilet Transfers  Toilet - Technique: Ambulating  Equipment Used: Standard toilet(with rails)  Toilet Transfer:  Moderate assistance  Toilet Transfers Comments: utilzing RW req increased assitance sec to pain and fatigue  Bed mobility  Comment: pt seated in chair at start of session, retiring to chair at session end  Transfers  Stand Step Transfers: Contact guard assistance  Sit to stand: Minimal assistance  Stand to sit: Minimal assistance  Transfer Comments: utilizing RW  Cognition  Overall Cognitive Status: Exceptions  Arousal/Alertness: Delayed responses to stimuli  Following Commands: Follows multistep commands with increased time; Follows multistep commands with repitition  Attention Span: Appears intact  Safety Judgement: Decreased awareness of need for safety  Problem Solving: Assistance required to correct errors made  Initiation: Requires cues for some  Sequencing: Requires cues for some    Pt agreeable to OT. ADL tasks of dressing, bathing and toileting completed see above for LOF. Decreased safety awareness noted during session. Pt supine in chair with BLE elevated, bed alarm on, call light in reach and ice packs donned to L hip.   Plan   Plan  Times per week: 2-4x/wk    Cont POC    Goals  Short term goals  Time Frame for Short term goals: patient will, by discharge  Short term goal 1: demonstrate UB self-cares at [address] term goal 2: demonstrate LB self-cares at Mod A using AE PRN   Short term goal 3: demonstrate grooming tasks <2 verbal cues at [address] term goal 4: demonstrate bed mobility at Min A to engage in ADL tasks EOB  Short term goal 5: demonstrate ~10 min of functional dynamic sitting balance at Min A to engage in ADL tasks        Therapy Time   Individual Concurrent Group Co-treatment   Time In 1102         Time Out 1144         Minutes [address] LAND[address] Gonzales/ALONDRA

## 2020-02-10 NOTE — DISCHARGE SUMMARY
[facility]    Discharge Summary     Patient ID: Carolyne Mazariegos  :  1950   MRN: [id number]     ACCOUNT:  [de-identified]   Patient's PCP: Ajay Curtis MD  Admit Date: 2020   Discharge Date: 2/10/2020     Length of Stay: 15  Code Status:  DNR-CCA  Admitting Physician: Abimael Arrington MD  Discharge Physician: Abimael Arrington MD     Active Discharge Diagnoses:     Hospital Problem Lists:  Principal Problem:    Metastatic breast cancer [facility])  Active Problems:    Anemia    Liver lesion    History of breast cancer    Diabetes mellitus type 2 in obese [facility])    Essential hypertension    Acquired hypothyroidism    Recurrent major depressive disorder, in remission ([facility] [address]    Bone lesion    Iron deficiency    Acute duodenal ulcer with gastric outlet obstruction    Ulcer of esophagus without bleeding    Encounter for palliative care  Resolved Problems:    Septic shock [facility])      Admission Condition:  poor     Discharged Condition: fair    Hospital Stay:     Hospital Course:  Carolyne Mazariegos is a 77 y.o. female who was admitted for the management of  Metastatic breast cancer ([facility] [address] , presented to ER with No chief complaint on file. ICU course, per documentation:     \"69 y. o. female with history of diabetes, hypothyroidism, CAD status post CABG in , breast cancer on the left side status post chemotherapy and mastectomy in  that presented to outside hospital [facility] on 2020 for complaints of generalized weakness and fatigue and flulike illness for about 1 month.  She was admitted there for anemia and hyperglycemia and hyponatremia however developed hypotension and had CT imaging which showed concern for duodenitis versus contained duodenal perforation.  Concern for intra-abdominal infection there and antibiotics were narrowed down to Zosyn, initially was on vancomycin as well.      CT chest showed extensive osteoblastic lesions and numerous soft tissue nodules in the greater omentum and mesentery suspicious for carcinomatosis. Elgie Keen showed mild/moderate left side hydroureteronephrosis without any obstructing stone or mass     Ct abdomen-   Distended gallbladder.  No radiodense gallstones noted but ultrasound is   recommended.       Extensive metastatic disease is a risk for fracture at T10.      Had egd 1/30:    Significant inflammation and ulceration of the distal third of the esophagus with white thick discharge suspicious for Candida (path negative for candida)  Although malignancy is less likely but could not be ruled out gentle biopsies were taken           Deep 1/2 cm ulcer in the duodenal bulb just beyond the pyloric orifice, with edema causing some gastric outlet narrowing, was covered with large clotted blood iWatch most of the clotted blood, but there is adherent clot to the crater base which I could not clear I could not see any vessel protruding for which we went ahead and injected epinephrine around it 6 cc of the 1-10,000 epi, at this point there is no fresh blood to indicate any more bleeding which indicate that the bleeding has subsided     Retained food in the stomach related to the gastric outlet narrowing     Had iliac biopsy which was positive for metastatic breast ca     On 2/1 had   Procedure(s):  1. Long TFN of the left femur  2. Femoral neck biopsy  For impending femoral neck fracture due to mets      Probable bone metastatic cancer probably from the breast pathology oncology following  From hematology oncology note bone lesions 2/2 metastatic breast adenocarcinoma. Biopsy results showing adenocarcinoma  Probable chemotherapy as outpatient  Back pain second to bone lesions.   Pain control follow-up with hematology as outpatient   Active Problems:    Anemia    Liver lesion hematology following       History of breast cancer status post chemotherapy and mastectomy       Diabetes mellitus type 2 in obese [facility]) continue home medication       Essential hypertension continue home medication       Acquired hypothyroidism levothyroxine       Recurrent major depressive disorder, in remission ([facility] [address] continue home medication     Acute blood loss anemia secondary to acute duodenal ulcer with gastric outlet obstruction status post endoscopy follow-up on biopsy results status post blood transfusion repeat CBC in 1 week follow-up with GI in 2 weeks  Resume aspirin if no evidence of bleeding in 1 week       Ulcer of esophagus without bleeding no evidence of fungal infection follow-up with GI in 2 weeks       Encounter for palliative care palliative care  Patient currently DNR    Overnight patient was kept as her urine output was decreased currently urine output has improved  Resolved Problems:    Septic shock developed during hospitalization ([facility] [address] status post IV antibiotics resolved ID recommendation appreciated watch ofF IV antibiotics  Physical exam     Alert  Chest: Clear to auscultation bilateral  Abdomen: Nontender nondistended  CVS: S1-S2  Neurology: Moves extremity  Significant therapeutic interventions:     Significant Diagnostic Studies:   Labs / Micro:  CBC:   Lab Results   Component Value Date    WBC 7.5 02/10/2020    RBC 3.13 02/10/2020    HGB 8.7 02/10/2020    HCT 29.8 02/10/2020    MCV 95.2 02/10/2020    MCH 27.8 02/10/2020    MCHC 29.2 02/10/2020    RDW 16.2 02/10/2020     02/10/2020     BMP:    Lab Results   Component Value Date    GLUCOSE 100 02/10/2020     02/10/2020    K 4.3 02/10/2020    CL 98 02/10/2020    CO2 27 02/10/2020    ANIONGAP 13 02/10/2020    BUN 10 02/10/2020    CREATININE 0.65 02/10/2020    BUNCRER NOT REPORTED 02/10/2020    CALCIUM 8.2 02/10/2020    LABGLOM >60 02/10/2020    GFRAA >60 02/10/2020    GFR      02/10/2020    GFR NOT REPORTED 02/10/2020     HFP:    Lab Results   Component Value Date    PROT 4.5 01/27/2020     CMP:    Lab Results   Component Value Date    GLUCOSE 100 02/10/2020     02/10/2020    K 4.3 02/10/2020    CL 98 02/10/2020    CO2 27 02/10/2020    BUN 10 02/10/2020    CREATININE 0.65 02/10/2020    ANIONGAP 13 02/10/2020    ALKPHOS 91 01/27/2020    ALT 11 01/27/2020    AST 19 01/27/2020    BILITOT 0.24 01/27/2020    LABALBU 2.3 01/27/2020    ALBUMIN 1.0 01/27/2020    LABGLOM >60 02/10/2020    GFRAA >60 02/10/2020    GFR      02/10/2020    GFR NOT REPORTED 02/10/2020    PROT 4.5 01/27/2020    CALCIUM 8.2 02/10/2020     PT/INR:  No results found for: PTINR, PROTIME, INR  PTT: No results found for: APTT  FLP:  No results found for: CHOL, TRIG, HDL  U/A:    Lab Results   Component Value Date    COLORU YELLOW 02/03/2020    TURBIDITY CLEAR 02/03/2020    SPECGRAV 1.017 02/03/2020    HGBUR NEGATIVE 02/03/2020    PHUR 5.0 02/03/2020    PROTEINU NEGATIVE 02/03/2020    GLUCOSEU NEGATIVE 02/03/2020    KETUA TRACE 02/03/2020    BILIRUBINUR NEGATIVE 02/03/2020    UROBILINOGEN Normal 02/03/2020    NITRU NEGATIVE 02/03/2020    LEUKOCYTESUR NEGATIVE 02/03/2020     TSH:  No results found for: TSH     Radiology:  Xr Femur Left (min 2 Views)    Result Date: 2/1/2020  Satisfactory postoperative appearance following ORIF of the left femur. No evident complication. Mri Brain W Wo Contrast    Result Date: 2/1/2020  1. Multiple calvarial metastases. 2. Abnormal dural thickening enhancement most evident overlying the left parietal and occipital lobes concerning for metastatic involvement given adjacent calvarial lesions. Meningitis is in the differential diagnosis but is thought unlikely. 3. Mild chronic white matter microvascular ischemic changes. No acute infarct or hemorrhage.        Consultations:    Consults:     Final Specialist Recommendations/Findings:   IP CONSULT TO HEM/ONC  IP CONSULT TO GI  IP CONSULT TO DIABETES EDUCATOR  IP CONSULT TO ORTHOPEDIC SURGERY  IP CONSULT TO PALLIATIVE CARE  IP CONSULT TO INFECTIOUS DISEASES      The patient was seen and examined on day of discharge and this taking these medications    carvedilol 6.25 MG tablet  Commonly known as:  COREG     glimepiride 4 MG tablet  Commonly known as:  AMARYL     levothyroxine 100 MCG tablet  Commonly known as:  SYNTHROID     lisinopril 2.5 MG tablet  Commonly known as:  PRINIVIL;ZESTRIL     metFORMIN 1000 MG tablet  Commonly known as:  GLUCOPHAGE     SITagliptin 100 MG tablet  Commonly known as:  JANUVIA     venlafaxine 150 MG extended release capsule  Commonly known as:  EFFEXOR XR        ASK your doctor about these medications    oxyCODONE 5 MG immediate release tablet  Commonly known as:  ROXICODONE  Take 1 tablet by mouth every 4 hours as needed for Pain for up to 12 doses. Ask about: Should I take this medication? Where to Get Your Medications      These medications were sent to [facility] [address] [place] [address] Drive  [facility] [address]    Phone:  [phone]   · aspirin 81 MG tablet  · ferrous sulfate 325 (65 Fe) MG EC tablet  · lactulose 10 GM/15ML solution  · pantoprazole 40 MG tablet     You can get these medications from any pharmacy    Bring a paper prescription for each of these medications  · oxyCODONE 5 MG immediate release tablet         No discharge procedures on file. Time Spent on discharge is  35 mins in patient examination, evaluation, counseling as well as medication reconciliation, prescriptions for required medications, discharge plan and follow up. Electronically signed by   Raul Santoyo MD  2/10/2020  10:40 AM      Thank you Dr. Ellie Pabon MD for the opportunity to be involved in this patient's care.

## 2020-02-10 NOTE — PROGRESS NOTES
with gastric outlet narrowing. It also showed a white esophagitis yet no Candida on pathology. ID is consulted for antibiotic management, she remains on vancomycin. On exam she has no abdominal pain and abdomen is benign. Otherwise her surgical wound on the left hip looks very dry and clean. No signs of active infection      Interval changes  2/10/2020     Patient seen and examined in her room. She is sitting comfortably and eating breakfast.  No fever or chills. No acute events overnight. Left hip wounds are clean and intact. Patient is off antibiotics. WBC: 7.4-7.3-7.2, stable  Creatinine 0.48    Summary of relevant labs:  Labs:   WBC: 10.3 - 8.5 -6.3 -7.4-7.3-7.2    Micro:  Blood cx 2/3   no growth 23 hours. Imaging:    MRI Brain 2/1/20  1. Multiple calvarial metastases. 2. Abnormal dural thickening enhancement most evident overlying the left   parietal and occipital lobes concerning for metastatic involvement given   adjacent calvarial lesions.  Meningitis is in the differential diagnosis but   is thought unlikely. 3. Mild chronic white matter microvascular ischemic changes.  No acute   infarct or hemorrhage. I have personally reviewed the past medical history, past surgical history, medications, social history, and family history, and I haveupdated the database accordingly.   Past Medical History:     Past Medical History:   Diagnosis Date    Breast cancer ([facility] 2007    CAD (coronary atherosclerotic disease)     Diabetes mellitus type 2 in obese ([facility] [address]     Essential hypertension     Hypothyroidism (acquired)        Past Surgical  History:     Past Surgical History:   Procedure Laterality Date    CORONARY ARTERY BYPASS GRAFT  2007    FEMUR FRACTURE SURGERY Left 2/1/2020    [facility]  FEMUR FRACTURE IM NAILING AND LEFT FEMUR BIOPSY (SYNTHES, , C-ARM) performed by Chaitanya Stevenson MD at [address] [place] [address] Rd  02/01/2020    nailing    JOINT REPLACEMENT Right 2009    MINDY    MASTECTOMY  2007    TUNNELED VENOUS PORT PLACEMENT      UPPER GASTROINTESTINAL ENDOSCOPY N/A 1/30/2020    EGD BIOPSY performed by Piedad Jo MD at [facility]  1/30/2020    EGD CONTROL HEMORRHAGE performed by Piedad Jo MD at [facility]       Medications:      insulin lispro  0-18 Units Subcutaneous 4x Daily AC & HS    metoprolol tartrate  50 mg Oral BID    lactulose  20 g Oral TID    sodium chloride  250 mL Intravenous Once    metFORMIN  1,000 mg Oral BID WC    linagliptin  5 mg Oral Daily    alteplase  2 mg Intracatheter Once    oyster shell calcium/vitamin D  2 tablet Oral Daily    pantoprazole  40 mg Oral BID AC    [Held by provider] morphine  15 mg Oral 2 times per day    levothyroxine  100 mcg Oral Daily    venlafaxine  150 mg Oral Daily    insulin glargine  10 Units Subcutaneous Nightly    docusate sodium  100 mg Oral BID    senna  1 tablet Oral BID    ferrous sulfate  325 mg Oral BID WC    sodium chloride flush  10 mL Intravenous 2 times per day    enoxaparin  40 mg Subcutaneous Daily       Social History:     Social History     Socioeconomic History    Marital status:      Spouse name: Not on file    Number of children: Not on file    Years of education: Not on file    Highest education level: Not on file   Occupational History    Not on file   Social Needs    Financial resource strain: Not on file    Food insecurity:     Worry: Not on file     Inability: Not on file    Transportation needs:     Medical: Not on file     Non-medical: Not on file   Tobacco Use    Smoking status: Never Smoker    Smokeless tobacco: Never Used   Substance and Sexual Activity    Alcohol use: Not on file     Comment: social    Drug use: Never    Sexual activity: Not on file   Lifestyle    Physical activity:     Days per week: Not on file     Minutes per session: Not on file    Stress: Not on file   Relationships    Social connections: Talks on phone: Not on file     Gets together: Not on file     Attends Buddhism service: Not on file     Active member of club or organization: Not on file     Attends meetings of clubs or organizations: Not on file     Relationship status: Not on file    Intimate partner violence:     Fear of current or ex partner: Not on file     Emotionally abused: Not on file     Physically abused: Not on file     Forced sexual activity: Not on file   Other Topics Concern    Not on file   Social History Narrative    Not on file       Family History:     Family History   Problem Relation Age of Onset    Hypertension Mother     Hypertension Father         Allergies:   Patient has no known allergies. Review of Systems:     Review of Systems   Constitutional: Negative for appetite change and chills. HENT: Negative for congestion and drooling. Eyes: Negative for discharge and itching. Respiratory: Negative for apnea and cough. Cardiovascular: Positive for leg swelling. Negative for chest pain. Gastrointestinal: Negative for abdominal distention, abdominal pain and diarrhea. Endocrine: Negative for heat intolerance. Genitourinary: Negative for difficulty urinating and flank pain. Musculoskeletal: Positive for arthralgias and back pain. Negative for neck pain. Skin: Negative for color change and rash. Allergic/Immunologic: Negative for environmental allergies. Neurological: Negative for dizziness and headaches. Hematological: Negative for adenopathy. Psychiatric/Behavioral: Negative for agitation and confusion. Physical Examination :     Patient Vitals for the past 8 hrs:   BP Temp Temp src Pulse Resp SpO2   02/10/20 0735 111/76 97.7 °F (36.5 °C) Oral 95 16 96 %       Physical Exam  Constitutional:       General: She is not in acute distress. Appearance: Normal appearance. HENT:      Head: Normocephalic and atraumatic. Nose: Nose normal. No congestion.       Mouth/Throat:

## 2020-02-10 NOTE — ADT AUTH CERT
[address] [facility]Extended [facility] by Radha Randhawa RN         Review Status Review Entered   In Primary 2/8/2020 12:47       Criteria Review   REVIEW SUMMARY     Patient: Amina Beard  Review Number: [id number]  Review Status: In Primary     Condition Specific: Yes        OUTCOMES  Outcome Type: Primary           REVIEW DETAILS     Product: [facility]  Subset: Extended Stay      (Symptom or finding within 24h)         (Excludes PO medications unless noted)          [X] Select Level of Care, One:              [X] ACUTE, >= One:                  [X] General, One:                      [X] Partial responder, not clinically stable for discharge and requires continued stay, >= One:                          [X] Anemia or bleeding and, Both:                              [X] Finding, >= One:                                  [X] Hct < 25%(0.25) or Hb < 8.3 g/dL(83 g/L)                              [X] Intervention, >= One:                                  [X] Blood product transfusion                                  ~--Admin, IQ Admin Admin on 02- 12:47 PM--~                                  transfuse 1 units prbc                                                                                                           Version: [employer] 2019.1  Kelsea Hinojosa  © 2019 [employer] and/or one of its [place]. All Rights Reserved. CPT only © 2018 American Medical Association. All Rights Reserved. Additional Notes   1/31/20      Ortho   No issues overnight. Not oriented. Pain controlled left hip. RLE: no erythema, warmth or swelling around the right hip, patient moves toes to command. Comparments soft and compressible. EHL/FHL/TA/GSC motor complex intact grossly. Sural/saphenous/SP/DP/Plantar sensory nerves SILT grossly. Foot warm and well perfused.       LLE: no TTP to the left hip, no pain with log roll. Compartments soft and compressible.  EHL/FHL/TS/GSC motor complex intact grossly. Sural/saphenous/SP/DP/Plantar sensory nerves SILT grossly. Foot warm and well perfused       74 y.o. female with multiple pelvic and left femur lesions concerning for metastatic disease       -Patient will likely require stabilization/fixaiton to the left femoral neck lesion.   -Possible fixation today after discussion with family. NPO for now, abx on call to OR   -Hgb 6.6 this morning. Type and crossed [facility] for OR. -CT left femur reviewed   -MRI brain pending   -IM on as primary team, appreciate recommendations and continued optimization for possible surgery   -Oncology following, f/u recs   -GI on and planning colonoscopy this AM   -Non-weightbearing to the left leg   -Pain control PO/IV Medication. Attempt to Wean IV medications. -DVT ppx: Lovenox   -Will attempt discussion today with family regarding patient's care, prognosis if appropriate, and timing of intervention. Please contact ortho when family arrives this morning.    ======================================================   Dietician    Nutrition Recommendations:    - Continue NPO - as able, restart an oral diet with a goal Carb Control diet.     - As diet restarted, will provide Glucerna oral supplements with meals to boost intakes. - Monitor labs and bowel function.       Nutrition Assessment: Pt seen based on NPO diet order x day 5 of admission.  Pt currenlty NPO for a colonoscopy and possible fixation of left femoral neck lesion.  EGD completed yesterday.  Admitted with c/o weakness, fatigue, and abdominal pain.  Noted pt with multiple pelvic and left femur lesions present. Stephen Drake reports has not been eating well and could benefit from an oral supplement.  Pt reports appetite decreased.   ==========================================   Gi    Pt was scheduled for colonoscopy but refused prep and so procedure had to be cancelled. No active bleeding overnight.  Hgb did drop slightly from 7.3 last night to 6.6 this am for which she was ====================================================   Medicine   1. Patient refusing any procedures or tests today   2. Was supposed to have colonoscopy but did not drink prep, again. 3. Pall care eval   4. Receiving 1u prbc today   5. Needs orthopedic stabilization of femur   ======================================================   Hem/onc   started on letrozole.  She also has been started on calcium and vitamin D. She will also need bone modulating agent as an outpatient   HER-2 testing requested per pathology lab   EGD pathology shows squamous mucosa   Transfuse to keep hemoglobin above 7   Follow-up on MRI brain results. We will hold off on increasing pain medication since patient mentation waxes and wanes.    Patient anticipating to undergo femur fixation for impending fracture   After discharge we will follow-up with patient at [facility] office.  If pain is still not controlled may consider palliative radiation to the pelvis.   ============================================          1/31/2020 04:42   Sodium: 136   Potassium: 4.4   Chloride: 100   CO2: 24   BUN: 14   Creatinine: 0.37 (L)   Bun/Cre Ratio: NOT REPORTED   Anion Gap: 12   GFR Non-: >60   GFR African American: >60   Glucose: 206 (H)   Calcium: 8.5 (L)   GFR Comment: Pend   GFR Staging: NOT REPORTED   WBC: 13.2 (H)   RBC: 2.37 (L)   Hemoglobin Quant: 6.6 (LL)   Hematocrit: 22.2 (L)   MCV: 93.7   MCH: 27.8   MCHC: 29.7   MPV: 9.9   RDW: 17.3 (H)   Platelet Count: 313   Platelet Estimate: NOT REPORTED   Absolute Mono #: 1.24 (H)   Eosinophils %: 2   Basophils Absolute: 0.06   Differential Type: NOT REPORTED   Seg Neutrophils: 66 (H)   Segs Absolute: 8.71 (H)   Lymphocytes: 21 (L)   Absolute Lymph #: 2.71   Monocytes: 9   Absolute Eos #: 0.28   Basophils: 1   Immature Granulocytes: 2 (H)   WBC Morphology: NOT REPORTED   RBC Morphology: ANISOCYTOSIS PRESENT      1/31/2020 07:52   POC Glucose: 196 (H)      1/31/2020 11:58   Hemoglobin Quant: 7.5 (L)   Hematocrit: 24.2 (L)      36.7, 111, 16, 92/52, 93%      Colace ferrous sulfate, lantus, humalog x3, proamatine, ms contin x3, ca / vit d, oxycodone x2.   Fsbs 4xd, labs daily, daily wt, sp02, vs, I and o. [address] [facility]-Extended Stay by Clarence Pinon RN         Review Status Review Entered   In Primary 2/8/2020 13:08       Criteria Review   REVIEW SUMMARY     Patient: Memo Marie  Review Number: [id number]  Review Status: In Primary     Condition Specific: Yes        OUTCOMES  Outcome Type: Primary           REVIEW DETAILS     Product: [address] [facility]  Subset: Extended Stay      (Symptom or finding within 24h)         (Excludes PO medications unless noted)          Select Level of Care, One:              [ ] ACUTE, >= One:                  [ ] General, One:                      [ ] Partial responder, not clinically stable for discharge and requires continued stay, >= One:                          [ ] Anemia or bleeding and, Both:                              [ ] Finding, >= One:                              ~--Admin, IQ Admin Admin on 02- 01:06 PM--~                              no labs noted for this day                                                                                               Version: [employer] 2019.1  [employer]  © 2019 [employer] and/or one of its subsidiaries. All Rights Reserved. CPT only © 2018 American Medical Association. All Rights Reserved. Additional Notes   2/2/20      Ortho   No complaints or concerns   Patient had more confusion overnight. Otherwise no other issues      LLE: Dressings c/d/i. Compartments soft. 2+ DP pulse. EHL/FHL motor intact.  Deep and Superficial Peroneal/Saphenous/Sural SILT.       74 y.o. female with multiple pelvic and left femur lesions concerning for metastatic disease s/p L femur cephalomedullary nailing and intra-operative biopsy POD#1       -Post op abx completed    -Maintain mobility and STS transfers. VC's given for hand placement, HOB elevated and use of bed rails required to complete bed mobility and transfers. Pt able to sit ~20min EOB modA-CGA. Pt would be unsafe to perform functional mobility without skilled assistance. Recommending continued skilled physical therapy to address functional mobility deficits and return pt to prior level of independence.   ===============================================   Ot   Performance deficits / Impairments: Decreased functional mobility ; Decreased high-level IADLs;Decreased ADL status; Decreased cognition;Decreased endurance;Decreased strength;Decreased posture;Decreased balance;Decreased safe awareness   Patient Education: OT POC, purpose of Crystal Valladares, Importance of OOB activity, proper posture while sitting EOB, transfer safety, use of bed rails/hand placement - fair return   REQUIRES OT FOLLOW UP: Yes   Activity Tolerance   Activity Tolerance: Patient limited by fatigue;Treatment limited secondary to agitation;Patient limited by pain   ===========================================   Medicine   Patient feels weak   Patient denies fever chest pain    Alert   Chest: Clear to auscultation bilateral   Abdomen: Nontender nondistended   CVS: S1-S2   Neurology: Moves extremity      [place] lesion hematology following         History of breast cancer status post chemotherapy and mastectomy         Diabetes mellitus type 2 in obese ([address] uncontrolled insulin sliding scale         Essential hypertension continue home medication         Acquired hypothyroidism levothyroxine         Recurrent major depressive disorder, in remission ([facility] [address] continue home medication       Acute blood loss anemia secondary to acute duodenal ulcer with gastric outlet obstruction status post endoscopy follow-up on biopsy results status post blood transfusion         Ulcer of esophagus without bleeding no evidence of fungal infection         Encounter for palliative care 13:47       Criteria Review   REVIEW SUMMARY     Patient: Alfonso Delgado  Review Number: [id number]  Review Status: In Primary     Condition Specific: Yes        OUTCOMES  Outcome Type: Primary           REVIEW DETAILS     Product: [facility] Adult  Subset: Extended Stay      (Symptom or finding within 24h)         (Excludes PO medications unless noted)          Select Level of Care, One:              [ ] ACUTE, >= One:                  [ ] General, One:                      [ ] Partial responder, not clinically stable for discharge and requires continued stay, >= One:                          [ ] Anemia or bleeding and, Both:                              [X] Finding, >= One:                                  [ ] Hct < 25%(0.25) or Hb < 8.3 g/dL(83 g/L)                                  ~--Admin, IQ Admin Admin on 02- 01:46 PM--~                                  2/6 h/h 8.6 / 28.5                                   previeous h/h noted was 2/4                                                                                                      ~--Admin, IQ Admin Admin on 02- 01:45 PM--~                                  h/h 8.3 / 25.9                                                                                                                                        [X] Heart rate 100-120/min, sustained     Version: [employer]® 2019.1  Red Gregory and [employer]®  © 2019 [employer] and/or one of its [place]. All Rights Reserved. CPT only © 2018 American Medical Association. All Rights Reserved.    Additional Notes   2/6/20      Medicine   Probable bone metastatic cancer probably from the breast pathology oncology following   From hematology oncology note bone lesions 2/2 metastatic breast adenocarcinoma. Biopsy results showing adenocarcinoma   Probable chemotherapy as outpatient   Back pain second to bone lesions.        Active Problems:   Michael [place] lesion hematology following         History of breast cancer status post chemotherapy and mastectomy         Diabetes mellitus type 2 in obese (HCC) uncontrolled insulin sliding scale         Essential hypertension continue home medication         Acquired hypothyroidism levothyroxine         Recurrent major depressive disorder, in remission ([facility] [address] continue home medication       Acute blood loss anemia secondary to acute duodenal ulcer with gastric outlet obstruction status post endoscopy follow-up on biopsy results status post blood transfusion         Ulcer of esophagus without bleeding no evidence of fungal infection         Encounter for palliative care palliative care   Resolved Problems:     Septic shock developed during hospitalization ([facility] [address] status post IV antibiotics resolved ID recommendation appreciated watch ofF IV antibiotics if no sign or symptom of infection      ============================================================   Hem/onc   Hemoglobin stable at 8.6. Pltlt 261.    Status post prophylactic rodding of the left femur   Plan for radiation as an outpatient to be followed by systemic therapy. Plan for XRT in [place]. Discussed with family   Prognosis is poor. On letrozole. Family had many questions. About prognosis, management plans and logistics. Needs rehab/SNF on discharge. Will send records to [address] in [place].   =======================================================   Pt   Assessment: Pt a,b 10ft x2 and 8ft with RW and MIN A for safety, while demonstrating extremely slow joey and difficulty advancing BLEs. Pt would continue to require assistance with any funtional mobility at this time. Would benefit from more PT to address deficit.    Prognosis: Fair   =====================================     98 (36.7)  23  108  159/74Abnormal  -  Semi fowlers  -  2  95  Nasal cannula         Results for Maxi Mendoza (MRN [id number]) as of 2/8/2020 12:42      2/6/2020 05:49   Sodium: 142 Potassium: 3.8   Chloride: 104   CO2: 26   BUN: 10   Creatinine: 0.44 (L)   Bun/Cre Ratio: NOT REPORTED   Anion Gap: 12   GFR Non-: >60   GFR African American: >60   Glucose: 124 (H)   Calcium: 8.2 (L)   GFR Comment: Pend   GFR Staging: NOT REPORTED   WBC: 6.3   RBC: 3.03 (L)   Hemoglobin Quant: 8.6 (L)   Hematocrit: 28.5 (L)   MCV: 94.1   MCH: 28.4   MCHC: 30.2   MPV: 10.0   RDW: 15.7 (H)   Platelet Count: 713   Platelet Estimate: NOT REPORTED   Absolute Mono #: 0.63   Eosinophils %: 3   Basophils Absolute: 0.05   Differential Type: NOT REPORTED   Seg Neutrophils: 61   Segs Absolute: 3.86   Lymphocytes: 24   Absolute Lymph #: 1.54   Monocytes: 10   Absolute Eos #: 0.17   Basophils: 1   Immature Granulocytes: 1 (H)   WBC Morphology: NOT REPORTED   RBC Morphology: ANISOCYTOSIS PRESENT         Asa, lasix 40 mg iv x1, humalog x2, lactulose x2,  lopressor iv x1, lopressor 50 mg po 2xd, proamatine x3,  oxycodone x3   fsbs 4xd, I and o, vs, daily wt, labs,

## 2020-02-10 NOTE — PROGRESS NOTES
Subcutaneous 4x Daily AC & HS Mahmud Abigail Borrero MD        metoprolol (LOPRESSOR) injection 5 mg  5 mg Intravenous Q8H PRN Jose D Yen MD        metoprolol tartrate (LOPRESSOR) tablet 50 mg  50 mg Oral BID Jose D Yen MD   50 mg at 02/10/20 0911    lactulose (CHRONULAC) 10 GM/15ML solution 20 g  20 g Oral TID Jose D Yen MD   20 g at 02/09/20 0845    0.9 % sodium chloride bolus  250 mL Intravenous Once Micheal P Blood, DO        metFORMIN (GLUCOPHAGE) tablet 1,000 mg  1,000 mg Oral BID WC Dinh Lemmings, DO   1,000 mg at 02/10/20 2564    linagliptin (TRADJENTA) tablet 5 mg  5 mg Oral Daily Dinh Lemmings, DO   5 mg at 02/10/20 0911    fentaNYL (SUBLIMAZE) injection 25 mcg  25 mcg Intravenous Q5 Min PRN Dinh Lemmings, DO   25 mcg at 02/02/20 1143    sodium chloride flush 0.9 % injection 10 mL  10 mL Intravenous PRN Micheal MONTE Blood, DO        alteplase (CATHFLO) injection 2 mg  2 mg Intracatheter Once Dinh Lemmings, DO        oyster shell calcium/vitamin D 250-125 MG-UNIT per tablet 500 mg  2 tablet Oral Daily Dinh Lemmings, DO   500 mg at 02/10/20 0911    pantoprazole (PROTONIX) tablet 40 mg  40 mg Oral BID AC Dinh Lemmings, DO   40 mg at 02/10/20 3495    [Held by provider] morphine (MS CONTIN) extended release tablet 15 mg  15 mg Oral 2 times per day Dinh Lemmings, DO   15 mg at 02/02/20 2132    sodium chloride flush 0.9 % injection 10 mL  10 mL Intravenous PRN Dinh Lemmings, DO   10 mL at 02/08/20 2025    acetaminophen (TYLENOL) tablet 650 mg  650 mg Oral Q4H PRN Dinh Lemmings, DO   650 mg at 02/10/20 0351    levothyroxine (SYNTHROID) tablet 100 mcg  100 mcg Oral Daily Dinh Lemmings, DO   100 mcg at 02/10/20 2474    venlafaxine (EFFEXOR XR) extended release capsule 150 mg  150 mg Oral Daily Dinh Lemmings, DO   150 mg at 02/10/20 0911    insulin glargine (LANTUS) injection vial 10 Units  10 Units Subcutaneous Nightly Dinh Bullard, DO   10 Units at 02/09/20 2025    docusate sodium (COLACE) capsule 100 mg  100 mg Oral BID Aurther Jennifer, DO   100 mg at 02/10/20 0839    senna (SENOKOT) tablet 8.6 mg  1 tablet Oral BID Aurther Jennifer, DO   8.6 mg at 02/10/20 5498    ferrous sulfate EC tablet 325 mg  325 mg Oral BID WC Aurther Jennifer, DO   325 mg at 02/10/20 7752    sodium chloride flush 0.9 % injection 10 mL  10 mL Intravenous 2 times per day Aurther Jennifer, DO   10 mL at 02/10/20 0912    sodium chloride flush 0.9 % injection 10 mL  10 mL Intravenous PRN Aurther Jennifer, DO   10 mL at 01/30/20 2042    magnesium hydroxide (MILK OF MAGNESIA) 400 MG/5ML suspension 30 mL  30 mL Oral Daily PRN Aurther Jennifer, DO        ondansetron [facility]) injection 4 mg  4 mg Intravenous Q6H PRN Aurther Jennifer, DO        enoxaparin (LOVENOX) injection 40 mg  40 mg Subcutaneous Daily Aurther Jennifer, DO   40 mg at 02/10/20 1994    potassium chloride 10 mEq/100 mL IVPB (Peripheral Line)  10 mEq Intravenous PRN Aurther Jennifer, DO        bisacodyl (DULCOLAX) suppository 10 mg  10 mg Rectal Daily PRN Aurther Jennifer, DO        acetaminophen (TYLENOL) tablet 650 mg  650 mg Oral Q4H PRN Aurther Jennifer, DO   650 mg at 02/09/20 1724    glucose (GLUTOSE) 40 % oral gel 15 g  15 g Oral PRN Aurther Jennifer, DO        dextrose 50 % IV solution  12.5 g Intravenous PRN Aurther Jennifer, DO        glucagon (rDNA) injection 1 mg  1 mg Intramuscular PRN Aurther Jennifer, DO        dextrose 5 % solution  100 mL/hr Intravenous PRN Aurther Jennifer, DO        oxyCODONE (ROXICODONE) immediate release tablet 5 mg  5 mg Oral Q4H PRN Aurther Jennifer, DO   5 mg at 02/07/20 1548    Or    oxyCODONE (ROXICODONE) immediate release tablet 10 mg  10 mg Oral Q4H PRN Aurther Jennifer, DO   10 mg at 02/10/20 0248       Allergies:  Patient has no known allergies. Social History:   reports that she has never smoked.  She has never used smokeless tobacco. She reports that she organomegaly   Neurological - alert, oriented, normal speech, no focal findings or movement disorder noted   Musculoskeletal - no joint tenderness, deformity or swelling   Extremities - peripheral pulses normal, no pedal edema, no clubbing or cyanosis   Skin - normal coloration and turgor, no rashes, no suspicious skin lesions noted ,  Breast left-sided mastectomy no signs of local recurrence    DATA:      Labs:     CBC:   Recent Labs     02/09/20 0543 02/10/20  0558   WBC 7.2 7.5   HGB 9.0* 8.7*   HCT 30.9* 29.8*    460*     BMP:   Recent Labs     02/09/20 0543 02/10/20  0558    138   K 4.6 4.3   CO2 26 27   BUN 11 10   CREATININE 0.48* 0.65   LABGLOM >60 >60   GLUCOSE 87 100*     PT/INR: No results for input(s): PROTIME, INR in the last 72 hours. APTT:No results for input(s): APTT in the last 72 hours. LIVER PROFILE:  No results for input(s): AST, ALT, LABALBU in the last 72 hours. Us Renal Complete    Result Date: 1/26/2020  EXAMINATION: RETROPERITONEAL ULTRASOUND OF THE KIDNEYS AND URINARY BLADDER 1/26/2020 COMPARISON: None HISTORY: ORDERING SYSTEM PROVIDED HISTORY: left sided hydroureteronephrosis, eval kidneys TECHNOLOGIST PROVIDED HISTORY: left sided hydroureteronephrosis, eval kidneys FINDINGS: Kidneys: The right kidney measures 12.5 cm in length and the left kidney measures 12 cm in length. Kidneys demonstrate normal cortical echogenicity. Probable vascular calcifications are noted bilaterally. Mild left hydronephrosis. . Bladder: Bladder is decompressed with a Matt catheter. Mild left hydronephrosis     Ct Abdomen Pelvis W Iv Contrast Additional Contrast? Oral    Result Date: 1/26/2020  EXAMINATION: CT OF THE ABDOMEN AND PELVIS WITH CONTRAST 1/26/2020 5:25 pm TECHNIQUE: CT of the abdomen and pelvis was performed with the administration of intravenous contrast. Multiplanar reformatted images are provided for review.  Dose modulation, iterative reconstruction, and/or weight based focal liver lesion. No right-sided hydronephrosis. Nonvisualization of the pancreas. No gallbladder wall thickening. No gallstones. No sonographic Nunez sign. Common bile duct is within normal limits measuring 5 mm in diameter. Minimal amount of free fluid within the right upper quadrant. Minimal amount of free fluid within the upper abdomen. Heterogeneous increased liver echogenicity could be on the basis of hepatocellular disease or hepatic steatosis. Xr Chest Portable    Result Date: 1/26/2020  EXAMINATION: ONE XRAY VIEW OF THE CHEST 1/26/2020 2:53 pm COMPARISON: None. HISTORY: ORDERING SYSTEM PROVIDED HISTORY: PICC line placement TECHNOLOGIST PROVIDED HISTORY: PICC line placement FINDINGS: The lungs are without consolidation effusion. There is no pneumothorax. The heart is prominent. The upper abdomen is unremarkable. The extrathoracic soft tissues are unremarkable. There is a right subclavian port with the tip in the mid SVC. There is a right-sided PICC line with the tip in the distal mid aspect of the SVC. Mild cardiomegaly without acute pulmonary process. Right-sided PICC line with the tip in the distal mid aspect of the SVC.          IMAGING DATA:          IMPRESSION:     Primary Problem  Metastatic breast cancer [facility])    Active Hospital Problems    Diagnosis Date Noted    Encounter for palliative care [Z51.5]     Acute duodenal ulcer with gastric outlet obstruction [K26.3] 01/30/2020    Ulcer of esophagus without bleeding [K22.10] 01/30/2020    Bone lesion [M89.9]     Iron deficiency [E61.1]     Diabetes mellitus type 2 in obese ([facility] [E11.69, E66.9] 01/29/2020    Essential hypertension [I10] 01/29/2020    Acquired hypothyroidism [E03.9] 01/29/2020    Recurrent major depressive disorder, in remission ([address] [F33.40] 01/29/2020    Metastatic breast cancer ([facility] Sergey Matthews 01/29/2020    History of breast cancer [Z85.3] 01/27/2020    Anemia [D64.9] 01/26/2020    Liver lesion [K76.9] 01/26/2020     History of breast cancer likely hormone receptor positive. Status post left mastectomy and chemotherapy and aromatase inhibitor therapy. Iron deficiency  Hypoproliferative anemia  Bone lesions 2/2 metastatic breast adenocarcinoma. Biopsy results showing adenocarcinoma  Back pain second to bone lesions. RECOMMENDATIONS:  Reviewed results of lab work-up and other relevant clinical data. Status post prophylactic rodding of the left femur  Plan for radiation as an outpatient to be followed by systemic therapy. Plan for XRT in [place]. Continue Femara. Will start on Lovenox, can switch to Eliquis from tomorrow. Needs rehab/SNF on discharge. Ok to discharge to SNF from my standpoint, needs follow up with radiation in [place]. Marcelina Craig, MS4    Fabiola Resendez MD  PGY-3, Internal Medicine  [facility]    Attending Physician Statement   I have discussed the care of [facility], including pertinent history and exam findings with the resident. I have reviewed the key elements of all parts of the encounter with the resident. I have seen and examined the patient with the resident. I agree with the assessment and plan and status of the problem list as documented.                                [address] [place] [address] [facility]                          Cell: [phone]

## 2020-02-11 ENCOUNTER — ANESTHESIA EVENT (OUTPATIENT)
Dept: ENDOSCOPY | Age: 70
DRG: 347 | End: 2020-02-11
Payer: COMMERCIAL

## 2020-02-11 ENCOUNTER — ANESTHESIA (OUTPATIENT)
Dept: ENDOSCOPY | Age: 70
DRG: 347 | End: 2020-02-11
Payer: COMMERCIAL

## 2020-02-11 VITALS
SYSTOLIC BLOOD PRESSURE: 131 MMHG | RESPIRATION RATE: 19 BRPM | DIASTOLIC BLOOD PRESSURE: 69 MMHG | OXYGEN SATURATION: 96 %

## 2020-02-11 LAB
ABSOLUTE EOS #: 0.22 K/UL (ref 0–0.44)
ABSOLUTE IMMATURE GRANULOCYTE: 0.06 K/UL (ref 0–0.3)
ABSOLUTE LYMPH #: 1.74 K/UL (ref 1.1–3.7)
ABSOLUTE MONO #: 0.82 K/UL (ref 0.1–1.2)
ANION GAP SERPL CALCULATED.3IONS-SCNC: 13 MMOL/L (ref 9–17)
BASOPHILS # BLD: 1 % (ref 0–2)
BASOPHILS ABSOLUTE: 0.09 K/UL (ref 0–0.2)
BUN BLDV-MCNC: 10 MG/DL (ref 8–23)
BUN/CREAT BLD: ABNORMAL (ref 9–20)
CALCIUM SERPL-MCNC: 7.6 MG/DL (ref 8.6–10.4)
CHLORIDE BLD-SCNC: 98 MMOL/L (ref 98–107)
CO2: 25 MMOL/L (ref 20–31)
CREAT SERPL-MCNC: 0.46 MG/DL (ref 0.5–0.9)
DIFFERENTIAL TYPE: ABNORMAL
EOSINOPHILS RELATIVE PERCENT: 2 % (ref 1–4)
GFR AFRICAN AMERICAN: >60 ML/MIN
GFR NON-AFRICAN AMERICAN: >60 ML/MIN
GFR SERPL CREATININE-BSD FRML MDRD: ABNORMAL ML/MIN/{1.73_M2}
GFR SERPL CREATININE-BSD FRML MDRD: ABNORMAL ML/MIN/{1.73_M2}
GLUCOSE BLD-MCNC: 75 MG/DL (ref 70–99)
GLUCOSE BLD-MCNC: 87 MG/DL (ref 65–105)
HCT VFR BLD CALC: 27.3 % (ref 36.3–47.1)
HCT VFR BLD CALC: 32.2 % (ref 36.3–47.1)
HEMOGLOBIN: 8 G/DL (ref 11.9–15.1)
HEMOGLOBIN: 9.3 G/DL (ref 11.9–15.1)
IMMATURE GRANULOCYTES: 1 %
INR BLD: 1
LYMPHOCYTES # BLD: 19 % (ref 24–43)
MCH RBC QN AUTO: 28.9 PG (ref 25.2–33.5)
MCHC RBC AUTO-ENTMCNC: 28.9 G/DL (ref 28.4–34.8)
MCV RBC AUTO: 100 FL (ref 82.6–102.9)
MONOCYTES # BLD: 9 % (ref 3–12)
NRBC AUTOMATED: 0.2 PER 100 WBC
PARTIAL THROMBOPLASTIN TIME: 25.7 SEC (ref 20.5–30.5)
PDW BLD-RTO: 16.3 % (ref 11.8–14.4)
PLATELET # BLD: 499 K/UL (ref 138–453)
PLATELET ESTIMATE: ABNORMAL
PMV BLD AUTO: 10.3 FL (ref 8.1–13.5)
POTASSIUM SERPL-SCNC: 4.6 MMOL/L (ref 3.7–5.3)
PROTHROMBIN TIME: 10.5 SEC (ref 9–12)
RBC # BLD: 3.22 M/UL (ref 3.95–5.11)
RBC # BLD: ABNORMAL 10*6/UL
SEG NEUTROPHILS: 68 % (ref 36–65)
SEGMENTED NEUTROPHILS ABSOLUTE COUNT: 6.06 K/UL (ref 1.5–8.1)
SODIUM BLD-SCNC: 136 MMOL/L (ref 135–144)
WBC # BLD: 9 K/UL (ref 3.5–11.3)
WBC # BLD: ABNORMAL 10*3/UL

## 2020-02-11 PROCEDURE — 0W3P7ZZ CONTROL BLEEDING IN GASTROINTESTINAL TRACT, VIA NATURAL OR ARTIFICIAL OPENING: ICD-10-PCS | Performed by: INTERNAL MEDICINE

## 2020-02-11 PROCEDURE — 6370000000 HC RX 637 (ALT 250 FOR IP): Performed by: ORTHOPAEDIC SURGERY

## 2020-02-11 PROCEDURE — 3700000001 HC ADD 15 MINUTES (ANESTHESIA): Performed by: INTERNAL MEDICINE

## 2020-02-11 PROCEDURE — 6360000002 HC RX W HCPCS: Performed by: INTERNAL MEDICINE

## 2020-02-11 PROCEDURE — 3609018200 HC EGD INJECTION SCLEROSIS ESOPHGL/GASTRIC VARICES: Performed by: INTERNAL MEDICINE

## 2020-02-11 PROCEDURE — 6360000002 HC RX W HCPCS: Performed by: NURSE ANESTHETIST, CERTIFIED REGISTERED

## 2020-02-11 PROCEDURE — 6370000000 HC RX 637 (ALT 250 FOR IP): Performed by: INTERNAL MEDICINE

## 2020-02-11 PROCEDURE — 2580000003 HC RX 258: Performed by: INTERNAL MEDICINE

## 2020-02-11 PROCEDURE — 82947 ASSAY GLUCOSE BLOOD QUANT: CPT

## 2020-02-11 PROCEDURE — 2500000003 HC RX 250 WO HCPCS: Performed by: NURSE ANESTHETIST, CERTIFIED REGISTERED

## 2020-02-11 PROCEDURE — 85730 THROMBOPLASTIN TIME PARTIAL: CPT

## 2020-02-11 PROCEDURE — 80048 BASIC METABOLIC PNL TOTAL CA: CPT

## 2020-02-11 PROCEDURE — 2000000000 HC ICU R&B

## 2020-02-11 PROCEDURE — 7100000010 HC PHASE II RECOVERY - FIRST 15 MIN: Performed by: INTERNAL MEDICINE

## 2020-02-11 PROCEDURE — 85014 HEMATOCRIT: CPT

## 2020-02-11 PROCEDURE — 85610 PROTHROMBIN TIME: CPT

## 2020-02-11 PROCEDURE — 85018 HEMOGLOBIN: CPT

## 2020-02-11 PROCEDURE — 99232 SBSQ HOSP IP/OBS MODERATE 35: CPT | Performed by: INTERNAL MEDICINE

## 2020-02-11 PROCEDURE — 36415 COLL VENOUS BLD VENIPUNCTURE: CPT

## 2020-02-11 PROCEDURE — 76937 US GUIDE VASCULAR ACCESS: CPT

## 2020-02-11 PROCEDURE — 2580000003 HC RX 258: Performed by: ORTHOPAEDIC SURGERY

## 2020-02-11 PROCEDURE — 43255 EGD CONTROL BLEEDING ANY: CPT | Performed by: INTERNAL MEDICINE

## 2020-02-11 PROCEDURE — 3E0G8GC INTRODUCTION OF OTHER THERAPEUTIC SUBSTANCE INTO UPPER GI, VIA NATURAL OR ARTIFICIAL OPENING ENDOSCOPIC: ICD-10-PCS | Performed by: INTERNAL MEDICINE

## 2020-02-11 PROCEDURE — 3700000000 HC ANESTHESIA ATTENDED CARE: Performed by: INTERNAL MEDICINE

## 2020-02-11 PROCEDURE — 85025 COMPLETE CBC W/AUTO DIFF WBC: CPT

## 2020-02-11 PROCEDURE — 99291 CRITICAL CARE FIRST HOUR: CPT | Performed by: INTERNAL MEDICINE

## 2020-02-11 PROCEDURE — 6360000002 HC RX W HCPCS: Performed by: STUDENT IN AN ORGANIZED HEALTH CARE EDUCATION/TRAINING PROGRAM

## 2020-02-11 PROCEDURE — 2720000010 HC SURG SUPPLY STERILE: Performed by: INTERNAL MEDICINE

## 2020-02-11 PROCEDURE — 2580000003 HC RX 258: Performed by: STUDENT IN AN ORGANIZED HEALTH CARE EDUCATION/TRAINING PROGRAM

## 2020-02-11 PROCEDURE — C9113 INJ PANTOPRAZOLE SODIUM, VIA: HCPCS | Performed by: INTERNAL MEDICINE

## 2020-02-11 PROCEDURE — 2580000003 HC RX 258: Performed by: NURSE ANESTHETIST, CERTIFIED REGISTERED

## 2020-02-11 PROCEDURE — 3609013000 HC EGD TRANSORAL CONTROL BLEEDING ANY METHOD: Performed by: INTERNAL MEDICINE

## 2020-02-11 PROCEDURE — 2709999900 HC NON-CHARGEABLE SUPPLY: Performed by: INTERNAL MEDICINE

## 2020-02-11 PROCEDURE — 7100000011 HC PHASE II RECOVERY - ADDTL 15 MIN: Performed by: INTERNAL MEDICINE

## 2020-02-11 PROCEDURE — 99233 SBSQ HOSP IP/OBS HIGH 50: CPT | Performed by: INTERNAL MEDICINE

## 2020-02-11 RX ORDER — SODIUM CHLORIDE 9 MG/ML
INJECTION, SOLUTION INTRAVENOUS ONCE
Status: COMPLETED | OUTPATIENT
Start: 2020-02-11 | End: 2020-02-11

## 2020-02-11 RX ORDER — PROPOFOL 10 MG/ML
INJECTION, EMULSION INTRAVENOUS PRN
Status: DISCONTINUED | OUTPATIENT
Start: 2020-02-11 | End: 2020-02-11 | Stop reason: SDUPTHER

## 2020-02-11 RX ORDER — LIDOCAINE HYDROCHLORIDE 10 MG/ML
INJECTION, SOLUTION EPIDURAL; INFILTRATION; INTRACAUDAL; PERINEURAL PRN
Status: DISCONTINUED | OUTPATIENT
Start: 2020-02-11 | End: 2020-02-11 | Stop reason: SDUPTHER

## 2020-02-11 RX ORDER — SODIUM CHLORIDE, SODIUM LACTATE, POTASSIUM CHLORIDE, CALCIUM CHLORIDE 600; 310; 30; 20 MG/100ML; MG/100ML; MG/100ML; MG/100ML
INJECTION, SOLUTION INTRAVENOUS CONTINUOUS
Status: DISCONTINUED | OUTPATIENT
Start: 2020-02-11 | End: 2020-02-19 | Stop reason: HOSPADM

## 2020-02-11 RX ORDER — EPINEPHRINE 1 MG/ML
INJECTION, SOLUTION, CONCENTRATE INTRAVENOUS PRN
Status: DISCONTINUED | OUTPATIENT
Start: 2020-02-11 | End: 2020-02-11 | Stop reason: ALTCHOICE

## 2020-02-11 RX ORDER — FENTANYL CITRATE 50 UG/ML
50 INJECTION, SOLUTION INTRAMUSCULAR; INTRAVENOUS
Status: DISCONTINUED | OUTPATIENT
Start: 2020-02-11 | End: 2020-02-19 | Stop reason: HOSPADM

## 2020-02-11 RX ORDER — SODIUM CHLORIDE 9 MG/ML
INJECTION, SOLUTION INTRAVENOUS CONTINUOUS PRN
Status: DISCONTINUED | OUTPATIENT
Start: 2020-02-11 | End: 2020-02-11 | Stop reason: SDUPTHER

## 2020-02-11 RX ADMIN — SODIUM CHLORIDE 80 MG: 9 INJECTION, SOLUTION INTRAVENOUS at 17:25

## 2020-02-11 RX ADMIN — FENTANYL CITRATE 50 MCG: 50 INJECTION, SOLUTION INTRAMUSCULAR; INTRAVENOUS at 18:41

## 2020-02-11 RX ADMIN — SODIUM CHLORIDE, POTASSIUM CHLORIDE, SODIUM LACTATE AND CALCIUM CHLORIDE: 600; 310; 30; 20 INJECTION, SOLUTION INTRAVENOUS at 20:12

## 2020-02-11 RX ADMIN — SODIUM CHLORIDE: 9 INJECTION, SOLUTION INTRAVENOUS at 14:01

## 2020-02-11 RX ADMIN — LEVOTHYROXINE SODIUM 100 MCG: 100 TABLET ORAL at 05:50

## 2020-02-11 RX ADMIN — LIDOCAINE HYDROCHLORIDE 100 MG: 10 INJECTION, SOLUTION EPIDURAL; INFILTRATION; INTRACAUDAL at 14:12

## 2020-02-11 RX ADMIN — PROPOFOL 520 MG: 10 INJECTION, EMULSION INTRAVENOUS at 14:12

## 2020-02-11 RX ADMIN — METOPROLOL TARTRATE 50 MG: 50 TABLET, FILM COATED ORAL at 21:33

## 2020-02-11 RX ADMIN — SODIUM CHLORIDE, PRESERVATIVE FREE 10 ML: 5 INJECTION INTRAVENOUS at 10:45

## 2020-02-11 RX ADMIN — SODIUM CHLORIDE 8 MG/HR: 9 INJECTION, SOLUTION INTRAVENOUS at 17:25

## 2020-02-11 RX ADMIN — OXYCODONE HYDROCHLORIDE 10 MG: 5 TABLET ORAL at 16:35

## 2020-02-11 RX ADMIN — SODIUM CHLORIDE: 9 INJECTION, SOLUTION INTRAVENOUS at 13:22

## 2020-02-11 RX ADMIN — PANTOPRAZOLE SODIUM 40 MG: 40 TABLET, DELAYED RELEASE ORAL at 05:50

## 2020-02-11 ASSESSMENT — ENCOUNTER SYMPTOMS
CHEST TIGHTNESS: 0
SORE THROAT: 0
RHINORRHEA: 0
ABDOMINAL PAIN: 0
VOMITING: 0
EYE PAIN: 0
WHEEZING: 0
EYE REDNESS: 0
NAUSEA: 0
SHORTNESS OF BREATH: 0
EYE DISCHARGE: 0
BACK PAIN: 0
VOICE CHANGE: 0
ABDOMINAL DISTENTION: 0
COUGH: 0

## 2020-02-11 ASSESSMENT — PAIN SCALES - GENERAL
PAINLEVEL_OUTOF10: 0
PAINLEVEL_OUTOF10: 8
PAINLEVEL_OUTOF10: 7
PAINLEVEL_OUTOF10: 0

## 2020-02-11 ASSESSMENT — PAIN - FUNCTIONAL ASSESSMENT: PAIN_FUNCTIONAL_ASSESSMENT: 0-10

## 2020-02-11 ASSESSMENT — PAIN DESCRIPTION - LOCATION: LOCATION: OTHER (COMMENT)

## 2020-02-11 ASSESSMENT — PAIN DESCRIPTION - PAIN TYPE: TYPE: CHRONIC PAIN

## 2020-02-11 NOTE — PROGRESS NOTES
taken           Deep 1/2 cm ulcer in the duodenal bulb just beyond the pyloric orifice, with edema causing some gastric outlet narrowing, was covered with large clotted blood iWatch most of the clotted blood, but there is adherent clot to the crater base which I could not clear I could not see any vessel protruding for which we went ahead and injected epinephrine around it 6 cc of the 1-10,000 epi, at this point there is no fresh blood to indicate any more bleeding which indicate that the bleeding has subsided     Retained food in the stomach related to the gastric outlet narrowing    Had iliac biopsy which was positive for metastatic breast ca    On 2/1 had   Procedure(s):  1. Long TFN of the left femur  2. Femoral neck biopsy  For impending femoral neck fracture due to mets        Medications:      Allergies:  No Known Allergies    Current Meds:   Scheduled Meds:    enoxaparin  1 mg/kg Subcutaneous BID    insulin lispro  0-18 Units Subcutaneous 4x Daily AC & HS    metoprolol tartrate  50 mg Oral BID    lactulose  20 g Oral TID    sodium chloride  250 mL Intravenous Once    metFORMIN  1,000 mg Oral BID WC    linagliptin  5 mg Oral Daily    alteplase  2 mg Intracatheter Once    oyster shell calcium/vitamin D  2 tablet Oral Daily    pantoprazole  40 mg Oral BID AC    [Held by provider] morphine  15 mg Oral 2 times per day    levothyroxine  100 mcg Oral Daily    venlafaxine  150 mg Oral Daily    insulin glargine  10 Units Subcutaneous Nightly    docusate sodium  100 mg Oral BID    senna  1 tablet Oral BID    ferrous sulfate  325 mg Oral BID WC    sodium chloride flush  10 mL Intravenous 2 times per day     Continuous Infusions:    dextrose       PRN Meds: metoprolol, fentaNYL, sodium chloride flush, sodium chloride flush, acetaminophen, sodium chloride flush, magnesium hydroxide, ondansetron, potassium chloride, bisacodyl, acetaminophen, glucose, dextrose, glucagon (rDNA), dextrose, oxyCODONE **OR** oxyCODONE    Data:     Past Medical History:   has a past medical history of Breast cancer ([address] CAD (coronary atherosclerotic disease), Diabetes mellitus type 2 in obese [facility]), Essential hypertension, and Hypothyroidism (acquired). Social History:   reports that she has never smoked. She has never used smokeless tobacco. She reports that she does not use drugs. Family History:   Family History   Problem Relation Age of Onset    Hypertension Mother     Hypertension Father        Vitals:  /64   Pulse 110   Temp 97.9 °F (36.6 °C) (Oral)   Resp 16   Ht 5' 2\" (1.575 m)   Wt 194 lb 1.6 oz (88 kg)   SpO2 (!) 84%   Breastfeeding No   BMI 35.50 kg/m²   Temp (24hrs), Av.4 °F (36.9 °C), Min:97.9 °F (36.6 °C), Max:98.8 °F (37.1 °C)    Recent Labs     20  1943 02/10/20  1230 02/10/20  1733 02/10/20  1932   POCGLU 127* 127* 121* 132*       I/O (24Hr):     Intake/Output Summary (Last 24 hours) at 2020 0905  Last data filed at 2/10/2020 0910  Gross per 24 hour   Intake 10 ml   Output --   Net 10 ml       Labs:  Hematology:  Recent Labs     20  0543 02/10/20  0558 20  0611   WBC 7.2 7.5 9.0   RBC 3.17* 3.13* 3.22*   HGB 9.0* 8.7* 9.3*   HCT 30.9* 29.8* 32.2*   MCV 97.5 95.2 100.0   MCH 28.4 27.8 28.9   MCHC 29.1 29.2 28.9   RDW 16.0* 16.2* 16.3*    460* 499*   MPV 10.6 10.2 10.3     Chemistry:  Recent Labs     20  0543 02/10/20  0558 20  0611    138 136   K 4.6 4.3 4.6    98 98   CO2 26 27 25   GLUCOSE 87 100* 75   BUN 11 10 10   CREATININE 0.48* 0.65 0.46*   ANIONGAP 11 13 13   LABGLOM >60 >60 >60   GFRAA >60 >60 >60   CALCIUM 8.4* 8.2* 7.6*     Recent Labs     20  1132 20  1706 20  1943 02/10/20  1230 02/10/20  1733 02/10/20  1932   POCGLU 127* 117* 127* 127* 121* 132*     ABG:No results found for: POCPH, PHART, PH, POCPCO2, UMW8OHN, PCO2, POCPO2, PO2ART, PO2, POCHCO3, TWZ3PJJ, HCO3, NBEA, PBEA, BEART, BE, THGBART, THB, [id number], abdomen. Heterogeneous increased liver echogenicity could be on the basis of hepatocellular disease or hepatic steatosis. Xr Pelvis (min 3 Views)    Result Date: 1/28/2020  There is no acute fracture of the left hip There is no acute fracture in the pelvis. Detail of the sacrum is markedly limited. There is subtle double density along the margin of the superior pubic ramus on the left without definitive fracture line at patient's pain persists, consider CT exam No acute osseous abnormality of the right femur or the left femur     Ct Femur Left Wo Contrast    Result Date: 1/31/2020  Multiple osseous metastatic lesions in the left ischium, likely anterior left acetabular wall, and probably in the intertrochanteric and lesser trochanteric portions of the left femur. These could be further characterized with bone scan or MRI with contrast.     Ct Biopsy Superficial Bone Percutaneous    Addendum Date: 1/28/2020    ADDENDUM: Addendum is for billing purposes only. Automated dose modulation and/ or weight based adjustment of the mA/kv was utilized to reduce the radiation dose to as low as reasonably achievable. Result Date: 1/28/2020  Technically successful CT-guided targeted right iliac bone core biopsy. Mri Brain W Wo Contrast    Result Date: 2/1/2020  1. Multiple calvarial metastases. 2. Abnormal dural thickening enhancement most evident overlying the left parietal and occipital lobes concerning for metastatic involvement given adjacent calvarial lesions. Meningitis is in the differential diagnosis but is thought unlikely. 3. Mild chronic white matter microvascular ischemic changes. No acute infarct or hemorrhage. Review of Systems   Constitutional: Positive for fatigue. Negative for activity change, appetite change, chills, diaphoresis and fever. HENT: Negative for congestion, rhinorrhea, sneezing, sore throat and voice change.     Eyes: Negative for pain, discharge, redness and visual disturbance. Respiratory: Negative for cough, chest tightness, shortness of breath and wheezing. Cardiovascular: Negative for chest pain and palpitations. Gastrointestinal: Negative for abdominal distention, abdominal pain, nausea and vomiting. Genitourinary: Negative for difficulty urinating, dysuria, frequency and urgency. Musculoskeletal: Negative for arthralgias, back pain and myalgias. Neurological: Negative for dizziness, tremors, seizures, syncope and light-headedness. Psychiatric/Behavioral: Negative for agitation and sleep disturbance. The patient is not nervous/anxious. Physical Examination:        Physical Exam  Constitutional:       General: She is not in acute distress. Appearance: Normal appearance. She is not ill-appearing, toxic-appearing or diaphoretic. HENT:      Head: Normocephalic and atraumatic. Nose: Nose normal.      Mouth/Throat:      Mouth: Mucous membranes are dry. Eyes:      General: No scleral icterus. Right eye: No discharge. Left eye: No discharge. Conjunctiva/sclera: Conjunctivae normal.   Cardiovascular:      Rate and Rhythm: Normal rate and regular rhythm. Pulses: Normal pulses. Heart sounds: Normal heart sounds. Pulmonary:      Effort: Pulmonary effort is normal. No respiratory distress. Breath sounds: Normal breath sounds. No wheezing, rhonchi or rales. Chest:      Chest wall: No tenderness. Abdominal:      General: Abdomen is flat. Bowel sounds are normal. There is no distension. Palpations: Abdomen is soft. There is no mass. Tenderness: There is no abdominal tenderness. There is no guarding or rebound. Skin:     Findings: No lesion or rash. Neurological:      General: No focal deficit present. Mental Status: She is oriented to person, place, and time. Mental status is at baseline.    Psychiatric:         Mood and Affect: Mood normal.         Assessment:        Hospital Problems Ulcer of esophagus without bleeding no evidence of fungal infection      Encounter for palliative care palliative care    Resolved Problems:    Septic shock developed during hospitalization ([address] status post IV antibiotics resolved ID recommendation appreciated watch off IV antibiotics. Possible DC post EGD to [facility] pending pre-cert.          Kala Chris MD  2/11/2020  9:05 AM

## 2020-02-11 NOTE — H&P
Date Taking? Authorizing Provider   aspirin 81 MG tablet Take 1 tablet by mouth daily 2/13/20  Yes Sharee Bear MD   ferrous sulfate 325 (65 Fe) MG EC tablet Take 1 tablet by mouth 2 times daily (with meals) 2/6/20  Yes Sharee Bear MD   lactulose (CHRONULAC) 10 GM/15ML solution Take 30 mLs by mouth 3 times daily as needed (constipation) 2/6/20  Yes Sharee Bear MD   pantoprazole (PROTONIX) 40 MG tablet Take 1 tablet by mouth 2 times daily (before meals) 2/6/20  Yes Sharee Bear MD   venlafaxine (EFFEXOR XR) 150 MG extended release capsule Take 150 mg by mouth daily   Yes Historical Provider, MD   metFORMIN (GLUCOPHAGE) 1000 MG tablet Take 1,000 mg by mouth 2 times daily (with meals)   Yes Historical Provider, MD   levothyroxine (SYNTHROID) 100 MCG tablet Take 100 mcg by mouth Daily   Yes Historical Provider, MD   glimepiride (AMARYL) 4 MG tablet Take 4 mg by mouth 2 times daily   Yes Historical Provider, MD   lisinopril (PRINIVIL;ZESTRIL) 2.5 MG tablet Take 2.5 mg by mouth daily   Yes Historical Provider, MD   carvedilol (COREG) 6.25 MG tablet Take 6.25 mg by mouth 2 times daily (with meals)   Yes Historical Provider, MD   SITagliptin (JANUVIA) 100 MG tablet Take 100 mg by mouth daily   Yes Historical Provider, MD       Social History:   TOBACCO:   reports that she has never smoked. She has never used smokeless tobacco.  ETOH:   has no history on file for alcohol. DRUGS:  reports no history of drug use. OCCUPATION:      Family History:       Problem Relation Age of Onset    Hypertension Mother     Hypertension Father        REVIEW OF SYSTEMS (ROS):  CONSTITUTIONAL:  fevers, chills, sweats, fatigue, weight loss, generalized weakness  HEENT:  Vision changes, hearing changes, runny nose, throat pain  RESPIRATORY:  shortness of breath, cough, congestion, wheezing.   CARDIOVASCULAR:  chest pain, palpitations  GASTROINTESTINAL:  nausea, vomiting, diarrhea, constipation, change in bowel habits, 02/11/20  0611   CALCIUM 7.6*     S. Ionized Calcium:No results for input(s): IONCA in the last 72 hours. S. Magnesium:No results for input(s): MG in the last 72 hours. S. Phosphorus:No results for input(s): PHOS in the last 72 hours. S. Glucose:  Recent Labs     02/10/20  1230 02/10/20  1733 02/10/20  1932   POCGLU 127* 121* 132*     Glycosylated hemoglobin A1C: No results for input(s): LABA1C in the last 72 hours. INR: No results for input(s): INR in the last 72 hours. Hepatic functions: No results for input(s): ALKPHOS, ALT, AST, PROT, BILITOT, BILIDIR, LABALBU in the last 72 hours. Pancreatic functions:No results for input(s): LACTA, AMYLASE in the last 72 hours. S. Lactic Acid: No results for input(s): LACTA in the last 72 hours. Cardiac enzymes:No results for input(s): CKTOTAL, CKMB, CKMBINDEX, TROPONINI in the last 72 hours. BNP:No results for input(s): BNP in the last 72 hours. Lipid profile: No results for input(s): CHOL, TRIG, HDL, LDLCALC in the last 72 hours. Invalid input(s): LDL  Blood Gases: No results found for: PH, PCO2, PO2, HCO3, O2SAT  Thyroid functions: No results found for: TSH     Urinalysis:   Negative     Microbiology:  Cultures during this admission:     Blood cultures:                 [] None drawn      [x] Negative             []  Positive (Details:  )  Urine Culture:                   [x] None drawn      [] Negative             []  Positive (Details:  )  Sputum Culture:               [x] None drawn       [] Negative             []  Positive (Details:  )   Endotracheal aspirate:     [x] None drawn       [] Negative             []  Positive (Details:      --------------------------------------------------------------  Radiological reports:    CXR:  VL DUP UPPER EXTREMITY VENOUS RIGHT   Final Result      MRI BRAIN W WO CONTRAST   Final Result   1. Multiple calvarial metastases.    2. Abnormal dural thickening enhancement most evident overlying the left   parietal and occipital lobes concerning for metastatic involvement given   adjacent calvarial lesions. Meningitis is in the differential diagnosis but   is thought unlikely. 3. Mild chronic white matter microvascular ischemic changes. No acute   infarct or hemorrhage. XR FEMUR LEFT (MIN 2 VIEWS)   Final Result   Satisfactory postoperative appearance following ORIF of the left femur. No   evident complication. XR FEMUR LEFT (MIN 2 VIEWS)   Final Result      CT FEMUR LEFT WO CONTRAST   Final Result   Multiple osseous metastatic lesions in the left ischium, likely anterior left   acetabular wall, and probably in the intertrochanteric and lesser   trochanteric portions of the left femur. These could be further   characterized with bone scan or MRI with contrast.         MRI THORACIC SPINE W WO CONTRAST   Final Result   Osseous metastatic disease with diffuse marrow infiltration of T10. Mild extraosseous extension of tumor suspected along the left neural foramina   a T10. XR PELVIS (MIN 3 VIEWS)   Final Result   There is no acute fracture of the left hip      There is no acute fracture in the pelvis. Detail of the sacrum is markedly   limited. There is subtle double density along the margin of the superior   pubic ramus on the left without definitive fracture line at patient's pain   persists, consider CT exam      No acute osseous abnormality of the right femur or the left femur         XR FEMUR RIGHT (MIN 2 VIEWS)   Final Result   There is no acute fracture of the left hip      There is no acute fracture in the pelvis. Detail of the sacrum is markedly   limited.   There is subtle double density along the margin of the superior   pubic ramus on the left without definitive fracture line at patient's pain   persists, consider CT exam      No acute osseous abnormality of the right femur or the left femur         XR FEMUR LEFT (MIN 2 VIEWS)   Final Result   There is no acute fracture of the left hip 9. 0* 8.7* 9.3*   · Patient did receive 80 mg Lovenox dose 1 time last night, morning dose will be held, will need to order H&H right now  -breast cancer with multiple mets---see heme onc notes    Endocrine:   Glucose controlled - most recent BGL is   Recent Labs     02/09/20  0543 02/10/20  0558 02/11/20  0611   GLUCOSE 87 100* 75   · held metformin and tradjenta; sliding scale insulin  DVT Prophylaxis  · Hold for bleeding risk  · DVT RUE  Discharge Needs:        CODE STATUS: DNR-CCA     DISPOSITION:  [x] To remain ICU:   [] OK for out of ICU from [facility]  PGY 2  Resident Physician Emergency Medicine  02/11/20 6:05 PM    Attending Physician Statement  I have discussed the care of [address], including pertinent history and exam findings with the resident. I have reviewed the key elements of all parts of the encounter with the resident. I have seen and examined the patient with the resident. I agree with the assessment and plan and status of the problem list as documented. I have seen the patient before patient was transferred to medical ICU, endoscopy report by GI seen today. She had history of metastatic breast carcinoma and has been followed by oncology, history of coronary artery disease status post CABG, diabetes mellitus, she was initially admitted to ICU and at that time she had an endoscopy done which showed postpyloric duodenal ulcer and at that time she had a clot there and treated with epinephrine and GI was planning to do colonoscopy was transferred to floor, she had a right PICC line and then she developed right axillary vein/subclavian vein thrombosis and was started on anticoagulation, she had a dose of Lovenox last night and she was scheduled for endoscopy this morning, endoscopy shows duodenal ulcer but visualization was difficult clot was present and some food particle was present, GI advised her to keep her in ICU because of active bleeding.     Continue

## 2020-02-11 NOTE — PROGRESS NOTES
Pt left to smoke, returned after an hour unable to stay awake, weak, verbally abusive when questioned.

## 2020-02-11 NOTE — PROGRESS NOTES
possible duodenitis and concern regarding bowel perforation. Subsequently patient was transferred to [place].  Repeat CT does not show any concerning findings in the duodenum but does show blastic lesions involving bone especially T10. Patient denies any lower extremity weakness. Denies any saddle anesthesia. Denies any loss of control of bowel or bladder. Additional work-up also shows anemia and iron deficiency. Patient has never had a EGD or colonoscopy done. Past Medical History: Breast cancer    Past Surgical History: Left mastectomy    Medications:    Prior to Admission medications    Medication Sig Start Date End Date Taking?  Authorizing Provider   aspirin 81 MG tablet Take 1 tablet by mouth daily 2/13/20  Yes Minal Dinh MD   ferrous sulfate 325 (65 Fe) MG EC tablet Take 1 tablet by mouth 2 times daily (with meals) 2/6/20  Yes Minal Dinh MD   lactulose (CHRONULAC) 10 GM/15ML solution Take 30 mLs by mouth 3 times daily as needed (constipation) 2/6/20  Yes Minal Dinh MD   pantoprazole (PROTONIX) 40 MG tablet Take 1 tablet by mouth 2 times daily (before meals) 2/6/20  Yes Minal Dinh MD   venlafaxine (EFFEXOR XR) 150 MG extended release capsule Take 150 mg by mouth daily   Yes Historical Provider, MD   metFORMIN (GLUCOPHAGE) 1000 MG tablet Take 1,000 mg by mouth 2 times daily (with meals)   Yes Historical Provider, MD   levothyroxine (SYNTHROID) 100 MCG tablet Take 100 mcg by mouth Daily   Yes Historical Provider, MD   glimepiride (AMARYL) 4 MG tablet Take 4 mg by mouth 2 times daily   Yes Historical Provider, MD   lisinopril (PRINIVIL;ZESTRIL) 2.5 MG tablet Take 2.5 mg by mouth daily   Yes Historical Provider, MD   carvedilol (COREG) 6.25 MG tablet Take 6.25 mg by mouth 2 times daily (with meals)   Yes Historical Provider, MD   SITagliptin (JANUVIA) 100 MG tablet Take 100 mg by mouth daily   Yes Historical Provider, MD     Current Facility-Administered venlafaxine (EFFEXOR XR) extended release capsule 150 mg  150 mg Oral Daily Anna Edward, DO   150 mg at 02/10/20 0911    insulin glargine (LANTUS) injection vial 10 Units  10 Units Subcutaneous Nightly Anna Edward, DO   10 Units at 02/10/20 2035    docusate sodium (COLACE) capsule 100 mg  100 mg Oral BID Anna Edward, DO   100 mg at 02/10/20 1129    senna (SENOKOT) tablet 8.6 mg  1 tablet Oral BID Anna Edward, DO   8.6 mg at 02/10/20 5807    ferrous sulfate EC tablet 325 mg  325 mg Oral BID WC Anna Edward, DO   Stopped at 02/11/20 0801    sodium chloride flush 0.9 % injection 10 mL  10 mL Intravenous 2 times per day Anna Edward, DO   10 mL at 02/11/20 1045    sodium chloride flush 0.9 % injection 10 mL  10 mL Intravenous PRN Anna Edward, DO   10 mL at 01/30/20 2042    magnesium hydroxide (MILK OF MAGNESIA) 400 MG/5ML suspension 30 mL  30 mL Oral Daily PRN Anna Edward, DO        ondansetron [facility]) injection 4 mg  4 mg Intravenous Q6H PRN Anna Edward, DO        potassium chloride 10 mEq/100 mL IVPB (Peripheral Line)  10 mEq Intravenous PRN Anna Edward, DO        bisacodyl (DULCOLAX) suppository 10 mg  10 mg Rectal Daily PRN Anna Edward, DO        acetaminophen (TYLENOL) tablet 650 mg  650 mg Oral Q4H PRN Anna Edward, DO   650 mg at 02/10/20 2221    glucose (GLUTOSE) 40 % oral gel 15 g  15 g Oral PRN Nana Edward, DO        dextrose 50 % IV solution  12.5 g Intravenous PRN Anna Edward, DO        glucagon (rDNA) injection 1 mg  1 mg Intramuscular PRN Anna Edward, DO        dextrose 5 % solution  100 mL/hr Intravenous PRN Anna Edward, DO        oxyCODONE (ROXICODONE) immediate release tablet 5 mg  5 mg Oral Q4H PRN Anna Edward, DO   5 mg at 02/07/20 1548    Or    oxyCODONE (ROXICODONE) immediate release tablet 10 mg  10 mg Oral Q4H PRN Anna Edward, DO   10 mg at 02/10/20 2035       Allergies:  Patient has no known allergies. Social History:   reports that she has never smoked. She has never used smokeless tobacco. She reports that she does not use drugs. Denies smoking. Denies taking alcohol. Family History: Hypertension and diabetes    REVIEW OF SYSTEMS:      Constitutional: No fever or chills. No night sweats, no weight loss   Eyes: No eye discharge, double vision, or eye pain   HEENT: negative for sore mouth, sore throat, hoarseness and voice change   Respiratory: negative for cough , sputum, dyspnea, wheezing, hemoptysis, chest pain   Cardiovascular: negative for chest pain, dyspnea, palpitations, orthopnea, PND   Gastrointestinal: negative for nausea, vomiting, diarrhea, constipation, abdominal pain, Dysphagia, hematemesis and hematochezia   Genitourinary: negative for frequency, dysuria, nocturia, urinary incontinence, and hematuria   Integument: negative for rash, skin lesions, bruises. Hematologic/Lymphatic: negative for easy bruising, bleeding, lymphadenopathy, or petechiae   Endocrine: negative for heat or cold intolerance,weight changes, change in bowel habits and hair loss   Musculoskeletal: Positive back pain  Neurological: negative for headaches, dizziness, seizures, weakness, numbness    PHYSICAL EXAM:        /64   Pulse 110   Temp 97.9 °F (36.6 °C) (Oral)   Resp 16   Ht 5' 2\" (1.575 m)   Wt 194 lb 1.6 oz (88 kg)   SpO2 (!) 84%   Breastfeeding No   BMI 35.50 kg/m²    Temp (24hrs), Av.4 °F (36.9 °C), Min:97.9 °F (36.6 °C), Max:98.8 °F (37.1 °C)      General appearance -slightly  lethargic but able to answer questions.   Eyes - pupils equal and reactive, extraocular eye movements intact   Ears - bilateral TM's and external ear canals normal   Mouth - mucous membranes moist, pharynx normal without lesions   Neck - supple, no significant adenopathy   Lymphatics - no palpable lymphadenopathy, no hepatosplenomegaly   Chest - clear to auscultation, no wheezes, rales or rhonchi, symmetric air entry   Heart - normal rate, regular rhythm, normal S1, S2, no murmurs  Abdomen - soft, nontender, nondistended, no masses or organomegaly   Neurological - alert, oriented, normal speech, no focal findings or movement disorder noted   Musculoskeletal - no joint tenderness, deformity or swelling   Extremities - peripheral pulses normal, no pedal edema, no clubbing or cyanosis   Skin - normal coloration and turgor, no rashes, no suspicious skin lesions noted ,  Breast left-sided mastectomy no signs of local recurrence    DATA:      Labs:     CBC:   Recent Labs     02/10/20  0558 02/11/20  0611   WBC 7.5 9.0   HGB 8.7* 9.3*   HCT 29.8* 32.2*   * 499*     BMP:   Recent Labs     02/10/20  0558 02/11/20  0611    136   K 4.3 4.6   CO2 27 25   BUN 10 10   CREATININE 0.65 0.46*   LABGLOM >60 >60   GLUCOSE 100* 75     PT/INR: No results for input(s): PROTIME, INR in the last 72 hours. APTT:No results for input(s): APTT in the last 72 hours. LIVER PROFILE:  No results for input(s): AST, ALT, LABALBU in the last 72 hours. Us Renal Complete    Result Date: 1/26/2020  EXAMINATION: RETROPERITONEAL ULTRASOUND OF THE KIDNEYS AND URINARY BLADDER 1/26/2020 COMPARISON: None HISTORY: ORDERING SYSTEM PROVIDED HISTORY: left sided hydroureteronephrosis, eval kidneys TECHNOLOGIST PROVIDED HISTORY: left sided hydroureteronephrosis, eval kidneys FINDINGS: Kidneys: The right kidney measures 12.5 cm in length and the left kidney measures 12 cm in length. Kidneys demonstrate normal cortical echogenicity. Probable vascular calcifications are noted bilaterally. Mild left hydronephrosis. . Bladder: Bladder is decompressed with a Matt catheter.      Mild left hydronephrosis     Ct Abdomen Pelvis W Iv Contrast Additional Contrast? Oral    Result Date: 1/26/2020  EXAMINATION: CT OF THE ABDOMEN AND PELVIS WITH CONTRAST 1/26/2020 5:25 pm TECHNIQUE: CT of the abdomen and pelvis was performed with the administration of intravenous contrast. Multiplanar reformatted images are provided for review. Dose modulation, iterative reconstruction, and/or weight based adjustment of the mA/kV was utilized to reduce the radiation dose to as low as reasonably achievable. COMPARISON: January 25, 2020 CT abdomen and pelvis HISTORY: ORDERING SYSTEM PROVIDED HISTORY: possible duodenitis versus contained bowel perforation seen on CT chest at [address] [place] [address] on 1/25 TECHNOLOGIST PROVIDED HISTORY: possible duodenitis versus contained bowel perforation seen on CT chest at [address] [place] [address] on 1/25 Reason for Exam: possible duodenitis versus contained bowel perforation seen on CT chest at [address] [place] [address] on 1/25 Acuity: Acute Type of Exam: Subsequent/Follow-up FINDINGS: Lower Chest: Cardiomegaly. Calcific coronary artery disease. Pacer wire right heart. Organs: Fat containing umbilical hernia. The liver, spleen, pancreas, and adrenals appear normal.  Distended gallbladder. No radiodense gallstones. Kidneys appear normal. Matt catheter decompresses the bladder. GI/Bowel: The stomach,small bowel, and colon appear normal. Increased stool throughout the colon. Oral contrast is noted in the stomach, small bowel and colon. The appendix is not identified. Pelvis: Uterus normal. Peritoneum/Retroperitoneum: The abdominal aorta and iliac arteries are normal in caliber. There is no pathologic adenopathy. 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 Right total hip arthroplasty. Distended gallbladder. No radiodense gallstones noted but ultrasound is recommended. Extensive metastatic disease is a risk for fracture at T10.      Us Gallbladder Ruq    Result Date: 1/27/2020  EXAMINATION: RIGHT UPPER QUADRANT ULTRASOUND 1/27/2020 10:23 am COMPARISON: CT abdomen and pelvis January 26, 2020 HISTORY: ORDERING SYSTEM PROVIDED HISTORY: Rule out liver cirrhosis, concern for aclaclulus cholecystitis TECHNOLOGIST PROVIDED HISTORY: Rule out liver cirrhosis, concern for aclaclulus cholecystitis FINDINGS: Heterogeneous increased liver echotexture. No focal liver lesion. No right-sided hydronephrosis. Nonvisualization of the pancreas. No gallbladder wall thickening. No gallstones. No sonographic Nunez sign. Common bile duct is within normal limits measuring 5 mm in diameter. Minimal amount of free fluid within the right upper quadrant. Minimal amount of free fluid within the upper abdomen. Heterogeneous increased liver echogenicity could be on the basis of hepatocellular disease or hepatic steatosis. Xr Chest Portable    Result Date: 1/26/2020  EXAMINATION: ONE XRAY VIEW OF THE CHEST 1/26/2020 2:53 pm COMPARISON: None. HISTORY: ORDERING SYSTEM PROVIDED HISTORY: PICC line placement TECHNOLOGIST PROVIDED HISTORY: PICC line placement FINDINGS: The lungs are without consolidation effusion. There is no pneumothorax. The heart is prominent. The upper abdomen is unremarkable. The extrathoracic soft tissues are unremarkable. There is a right subclavian port with the tip in the mid SVC. There is a right-sided PICC line with the tip in the distal mid aspect of the SVC. Mild cardiomegaly without acute pulmonary process. Right-sided PICC line with the tip in the distal mid aspect of the SVC.          IMAGING DATA:          IMPRESSION:     Primary Problem  Metastatic breast cancer [facility])    Active Hospital Problems    Diagnosis Date Noted    Deep venous thrombosis ([facility] [I82.409]     Encounter for palliative care [Z51.5]     Acute duodenal ulcer with gastric outlet obstruction [K26.3] 01/30/2020    Ulcer of esophagus without bleeding [K22.10] 01/30/2020    Bone lesion [M89.9]     Iron deficiency [E61.1]     Diabetes mellitus type 2 in obese ([facility] [E11.69, E66.9] 01/29/2020    Essential hypertension [I10] 01/29/2020    Acquired hypothyroidism [E03.9] 01/29/2020    Recurrent major depressive disorder, [facility] ([facility] [F33.40] 01/29/2020    Metastatic breast cancer ([facility] [C50.919] 01/29/2020    History of breast cancer [Z85.3] 01/27/2020    Anemia [D64.9] 01/26/2020    Liver lesion [K76.9] 01/26/2020     History of breast cancer likely hormone receptor positive. Status post left mastectomy and chemotherapy and aromatase inhibitor therapy. Iron deficiency  Hypoproliferative anemia  Bone lesions 2/2 metastatic breast adenocarcinoma. Biopsy results showing adenocarcinoma  Back pain second to bone lesions. RECOMMENDATIONS:  Reviewed results of lab work-up and other relevant clinical data. Status post prophylactic rodding of the left femur  Plan for radiation as an outpatient to be followed by systemic therapy. Plan for XRT in [place]. Continue Femara. Restart AC with Eliquis when ok with GI and primary. Approved at [facility]. Precert in progress. Ok to discharge to SNF from my standpoint, needs follow up with radiation in [place]. Fco Mcdaniel, MS4  Kang Gleason MD  PGY-3, Internal Medicine  [facility]      Attending Physician Statement   I have discussed the care of [address], including pertinent history and exam findings with the resident. I have reviewed the key elements of all parts of the encounter with the resident. I have seen and examined the patient with the resident. I agree with the assessment and plan and status of the problem list as documented.                                [address] [place] [address] [facility]                          Cell: [phone]

## 2020-02-11 NOTE — OP NOTE
[facility]     PROCEDURE DATE: 02/11/20    REFERRING PHYSICIAN: Sarabjit Hernandez     PRIMARY CARE PROVIDER: Portia Soria MD    ATTENDING PHYSICIAN: Jacqui Elias MD     HISTORY: Ms. Chantal Castano is a 77 y.o. female who presents to the [place] [address] Endoscopy unit for upper endoscopy. The patient's clinical history is remarkable for bleeding duodenal ulcer. She was found to have upper extremity DVT. She requires anticoagulation. She is currently medically stable and appropriate for the planned procedure. PREOPERATIVE DIAGNOSIS:   Bleeding duodenal ulcer. PROCEDURES:   1) Transoral Upper Endoscopy, hemostasis. POSTOPERATIVE DIAGNOSIS:   Actively bleeding duodenal ulcer     SPECIMENS: none    MEDICATIONS:   MAC per anesthesia    EBL: minimal    INSTRUMENT: Olympus GIF-H190 flexible Gastroscope. PREPARATION: The nature and character of the procedure as well as risks, benefits, and alternatives were discussed with the patient and informed consent was obtained. Complications were said to include, but were not limited to: medication allergy, medication reaction, cardiovascular and respiratory problems, bleeding, perforation, infection, and/or missed diagnosis. Following arrival in the endoscopy room, the patient was placed in the left lateral decubitus position and final time-out accomplished in the presence of the nursing staff. Baseline vital signs were obtained and reviewed, and IV sedation was subsequently initiated. FINDINGS:   Esophagus: The esophagus was inspected to the Z-line. The endoscopic exam showed no pathology. Stomach: The stomach was inspected in both forward and retroflex fashion and was appropriately distensible. The cardia, fundus, incisura, antrum and pylorus were identified via direct visualization. The endoscopic exam showed moderate amount of retained gastric contents including coffee-ground material and small red blood clots.      Duodenum: The proximal small bowel was inspected through the bulb, sweep, and second portion of the duodenum. The endoscopic exam showed a large adherent fresh clot in duodenal bulb. Active bleeding was noted. 15 mL of 1-10,000 epinephrine injected around the bleeding area. We attempted to remove the clots. APC to possible bleeding site was applied. Visualization was not adequate due to retained gastric contents and blood clots. RECOMMENDATIONS:   1) Transfer to ICU for close monitoring. 2) PPI drip. 3) monitor H&H closely. May need intervention radiology in case of further bleeding. 4) hold off on blood thinners  5) possible repeat EGD tomorrow.       Dariel Maurice

## 2020-02-11 NOTE — PROGRESS NOTES
Physical Therapy  DATE: 2020    NAME: Sugey Collins  MRN: [id number]   : 1950    Patient not seen this date for Physical Therapy due to:  [] Blood transfusion in progress  [] Hemodialysis  []  Patient Declined  [] Spine Precautions   [] Strict Bedrest  [] Surgery/ Procedure  [] Testing      [x] Other: Acute DVT identified. Await plan for anticoagulation. PT will check back this PM as time allows or 20. [] PT being discontinued at this time. Patient independent. No further needs. [] PT being discontinued at this time as the patient has been transferred to palliative care. No further needs.     Marcial Graham, PT

## 2020-02-11 NOTE — PROGRESS NOTES
Report called to 4 c RN. Informed of procedure,vital signs, sedation , and iv fluids. Made aware of need to transfer to an ICU bed.   Vibha Miranda RN

## 2020-02-11 NOTE — ANESTHESIA PRE PROCEDURE
Department of Anesthesiology  Preprocedure Note       Name:  Jyothi Giraldo   Age:  77 y.o.  :  1950                                          MRN:  [id number]         Date:  2020      Surgeon: Evelin Tanner):  El Singh MD    Procedure: EGD ESOPHAGOGASTRODUODENOSCOPY (N/A )    Department of Anesthesiology  Pre-Anesthesia Evaluation/Consultation         Name:  Jyothi Giraldo                                         Age:  77 y.o.   MRN:  [id number]             Medications  Current Facility-Administered Medications   Medication Dose Route Frequency Provider Last Rate Last Dose    [MAR Hold] enoxaparin (LOVENOX) injection 90 mg  1 mg/kg Subcutaneous BID Tai Heredia MD        0.9 % sodium chloride infusion   Intravenous Once El Singh MD        [facility] Hold] insulin lispro (HUMALOG) injection vial 0-18 Units  0-18 Units Subcutaneous 4x Daily AC & HS Tali Denise MD        [facility] Hold] metoprolol (LOPRESSOR) injection 5 mg  5 mg Intravenous Q8H PRN Tali Denise MD        [facility] Hold] metoprolol tartrate (LOPRESSOR) tablet 50 mg  50 mg Oral BID Tali Denise MD   50 mg at 02/10/20 0911    [MAR Hold] lactulose (CHRONULAC) 10 GM/15ML solution 20 g  20 g Oral TID Tali Denise MD   20 g at 20 0845    [MAR Hold] 0.9 % sodium chloride bolus  250 mL Intravenous Once Micheal MONTE Blood, DO        [MAR Hold] metFORMIN (GLUCOPHAGE) tablet 1,000 mg  1,000 mg Oral BID  [place] Milch, DO   1,000 mg at 02/10/20 1804    [MAR Hold] linagliptin (TRADJENTA) tablet 5 mg  5 mg Oral Daily Alexandr Milch, DO   5 mg at 02/10/20 0911    [MAR Hold] fentaNYL (SUBLIMAZE) injection 25 mcg  25 mcg Intravenous Q5 Min PRN Alexandr Milgregorio, DO   25 mcg at 20 1143    [MAR Hold] sodium chloride flush 0.9 % injection 10 mL  10 mL Intravenous PRN Micheal MONTE Blood, DO        [MAR Hold] alteplase (CATHFLO) injection 2 mg  2 mg Intracatheter Once [place] Milgregorio, DO        [facility] Hold] oyster shell calcium/vitamin D 250-125 MG-UNIT per tablet 500 mg  2 tablet Oral Daily Bernarda Hodge, DO   500 mg at 02/10/20 0911    [MAR Hold] pantoprazole (PROTONIX) tablet 40 mg  40 mg Oral BID AC Bernarda Hodge, DO   40 mg at 02/11/20 0550    [Held by provider] morphine (MS CONTIN) extended release tablet 15 mg  15 mg Oral 2 times per day Bernarda Hodge, DO   15 mg at 02/02/20 2132    [MAR Hold] sodium chloride flush 0.9 % injection 10 mL  10 mL Intravenous PRN Bernarda Hodge, DO   10 mL at 02/08/20 2025    [MAR Hold] acetaminophen (TYLENOL) tablet 650 mg  650 mg Oral Q4H PRN Bernarda Hodge, DO   650 mg at 02/10/20 0351    [MAR Hold] levothyroxine (SYNTHROID) tablet 100 mcg  100 mcg Oral Daily Bernarda Hodge, DO   100 mcg at 02/11/20 0550    [MAR Hold] venlafaxine (EFFEXOR XR) extended release capsule 150 mg  150 mg Oral Daily Bernarda Hodge, DO   150 mg at 02/10/20 0911    [MAR Hold] insulin glargine (LANTUS) injection vial 10 Units  10 Units Subcutaneous Nightly Bernarda Hodge, DO   10 Units at 02/10/20 2035    [MAR Hold] docusate sodium (COLACE) capsule 100 mg  100 mg Oral BID Bernarda Hodge, DO   100 mg at 02/10/20 0911    [MAR Hold] senna (SENOKOT) tablet 8.6 mg  1 tablet Oral BID Bernarda Hodge DO   8.6 mg at 02/10/20 0911    [MAR Hold] ferrous sulfate EC tablet 325 mg  325 mg Oral BID WC Bernarda Hodge DO   Stopped at 02/11/20 0801    [MAR Hold] sodium chloride flush 0.9 % injection 10 mL  10 mL Intravenous 2 times per day Bernarda Hodge DO   10 mL at 02/11/20 1045    [MAR Hold] sodium chloride flush 0.9 % injection 10 mL  10 mL Intravenous PRN Bernarda Hodge, DO   10 mL at 01/30/20 2042    [MAR Hold] magnesium hydroxide (MILK OF MAGNESIA) 400 MG/5ML suspension 30 mL  30 mL Oral Daily PRN Bernarda Hodge DO        [MAR Hold] ondansetron [facility] injection 4 mg  4 mg Intravenous Q6H PRN Bernarda Hodge DO        [facility] Hold] potassium chloride 10 mEq/100 mL IVPB (Peripheral Line)  10 mEq Intravenous PRN Katerina Shaver, DO        [MAR Hold] bisacodyl (DULCOLAX) suppository 10 mg  10 mg Rectal Daily PRN Katerina Shaver, DO        [MAR Hold] acetaminophen (TYLENOL) tablet 650 mg  650 mg Oral Q4H PRN Katerina Shaver, DO   650 mg at 02/10/20 2221    [MAR Hold] glucose (GLUTOSE) 40 % oral gel 15 g  15 g Oral PRN Katerina Shaver, DO        [facility] Hold] dextrose 50 % IV solution  12.5 g Intravenous PRN Katerina Shaver, DO        [MAR Hold] glucagon (rDNA) injection 1 mg  1 mg Intramuscular PRN Katerina Shaver, DO        [facility] Hold] dextrose 5 % solution  100 mL/hr Intravenous PRN Katerina Shaver, DO        [facility] Hold] oxyCODONE (ROXICODONE) immediate release tablet 5 mg  5 mg Oral Q4H PRN Katerina Shaver, DO   5 mg at 02/07/20 1548    Or    [MAR Hold] oxyCODONE (ROXICODONE) immediate release tablet 10 mg  10 mg Oral Q4H PRN Katerina Shaver, DO   10 mg at 02/10/20 2035       No Known Allergies  Patient Active Problem List   Diagnosis    Anemia    Liver lesion    History of breast cancer    Diabetes mellitus type 2 in obese ([facility]    Essential hypertension    Acquired hypothyroidism    Recurrent major depressive disorder, in remission ([facility]    Metastatic breast cancer ([facility]    Bone lesion    Iron deficiency    Acute duodenal ulcer with gastric outlet obstruction    Ulcer of esophagus without bleeding    Encounter for palliative care    Deep venous thrombosis ([facility]     Past Medical History:   Diagnosis Date    Breast cancer ([facility] 2007    CAD (coronary atherosclerotic disease)     Diabetes mellitus type 2 in obese ([facility]     Essential hypertension     Hypothyroidism (acquired)      Past Surgical History:   Procedure Laterality Date    CORONARY ARTERY BYPASS GRAFT  2007    FEMUR FRACTURE SURGERY Left 2/1/2020    [facility]  FEMUR FRACTURE IM NAILING AND LEFT FEMUR BIOPSY (SYNTHES, , C-ARM) performed by Honorio Grande MD at [address] [place] [address]  02/01/2020 nailing    JOINT REPLACEMENT Right 2009    MINDY    MASTECTOMY  2007    TUNNELED VENOUS PORT PLACEMENT      UPPER GASTROINTESTINAL ENDOSCOPY N/A 2020    EGD BIOPSY performed by Joana Blount MD at [facility]    UPPER GASTROINTESTINAL ENDOSCOPY  2020    EGD CONTROL HEMORRHAGE performed by Jo[facility] Blount MD at [facility]     Social History     Tobacco Use    Smoking status: Never Smoker    Smokeless tobacco: Never Used   Substance Use Topics    Alcohol use: Not on file     Comment: social    Drug use: Never         Vital Signs (Current)   Vitals:    20 1319   BP: 111/70   Pulse: 104   Resp: 19   Temp:    SpO2: 91%     Vital Signs Statistics (for past 48 hrs)     Temp  Av.1 °F (36.7 °C)  Min: 97.7 °F (36.5 °C)   Min taken time: 02/10/20 07  Max: 98.8 °F (37.1 °C)   Max taken time: 02/10/20 1930  Pulse  Av  Min: 95   Min taken time: 02/10/20 1929  Max: 110   Max taken time: 20 0732  Resp  Av.6  Min: 12   Min taken time: 20 0732  Max: 23   Max taken time: 20 1319  BP  Min: 96/65   Min taken time: 20 1939  Max: 111/70   Max taken time: 20 1319  SpO2  Av.8 %  Min: 84 %   Min taken time: 20 0732  Max: 96 %   Max taken time: 02/10/20 0735  BP Readings from Last 3 Encounters:   20 111/70   20 121/70   20 (!) 141/127       BMI  Body mass index is 35.5 kg/m².     CBC   Lab Results   Component Value Date    WBC 9.0 2020    RBC 3.22 2020    HGB 9.3 2020    HCT 32.2 2020    .0 2020    RDW 16.3 2020     2020       CMP    Lab Results   Component Value Date     2020    K 4.6 2020    CL 98 2020    CO2 25 2020    BUN 10 2020    CREATININE 0.46 2020    GFRAA >60 2020    LABGLOM >60 2020    GLUCOSE 75 2020    PROT 4.5 2020    CALCIUM 7.6 2020    BILITOT 0.24 2020    ALKPHOS 91 2020    AST 19 01/27/2020    ALT 11 01/27/2020       BMP    Lab Results   Component Value Date     02/11/2020    K 4.6 02/11/2020    CL 98 02/11/2020    CO2 25 02/11/2020    BUN 10 02/11/2020    CREATININE 0.46 02/11/2020    CALCIUM 7.6 02/11/2020    GFRAA >60 02/11/2020    LABGLOM >60 02/11/2020    GLUCOSE 75 02/11/2020       POC Testing  Recent Labs     02/10/20  1932   POCGLU 132*       Coags  No results found for: PROTIME, INR, APTT    HCG (If Applicable) No results found for: PREGTESTUR, PREGSERUM, HCG, HCGQUANT     ABGs No results found for: PHART, PO2ART, ORF7UED, BJL5YEW, BEART, Q0ZJBDCG     Type & Screen (If Applicable)  No results found for: LABABO, [address]    Radiology (If Applicable)    Cardiac Testing (If Applicable)     EKG (If Applicable) sinus tach          Medications prior to admission:   Prior to Admission medications    Medication Sig Start Date End Date Taking?  Authorizing Provider   aspirin 81 MG tablet Take 1 tablet by mouth daily 2/13/20  Yes Lety Chris MD   ferrous sulfate 325 (65 Fe) MG EC tablet Take 1 tablet by mouth 2 times daily (with meals) 2/6/20  Yes Lety Chris MD   lactulose (CHRONULAC) 10 GM/15ML solution Take 30 mLs by mouth 3 times daily as needed (constipation) 2/6/20  Yes Lety Chris MD   pantoprazole (PROTONIX) 40 MG tablet Take 1 tablet by mouth 2 times daily (before meals) 2/6/20  Yes Lety Chris MD   venlafaxine (EFFEXOR XR) 150 MG extended release capsule Take 150 mg by mouth daily   Yes Historical Provider, MD   metFORMIN (GLUCOPHAGE) 1000 MG tablet Take 1,000 mg by mouth 2 times daily (with meals)   Yes Historical Provider, MD   levothyroxine (SYNTHROID) 100 MCG tablet Take 100 mcg by mouth Daily   Yes Historical Provider, MD   glimepiride (AMARYL) 4 MG tablet Take 4 mg by mouth 2 times daily   Yes Historical Provider, MD   lisinopril (PRINIVIL;ZESTRIL) 2.5 MG tablet Take 2.5 mg by mouth daily   Yes Historical Provider, MD   carvedilol (COREG) 6.25 MG tablet Take 6.25 mg by mouth 2 times daily (with meals)   Yes Historical Provider, MD   SITagliptin (JANUVIA) 100 MG tablet Take 100 mg by mouth daily   Yes Historical Provider, MD       Current medications:    Current Facility-Administered Medications   Medication Dose Route Frequency Provider Last Rate Last Dose    [MAR Hold] enoxaparin (LOVENOX) injection 90 mg  1 mg/kg Subcutaneous BID Simon Quintanilla MD        0.9 % sodium chloride infusion   Intravenous Once Sunday MD Amanda        [facility] Hold] insulin lispro (HUMALOG) injection vial 0-18 Units  0-18 Units Subcutaneous 4x Daily AC & HS Love Lomas MD        [facility] Hold] metoprolol (LOPRESSOR) injection 5 mg  5 mg Intravenous Q8H PRN Love Lomas MD        [facility] Hold] metoprolol tartrate (LOPRESSOR) tablet 50 mg  50 mg Oral BID Love Lomas MD   50 mg at 02/10/20 0911    [MAR Hold] lactulose (CHRONULAC) 10 GM/15ML solution 20 g  20 g Oral TID Loev Lomas MD   20 g at 02/09/20 0845    [MAR Hold] 0.9 % sodium chloride bolus  250 mL Intravenous Once Micheal MONTE Blood, DO        [MAR Hold] metFORMIN (GLUCOPHAGE) tablet 1,000 mg  1,000 mg Oral BID WC Aurther Jennifer, DO   1,000 mg at 02/10/20 1804    [MAR Hold] linagliptin (TRADJENTA) tablet 5 mg  5 mg Oral Daily Aurther Jennifer, DO   5 mg at 02/10/20 0911    [MAR Hold] fentaNYL (SUBLIMAZE) injection 25 mcg  25 mcg Intravenous Q5 Min PRN Aurther Jennifer, DO   25 mcg at 02/02/20 1143    [MAR Hold] sodium chloride flush 0.9 % injection 10 mL  10 mL Intravenous PRN Micheal MONTE Blood, DO        [MAR Hold] alteplase (CATHFLO) injection 2 mg  2 mg Intracatheter Once Aurther Jennifer, DO        [MAR Hold] oyster shell calcium/vitamin D 250-125 MG-UNIT per tablet 500 mg  2 tablet Oral Daily Aurther Jennifer, DO   500 mg at 02/10/20 0911    [MAR Hold] pantoprazole (PROTONIX) tablet 40 mg  40 mg Oral BID AC Aurther Jennifer, DO   40 mg at 02/11/20 0550    [Held by provider] morphine (MS CONTIN) extended release tablet 15 mg  15 mg Oral 2 times per day Forrest Perla, DO   15 mg at 02/02/20 2132    [MAR Hold] sodium chloride flush 0.9 % injection 10 mL  10 mL Intravenous PRN Forrest Perla, DO   10 mL at 02/08/20 2025    [MAR Hold] acetaminophen (TYLENOL) tablet 650 mg  650 mg Oral Q4H PRN Forrest Perla, DO   650 mg at 02/10/20 0351    [MAR Hold] levothyroxine (SYNTHROID) tablet 100 mcg  100 mcg Oral Daily Forrest Perla, DO   100 mcg at 02/11/20 0550    [MAR Hold] venlafaxine (EFFEXOR XR) extended release capsule 150 mg  150 mg Oral Daily Forrest Perla, DO   150 mg at 02/10/20 0911    [MAR Hold] insulin glargine (LANTUS) injection vial 10 Units  10 Units Subcutaneous Nightly Forrest Perla, DO   10 Units at 02/10/20 2035    [MAR Hold] docusate sodium (COLACE) capsule 100 mg  100 mg Oral BID Forrest Perla, DO   100 mg at 02/10/20 0911    [MAR Hold] senna (SENOKOT) tablet 8.6 mg  1 tablet Oral BID Forrest Perla, DO   8.6 mg at 02/10/20 0911    [MAR Hold] ferrous sulfate EC tablet 325 mg  325 mg Oral BID WC Forrest Perla, DO   Stopped at 02/11/20 0801    [MAR Hold] sodium chloride flush 0.9 % injection 10 mL  10 mL Intravenous 2 times per day Forrest Perla, DO   10 mL at 02/11/20 1045    [MAR Hold] sodium chloride flush 0.9 % injection 10 mL  10 mL Intravenous PRN Forrest Perla, DO   10 mL at 01/30/20 2042    [MAR Hold] magnesium hydroxide (MILK OF MAGNESIA) 400 MG/5ML suspension 30 mL  30 mL Oral Daily PRN Forrest Perla, DO        [MAR Hold] ondansetron [facility]) injection 4 mg  4 mg Intravenous Q6H PRN Forrest Perla, DO        [facility] Hold] potassium chloride 10 mEq/100 mL IVPB (Peripheral Line)  10 mEq Intravenous PRN Forrest Perla, DO        [facility] Hold] bisacodyl (DULCOLAX) suppository 10 mg  10 mg Rectal Daily PRN Forrest Perla, DO        [MAR Hold] acetaminophen (TYLENOL) tablet 650 mg  650 mg Oral Q4H PRN Forrest Perla, DO   650 mg at 02/10/20 2221    [MAR Hold] glucose (GLUTOSE) 40 % oral gel 15 g  15 g Oral PRN Dona Kanner, DO        [facility] Hold] dextrose 50 % IV solution  12.5 g Intravenous PRN Nelida Kanner, DO        [MAR Hold] glucagon (rDNA) injection 1 mg  1 mg Intramuscular PRN Nelida Kanner, DO        [facility] Hold] dextrose 5 % solution  100 mL/hr Intravenous PRN Nelida Kanner, DO        [facility] Hold] oxyCODONE (ROXICODONE) immediate release tablet 5 mg  5 mg Oral Q4H PRN Nelida Kanner, DO   5 mg at 02/07/20 1548    Or    [MAR Hold] oxyCODONE (ROXICODONE) immediate release tablet 10 mg  10 mg Oral Q4H PRN Nelida Kanner, DO   10 mg at 02/10/20 2035       Allergies:  No Known Allergies    Problem List:    Patient Active Problem List   Diagnosis Code    Anemia D64.9    Liver lesion K76.9    History of breast cancer Z85.3    Diabetes mellitus type 2 in obese ([facility] E11.69, E66.9    Essential hypertension I10    Acquired hypothyroidism E03.9    Recurrent major depressive disorder, in remission ([facility] F33.40    Metastatic breast cancer ([facility] C50.919    Bone lesion M89.9    Iron deficiency E61.1    Acute duodenal ulcer with gastric outlet obstruction K26.3    Ulcer of esophagus without bleeding K22.10    Encounter for palliative care Z51.5    Deep venous thrombosis (HCC) I82.409       Past Medical History:        Diagnosis Date    Breast cancer ([facility] 2007    CAD (coronary atherosclerotic disease)     Diabetes mellitus type 2 in obese ([facility]     Essential hypertension     Hypothyroidism (acquired)        Past Surgical History:        Procedure Laterality Date    CORONARY ARTERY BYPASS GRAFT  2007    FEMUR FRACTURE SURGERY Left 2/1/2020    [facility]  FEMUR FRACTURE IM NAILING AND LEFT FEMUR BIOPSY (SYNTHES, , C-ARM) performed by Elise Meza MD at [address] [place] [address] Rd  02/01/2020    nailing    JOINT REPLACEMENT Right 2009    MINDY    MASTECTOMY  2007    TUNNELED VENOUS PORT PLACEMENT      UPPER

## 2020-02-12 ENCOUNTER — TELEPHONE (OUTPATIENT)
Dept: ONCOLOGY | Age: 70
End: 2020-02-12

## 2020-02-12 ENCOUNTER — APPOINTMENT (OUTPATIENT)
Dept: GENERAL RADIOLOGY | Age: 70
DRG: 347 | End: 2020-02-12
Attending: INTERNAL MEDICINE
Payer: COMMERCIAL

## 2020-02-12 LAB
ABO/RH: NORMAL
ABSOLUTE EOS #: 0.12 K/UL (ref 0–0.44)
ABSOLUTE IMMATURE GRANULOCYTE: 0.09 K/UL (ref 0–0.3)
ABSOLUTE LYMPH #: 1.4 K/UL (ref 1.1–3.7)
ABSOLUTE MONO #: 0.6 K/UL (ref 0.1–1.2)
ANION GAP SERPL CALCULATED.3IONS-SCNC: 16 MMOL/L (ref 9–17)
ANTIBODY SCREEN: NEGATIVE
ARM BAND NUMBER: NORMAL
BASOPHILS # BLD: 1 % (ref 0–2)
BASOPHILS ABSOLUTE: 0.07 K/UL (ref 0–0.2)
BLOOD BANK SPECIMEN: NORMAL
BUN BLDV-MCNC: 9 MG/DL (ref 8–23)
BUN/CREAT BLD: ABNORMAL (ref 9–20)
CALCIUM SERPL-MCNC: 7.7 MG/DL (ref 8.6–10.4)
CHLORIDE BLD-SCNC: 103 MMOL/L (ref 98–107)
CO2: 25 MMOL/L (ref 20–31)
CREAT SERPL-MCNC: 0.55 MG/DL (ref 0.5–0.9)
DIFFERENTIAL TYPE: ABNORMAL
EOSINOPHILS RELATIVE PERCENT: 2 % (ref 1–4)
EXPIRATION DATE: NORMAL
GFR AFRICAN AMERICAN: >60 ML/MIN
GFR NON-AFRICAN AMERICAN: >60 ML/MIN
GFR SERPL CREATININE-BSD FRML MDRD: ABNORMAL ML/MIN/{1.73_M2}
GFR SERPL CREATININE-BSD FRML MDRD: ABNORMAL ML/MIN/{1.73_M2}
GLUCOSE BLD-MCNC: 101 MG/DL (ref 70–99)
GLUCOSE BLD-MCNC: 105 MG/DL (ref 65–105)
GLUCOSE BLD-MCNC: 108 MG/DL (ref 65–105)
GLUCOSE BLD-MCNC: 124 MG/DL (ref 65–105)
GLUCOSE BLD-MCNC: 153 MG/DL (ref 65–105)
HCT VFR BLD CALC: 22 % (ref 36.3–47.1)
HCT VFR BLD CALC: 27.8 % (ref 36.3–47.1)
HCT VFR BLD CALC: 29.8 % (ref 36.3–47.1)
HCT VFR BLD CALC: 30.1 % (ref 36.3–47.1)
HCT VFR BLD CALC: 30.6 % (ref 36.3–47.1)
HEMOGLOBIN: 6.4 G/DL (ref 11.9–15.1)
HEMOGLOBIN: 8.3 G/DL (ref 11.9–15.1)
HEMOGLOBIN: 8.6 G/DL (ref 11.9–15.1)
HEMOGLOBIN: 8.9 G/DL (ref 11.9–15.1)
HEMOGLOBIN: 9 G/DL (ref 11.9–15.1)
IMMATURE GRANULOCYTES: 1 %
LYMPHOCYTES # BLD: 19 % (ref 24–43)
MCH RBC QN AUTO: 28.2 PG (ref 25.2–33.5)
MCHC RBC AUTO-ENTMCNC: 29.1 G/DL (ref 28.4–34.8)
MCV RBC AUTO: 96.8 FL (ref 82.6–102.9)
MONOCYTES # BLD: 8 % (ref 3–12)
NRBC AUTOMATED: 0 PER 100 WBC
PDW BLD-RTO: 16 % (ref 11.8–14.4)
PLATELET # BLD: 502 K/UL (ref 138–453)
PLATELET ESTIMATE: ABNORMAL
PMV BLD AUTO: 10.1 FL (ref 8.1–13.5)
POTASSIUM SERPL-SCNC: 4.3 MMOL/L (ref 3.7–5.3)
RBC # BLD: 3.16 M/UL (ref 3.95–5.11)
RBC # BLD: ABNORMAL 10*6/UL
SEG NEUTROPHILS: 69 % (ref 36–65)
SEGMENTED NEUTROPHILS ABSOLUTE COUNT: 5.19 K/UL (ref 1.5–8.1)
SODIUM BLD-SCNC: 144 MMOL/L (ref 135–144)
WBC # BLD: 7.5 K/UL (ref 3.5–11.3)
WBC # BLD: ABNORMAL 10*3/UL

## 2020-02-12 PROCEDURE — 99232 SBSQ HOSP IP/OBS MODERATE 35: CPT | Performed by: INTERNAL MEDICINE

## 2020-02-12 PROCEDURE — 36415 COLL VENOUS BLD VENIPUNCTURE: CPT

## 2020-02-12 PROCEDURE — 85018 HEMOGLOBIN: CPT

## 2020-02-12 PROCEDURE — 6370000000 HC RX 637 (ALT 250 FOR IP): Performed by: INTERNAL MEDICINE

## 2020-02-12 PROCEDURE — 82947 ASSAY GLUCOSE BLOOD QUANT: CPT

## 2020-02-12 PROCEDURE — C9113 INJ PANTOPRAZOLE SODIUM, VIA: HCPCS | Performed by: INTERNAL MEDICINE

## 2020-02-12 PROCEDURE — 2580000003 HC RX 258: Performed by: STUDENT IN AN ORGANIZED HEALTH CARE EDUCATION/TRAINING PROGRAM

## 2020-02-12 PROCEDURE — 6360000002 HC RX W HCPCS: Performed by: INTERNAL MEDICINE

## 2020-02-12 PROCEDURE — 80048 BASIC METABOLIC PNL TOTAL CA: CPT

## 2020-02-12 PROCEDURE — 99233 SBSQ HOSP IP/OBS HIGH 50: CPT | Performed by: INTERNAL MEDICINE

## 2020-02-12 PROCEDURE — 86900 BLOOD TYPING SEROLOGIC ABO: CPT

## 2020-02-12 PROCEDURE — 86850 RBC ANTIBODY SCREEN: CPT

## 2020-02-12 PROCEDURE — 2580000003 HC RX 258: Performed by: INTERNAL MEDICINE

## 2020-02-12 PROCEDURE — 6360000002 HC RX W HCPCS: Performed by: STUDENT IN AN ORGANIZED HEALTH CARE EDUCATION/TRAINING PROGRAM

## 2020-02-12 PROCEDURE — 86901 BLOOD TYPING SEROLOGIC RH(D): CPT

## 2020-02-12 PROCEDURE — 73562 X-RAY EXAM OF KNEE 3: CPT

## 2020-02-12 PROCEDURE — 85025 COMPLETE CBC W/AUTO DIFF WBC: CPT

## 2020-02-12 PROCEDURE — 2060000000 HC ICU INTERMEDIATE R&B

## 2020-02-12 PROCEDURE — APPNB45 APP NON BILLABLE 31-45 MINUTES: Performed by: INTERNAL MEDICINE

## 2020-02-12 PROCEDURE — 85014 HEMATOCRIT: CPT

## 2020-02-12 RX ORDER — 0.9 % SODIUM CHLORIDE 0.9 %
20 INTRAVENOUS SOLUTION INTRAVENOUS ONCE
Status: DISCONTINUED | OUTPATIENT
Start: 2020-02-12 | End: 2020-02-19 | Stop reason: HOSPADM

## 2020-02-12 RX ADMIN — SODIUM CHLORIDE, POTASSIUM CHLORIDE, SODIUM LACTATE AND CALCIUM CHLORIDE: 600; 310; 30; 20 INJECTION, SOLUTION INTRAVENOUS at 08:35

## 2020-02-12 RX ADMIN — VENLAFAXINE HYDROCHLORIDE 150 MG: 150 CAPSULE, EXTENDED RELEASE ORAL at 09:25

## 2020-02-12 RX ADMIN — DOCUSATE SODIUM 100 MG: 100 CAPSULE, LIQUID FILLED ORAL at 09:25

## 2020-02-12 RX ADMIN — LEVOTHYROXINE SODIUM 100 MCG: 100 TABLET ORAL at 08:34

## 2020-02-12 RX ADMIN — SODIUM CHLORIDE, POTASSIUM CHLORIDE, SODIUM LACTATE AND CALCIUM CHLORIDE: 600; 310; 30; 20 INJECTION, SOLUTION INTRAVENOUS at 22:57

## 2020-02-12 RX ADMIN — FENTANYL CITRATE 50 MCG: 50 INJECTION, SOLUTION INTRAMUSCULAR; INTRAVENOUS at 10:20

## 2020-02-12 RX ADMIN — FERROUS SULFATE TAB EC 325 MG (65 MG FE EQUIVALENT) 325 MG: 325 (65 FE) TABLET DELAYED RESPONSE at 09:25

## 2020-02-12 RX ADMIN — METOPROLOL TARTRATE 50 MG: 50 TABLET, FILM COATED ORAL at 09:25

## 2020-02-12 RX ADMIN — METOPROLOL TARTRATE 50 MG: 50 TABLET, FILM COATED ORAL at 21:07

## 2020-02-12 RX ADMIN — CALCIUM CARBONATE-CHOLECALCIFEROL TAB 250 MG-125 UNIT 500 MG: 250-125 TAB at 09:25

## 2020-02-12 RX ADMIN — SODIUM CHLORIDE 8 MG/HR: 9 INJECTION, SOLUTION INTRAVENOUS at 16:43

## 2020-02-12 RX ADMIN — INSULIN LISPRO 3 UNITS: 100 INJECTION, SOLUTION INTRAVENOUS; SUBCUTANEOUS at 12:15

## 2020-02-12 RX ADMIN — OXYCODONE HYDROCHLORIDE 10 MG: 5 TABLET ORAL at 22:05

## 2020-02-12 RX ADMIN — FERROUS SULFATE TAB EC 325 MG (65 MG FE EQUIVALENT) 325 MG: 325 (65 FE) TABLET DELAYED RESPONSE at 18:12

## 2020-02-12 RX ADMIN — SODIUM CHLORIDE 8 MG/HR: 9 INJECTION, SOLUTION INTRAVENOUS at 05:22

## 2020-02-12 RX ADMIN — ERYTHROMYCIN LACTOBIONATE 250 MG: 500 INJECTION, POWDER, LYOPHILIZED, FOR SOLUTION INTRAVENOUS at 05:22

## 2020-02-12 ASSESSMENT — PAIN SCALES - GENERAL
PAINLEVEL_OUTOF10: 10
PAINLEVEL_OUTOF10: 7
PAINLEVEL_OUTOF10: 0
PAINLEVEL_OUTOF10: 0

## 2020-02-12 NOTE — FLOWSHEET NOTE
DATE: 2020    NAME: Macarena Collins  MRN: [id number]   : 1950    Patient not seen this date for Physical Therapy due to:  [] Blood transfusion in progress  [] Hemodialysis  []  Patient Declined  [] Spine Precautions   [] Strict Bedrest  [] Surgery/ Procedure  [] Testing      [x] Other; per RN, pt going for possible scope this AM. +DVT RUE, pending plan for anticoagulation. Will check back in PM time permitting. [] PT being discontinued at this time. Patient independent. No further needs. [] PT being discontinued at this time as the patient has been transferred to palliative care. No further needs.     Marty Gooden, PTA

## 2020-02-12 NOTE — PROGRESS NOTES
trachea. Neck: Supple, symmetrical, trachea midline, no adenopathy, thyroid symmetric, no jvd skin normal  Respiratory: clear to auscultation, no wheezes or rales and unlabored breathing.  No intercostal tenderness  Cardiovascular: regular rate and rhythm, normal S1, S2, no murmur noted and 2+ pulses throughout  Abdomen: soft, nontender, nondistended, no masses or organomegaly  Neurological: Patient awake alert in bed  Extremities:  peripheral pulses normal, some mild tenderness and swelling of the right upper extremity, pulses intact no pedal edema, no clubbing or cyanosis    MEDICATIONS  Scheduled Meds:   sodium chloride  20 mL Intravenous Once    insulin lispro  0-18 Units Subcutaneous 4x Daily AC & HS    metoprolol tartrate  50 mg Oral BID    lactulose  20 g Oral TID    sodium chloride  250 mL Intravenous Once    [Held by provider] metFORMIN  1,000 mg Oral BID WC    [Held by provider] linagliptin  5 mg Oral Daily    alteplase  2 mg Intracatheter Once    oyster shell calcium/vitamin D  2 tablet Oral Daily    [Held by provider] morphine  15 mg Oral 2 times per day    levothyroxine  100 mcg Oral Daily    venlafaxine  150 mg Oral Daily    insulin glargine  10 Units Subcutaneous Nightly    docusate sodium  100 mg Oral BID    senna  1 tablet Oral BID    ferrous sulfate  325 mg Oral BID     sodium chloride flush  10 mL Intravenous 2 times per day     Continuous Infusions:   pantoprozole (PROTONIX) infusion 8 mg/hr (02/12/20 0522)    lactated ringers 75 mL/hr at 02/12/20 0835    dextrose       PRN Meds:   fentanNYL, 50 mcg, Q2H PRN  metoprolol, 5 mg, Q8H PRN  fentaNYL, 25 mcg, Q5 Min PRN  sodium chloride flush, 10 mL, PRN  sodium chloride flush, 10 mL, PRN  acetaminophen, 650 mg, Q4H PRN  sodium chloride flush, 10 mL, PRN  magnesium hydroxide, 30 mL, Daily PRN  ondansetron, 4 mg, Q6H PRN  potassium chloride, 10 mEq, PRN  bisacodyl, 10 mg, Daily PRN  acetaminophen, 650 mg, Q4H PRN  glucose, 15 g, PRN  dextrose, 12.5 g, PRN  glucagon (rDNA), 1 mg, PRN  dextrose, 100 mL/hr, PRN  oxyCODONE, 5 mg, Q4H PRN    Or  oxyCODONE, 10 mg, Q4H PRN        DATA:  Complete Blood Count:   Recent Labs     02/10/20  0558 02/11/20  0611  02/12/20  0701 02/12/20  1209 02/12/20  1251   WBC 7.5 9.0  --  7.5  --   --    RBC 3.13* 3.22*  --  3.16*  --   --    HGB 8.7* 9.3*   < > 8.9* 6.4* 8.3*   HCT 29.8* 32.2*   < > 30.6* 22.0* 27.8*   MCV 95.2 100.0  --  96.8  --   --    MCH 27.8 28.9  --  28.2  --   --    MCHC 29.2 28.9  --  29.1  --   --    RDW 16.2* 16.3*  --  16.0*  --   --    * 499*  --  502*  --   --    MPV 10.2 10.3  --  10.1  --   --     < > = values in this interval not displayed.       Last 3 Blood Glucose:   Recent Labs     02/10/20  0558 02/11/20  0611 02/12/20  0701   GLUCOSE 100* 75 101*      PT/INR:    Lab Results   Component Value Date    PROTIME 10.5 02/11/2020    INR 1.0 02/11/2020     PTT:    Lab Results   Component Value Date    APTT 25.7 02/11/2020       Comprehensive Metabolic Profile:   Recent Labs     02/10/20  0558 02/11/20  0611 02/12/20  0701    136 144   K 4.3 4.6 4.3   CL 98 98 103   CO2 27 25 25   BUN 10 10 9   CREATININE 0.65 0.46* 0.55   GLUCOSE 100* 75 101*   CALCIUM 8.2* 7.6* 7.7*      Magnesium: No results found for: MG  Phosphorus: No results found for: PHOS  Ionized Calcium: No results found for: CAION     Urinalysis:   Lab Results   Component Value Date    NITRU NEGATIVE 02/03/2020    COLORU YELLOW 02/03/2020    PHUR 5.0 02/03/2020    SPECGRAV 1.017 02/03/2020    LEUKOCYTESUR NEGATIVE 02/03/2020    UROBILINOGEN Normal 02/03/2020    BILIRUBINUR NEGATIVE 02/03/2020    GLUCOSEU NEGATIVE 02/03/2020    KETUA TRACE 02/03/2020       ASSESSMENT:     Patient Active Problem List    Diagnosis Date Noted    [facility] venous [facility]     Encounter for palliative care     Acute duodenal ulcer with gastric outlet obstruction 01/30/2020    Ulcer of esophagus without bleeding 01/30/2020    Bone lesion     Iron deficiency     Diabetes mellitus type 2 in obese ([facility] 01/29/2020    Essential hypertension 01/29/2020    Acquired hypothyroidism 01/29/2020    Recurrent major depressive disorder, in remission ([facility] 01/29/2020    Metastatic breast cancer ([facility] 01/29/2020    History of breast cancer 01/27/2020    Anemia 01/26/2020    Liver lesion 01/26/2020        PLAN:     ICU PROPHYLAXIS:  Stress ulcer:  [x] PPI Agent  [] [id number] [] Sucralfate  [] Other:  VTE:   [] Enoxaparin  [] Unfract. Heparin Subcut  [x] EPC Cuffs    Hold AC for bleeding risk    NUTRITION:  [x] NPO [] Tube Feeding (Specify: ) [] TPN  [] PO    HOME MEDS RECONCILED: [x] No  [] Yes    CONSULTATION NEEDED:  [x] No  [] Yes    FAMILY UPDATED:    [x] No  [] Yes    TRANSFER OUT OF ICU:   [x] No  [] Yes    Additional Assessment:  Additional assessment:  25-year-old woman with likely metastatic breast cancer with multiple osteoblastic lesions, liver lesions, possible carcinomatosis with known right upper extremity DVT and duodenal bulb ulcer transferred to the ICU for observation for high risk of bleeding decompensation according to gastroenterology. Will keep overnight for possible endoscopy tomorrow,    Plan:  · Patient with no bleeding overnight, 20 care H&H was 6 however was upstream of IV fluids, repeat drawn from different site showed stable H&H at 8. Patient does have ongoing axillary DVT. Will follow up with GI regarding need for ICU status. No scope today. Possible stepdown pending release by GI.      PLAN/MEDICAL DECISION MAKING:  Neurologic:   · Neuro intact  · Neuro checks per protocol  A/O--possible leptomeningeal ivolvement ---see MRI  Fentanyl Q2 PRN for pain    Cardiovascular:  · Hemodynamically stable  · MAP goal >60  · History of CABG in 2008  · DNR-CCA    Pulmonary:  · Maintain oxygen sats >92%  · Pulmonary toilet  · Vent Information  · FiO2 : 96 %    GI/Nutrition  · See med list  · Ulcer Prophylaxis: protonix drip not reviewed. Her hemoglobin was 8.6 last night and there was a drop in hemoglobin to 6.9 although it may be drawn from the site of very she is having running IV fluid, repeat hemoglobin was 8.9, she did not have any vomiting, no abdominal pain and abdominal tenderness is not present she is denies having melanotic stool and bleeding per rectum and she is off antiplatelet medication and anticoagulation. Seen by GI started on liquid diet and will continue. Continue with PPI drip. Continue monitor hemoglobin hematocrit. If active bleeding then will inform GI. Follow-up with oncology  Continue to hold anticoagulation at this time  Okay to transfer to medicine floor with medicine team and critical care team will sign off once on the floor.       Janie Young MD  2/12/2020 6:24 PM

## 2020-02-12 NOTE — ADT AUTH CERT
[address] [facility] Adult-Extended Stay by Lui Villanueva RN         Review Status Review Entered   In Primary 2/12/2020 08:17       Criteria Review   REVIEW SUMMARY     Patient: Renetta Hendricks  Review Number: [id number]  Review Status: In Primary     Condition Specific: Yes        OUTCOMES  Outcome Type: Primary           REVIEW DETAILS     Product: [facility]  Subset: Extended Stay      (Symptom or finding within 24h)         (Excludes PO medications unless noted)          [X] Select Level of Care, One:              [X] ACUTE, >= One:                  [X] Cardiovascular or peripheral vascular, One:                      [X] Partial responder, not clinically stable for discharge and requires continued stay, >= One:                          [X] Anticoagulation, One:                              [X] Low molecular weight heparin (LMWH) <= 3d                              ~--Admin, IQ Admin Admin on 02- 08:17 AM--~                              + for dvt and started on lovenox 70 mg bid on 2/10-2/11                                                                                               Version: [employer]® 2019.1  Rodrigo Dyer  © 2019 [employer] and/or one of its [place]. All Rights Reserved. CPT only © 2018 American [employer] Association. All Rights Reserved. Additional Notes   2/10      Tx She underwent EGD with hemostasis on 31 January.  No gross bleeding since then. Yudy Padgett was found to have upper extremity DVT. Yudy Padgett is plan for blood thinners. Sq lovenox started today.  Monitoring the pt for bleeding.        Vitals  02/10/20 07:35:34  97.7 (36.5)  16  95  111/76  -  -  -  -  96  None (Room air)  5       Labs hgb 8.7, hct 29.8, plt 460, hgb 8.7,       Imaging    Venous duplex upper ext 2/10 Summary  Acute deep vein thrombosis of the right upper extremity involving the    subclavian and axillary veins.                 MEDS    Lovenox sc daily 40 mg    Ferrous sulfate po 325 mg 2 x per day    Lantus 10 unit sc nightly. Insulin per sliding scale ac et hs used x 0 on 2/7 and 2/8   Chronulac 20 g 3 x per day po    Synthroid 100 mcg po daily    Tranjenta 5 mg po daily    Glucophage 1000 mg 2 x per ay    Lopressor 50 mg po bid    Protonix 40 mg po bid    Effexor po 150 mg po daily    Roxicodone ir 10 mg gien x 3 on 2/7 and x 2 on 2/8   Lovenox 70 mg bid sc for 2/10-2/11             Orders  h/h  q 12 hours. Strict I and o   Tele et cont pulse ox. Scdepc cuffs on bilaterally    Full wgt bearing    Remove picc on 2/10   Is q 2 hours. Pt et ot evaluations. O2 as needed keep sao2 > 90%.        Per int med notes    IMPRESSION / RECOMMENDATIONS: Ms. Mariah Bailey is a 77 y.o. female followed for management of needing duodenal ulcer.  Status post hemostasis.  She was found to have DVT.  She needs blood thinners.  We discussed options with the patient. Michel Last can do EGD to reassess and ensure adequate healing prior to starting blood thinners.  We also discussed other options with the patient. Che Peres is not sure what she wants to do.  She wants to think about it. Per hem/onc following    RECOMMENDATIONS:   Reviewed results of lab work-up and other relevant clinical data. Status post prophylactic rodding of the left femur   Plan for radiation as an outpatient to be followed by systemic therapy. Plan for XRT in [place]. Continue Femara. Will start on Lovenox, can switch to Eliquis from tomorrow. Needs rehab/SNF on discharge. needs follow up with radiation in [facility] Adult-Extended Stay by Karey Christensen RN         Review Status Review Entered   In Primary 2/12/2020 07:58       Criteria Review   REVIEW SUMMARY     Patient: Yared Perdomo  Review Number: [id number]  Review Status:  In Primary     Condition Specific: Yes        OUTCOMES  Outcome Type: Primary           REVIEW DETAILS     Product: [facility] Adult  Subset: Extended Stay      (Symptom or finding within 24h)      ~--Admin, IQ Admin Admin on 02- 07:58 AM--~      the pt has been taken off of iv atb.  observatiion off atb.                           (Excludes PO medications unless noted)     Version: [employer]® 2019.1  Gamaliel Ha  © 2019 [employer] and/or one of its [place]. All Rights Reserved. CPT only © 2018 American Medical Association. All Rights Reserved. Additional Notes   2/8/2020        Tx The pt is feeling well today. Pt with no acute events overnight. No sob and no chest pain. Pt will needs snf at time of discharge.  slightly  lethargic but able to answer questions      Vitals /67   Pulse 81   Temp 97.9 °F (36.6 °C) (Oral)   Resp 17   Ht 5' 2\" (1.575 m)   Wt 188 lb 0.8 oz (85.3 kg)   SpO2 91% off of o2 since yesterday. Temp max is 98.6. Labs glucose 121. Calcium 7.8, hgb 9.3 hct 37.3. MEDS    Lovenox sc daily 40 mg    Ferrous sulfate po 325 mg 2 x per day    Lantus 10 unit sc nightly. Insulin per sliding scale ac et hs used x 0 on 2/7 and 2/8   Chronulac 20 g 3 x per day po    Synthroid 100 mcg po daily    Tranjenta 5 mg po daily    Glucophage 1000 mg 2 x per ay    Lopressor 50 mg po bid    Protonix 40 mg po bid    Effexor po 150 mg po daily    Roxicodone ir 10 mg gien x 3 on 2/7 and x 2 on 2/8      Orders    Strict I and o   Tele et cont pulse ox. Scdepc cuffs on bilaterally    Full wgt bearing    Remove picc on 2/10   Is q 2 hours. Pt et ot evaluations.     O2 as needed keep sao2 > 90%.        Per int med notes      IMPRESSION:  Primary Problem Metastatic breast cancer [facility])     Active Hospital Problems     Diagnosis Date Noted   · Encounter for palliative care [Z51.5]     · Acute duodenal ulcer with gastric outlet obstruction [K26.3] 01/30/2020   · Ulcer of esophagus without bleeding [K22.10] 01/30/2020   · Bone lesion [M89.9]     · Iron deficiency [E61.1]     · Diabetes mellitus type 2 in obese ([address] [E11.69, E66.9] 01/29/2020   · Essential hypertension [I10] 01/29/2020   · Acquired hypothyroidism [E03.9] 01/29/2020   · Recurrent major depressive disorder, in remission ([facility] [F33.40] 01/29/2020   · Metastatic breast cancer ([facility] [C50.919] 01/29/2020   · History of breast cancer [Z85.3] 01/27/2020   · Anemia [D64.9] 01/26/2020   · Liver lesion [K76.9] 01/26/2020       History of breast cancer likely hormone receptor positive.  Status post left mastectomy and chemotherapy and aromatase inhibitor therapy. Iron deficiency   Hypoproliferative anemia   Bone lesions 2/2 metastatic breast adenocarcinoma. Biopsy results showing adenocarcinoma   Back pain second to bone lesions.         RECOMMENDATIONS:   Reviewed results of lab work-up and other relevant clinical data. Status post prophylactic rodding of the left femur   Plan for radiation as an outpatient to be followed by systemic therapy. Plan for XRT in [place]. Discussed with family   Prognosis is poor. On letrozole. Family had many questions. About prognosis, management plans and logistics. Needs rehab/SNF on discharge.    Will send records to [address] in [place]   The pt will need follow up with radiation in [place].

## 2020-02-12 NOTE — PROGRESS NOTES
Today's Date: 2/12/2020  Patient Name: Hector Bowers  Date of admission: 1/26/2020  1:38 PM  Patient's age: 77 y. o., 1950  Admission Dx: Septic shock ([facility] [address] [A41.9, R65.21]    Reason for Consult: management recommendations  Requesting Physician: Nellie Chambers MD    CHIEF COMPLAINT: Abdominal pain. History of breast cancer. Back pain. History Obtained From:  patient, electronic medical record    SUBJECTIVE: 02/12/20    Seen and examined in ICU. Had EGD yesterday with active bleeding duodenal ulcer. On ppi gtt. Started on CLD per GI. Repeat Hb dropped 8.3   Has acute DVT RUE involving subclavian and axillary veins. Pain improved in left leg. Follow-up with Dr. Yamel Albright as outpatient in 1 week    Denies any c/o fever, chills, chest pain, shortness of breath, active bleeding. BRIEF CASE HISTORY:    The patient is a 77 y.o.  female who is admitted to the hospital for chief complaints of abdominal pain. According to the records patient has remote history of breast cancer diagnosed in 2007. There are no records available regarding treatment. Per patient she underwent left mastectomy and had around 13 lymph nodes involved. Subsequently patient underwent chemotherapy which she completed in 2008. Patient did not receive any radiation therapy. Patient was then given Arimidex for many years likely 5 years. Patient has not seen a oncologist for quite a few years now. Patient has been having back pain for a while likely many months. Patient started having bowel pain with a lot of nausea and vomiting around [place] time which persisted and got worse eventually patient presented to the ER at [facility] CT scan done per report shows possible duodenitis and concern regarding bowel perforation. Subsequently patient was transferred to [facility].  Repeat CT does not show any concerning findings in the duodenum but does show blastic lesions involving bone especially T10. Continuous Brad Tejada MD 10 mL/hr at 02/12/20 0522 8 mg/hr at 02/12/20 0522    fentaNYL (SUBLIMAZE) injection 50 mcg  50 mcg Intravenous Q2H PRN Peg Schuster MD   50 mcg at 02/12/20 1020    lactated ringers infusion   Intravenous Continuous Peg Schuster MD 75 mL/hr at 02/12/20 0835      insulin lispro (HUMALOG) injection vial 0-18 Units  0-18 Units Subcutaneous 4x Daily AC & HS Brad Tejada MD   3 Units at 02/12/20 1215    metoprolol (LOPRESSOR) injection 5 mg  5 mg Intravenous Q8H PRN Pao Rodríguez MD        metoprolol tartrate (LOPRESSOR) tablet 50 mg  50 mg Oral BID Brad Tejada MD   50 mg at 02/12/20 0925    lactulose (CHRONULAC) 10 GM/15ML solution 20 g  20 g Oral TID Brad Tejada MD   Stopped at 02/12/20 0926    0.9 % sodium chloride bolus  250 mL Intravenous Once Brad Tejada MD        [Held by provider] metFORMIN (GLUCOPHAGE) tablet 1,000 mg  1,000 mg Oral BID WC Brad Tejada MD   1,000 mg at 02/10/20 1804    [Held by provider] linagliptin (TRADJENTA) tablet 5 mg  5 mg Oral Daily Pao Rodríguez MD   5 mg at 02/10/20 0911    fentaNYL (SUBLIMAZE) injection 25 mcg  25 mcg Intravenous Q5 Min PRN Brad Tejada MD   25 mcg at 02/02/20 1143    sodium chloride flush 0.9 % injection 10 mL  10 mL Intravenous PRN Pao Rodríguez MD        alteplase (CATHFLO) injection 2 mg  2 mg Intracatheter Once Brad Tejada MD        oyster shell calcium/vitamin D 250-125 MG-UNIT per tablet 500 mg  2 tablet Oral Daily Brad Tejada MD   500 mg at 02/12/20 0925    [Held by provider] morphine (MS CONTIN) extended release tablet 15 mg  15 mg Oral 2 times per day Luisa Skiff, DO   15 mg at 02/02/20 2132    sodium chloride flush 0.9 % injection 10 mL  10 mL Intravenous PRN Brad Tejada MD   10 mL at 02/08/20 2025    acetaminophen (TYLENOL) tablet 650 mg  650 mg Oral Q4H PRN Pao Rodríguez MD   650 mg at 02/10/20 0351    levothyroxine (SYNTHROID) tablet 100 mcg  100 mcg Oral Daily Irlanda Felix MD   100 mcg at 02/12/20 0834    venlafaxine (EFFEXOR XR) extended release capsule 150 mg  150 mg Oral Daily Irlanda Felix MD   150 mg at 02/12/20 0925    insulin glargine (LANTUS) injection vial 10 Units  10 Units Subcutaneous Nightly Irlanda Felix MD   10 Units at 02/10/20 2035    docusate sodium (COLACE) capsule 100 mg  100 mg Oral BID Irlanda Felix MD   100 mg at 02/12/20 0925    senna (SENOKOT) tablet 8.6 mg  1 tablet Oral BID Irlanda Felix MD   Stopped at 02/12/20 0926    ferrous sulfate EC tablet 325 mg  325 mg Oral BID WC Irlanda Felix MD   325 mg at 02/12/20 0925    sodium chloride flush 0.9 % injection 10 mL  10 mL Intravenous 2 times per day Irladna Felix MD   10 mL at 02/11/20 1045    sodium chloride flush 0.9 % injection 10 mL  10 mL Intravenous PRN Irlanda Felix MD   10 mL at 01/30/20 2042    magnesium hydroxide (MILK OF MAGNESIA) 400 MG/5ML suspension 30 mL  30 mL Oral Daily PRN Pao Rodríguez MD        ondansetron (ZOFRAN) injection 4 mg  4 mg Intravenous Q6H PRN Pao Rodríguez MD        potassium chloride 10 mEq/100 mL IVPB (Peripheral Line)  10 mEq Intravenous PRN Irlanda Felix MD        bisacodyl (DULCOLAX) suppository 10 mg  10 mg Rectal Daily PRN Irlanda Felix MD        acetaminophen (TYLENOL) tablet 650 mg  650 mg Oral Q4H PRN Pao Rodríguez MD   650 mg at 02/10/20 2221    glucose (GLUTOSE) 40 % oral gel 15 g  15 g Oral PRN Pao Rodríguez MD        dextrose 50 % IV solution  12.5 g Intravenous PRN Pao Rodríguez MD        glucagon (rDNA) injection 1 mg  1 mg Intramuscular PRN Pao Rodríguez MD        dextrose 5 % solution  100 mL/hr Intravenous PRN Pao Rodríguez MD        oxyCODONE (ROXICODONE) immediate release tablet 5 mg  5 mg Oral Q4H PRN Pao Rodríguez MD   5 mg at 02/07/20 1548    Or    oxyCODONE (ROXICODONE) immediate release tablet 10 mg  10 mg Oral Q4H PRN Pao Rodríguez MD   10 mg at 02/11/20 1635       Allergies: Patient has no known allergies. Social History:   reports that she has never smoked. She has never used smokeless tobacco. She reports that she does not use drugs. Denies smoking. Denies taking alcohol. Family History: Hypertension and diabetes    REVIEW OF SYSTEMS:      Constitutional: No fever or chills. No night sweats, no weight loss   Eyes: No eye discharge, double vision, or eye pain   HEENT: negative for sore mouth, sore throat, hoarseness and voice change   Respiratory: negative for cough , sputum, dyspnea, wheezing, hemoptysis, chest pain   Cardiovascular: negative for chest pain, dyspnea, palpitations, orthopnea, PND   Gastrointestinal: negative for nausea, vomiting, diarrhea, constipation, abdominal pain, Dysphagia, hematemesis and hematochezia   Genitourinary: negative for frequency, dysuria, nocturia, urinary incontinence, and hematuria   Integument: negative for rash, skin lesions, bruises. Hematologic/Lymphatic: negative for easy bruising, bleeding, lymphadenopathy, or petechiae   Endocrine: negative for heat or cold intolerance,weight changes, change in bowel habits and hair loss   Musculoskeletal: Positive back pain  Neurological: negative for headaches, dizziness, seizures, weakness, numbness    PHYSICAL EXAM:        BP (!) 108/59   Pulse 70   Temp 98 °F (36.7 °C) (Oral)   Resp 18   Ht 5' 2\" (1.575 m)   Wt 182 lb 5.1 oz (82.7 kg)   SpO2 97%   Breastfeeding No   BMI 33.35 kg/m²    Temp (24hrs), Av °F (36.7 °C), Min:97.7 °F (36.5 °C), Max:98.2 °F (36.8 °C)      General appearance -slightly  lethargic but able to answer questions.   Eyes - pupils equal and reactive, extraocular eye movements intact   Ears - bilateral TM's and external ear canals normal   Mouth - mucous membranes moist, pharynx normal without lesions   Neck - supple, no significant adenopathy   Lymphatics - no palpable lymphadenopathy, no hepatosplenomegaly   Chest - clear to auscultation, no wheezes, rales or rhonchi, symmetric air entry   Heart - normal rate, regular rhythm, normal S1, S2, no murmurs  Abdomen - soft, nontender, nondistended, no masses or organomegaly   Neurological - alert, oriented, normal speech, no focal findings or movement disorder noted   Musculoskeletal - no joint tenderness, deformity or swelling   Extremities - peripheral pulses normal, no pedal edema, no clubbing or cyanosis   Skin - normal coloration and turgor, no rashes, no suspicious skin lesions noted ,  Breast left-sided mastectomy no signs of local recurrence    DATA:      Labs:     CBC:   Recent Labs     02/11/20  0611  02/12/20  0701 02/12/20  1209 02/12/20  1251   WBC 9.0  --  7.5  --   --    HGB 9.3*   < > 8.9* 6.4* 8.3*   HCT 32.2*   < > 30.6* 22.0* 27.8*   *  --  502*  --   --     < > = values in this interval not displayed. BMP:   Recent Labs     02/11/20  0611 02/12/20  0701    144   K 4.6 4.3   CO2 25 25   BUN 10 9   CREATININE 0.46* 0.55   LABGLOM >60 >60   GLUCOSE 75 101*     PT/INR:   Recent Labs     02/11/20  1855   PROTIME 10.5   INR 1.0     APTT:  Recent Labs     02/11/20  1855   APTT 25.7     LIVER PROFILE:  No results for input(s): AST, ALT, LABALBU in the last 72 hours. Us Renal Complete    Result Date: 1/26/2020  EXAMINATION: RETROPERITONEAL ULTRASOUND OF THE KIDNEYS AND URINARY BLADDER 1/26/2020 COMPARISON: None HISTORY: ORDERING SYSTEM PROVIDED HISTORY: left sided hydroureteronephrosis, eval kidneys TECHNOLOGIST PROVIDED HISTORY: left sided hydroureteronephrosis, eval kidneys FINDINGS: Kidneys: The right kidney measures 12.5 cm in length and the left kidney measures 12 cm in length. Kidneys demonstrate normal cortical echogenicity. Probable vascular calcifications are noted bilaterally. Mild left hydronephrosis. . Bladder: Bladder is decompressed with a Matt catheter.      Mild left hydronephrosis     Ct Abdomen Pelvis W Iv Contrast Additional Contrast? Oral    Result Date: 1/26/2020  EXAMINATION: CT OF THE ABDOMEN AND PELVIS WITH CONTRAST 1/26/2020 5:25 pm TECHNIQUE: CT of the abdomen and pelvis was performed with the administration of intravenous contrast. Multiplanar reformatted images are provided for review. Dose modulation, iterative reconstruction, and/or weight based adjustment of the mA/kV was utilized to reduce the radiation dose to as low as reasonably achievable. COMPARISON: January 25, 2020 CT abdomen and pelvis HISTORY: ORDERING SYSTEM PROVIDED HISTORY: possible duodenitis versus contained bowel perforation seen on CT chest at [address] [place] [address] on 1/25 TECHNOLOGIST PROVIDED HISTORY: possible duodenitis versus contained bowel perforation seen on CT chest at [address] [place] [address] on 1/25 Reason for Exam: possible duodenitis versus contained bowel perforation seen on CT chest at [address] [place] [address] on 1/25 Acuity: Acute Type of Exam: Subsequent/Follow-up FINDINGS: Lower Chest: Cardiomegaly. Calcific coronary artery disease. Pacer wire right heart. Organs: Fat containing umbilical hernia. The liver, spleen, pancreas, and adrenals appear normal.  Distended gallbladder. No radiodense gallstones. Kidneys appear normal. Matt catheter decompresses the bladder. GI/Bowel: The stomach,small bowel, and colon appear normal. Increased stool throughout the colon. Oral contrast is noted in the stomach, small bowel and colon. The appendix is not identified. Pelvis: Uterus normal. Peritoneum/Retroperitoneum: The abdominal aorta and iliac arteries are normal in caliber. There is no pathologic adenopathy. 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 Right total hip arthroplasty. Distended gallbladder. No radiodense gallstones noted but ultrasound is recommended. Extensive metastatic disease is a risk for fracture at T10.      Us Gallbladder Ruq    Result Date: 1/27/2020  EXAMINATION: RIGHT UPPER QUADRANT ULTRASOUND 1/27/2020 10:23 am COMPARISON: CT abdomen and  Essential hypertension [I10] 01/29/2020    Acquired hypothyroidism [E03.9] 01/29/2020    Recurrent major depressive disorder, in remission ([facility] [address] [F33.40] 01/29/2020    Metastatic breast cancer ([facility] [address] Kari Reusing 01/29/2020    History of breast cancer [Z85.3] 01/27/2020    Anemia [D64.9] 01/26/2020    Liver lesion [K76.9] 01/26/2020     History of breast cancer likely hormone receptor positive. Status post left mastectomy and chemotherapy and aromatase inhibitor therapy. Iron deficiency  Hypoproliferative anemia  Bone lesions 2/2 metastatic breast adenocarcinoma. Biopsy results showing adenocarcinoma  Back pain second to bone lesions. RECOMMENDATIONS:  Reviewed results of lab work-up and other relevant clinical data. Status post prophylactic rodding of the left femur  Plan for radiation as an outpatient to be followed by systemic therapy. Plan for XRT in [facility]. Continue Femara. Restart AC with Eliquis when ok with GI, critical care and primary. Approved at [facility]. Needs follow up with radiation in [place]. Coty Schneider MD  PGY-3, Internal Medicine  [facility]      Attending Physician Statement   I have discussed the care of [address], including pertinent history and exam findings with the resident. I have reviewed the key elements of all parts of the encounter with the resident. I have seen and examined the patient with the resident. I agree with the assessment and plan and status of the problem list as documented.                                [address] [place] [address] [facility]                          Cell: [phone]

## 2020-02-12 NOTE — PROGRESS NOTES
THE [facility] Progress Note    Arelis Cruz is a 77 y.o. female patient. Hospitalization Day:17      Chief consult reason: Request to re-eval duodenal ulcer prior to [facility] or DAPT for right upper extremity DVT      Subjective: Patient seen and examined. Patient remains in ICU post EGD yesterday. Patient denies any abdominal pain. No melena or hematochezia overnight. VITALS:  BP (!) 147/69   Pulse 87   Temp 97.7 °F (36.5 °C)   Resp 16   Ht 5' 2\" (1.575 m)   Wt 182 lb 5.1 oz (82.7 kg)   SpO2 100%   Breastfeeding No   BMI 33.35 kg/m²   TEMPERATURE:  Current - Temp: 97.7 °F (36.5 °C); Max - Temp  Av °F (36.7 °C)  Min: 97.3 °F (36.3 °C)  Max: 98.7 °F (37.1 °C)    Physical Assessment:  General appearance:  alert, cooperative and no distress  Mental Status:  oriented to person, place and time and normal affect  Lungs:  clear to auscultation bilaterally, normal effort  Heart:  regular rate and rhythm, no murmur  Abdomen:  soft, nontender, nondistended, normal bowel sounds  Extremities:  no edema, redness, tenderness in the calves  Skin:  warm, dry, no gross lesions or rashes    Data Review:  LABS and IMAGING:     CBC  Recent Labs     02/10/20  0558 20  0611 20  1856 20  0123 20  0701   WBC 7.5 9.0  --   --  7.5   HGB 8.7* 9.3* 8.0* 8.6* 8.9*   MCV 95.2 100.0  --   --  96.8   RDW 16.2* 16.3*  --   --  16.0*   * 499*  --   --  502*       ANEMIA STUDIES  No results for input(s): LABIRON, TIBC, IRON, FERRITIN, [id number], FOLATE, OCCULTBLD in the last 72 hours. BMP  Recent Labs     02/10/20  0558 20  0611 20  0701    136 144   K 4.3 4.6 4.3   CL 98 98 103   CO2 27 25 25   BUN 10 10 9   CREATININE 0.65 0.46* 0.55   GLUCOSE 100* 75 101*   CALCIUM 8.2* 7.6* 7.7*       LFTS  No results for input(s): ALKPHOS, ALT, AST, BILITOT, BILIDIR, LABALBU in the last 72 hours. AMYLASE/LIPASE/AMMONIA  No results for input(s):  AMYLASE, LIPASE, AMMONIA in bleed  2. Anemia  3. DVT RU extremity     01/30/2020 - EGD - Deep 1/2 CM duodenal ulcer just beyond the pylorus covered with large blood clot    02/11/2020 - EGD - bleeding duodenal ulcer    02/12/2020 -no signs of GI bleeding overnight. Hemoglobin has improved to 8.9. Plan of care:  1. Monitor hemoglobin and hematocrit. Transfuse to keep hemoglobin greater than 7 g/dL  2. We will hold off on EGD today and continue to monitor. If patient shows any signs of acute GI bleed, precipitous drop in hemoglobin or clinical instability, plan will be for repeat EGD  3. Continue PPI gtt  4. Cl diet - no red products     Please feel free to contact me with any questions or concerns. Thank you for allowing me to participate in the care of your patient. SERAFIN Zhang - CNP on 2/12/2020 at 9:33 AM   THE [facility]    Please note that this note was generated using a voice recognition dictation software. Although every effort was made to ensure the accuracy of this automated transcription, some errors in transcription may have occurred.

## 2020-02-13 ENCOUNTER — ANESTHESIA (OUTPATIENT)
Dept: ENDOSCOPY | Age: 70
DRG: 347 | End: 2020-02-13
Payer: COMMERCIAL

## 2020-02-13 ENCOUNTER — ANESTHESIA EVENT (OUTPATIENT)
Dept: ENDOSCOPY | Age: 70
DRG: 347 | End: 2020-02-13
Payer: COMMERCIAL

## 2020-02-13 VITALS
OXYGEN SATURATION: 95 % | DIASTOLIC BLOOD PRESSURE: 95 MMHG | SYSTOLIC BLOOD PRESSURE: 126 MMHG | RESPIRATION RATE: 19 BRPM

## 2020-02-13 LAB
ABSOLUTE EOS #: 0.27 K/UL (ref 0–0.44)
ABSOLUTE IMMATURE GRANULOCYTE: 0.06 K/UL (ref 0–0.3)
ABSOLUTE LYMPH #: 1.99 K/UL (ref 1.1–3.7)
ABSOLUTE MONO #: 0.88 K/UL (ref 0.1–1.2)
ANION GAP SERPL CALCULATED.3IONS-SCNC: 12 MMOL/L (ref 9–17)
BASOPHILS # BLD: 1 % (ref 0–2)
BASOPHILS ABSOLUTE: 0.09 K/UL (ref 0–0.2)
BUN BLDV-MCNC: 6 MG/DL (ref 8–23)
BUN/CREAT BLD: ABNORMAL (ref 9–20)
CALCIUM SERPL-MCNC: 7.7 MG/DL (ref 8.6–10.4)
CHLORIDE BLD-SCNC: 104 MMOL/L (ref 98–107)
CO2: 25 MMOL/L (ref 20–31)
CREAT SERPL-MCNC: 0.47 MG/DL (ref 0.5–0.9)
DIFFERENTIAL TYPE: ABNORMAL
EOSINOPHILS RELATIVE PERCENT: 3 % (ref 1–4)
GFR AFRICAN AMERICAN: >60 ML/MIN
GFR NON-AFRICAN AMERICAN: >60 ML/MIN
GFR SERPL CREATININE-BSD FRML MDRD: ABNORMAL ML/MIN/{1.73_M2}
GFR SERPL CREATININE-BSD FRML MDRD: ABNORMAL ML/MIN/{1.73_M2}
GLUCOSE BLD-MCNC: 108 MG/DL (ref 65–105)
GLUCOSE BLD-MCNC: 146 MG/DL (ref 65–105)
GLUCOSE BLD-MCNC: 206 MG/DL (ref 65–105)
GLUCOSE BLD-MCNC: 93 MG/DL (ref 70–99)
HCT VFR BLD CALC: 28.4 % (ref 36.3–47.1)
HCT VFR BLD CALC: 29.9 % (ref 36.3–47.1)
HEMOGLOBIN: 8.4 G/DL (ref 11.9–15.1)
HEMOGLOBIN: 8.5 G/DL (ref 11.9–15.1)
IMMATURE GRANULOCYTES: 1 %
LYMPHOCYTES # BLD: 25 % (ref 24–43)
MCH RBC QN AUTO: 27.6 PG (ref 25.2–33.5)
MCHC RBC AUTO-ENTMCNC: 28.4 G/DL (ref 28.4–34.8)
MCV RBC AUTO: 97.1 FL (ref 82.6–102.9)
MONOCYTES # BLD: 11 % (ref 3–12)
NRBC AUTOMATED: 0 PER 100 WBC
PDW BLD-RTO: 15.9 % (ref 11.8–14.4)
PLATELET # BLD: 520 K/UL (ref 138–453)
PLATELET ESTIMATE: ABNORMAL
PMV BLD AUTO: 10 FL (ref 8.1–13.5)
POTASSIUM SERPL-SCNC: 3.9 MMOL/L (ref 3.7–5.3)
RBC # BLD: 3.08 M/UL (ref 3.95–5.11)
RBC # BLD: ABNORMAL 10*6/UL
SEG NEUTROPHILS: 59 % (ref 36–65)
SEGMENTED NEUTROPHILS ABSOLUTE COUNT: 4.73 K/UL (ref 1.5–8.1)
SODIUM BLD-SCNC: 141 MMOL/L (ref 135–144)
WBC # BLD: 8 K/UL (ref 3.5–11.3)
WBC # BLD: ABNORMAL 10*3/UL

## 2020-02-13 PROCEDURE — 80048 BASIC METABOLIC PNL TOTAL CA: CPT

## 2020-02-13 PROCEDURE — 85014 HEMATOCRIT: CPT

## 2020-02-13 PROCEDURE — 6370000000 HC RX 637 (ALT 250 FOR IP): Performed by: INTERNAL MEDICINE

## 2020-02-13 PROCEDURE — 99232 SBSQ HOSP IP/OBS MODERATE 35: CPT | Performed by: INTERNAL MEDICINE

## 2020-02-13 PROCEDURE — 85018 HEMOGLOBIN: CPT

## 2020-02-13 PROCEDURE — 6360000002 HC RX W HCPCS: Performed by: NURSE ANESTHETIST, CERTIFIED REGISTERED

## 2020-02-13 PROCEDURE — 3700000001 HC ADD 15 MINUTES (ANESTHESIA): Performed by: INTERNAL MEDICINE

## 2020-02-13 PROCEDURE — 6370000000 HC RX 637 (ALT 250 FOR IP): Performed by: NURSE PRACTITIONER

## 2020-02-13 PROCEDURE — 82947 ASSAY GLUCOSE BLOOD QUANT: CPT

## 2020-02-13 PROCEDURE — C9113 INJ PANTOPRAZOLE SODIUM, VIA: HCPCS | Performed by: INTERNAL MEDICINE

## 2020-02-13 PROCEDURE — 3609017100 HC EGD: Performed by: INTERNAL MEDICINE

## 2020-02-13 PROCEDURE — 43235 EGD DIAGNOSTIC BRUSH WASH: CPT | Performed by: INTERNAL MEDICINE

## 2020-02-13 PROCEDURE — 2060000000 HC ICU INTERMEDIATE R&B

## 2020-02-13 PROCEDURE — C9113 INJ PANTOPRAZOLE SODIUM, VIA: HCPCS | Performed by: NURSE PRACTITIONER

## 2020-02-13 PROCEDURE — 6360000002 HC RX W HCPCS: Performed by: INTERNAL MEDICINE

## 2020-02-13 PROCEDURE — 0DJ08ZZ INSPECTION OF UPPER INTESTINAL TRACT, VIA NATURAL OR ARTIFICIAL OPENING ENDOSCOPIC: ICD-10-PCS | Performed by: INTERNAL MEDICINE

## 2020-02-13 PROCEDURE — 2580000003 HC RX 258: Performed by: NURSE PRACTITIONER

## 2020-02-13 PROCEDURE — 36415 COLL VENOUS BLD VENIPUNCTURE: CPT

## 2020-02-13 PROCEDURE — 7100000011 HC PHASE II RECOVERY - ADDTL 15 MIN: Performed by: INTERNAL MEDICINE

## 2020-02-13 PROCEDURE — 2580000003 HC RX 258: Performed by: NURSE ANESTHETIST, CERTIFIED REGISTERED

## 2020-02-13 PROCEDURE — 7100000010 HC PHASE II RECOVERY - FIRST 15 MIN: Performed by: INTERNAL MEDICINE

## 2020-02-13 PROCEDURE — 3700000000 HC ANESTHESIA ATTENDED CARE: Performed by: INTERNAL MEDICINE

## 2020-02-13 PROCEDURE — 2580000003 HC RX 258: Performed by: INTERNAL MEDICINE

## 2020-02-13 PROCEDURE — 85025 COMPLETE CBC W/AUTO DIFF WBC: CPT

## 2020-02-13 PROCEDURE — 6360000002 HC RX W HCPCS: Performed by: NURSE PRACTITIONER

## 2020-02-13 RX ORDER — SODIUM CHLORIDE 9 MG/ML
INJECTION, SOLUTION INTRAVENOUS CONTINUOUS PRN
Status: DISCONTINUED | OUTPATIENT
Start: 2020-02-13 | End: 2020-02-13 | Stop reason: SDUPTHER

## 2020-02-13 RX ORDER — SUCRALFATE 1 G/1
1 TABLET ORAL EVERY 6 HOURS SCHEDULED
Status: DISCONTINUED | OUTPATIENT
Start: 2020-02-13 | End: 2020-02-19 | Stop reason: HOSPADM

## 2020-02-13 RX ORDER — PROPOFOL 10 MG/ML
INJECTION, EMULSION INTRAVENOUS PRN
Status: DISCONTINUED | OUTPATIENT
Start: 2020-02-13 | End: 2020-02-13 | Stop reason: SDUPTHER

## 2020-02-13 RX ADMIN — INSULIN GLARGINE 10 UNITS: 100 INJECTION, SOLUTION SUBCUTANEOUS at 21:18

## 2020-02-13 RX ADMIN — SODIUM CHLORIDE, POTASSIUM CHLORIDE, SODIUM LACTATE AND CALCIUM CHLORIDE: 600; 310; 30; 20 INJECTION, SOLUTION INTRAVENOUS at 17:37

## 2020-02-13 RX ADMIN — SODIUM CHLORIDE 8 MG/HR: 9 INJECTION, SOLUTION INTRAVENOUS at 19:57

## 2020-02-13 RX ADMIN — PROPOFOL 50 MG: 10 INJECTION, EMULSION INTRAVENOUS at 11:52

## 2020-02-13 RX ADMIN — SODIUM CHLORIDE 8 MG/HR: 9 INJECTION, SOLUTION INTRAVENOUS at 03:31

## 2020-02-13 RX ADMIN — LACTULOSE 20 G: 10 SOLUTION ORAL at 20:59

## 2020-02-13 RX ADMIN — LEVOTHYROXINE SODIUM 100 MCG: 100 TABLET ORAL at 17:32

## 2020-02-13 RX ADMIN — OXYCODONE HYDROCHLORIDE 10 MG: 5 TABLET ORAL at 18:30

## 2020-02-13 RX ADMIN — SODIUM CHLORIDE: 9 INJECTION, SOLUTION INTRAVENOUS at 11:43

## 2020-02-13 RX ADMIN — PROPOFOL 25 MG: 10 INJECTION, EMULSION INTRAVENOUS at 11:57

## 2020-02-13 RX ADMIN — FERROUS SULFATE TAB EC 325 MG (65 MG FE EQUIVALENT) 325 MG: 325 (65 FE) TABLET DELAYED RESPONSE at 17:31

## 2020-02-13 RX ADMIN — INSULIN LISPRO 6 UNITS: 100 INJECTION, SOLUTION INTRAVENOUS; SUBCUTANEOUS at 21:18

## 2020-02-13 RX ADMIN — SENNOSIDES 8.6 MG: 8.6 TABLET, FILM COATED ORAL at 17:30

## 2020-02-13 RX ADMIN — METOPROLOL TARTRATE 50 MG: 50 TABLET, FILM COATED ORAL at 17:31

## 2020-02-13 RX ADMIN — CALCIUM CARBONATE-CHOLECALCIFEROL TAB 250 MG-125 UNIT 500 MG: 250-125 TAB at 17:30

## 2020-02-13 RX ADMIN — VENLAFAXINE HYDROCHLORIDE 150 MG: 150 CAPSULE, EXTENDED RELEASE ORAL at 17:30

## 2020-02-13 RX ADMIN — DOCUSATE SODIUM 100 MG: 100 CAPSULE, LIQUID FILLED ORAL at 17:32

## 2020-02-13 RX ADMIN — SUCRALFATE 1 G: 1 TABLET ORAL at 20:58

## 2020-02-13 ASSESSMENT — ENCOUNTER SYMPTOMS
VOICE CHANGE: 0
EYE REDNESS: 0
RHINORRHEA: 0
BACK PAIN: 0
NAUSEA: 0
ABDOMINAL PAIN: 0
COUGH: 0
CHEST TIGHTNESS: 0
SHORTNESS OF BREATH: 0
VOMITING: 0
EYE DISCHARGE: 0
ABDOMINAL DISTENTION: 0
EYE PAIN: 0
WHEEZING: 0
SORE THROAT: 0

## 2020-02-13 ASSESSMENT — PAIN SCALES - GENERAL
PAINLEVEL_OUTOF10: 0
PAINLEVEL_OUTOF10: 5
PAINLEVEL_OUTOF10: 0
PAINLEVEL_OUTOF10: 0

## 2020-02-13 ASSESSMENT — PAIN - FUNCTIONAL ASSESSMENT: PAIN_FUNCTIONAL_ASSESSMENT: 0-10

## 2020-02-13 NOTE — PROGRESS NOTES
Today's Date: 2/13/2020  Patient Name: Deshaun Horner  Date of admission: 1/26/2020  1:38 PM  Patient's age: 77 y. o., 1950  Admission Dx: Septic shock ([facility] [address] [A41.9, R65.21]    Reason for Consult: management recommendations  Requesting Physician: Raul Joshua MD    CHIEF COMPLAINT: Abdominal pain. History of breast cancer. Back pain. History Obtained From:  patient, electronic medical record    SUBJECTIVE: 02/13/20    Patient seen and examined. Clinically stable. Hb 8.5  Repeat EGD today showed no evidence of ongoing bleeding of duodenal ulcers. On ppi gtt. Has acute DVT RUE involving subclavian and axillary veins. Pain improved in left leg. Follow-up with Dr. Bharathi Acevedo as outpatient in 1 week    Denies any c/o fever, chills, chest pain, shortness of breath, active bleeding. BRIEF CASE HISTORY:    The patient is a 77 y.o.  female who is admitted to the hospital for chief complaints of abdominal pain. According to the records patient has remote history of breast cancer diagnosed in 2007. There are no records available regarding treatment. Per patient she underwent left mastectomy and had around 13 lymph nodes involved. Subsequently patient underwent chemotherapy which she completed in 2008. Patient did not receive any radiation therapy. Patient was then given Arimidex for many years likely 5 years. Patient has not seen a oncologist for quite a few years now. Patient has been having back pain for a while likely many months. Patient started having bowel pain with a lot of nausea and vomiting around [place] time which persisted and got worse eventually patient presented to the ER at [facility] CT scan done per report shows possible duodenitis and concern regarding bowel perforation.   Subsequently patient was transferred to [place].  Repeat CT does not show any concerning findings in the duodenum but does show blastic lesions involving bone especially T10. Patient denies any lower extremity weakness. Denies any saddle anesthesia. Denies any loss of control of bowel or bladder. Additional work-up also shows anemia and iron deficiency. Patient has never had a EGD or colonoscopy done. Past Medical History: Breast cancer    Past Surgical History: Left mastectomy    Medications:    Prior to Admission medications    Medication Sig Start Date End Date Taking?  Authorizing Provider   aspirin 81 MG tablet Take 1 tablet by mouth daily 2/13/20  Yes Thee Gonzalez MD   ferrous sulfate 325 (65 Fe) MG EC tablet Take 1 tablet by mouth 2 times daily (with meals) 2/6/20  Yes Thee Gonzalez MD   lactulose (CHRONULAC) 10 GM/15ML solution Take 30 mLs by mouth 3 times daily as needed (constipation) 2/6/20  Yes Thee Gonzalez MD   pantoprazole (PROTONIX) 40 MG tablet Take 1 tablet by mouth 2 times daily (before meals) 2/6/20  Yes Thee Gonzalez MD   venlafaxine (EFFEXOR XR) 150 MG extended release capsule Take 150 mg by mouth daily   Yes Historical Provider, MD   metFORMIN (GLUCOPHAGE) 1000 MG tablet Take 1,000 mg by mouth 2 times daily (with meals)   Yes Historical Provider, MD   levothyroxine (SYNTHROID) 100 MCG tablet Take 100 mcg by mouth Daily   Yes Historical Provider, MD   glimepiride (AMARYL) 4 MG tablet Take 4 mg by mouth 2 times daily   Yes Historical Provider, MD   lisinopril (PRINIVIL;ZESTRIL) 2.5 MG tablet Take 2.5 mg by mouth daily   Yes Historical Provider, MD   carvedilol (COREG) 6.25 MG tablet Take 6.25 mg by mouth 2 times daily (with meals)   Yes Historical Provider, MD   SITagliptin (JANUVIA) 100 MG tablet Take 100 mg by mouth daily   Yes Historical Provider, MD     Current Facility-Administered Medications   Medication Dose Route Frequency Provider Last Rate Last Dose    0.9 % sodium chloride bolus  20 mL Intravenous Once Achilles MD Kamryn        pantoprazole (PROTONIX) 80 mg in sodium chloride 0.9 % 100 mL infusion  8 mg/hr Intravenous Continuous Billy Hercules MD 10 mL/hr at 02/13/20 0331 8 mg/hr at 02/13/20 0331    fentaNYL (SUBLIMAZE) injection 50 mcg  50 mcg Intravenous Q2H PRN Kevin Shea MD   50 mcg at 02/12/20 1020    lactated ringers infusion   Intravenous Continuous Kevin Shea MD 75 mL/hr at 02/12/20 2257      insulin lispro (HUMALOG) injection vial 0-18 Units  0-18 Units Subcutaneous 4x Daily AC & HS Pao Rodríguez MD   3 Units at 02/12/20 1215    metoprolol (LOPRESSOR) injection 5 mg  5 mg Intravenous Q8H PRN Pao Rodríguez MD        metoprolol tartrate (LOPRESSOR) tablet 50 mg  50 mg Oral BID Billy Hercules MD   50 mg at 02/12/20 2107    lactulose (CHRONULAC) 10 GM/15ML solution 20 g  20 g Oral TID Billy Hercules MD   Stopped at 02/12/20 0926    0.9 % sodium chloride bolus  250 mL Intravenous Once Billy Hercules MD        [Held by provider] metFORMIN (GLUCOPHAGE) tablet 1,000 mg  1,000 mg Oral BID WC Billy Hercules MD   1,000 mg at 02/10/20 1804    [Held by provider] linagliptin (TRADJENTA) tablet 5 mg  5 mg Oral Daily Pao Rodríguez MD   5 mg at 02/10/20 0911    fentaNYL (SUBLIMAZE) injection 25 mcg  25 mcg Intravenous Q5 Min PRN Billy Hercules MD   25 mcg at 02/02/20 1143    sodium chloride flush 0.9 % injection 10 mL  10 mL Intravenous PRN Pao Rodríguez MD        alteplase (CATHFLO) injection 2 mg  2 mg Intracatheter Once Billy Hercules MD        oyster shell calcium/vitamin D 250-125 MG-UNIT per tablet 500 mg  2 tablet Oral Daily Billy Hercules MD   500 mg at 02/12/20 0925    [Held by provider] morphine (MS CONTIN) extended release tablet 15 mg  15 mg Oral 2 times per day Tip Mayte, DO   15 mg at 02/02/20 2132    sodium chloride flush 0.9 % injection 10 mL  10 mL Intravenous PRN Billy Hercules MD   10 mL at 02/08/20 2025    acetaminophen (TYLENOL) tablet 650 mg  650 mg Oral Q4H PRN Pao Rodríguez MD   650 mg at 02/10/20 0351    levothyroxine (SYNTHROID) tablet 100 mcg  100 mcg Oral Daily Thalia Young MD   100 mcg at 02/12/20 0834    venlafaxine (EFFEXOR XR) extended release capsule 150 mg  150 mg Oral Daily Thalia Young MD   150 mg at 02/12/20 0925    insulin glargine (LANTUS) injection vial 10 Units  10 Units Subcutaneous Nightly Thalia Young MD   10 Units at 02/10/20 2035    docusate sodium (COLACE) capsule 100 mg  100 mg Oral BID Thalia Young MD   100 mg at 02/12/20 0925    senna (SENOKOT) tablet 8.6 mg  1 tablet Oral BID Thalia Young MD   Stopped at 02/12/20 0926    ferrous sulfate EC tablet 325 mg  325 mg Oral BID WC Thalia Young MD   325 mg at 02/12/20 1812    sodium chloride flush 0.9 % injection 10 mL  10 mL Intravenous 2 times per day Thalia Young MD   10 mL at 02/11/20 1045    sodium chloride flush 0.9 % injection 10 mL  10 mL Intravenous PRN Thalia Young MD   10 mL at 01/30/20 2042    magnesium hydroxide (MILK OF MAGNESIA) 400 MG/5ML suspension 30 mL  30 mL Oral Daily PRN Pao Rodríguez MD        ondansetron (ZOFRAN) injection 4 mg  4 mg Intravenous Q6H PRN Pao Rodríguez MD        potassium chloride 10 mEq/100 mL IVPB (Peripheral Line)  10 mEq Intravenous PRN Thalia Young MD        bisacodyl (DULCOLAX) suppository 10 mg  10 mg Rectal Daily PRN Thalia Young MD        acetaminophen (TYLENOL) tablet 650 mg  650 mg Oral Q4H PRN Pao Rodríguez MD   650 mg at 02/10/20 2221    glucose (GLUTOSE) 40 % oral gel 15 g  15 g Oral PRN Pao Rodrgíuez MD        dextrose 50 % IV solution  12.5 g Intravenous PRN Pao Rodríguez MD        glucagon (rDNA) injection 1 mg  1 mg Intramuscular PRN Pao Rodríguez MD        dextrose 5 % solution  100 mL/hr Intravenous PRN Pao Rodríguez MD        oxyCODONE (ROXICODONE) immediate release tablet 5 mg  5 mg Oral Q4H PRN Pao Rodríguez MD   5 mg at 02/07/20 1541    Or    oxyCODONE (ROXICODONE) immediate release tablet 10 mg  10 mg Oral Q4H PRN Thalia Young MD   10 mg at 02/12/20 0461 Facility-Administered Medications Ordered in Other Encounters   Medication Dose Route Frequency Provider Last Rate Last Dose    0.9 % sodium chloride infusion    Continuous PRN Everardo Antonio, APRN - CRNA        propofol injection    PRN Everardo Antonio, APRN - CRNA   25 mg at 20 1157       Allergies:  Patient has no known allergies. Social History:   reports that she has never smoked. She has never used smokeless tobacco. She reports that she does not use drugs. Denies smoking. Denies taking alcohol. Family History: Hypertension and diabetes    REVIEW OF SYSTEMS:      Constitutional: No fever or chills. No night sweats, no weight loss   Eyes: No eye discharge, double vision, or eye pain   HEENT: negative for sore mouth, sore throat, hoarseness and voice change   Respiratory: negative for cough , sputum, dyspnea, wheezing, hemoptysis, chest pain   Cardiovascular: negative for chest pain, dyspnea, palpitations, orthopnea, PND   Gastrointestinal: negative for nausea, vomiting, diarrhea, constipation, abdominal pain, Dysphagia, hematemesis and hematochezia   Genitourinary: negative for frequency, dysuria, nocturia, urinary incontinence, and hematuria   Integument: negative for rash, skin lesions, bruises. Hematologic/Lymphatic: negative for easy bruising, bleeding, lymphadenopathy, or petechiae   Endocrine: negative for heat or cold intolerance,weight changes, change in bowel habits and hair loss   Musculoskeletal: Positive back pain  Neurological: negative for headaches, dizziness, seizures, weakness, numbness    PHYSICAL EXAM:        /68   Pulse 80   Temp 97.5 °F (36.4 °C) (Infrared)   Resp 18   Ht 5' 2\" (1.575 m)   Wt 182 lb 8 oz (82.8 kg)   SpO2 99%   Breastfeeding No   BMI 33.38 kg/m²    Temp (24hrs), Av.9 °F (36.6 °C), Min:97.3 °F (36.3 °C), Max:98.3 °F (36.8 °C)      General appearance -slightly  lethargic but able to answer questions.   Eyes - pupils equal and reactive, extraocular eye movements intact   Ears - bilateral TM's and external ear canals normal   Mouth - mucous membranes moist, pharynx normal without lesions   Neck - supple, no significant adenopathy   Lymphatics - no palpable lymphadenopathy, no hepatosplenomegaly   Chest - clear to auscultation, no wheezes, rales or rhonchi, symmetric air entry   Heart - normal rate, regular rhythm, normal S1, S2, no murmurs  Abdomen - soft, nontender, nondistended, no masses or organomegaly   Neurological - alert, oriented, normal speech, no focal findings or movement disorder noted   Musculoskeletal - no joint tenderness, deformity or swelling   Extremities - peripheral pulses normal, no pedal edema, no clubbing or cyanosis   Skin - normal coloration and turgor, no rashes, no suspicious skin lesions noted ,  Breast left-sided mastectomy no signs of local recurrence    DATA:      Labs:     CBC:   Recent Labs     02/12/20  0701  02/12/20  2048 02/13/20  0504   WBC 7.5  --   --  8.0   HGB 8.9*   < > 9.0* 8.5*   HCT 30.6*   < > 30.1* 29.9*   *  --   --  520*    < > = values in this interval not displayed. BMP:   Recent Labs     02/12/20  0701 02/13/20  0504    141   K 4.3 3.9   CO2 25 25   BUN 9 6*   CREATININE 0.55 0.47*   LABGLOM >60 >60   GLUCOSE 101* 93     PT/INR:   Recent Labs     02/11/20  1855   PROTIME 10.5   INR 1.0     APTT:  Recent Labs     02/11/20  1855   APTT 25.7     LIVER PROFILE:  No results for input(s): AST, ALT, LABALBU in the last 72 hours. Us Renal Complete    Result Date: 1/26/2020  EXAMINATION: RETROPERITONEAL ULTRASOUND OF THE KIDNEYS AND URINARY BLADDER 1/26/2020 COMPARISON: None HISTORY: ORDERING SYSTEM PROVIDED HISTORY: left sided hydroureteronephrosis, eval kidneys TECHNOLOGIST PROVIDED HISTORY: left sided hydroureteronephrosis, eval kidneys FINDINGS: Kidneys: The right kidney measures 12.5 cm in length and the left kidney measures 12 cm in length. Kidneys demonstrate normal cortical echogenicity. [phone]

## 2020-02-13 NOTE — PROGRESS NOTES
Critical care team - Resident sign-out to medicine service      Date and time: 2/12/2020 8:33 PM  Patient's name:  Tanna Jain Record Number: [id number]  Patient's account/billing number: [de-identified]  Patient's YOB: 1950  Age: 77 y.o. Date of Admission: 1/26/2020  1:38 PM  Length of stay during current admission: 17    Primary Care Physician: Brenda Haddad MD    Code Status: DNR-CCA    Mode of physician to physician communication:        [x] Via telephone   [] In person     Date and time of sign-out: 2/12/2020 8:33 PM    Accepting Internal Medicine : Julia Tiwari NP    Accepting Medicine team: IM Intermed    Accepting team's attending: Dr. Krista Mclean    Patient's current ICU Bed:  105     Patient's assigned bed on floor:  3027        [] Med-Surg Monitored [x] Step-down       [] Psychiatry ICU       [] Psych floor     Reason for ICU admission:     GI bleed    ICU course summary:   She had history of metastatic breast carcinoma and has been followed by oncology, history of coronary artery disease status post CABG, diabetes mellitus, she was initially admitted to ICU and at that time she had an endoscopy done which showed postpyloric duodenal ulcer and at that time she had a clot there and treated with epinephrine and GI was planning to do colonoscopy was transferred to floor, she had a right PICC line and then she developed right axillary vein/subclavian vein thrombosis and was started on anticoagulation. EGD on 1/30 showed deep half centimeter ulcer of the duodenal bulb just beyond the pyloric orifice which was treated with epinephrine -- this is the same lesion that was treated this time  The proximal small bowel was inspected through the bulb, sweep, and second portion of the duodenum. The endoscopic exam showed a large adherent fresh clot in duodenal bulb. Active bleeding was noted. 15 mL of 1-10,000 epinephrine injected around the bleeding area. We attempted to remove the clots.   APC to possible bleeding site was applied. Visualization was not adequate due to retained gastric contents and blood clots. Pt was then transferred to ICU for close monitor. Hb been stable. One reading 6.4 which was error then repeat was stable in 8. Pt Hb stable will transfer out of ICU to step down. repeat EGD tomorrow AM.  Procedures during patient's ICU stay:     EGD    Current Vitals:     /75   Pulse 76   Temp 97.9 °F (36.6 °C) (Oral)   Resp 17   Ht 5' 2\" (1.575 m)   Wt 182 lb 5.1 oz (82.7 kg)   SpO2 98%   Breastfeeding No   BMI 33.35 kg/m²       Cultures:     Blood cultures:                 [] None drawn      [x] Negative             []  Positive (Details:  )  Urine Culture:                   [] None drawn      [] Negative             []  Positive (Details:  )  Sputum Culture:               [] None drawn       [] Negative             []  Positive (Details:  )   Endotracheal aspirate:     [] None drawn       [] Negative             []  Positive (Details:  )       Consults:     GI    Assessment:     Patient Active Problem List    Diagnosis Date Noted    [facility] venous thrombosis ([facility]     Encounter for palliative care     Acute duodenal ulcer with gastric outlet obstruction 01/30/2020    Ulcer of esophagus without bleeding 01/30/2020    Bone lesion     Iron deficiency     Diabetes mellitus type 2 in obese ([facility] [address] 01/29/2020    Essential hypertension 01/29/2020    Acquired hypothyroidism 01/29/2020    Recurrent major depressive disorder, in remission ([facility] [address] 01/29/2020    Metastatic breast cancer ([facility] [address] 01/29/2020    History of breast cancer 01/27/2020    Anemia 01/26/2020    Liver lesion 01/26/2020       Recommended Follow-up:     Seen by GI started on liquid diet . Keep NPO after midnight  EGD tomorrow  Continue with PPI drip. Continue monitor hemoglobin hematocrit. If active bleeding then will inform GI.   Follow-up with oncology  Continue to hold anticoagulation at this time until ok with GI      Above mentioned assessment and plan was discussed by me with the admitting medicine resident. The medicine team assigned to the patient by medicine admitting resident will be following up the patient from now onwards on the floor.          Maeve Cervantes MD      Department of Internal Medicine  [facility]         2/12/2020, 8:41 PM

## 2020-02-13 NOTE — ANESTHESIA PRE PROCEDURE
injection 10 mL  10 mL Intravenous 2 times per day Treva Malik MD   10 mL at 02/11/20 1045    sodium chloride flush 0.9 % injection 10 mL  10 mL Intravenous PRN Treva Malik MD   10 mL at 01/30/20 2042    magnesium hydroxide (MILK OF MAGNESIA) 400 MG/5ML suspension 30 mL  30 mL Oral Daily PRN Pao Rodríguez MD        ondansetron (ZOFRAN) injection 4 mg  4 mg Intravenous Q6H PRN Pao Rodríguez MD        potassium chloride 10 mEq/100 mL IVPB (Peripheral Line)  10 mEq Intravenous PRN Treva Malik MD        bisacodyl (DULCOLAX) suppository 10 mg  10 mg Rectal Daily PRN Treva Malik MD        acetaminophen (TYLENOL) tablet 650 mg  650 mg Oral Q4H PRN Pao Rodríguez MD   650 mg at 02/10/20 2221    glucose (GLUTOSE) 40 % oral gel 15 g  15 g Oral PRN Pao Rodríguez MD        dextrose 50 % IV solution  12.5 g Intravenous PRN Treva Malik MD        glucagon (rDNA) injection 1 mg  1 mg Intramuscular PRN Pao Rodríguez MD        dextrose 5 % solution  100 mL/hr Intravenous PRN Treva Malik MD        oxyCODONE (ROXICODONE) immediate release tablet 5 mg  5 mg Oral Q4H PRN Treva Malik MD   5 mg at 02/07/20 1548    Or    oxyCODONE (ROXICODONE) immediate release tablet 10 mg  10 mg Oral Q4H PRN Treva Malik MD   10 mg at 02/12/20 2205       No Known Allergies  Patient Active Problem List   Diagnosis    Anemia    Liver lesion    History of breast cancer    Diabetes mellitus type 2 in obese [facility]    Essential hypertension    Acquired hypothyroidism    Recurrent major depressive disorder, in remission ([facility]    Metastatic breast cancer ([facility]    Bone lesion    Iron deficiency    Acute duodenal ulcer with gastric outlet obstruction    Ulcer of esophagus without bleeding    Encounter for palliative care    Deep venous thrombosis ([facility]     Past Medical History:   Diagnosis Date    Breast cancer ([facility] 2007    CAD (coronary atherosclerotic disease)     Diabetes mellitus type 2 in mouth 3 times daily as needed (constipation) 2/6/20   Vishal Vegas MD   pantoprazole (PROTONIX) 40 MG tablet Take 1 tablet by mouth 2 times daily (before meals) 2/6/20   Vishal Vegas MD   venlafaxine (EFFEXOR XR) 150 MG extended release capsule Take 150 mg by mouth daily    Historical Provider, MD   metFORMIN (GLUCOPHAGE) 1000 MG tablet Take 1,000 mg by mouth 2 times daily (with meals)    Historical Provider, MD   levothyroxine (SYNTHROID) 100 MCG tablet Take 100 mcg by mouth Daily    Historical Provider, MD   glimepiride (AMARYL) 4 MG tablet Take 4 mg by mouth 2 times daily    Historical Provider, MD   lisinopril (PRINIVIL;ZESTRIL) 2.5 MG tablet Take 2.5 mg by mouth daily    Historical Provider, MD   carvedilol (COREG) 6.25 MG tablet Take 6.25 mg by mouth 2 times daily (with meals)    Historical Provider, MD   SITagliptin (JANUVIA) 100 MG tablet Take 100 mg by mouth daily    Historical Provider, MD       Current medications:    No current facility-administered medications for this visit.       Current Outpatient Medications   Medication Sig Dispense Refill    aspirin 81 MG tablet Take 1 tablet by mouth daily 30 tablet 0    ferrous sulfate 325 (65 Fe) MG EC tablet Take 1 tablet by mouth 2 times daily (with meals) 90 tablet 0    lactulose (CHRONULAC) 10 GM/15ML solution Take 30 mLs by mouth 3 times daily as needed (constipation) 956 mL 0    pantoprazole (PROTONIX) 40 MG tablet Take 1 tablet by mouth 2 times daily (before meals) 30 tablet 3     Facility-Administered Medications Ordered in Other Visits   Medication Dose Route Frequency Provider Last Rate Last Dose    0.9 % sodium chloride bolus  20 mL Intravenous Once Bryce May MD        pantoprazole (PROTONIX) 80 mg in sodium chloride 0.9 % 100 mL infusion  8 mg/hr Intravenous Continuous Pao Rodríguez MD 10 mL/hr at 02/13/20 0331 8 mg/hr at 02/13/20 0331    fentaNYL (SUBLIMAZE) injection 50 mcg  50 mcg Intravenous Q2H PRN Leandra Green MD 50 mcg at 02/12/20 1020    lactated ringers infusion   Intravenous Continuous Franny Gamez MD 75 mL/hr at 02/12/20 2257      insulin lispro (HUMALOG) injection vial 0-18 Units  0-18 Units Subcutaneous 4x Daily AC & HS Pao Rodríguez MD   3 Units at 02/12/20 1215    metoprolol (LOPRESSOR) injection 5 mg  5 mg Intravenous Q8H PRN Pao Rodríguez MD        metoprolol tartrate (LOPRESSOR) tablet 50 mg  50 mg Oral BID Dariela Beasley MD   50 mg at 02/12/20 2107    lactulose (CHRONULAC) 10 GM/15ML solution 20 g  20 g Oral TID Dariela Beasley MD   Stopped at 02/12/20 0926    0.9 % sodium chloride bolus  250 mL Intravenous Once Dariela Beasley MD        [Held by provider] metFORMIN (GLUCOPHAGE) tablet 1,000 mg  1,000 mg Oral BID WC Dariela Beasley MD   1,000 mg at 02/10/20 1804    [Held by provider] linagliptin (TRADJENTA) tablet 5 mg  5 mg Oral Daily Pao Rodríguez MD   5 mg at 02/10/20 0911    fentaNYL (SUBLIMAZE) injection 25 mcg  25 mcg Intravenous Q5 Min PRN Dariela Beasley MD   25 mcg at 02/02/20 1143    sodium chloride flush 0.9 % injection 10 mL  10 mL Intravenous PRN Dariela Beasley MD        alteplase (CATHFLO) injection 2 mg  2 mg Intracatheter Once Dariela Beasley MD        oyster shell calcium/vitamin D 250-125 MG-UNIT per tablet 500 mg  2 tablet Oral Daily Dariela Beasley MD   500 mg at 02/12/20 0925    [Held by provider] morphine (MS CONTIN) extended release tablet 15 mg  15 mg Oral 2 times per day Ripon Heritage, DO   15 mg at 02/02/20 2132    sodium chloride flush 0.9 % injection 10 mL  10 mL Intravenous PRN Dariela Beasley MD   10 mL at 02/08/20 2025    acetaminophen (TYLENOL) tablet 650 mg  650 mg Oral Q4H PRN Pao Rodríguez MD   650 mg at 02/10/20 0351    levothyroxine (SYNTHROID) tablet 100 mcg  100 mcg Oral Daily Pao Rodríguez MD   100 mcg at 02/12/20 0834    venlafaxine (EFFEXOR XR) extended release capsule 150 mg  150 mg Oral Daily Pao Rodríguez MD   150 mg at 02/12/20 7745    insulin glargine (LANTUS) injection vial 10 Units  10 Units Subcutaneous Nightly Seb Garcia MD   10 Units at 02/10/20 2035    docusate sodium (COLACE) capsule 100 mg  100 mg Oral BID Seb Garcia MD   100 mg at 02/12/20 0925    senna (SENOKOT) tablet 8.6 mg  1 tablet Oral BID Seb Garcia MD   Stopped at 02/12/20 0926    ferrous sulfate EC tablet 325 mg  325 mg Oral BID WC Seb Garcia MD   325 mg at 02/12/20 1812    sodium chloride flush 0.9 % injection 10 mL  10 mL Intravenous 2 times per day Seb Garcia MD   10 mL at 02/11/20 1045    sodium chloride flush 0.9 % injection 10 mL  10 mL Intravenous PRN Seb Garcia MD   10 mL at 01/30/20 2042    magnesium hydroxide (MILK OF MAGNESIA) 400 MG/5ML suspension 30 mL  30 mL Oral Daily PRN Pao Rodríguez MD        ondansetron (ZOFRAN) injection 4 mg  4 mg Intravenous Q6H PRN Pao Rodríguez MD        potassium chloride 10 mEq/100 mL IVPB (Peripheral Line)  10 mEq Intravenous PRN Seb Garcia MD        bisacodyl (DULCOLAX) suppository 10 mg  10 mg Rectal Daily PRN Seb Garcia MD        acetaminophen (TYLENOL) tablet 650 mg  650 mg Oral Q4H PRN Pao Rodríguez MD   650 mg at 02/10/20 2221    glucose (GLUTOSE) 40 % oral gel 15 g  15 g Oral PRN Pao Rodríguez MD        dextrose 50 % IV solution  12.5 g Intravenous PRN Pao Rodríguez MD        glucagon (rDNA) injection 1 mg  1 mg Intramuscular PRN Pao Rodríguez MD        dextrose 5 % solution  100 mL/hr Intravenous PRN Pao Rodríguez MD        oxyCODONE (ROXICODONE) immediate release tablet 5 mg  5 mg Oral Q4H PRN Pao Rodríguez MD   5 mg at 02/07/20 1548    Or    oxyCODONE (ROXICODONE) immediate release tablet 10 mg  10 mg Oral Q4H PRN Seb Garcia MD   10 mg at 02/12/20 2205       Allergies:  No Known Allergies    Problem List:    Patient Active Problem List   Diagnosis Code    Anemia D64.9    Liver lesion K76.9    History of breast cancer Z85.3    Diabetes mellitus type 2 in obese (HCC) E11.69, E66.9    Essential hypertension I10    Acquired hypothyroidism E03.9    Recurrent major depressive disorder, in remission ([facility] F33.40    Metastatic breast cancer ([facility] C50.919    Bone lesion M89.9    Iron deficiency E61.1    Acute duodenal ulcer with gastric outlet obstruction K26.3    Ulcer of esophagus without bleeding K22.10    Encounter for palliative care Z51.5    Deep venous thrombosis (HCC) I82.409       Past Medical History:        Diagnosis Date    Breast cancer ([facility] 2007    CAD (coronary atherosclerotic disease)     Diabetes mellitus type 2 in obese ([facility]     Essential hypertension     Hypothyroidism (acquired)        Past Surgical History:        Procedure Laterality Date    CORONARY ARTERY BYPASS GRAFT  2007    FEMUR FRACTURE SURGERY Left 2/1/2020    [facility]  FEMUR FRACTURE IM NAILING AND LEFT FEMUR BIOPSY (SYNTHES, , C-ARM) performed by Isaiah Arboleda MD at [address] [place] [address]  02/01/2020    nailing    JOINT REPLACEMENT Right 2009    MINDY    MASTECTOMY  2007    TUNNELED VENOUS PORT PLACEMENT      UPPER GASTROINTESTINAL ENDOSCOPY N/A 1/30/2020    EGD BIOPSY performed by Camille Moser MD at [address] [place] [address]  1/30/2020    EGD CONTROL HEMORRHAGE performed by Camille Moser MD at [address] [place] [address]  2/11/2020    EGD CONTROL HEMORRHAGE performed by Torres Fernando MD at [address] [place] [address]  2/11/2020    EGD ESOPHAGOGASTRODUODENOSCOPY SCLEROTHERAPY performed by Torres Fernando MD at [facility]       Social History:    Social History     Tobacco Use    Smoking status: Never Smoker    Smokeless tobacco: Never Used   Substance Use Topics    Alcohol use: Not on file     Comment: social                                Counseling given: Not Answered      Vital Signs (Current): There were no vitals filed for this visit.

## 2020-02-13 NOTE — PROGRESS NOTES
Physical Therapy  DATE: 2020    NAME: Ankita Collins  MRN: [id number]   : 1950    Patient not seen this date for Physical Therapy due to:  [] Blood transfusion in progress  [] Hemodialysis  []  Patient Declined  [] Spine Precautions   [] Strict Bedrest  [] Surgery/ Procedure  [] Testing      [x] Other  - +DVT on 2/10/2020, per GI, hold anticoagulation for 3 days. Per dept. protocol pt has to be on blood thinners for us to treat the pt with a DVT. We would need an written active order from pt's MD's to ok working with pt w/o being on blood thinners. RN informed. [] PT being discontinued at this time. Patient independent. No further needs. [] PT being discontinued at this time as the patient has been transferred to palliative care. No further needs.     Trinity Ibrahim, PTA

## 2020-02-13 NOTE — ADDENDUM NOTE
Addendum  created 02/13/20 1308 by SERAFIN Rodriguez CRNA    Attestation recorded in [facility] filed, Intraprocedure Staff edited

## 2020-02-13 NOTE — OP NOTE
risk stigmata noted. No evidence of ongoing bleeding. RECOMMENDATIONS:   1) Transfer back to the floor for further management as per primary care team.   2) continue PPI drip and Carafate. 3) no blood thinners for 3 more days. 4) Clear liquid diet today.        Seb Grimes

## 2020-02-13 NOTE — ANESTHESIA POSTPROCEDURE EVALUATION
Department of Anesthesiology  Postprocedure Note    Patient: Arelis Cruz  MRN: [id number]  YOB: 1950  Date of evaluation: 2/13/2020  Time:  12:30 PM     Procedure Summary     Date:  02/13/20 Room / Location:  [address] [place] [address]    Anesthesia Start:  1143 Anesthesia Stop:  1626    Procedure:  EGD ESOPHAGOGASTRODUODENOSCOPY (N/A ) Diagnosis:  (bleeding duodenal ulcer)    Surgeon:  Shyanne Villalta MD Responsible Provider:  Atul Carver MD    Anesthesia Type:  MAC ASA Status:  4          Anesthesia Type: MAC    Cyn Phase I: Cyn Score: 10    Cyn Phase II: Cyn Score: 9    Last vitals: Reviewed and per EMR flowsheets.        Anesthesia Post Evaluation    Patient location during evaluation: PACU  Patient participation: complete - patient participated  Level of consciousness: awake  Pain score: 0  Airway patency: patent  Nausea & Vomiting: no vomiting and no nausea  Complications: no  Cardiovascular status: blood pressure returned to baseline  Respiratory status: acceptable  Hydration status: euvolemic

## 2020-02-13 NOTE — PROGRESS NOTES
[facility]    Progress Note    2/13/2020    8:42 AM    Name:   Lilian Collins  MRN:     [id number]     Acct:      [de-identified]   Room:   [facility] Day:  25  Admit Date:  1/26/2020  1:38 PM    PCP:   Cher Nicholas MD  Code Status:  DNR-CCA    Subjective:     C/C: anemia    Interval History Status: improving    Patient seen and examined. Patient had repeat EGD today with no evidence of active bleeding. Transferred from the ICU back to the medical floor. Denies abdominal pain, nausea, vomiting. Brief History:     ICU course, per documentation:     \"69 y. o. female with history of diabetes, hypothyroidism, CAD status post CABG in 2008, breast cancer on the left side status post chemotherapy and mastectomy in 2007 that presented to outside hospital [facility] on 1/23/2020 for complaints of generalized weakness and fatigue and flulike illness for about 1 month.  She was admitted there for anemia and hyperglycemia and hyponatremia however developed hypotension and had CT imaging which showed concern for duodenitis versus contained duodenal perforation.  Concern for intra-abdominal infection there and antibiotics were narrowed down to Zosyn, initially was on vancomycin as well.      CT chest showed extensive osteoblastic lesions and numerous soft tissue nodules in the greater omentum and mesentery suspicious for carcinomatosis. Tad Cable showed mild/moderate left side hydroureteronephrosis without any obstructing stone or mass     Ct abdomen-   Distended gallbladder.  No radiodense gallstones noted but ultrasound is   recommended.       Extensive metastatic disease is a risk for fracture at T10.      Had egd 1/30:    Significant inflammation and ulceration of the distal third of the esophagus with white thick discharge suspicious for Candida (path negative for candida)  Although malignancy is less likely but could not be ruled out gentle biopsies 02/13/20  0504    144 141   K 4.6 4.3 3.9   CL 98 103 104   CO2 25 25 25   GLUCOSE 75 101* 93   BUN 10 9 6*   CREATININE 0.46* 0.55 0.47*   ANIONGAP 13 16 12   LABGLOM >60 >60 >60   GFRAA >60 >60 >60   CALCIUM 7.6* 7.7* 7.7*     Recent Labs     02/11/20  2130 02/12/20  0821 02/12/20  1203 02/12/20  1653 02/12/20  2105 02/13/20  0721   POCGLU 87 108* 153* 105 124* 108*     ABG:No results found for: POCPH, PHART, PH, POCPCO2, CBX7ISV, PCO2, POCPO2, PO2ART, PO2, POCHCO3, QUQ4FZV, HCO3, NBEA, PBEA, BEART, BE, THGBART, THB, BSL8BSM, DFRH5DGZ, L0SKIDLY, O2SAT, FIO2  Lab Results   Component Value Date/Time    SPECIAL R HAND 3MLS 02/03/2020 02:50 PM     Lab Results   Component Value Date/Time    CULTURE NO GROWTH 6 DAYS 02/03/2020 02:50 PM       Radiology:  Ac Re Hip Left (2-3 Views)    Result Date: 1/28/2020  There is no acute fracture of the left hip There is no acute fracture in the pelvis. Detail of the sacrum is markedly limited. There is subtle double density along the margin of the superior pubic ramus on the left without definitive fracture line at patient's pain persists, consider CT exam No acute osseous abnormality of the right femur or the left femur     Xr Femur Left (min 2 Views)    Result Date: 2/1/2020  Satisfactory postoperative appearance following ORIF of the left femur. No evident complication. Xr Femur Left (min 2 Views)    Result Date: 1/28/2020  There is no acute fracture of the left hip There is no acute fracture in the pelvis. Detail of the sacrum is markedly limited. There is subtle double density along the margin of the superior pubic ramus on the left without definitive fracture line at patient's pain persists, consider CT exam No acute osseous abnormality of the right femur or the left femur     Xr Femur Right (min 2 Views)    Result Date: 1/28/2020  There is no acute fracture of the left hip There is no acute fracture in the pelvis. Detail of the sacrum is markedly limited.   There is subtle double density along the margin of the superior pubic ramus on the left without definitive fracture line at patient's pain persists, consider CT exam No acute osseous abnormality of the right femur or the left femur     Mri Thoracic Spine W Wo Contrast    Result Date: 1/29/2020  Osseous metastatic disease with diffuse marrow infiltration of T10. Mild extraosseous extension of tumor suspected along the left neural foramina a T10. Us Gallbladder Ruq    Result Date: 1/27/2020  Minimal amount of free fluid within the upper abdomen. Heterogeneous increased liver echogenicity could be on the basis of hepatocellular disease or hepatic steatosis. Xr Pelvis (min 3 Views)    Result Date: 1/28/2020  There is no acute fracture of the left hip There is no acute fracture in the pelvis. Detail of the sacrum is markedly limited. There is subtle double density along the margin of the superior pubic ramus on the left without definitive fracture line at patient's pain persists, consider CT exam No acute osseous abnormality of the right femur or the left femur     Ct Femur Left Wo Contrast    Result Date: 1/31/2020  Multiple osseous metastatic lesions in the left ischium, likely anterior left acetabular wall, and probably in the intertrochanteric and lesser trochanteric portions of the left femur. These could be further characterized with bone scan or MRI with contrast.     Ct Biopsy Superficial Bone Percutaneous    Addendum Date: 1/28/2020    ADDENDUM: Addendum is for billing purposes only. Automated dose modulation and/ or weight based adjustment of the mA/kv was utilized to reduce the radiation dose to as low as reasonably achievable. Result Date: 1/28/2020  Technically successful CT-guided targeted right iliac bone core biopsy. Mri Brain W Wo Contrast    Result Date: 2/1/2020  1. Multiple calvarial metastases.  2. Abnormal dural thickening enhancement most evident overlying the left parietal and tenderness. Abdominal:      General: Abdomen is flat. Bowel sounds are normal. There is no distension. Palpations: Abdomen is soft. There is no mass. Tenderness: There is no abdominal tenderness. There is no guarding or rebound. Skin:     Findings: No lesion or rash. Neurological:      General: No focal deficit present. Mental Status: She is oriented to person, place, and time. Mental status is at baseline. Psychiatric:         Mood and Affect: Mood normal.         Assessment:        Hospital Problems           Last Modified POA    * (Principal) Metastatic breast cancer ([facility] 2/2/2020 Yes    Overview Signed 2/2/2020  1:13 PM by Deepthi Roger Blood, DO     To bones, calvarium, dura,          Anemia 1/30/2020 Yes    Liver lesion 1/26/2020 Yes    History of breast cancer 1/27/2020 Yes    Diabetes mellitus type 2 in obese ([address] (Chronic) 1/29/2020 Yes    Essential hypertension (Chronic) 1/29/2020 Yes    Acquired hypothyroidism (Chronic) 1/29/2020 Yes    Recurrent major depressive disorder, in remission ([address] (Chronic) 1/29/2020 Yes    Bone lesion 1/30/2020 Yes    Iron deficiency 1/30/2020 Yes    Acute duodenal ulcer with gastric outlet obstruction 2/3/2020 Yes    Ulcer of esophagus without bleeding 2/3/2020 Yes    Encounter for palliative care 2/3/2020 Yes    Deep venous thrombosis ([address] 2/10/2020 Yes          Plan:          Principal Problem:  Metastatic breast adenocarcinoma, mets to bone. Hematology oncology on board with plan for chemotherapy and radiotherapy as outpatient. Back pain second to bone lesions. Pain control for now     Active Problems:     Acute RUE Subclavian, Axillary DVT. Per GI, continue to hold anticoagulation for 3 days. Acute blood loss anemia secondary to bleeding duodenal ulcer. Patient had repeat EGD done today. No active bleeding noted at this time. Per gastroenterology, continue Protonix drip and Carafate. No anticoagulation for at least 3 days.       Liver lesion hematology following      Diabetes mellitus type 2 in obese ([facility] uncontrolled insulin sliding scale      Essential hypertension continue home medication      Acquired hypothyroidism levothyroxine      Recurrent major depressive disorder, in remission ([facility] continue home medication    Resolved Problems:    Septic shock developed during hospitalization ([facility] status post IV antibiotics resolved ID recommendation appreciated watch off IV antibiotics.         Sammy Sharp MD  2/13/2020  8:42 AM

## 2020-02-14 ENCOUNTER — APPOINTMENT (OUTPATIENT)
Dept: GENERAL RADIOLOGY | Age: 70
DRG: 347 | End: 2020-02-14
Attending: INTERNAL MEDICINE
Payer: COMMERCIAL

## 2020-02-14 LAB
ABSOLUTE EOS #: 0.23 K/UL (ref 0–0.44)
ABSOLUTE IMMATURE GRANULOCYTE: 0.05 K/UL (ref 0–0.3)
ABSOLUTE LYMPH #: 1.62 K/UL (ref 1.1–3.7)
ABSOLUTE MONO #: 0.94 K/UL (ref 0.1–1.2)
ANION GAP SERPL CALCULATED.3IONS-SCNC: 13 MMOL/L (ref 9–17)
BASOPHILS # BLD: 1 % (ref 0–2)
BASOPHILS ABSOLUTE: 0.1 K/UL (ref 0–0.2)
BUN BLDV-MCNC: 4 MG/DL (ref 8–23)
BUN/CREAT BLD: ABNORMAL (ref 9–20)
CALCIUM SERPL-MCNC: 8.1 MG/DL (ref 8.6–10.4)
CHLORIDE BLD-SCNC: 103 MMOL/L (ref 98–107)
CO2: 26 MMOL/L (ref 20–31)
CREAT SERPL-MCNC: 0.46 MG/DL (ref 0.5–0.9)
DIFFERENTIAL TYPE: ABNORMAL
EOSINOPHILS RELATIVE PERCENT: 2 % (ref 1–4)
GFR AFRICAN AMERICAN: >60 ML/MIN
GFR NON-AFRICAN AMERICAN: >60 ML/MIN
GFR SERPL CREATININE-BSD FRML MDRD: ABNORMAL ML/MIN/{1.73_M2}
GFR SERPL CREATININE-BSD FRML MDRD: ABNORMAL ML/MIN/{1.73_M2}
GLUCOSE BLD-MCNC: 105 MG/DL (ref 65–105)
GLUCOSE BLD-MCNC: 111 MG/DL (ref 65–105)
GLUCOSE BLD-MCNC: 112 MG/DL (ref 65–105)
GLUCOSE BLD-MCNC: 60 MG/DL (ref 65–105)
GLUCOSE BLD-MCNC: 61 MG/DL (ref 65–105)
GLUCOSE BLD-MCNC: 84 MG/DL (ref 70–99)
HCT VFR BLD CALC: 28.4 % (ref 36.3–47.1)
HCT VFR BLD CALC: 30.6 % (ref 36.3–47.1)
HCT VFR BLD CALC: 32.2 % (ref 36.3–47.1)
HEMOGLOBIN: 8.7 G/DL (ref 11.9–15.1)
HEMOGLOBIN: 9.1 G/DL (ref 11.9–15.1)
HEMOGLOBIN: 9.5 G/DL (ref 11.9–15.1)
IMMATURE GRANULOCYTES: 1 %
LYMPHOCYTES # BLD: 17 % (ref 24–43)
MAGNESIUM: 1.3 MG/DL (ref 1.6–2.6)
MCH RBC QN AUTO: 27.8 PG (ref 25.2–33.5)
MCHC RBC AUTO-ENTMCNC: 30.6 G/DL (ref 28.4–34.8)
MCV RBC AUTO: 90.7 FL (ref 82.6–102.9)
MONOCYTES # BLD: 10 % (ref 3–12)
NRBC AUTOMATED: 0 PER 100 WBC
PDW BLD-RTO: 15.7 % (ref 11.8–14.4)
PLATELET # BLD: 537 K/UL (ref 138–453)
PLATELET ESTIMATE: ABNORMAL
PMV BLD AUTO: 10.5 FL (ref 8.1–13.5)
POTASSIUM SERPL-SCNC: 3.5 MMOL/L (ref 3.7–5.3)
RBC # BLD: 3.13 M/UL (ref 3.95–5.11)
RBC # BLD: ABNORMAL 10*6/UL
SEG NEUTROPHILS: 70 % (ref 36–65)
SEGMENTED NEUTROPHILS ABSOLUTE COUNT: 6.76 K/UL (ref 1.5–8.1)
SODIUM BLD-SCNC: 142 MMOL/L (ref 135–144)
WBC # BLD: 9.7 K/UL (ref 3.5–11.3)
WBC # BLD: ABNORMAL 10*3/UL

## 2020-02-14 PROCEDURE — 2060000000 HC ICU INTERMEDIATE R&B

## 2020-02-14 PROCEDURE — 82805 BLOOD GASES W/O2 SATURATION: CPT

## 2020-02-14 PROCEDURE — C9113 INJ PANTOPRAZOLE SODIUM, VIA: HCPCS | Performed by: NURSE PRACTITIONER

## 2020-02-14 PROCEDURE — 82248 BILIRUBIN DIRECT: CPT

## 2020-02-14 PROCEDURE — 6360000002 HC RX W HCPCS: Performed by: NURSE PRACTITIONER

## 2020-02-14 PROCEDURE — 85018 HEMOGLOBIN: CPT

## 2020-02-14 PROCEDURE — 84146 ASSAY OF PROLACTIN: CPT

## 2020-02-14 PROCEDURE — 6370000000 HC RX 637 (ALT 250 FOR IP): Performed by: NURSE PRACTITIONER

## 2020-02-14 PROCEDURE — 71045 X-RAY EXAM CHEST 1 VIEW: CPT

## 2020-02-14 PROCEDURE — 82947 ASSAY GLUCOSE BLOOD QUANT: CPT

## 2020-02-14 PROCEDURE — 6370000000 HC RX 637 (ALT 250 FOR IP): Performed by: INTERNAL MEDICINE

## 2020-02-14 PROCEDURE — 82140 ASSAY OF AMMONIA: CPT

## 2020-02-14 PROCEDURE — 99233 SBSQ HOSP IP/OBS HIGH 50: CPT | Performed by: INTERNAL MEDICINE

## 2020-02-14 PROCEDURE — 85014 HEMATOCRIT: CPT

## 2020-02-14 PROCEDURE — 80048 BASIC METABOLIC PNL TOTAL CA: CPT

## 2020-02-14 PROCEDURE — 80053 COMPREHEN METABOLIC PANEL: CPT

## 2020-02-14 PROCEDURE — 83605 ASSAY OF LACTIC ACID: CPT

## 2020-02-14 PROCEDURE — 99232 SBSQ HOSP IP/OBS MODERATE 35: CPT | Performed by: INTERNAL MEDICINE

## 2020-02-14 PROCEDURE — 2580000003 HC RX 258: Performed by: INTERNAL MEDICINE

## 2020-02-14 PROCEDURE — 84145 PROCALCITONIN (PCT): CPT

## 2020-02-14 PROCEDURE — 87040 BLOOD CULTURE FOR BACTERIA: CPT

## 2020-02-14 PROCEDURE — 85025 COMPLETE CBC W/AUTO DIFF WBC: CPT

## 2020-02-14 PROCEDURE — 36415 COLL VENOUS BLD VENIPUNCTURE: CPT

## 2020-02-14 PROCEDURE — 2580000003 HC RX 258: Performed by: NURSE PRACTITIONER

## 2020-02-14 PROCEDURE — 83735 ASSAY OF MAGNESIUM: CPT

## 2020-02-14 PROCEDURE — 99232 SBSQ HOSP IP/OBS MODERATE 35: CPT | Performed by: FAMILY MEDICINE

## 2020-02-14 RX ORDER — 0.9 % SODIUM CHLORIDE 0.9 %
500 INTRAVENOUS SOLUTION INTRAVENOUS ONCE
Status: COMPLETED | OUTPATIENT
Start: 2020-02-14 | End: 2020-02-15

## 2020-02-14 RX ORDER — POTASSIUM CHLORIDE 7.45 MG/ML
10 INJECTION INTRAVENOUS PRN
Status: DISCONTINUED | OUTPATIENT
Start: 2020-02-14 | End: 2020-02-19 | Stop reason: HOSPADM

## 2020-02-14 RX ORDER — POTASSIUM CHLORIDE 20 MEQ/1
40 TABLET, EXTENDED RELEASE ORAL PRN
Status: DISCONTINUED | OUTPATIENT
Start: 2020-02-14 | End: 2020-02-19 | Stop reason: HOSPADM

## 2020-02-14 RX ORDER — POTASSIUM CHLORIDE 20 MEQ/1
40 TABLET, EXTENDED RELEASE ORAL 2 TIMES DAILY WITH MEALS
Status: COMPLETED | OUTPATIENT
Start: 2020-02-14 | End: 2020-02-14

## 2020-02-14 RX ADMIN — DOCUSATE SODIUM 100 MG: 100 CAPSULE, LIQUID FILLED ORAL at 09:00

## 2020-02-14 RX ADMIN — SODIUM CHLORIDE, POTASSIUM CHLORIDE, SODIUM LACTATE AND CALCIUM CHLORIDE: 600; 310; 30; 20 INJECTION, SOLUTION INTRAVENOUS at 17:24

## 2020-02-14 RX ADMIN — POTASSIUM CHLORIDE 40 MEQ: 1500 TABLET, EXTENDED RELEASE ORAL at 10:25

## 2020-02-14 RX ADMIN — FERROUS SULFATE TAB EC 325 MG (65 MG FE EQUIVALENT) 325 MG: 325 (65 FE) TABLET DELAYED RESPONSE at 09:00

## 2020-02-14 RX ADMIN — METOPROLOL TARTRATE 50 MG: 50 TABLET, FILM COATED ORAL at 09:02

## 2020-02-14 RX ADMIN — VENLAFAXINE HYDROCHLORIDE 150 MG: 150 CAPSULE, EXTENDED RELEASE ORAL at 09:02

## 2020-02-14 RX ADMIN — OXYCODONE HYDROCHLORIDE 5 MG: 5 TABLET ORAL at 05:50

## 2020-02-14 RX ADMIN — SENNOSIDES 8.6 MG: 8.6 TABLET, FILM COATED ORAL at 09:01

## 2020-02-14 RX ADMIN — ACETAMINOPHEN 650 MG: 325 TABLET ORAL at 16:28

## 2020-02-14 RX ADMIN — CALCIUM CARBONATE-CHOLECALCIFEROL TAB 250 MG-125 UNIT 500 MG: 250-125 TAB at 09:01

## 2020-02-14 RX ADMIN — LACTULOSE 20 G: 10 SOLUTION ORAL at 09:03

## 2020-02-14 RX ADMIN — POTASSIUM CHLORIDE 40 MEQ: 1500 TABLET, EXTENDED RELEASE ORAL at 16:29

## 2020-02-14 RX ADMIN — INSULIN LISPRO 6 UNITS: 100 INJECTION, SOLUTION INTRAVENOUS; SUBCUTANEOUS at 08:58

## 2020-02-14 RX ADMIN — SUCRALFATE 1 G: 1 TABLET ORAL at 05:50

## 2020-02-14 RX ADMIN — SODIUM CHLORIDE 8 MG/HR: 9 INJECTION, SOLUTION INTRAVENOUS at 06:51

## 2020-02-14 RX ADMIN — SODIUM CHLORIDE 8 MG/HR: 9 INJECTION, SOLUTION INTRAVENOUS at 17:24

## 2020-02-14 RX ADMIN — LEVOTHYROXINE SODIUM 100 MCG: 100 TABLET ORAL at 09:02

## 2020-02-14 RX ADMIN — SODIUM CHLORIDE, POTASSIUM CHLORIDE, SODIUM LACTATE AND CALCIUM CHLORIDE: 600; 310; 30; 20 INJECTION, SOLUTION INTRAVENOUS at 05:27

## 2020-02-14 RX ADMIN — SUCRALFATE 1 G: 1 TABLET ORAL at 12:23

## 2020-02-14 RX ADMIN — SUCRALFATE 1 G: 1 TABLET ORAL at 00:36

## 2020-02-14 RX ADMIN — LACTULOSE 20 G: 10 SOLUTION ORAL at 22:15

## 2020-02-14 RX ADMIN — FERROUS SULFATE TAB EC 325 MG (65 MG FE EQUIVALENT) 325 MG: 325 (65 FE) TABLET DELAYED RESPONSE at 16:29

## 2020-02-14 ASSESSMENT — PAIN DESCRIPTION - PROGRESSION: CLINICAL_PROGRESSION: NOT CHANGED

## 2020-02-14 ASSESSMENT — PAIN SCALES - GENERAL
PAINLEVEL_OUTOF10: 0
PAINLEVEL_OUTOF10: 0
PAINLEVEL_OUTOF10: 4
PAINLEVEL_OUTOF10: 6
PAINLEVEL_OUTOF10: 3
PAINLEVEL_OUTOF10: 0
PAINLEVEL_OUTOF10: 6
PAINLEVEL_OUTOF10: 3
PAINLEVEL_OUTOF10: 6

## 2020-02-14 ASSESSMENT — PAIN DESCRIPTION - LOCATION: LOCATION: HEAD

## 2020-02-14 ASSESSMENT — ENCOUNTER SYMPTOMS
SHORTNESS OF BREATH: 0
CHEST TIGHTNESS: 0
EYE DISCHARGE: 0
EYE REDNESS: 0
RHINORRHEA: 0
VOICE CHANGE: 0
EYE PAIN: 0
ABDOMINAL PAIN: 0
NAUSEA: 0
VOMITING: 0
SORE THROAT: 0
ABDOMINAL DISTENTION: 0
WHEEZING: 0
COUGH: 0
BACK PAIN: 0

## 2020-02-14 ASSESSMENT — PAIN - FUNCTIONAL ASSESSMENT
PAIN_FUNCTIONAL_ASSESSMENT: ACTIVITIES ARE NOT PREVENTED
PAIN_FUNCTIONAL_ASSESSMENT: PREVENTS OR INTERFERES SOME ACTIVE ACTIVITIES AND ADLS

## 2020-02-14 ASSESSMENT — PAIN DESCRIPTION - FREQUENCY: FREQUENCY: CONTINUOUS

## 2020-02-14 ASSESSMENT — PAIN DESCRIPTION - DESCRIPTORS: DESCRIPTORS: ACHING

## 2020-02-14 ASSESSMENT — PAIN SCALES - WONG BAKER: WONGBAKER_NUMERICALRESPONSE: 0

## 2020-02-14 NOTE — PROGRESS NOTES
GI [facility] 21 Gastroenterology  Progress Note       Subjective:    No signs of recurrent bleeding   Hg stable  Normal bowel movements  Denies any abdominal pain or discomfort  Tolerating CLD      REASON FOR CONSULTATION:  GI hemorrhage, duodenal ulcer    HISTORY OF PRESENT ILLNESS:         Per initial consultation from 1/27/20:    \"The patient is a 77 y.o. female  who presents with intermittent abdominal pain has been worsening for the past week. Patient also has had recent flulike illness and has been having some GERD-like symptoms since then. Was transferred to ICU due to anemia and infection with concern for sepsis. Leukocytosis 13.5, hemoglobin stable 7.9 after transfusions. Transfused with 3 units red blood cells]. Patient does have a history of breast cancer, was treated with chemotherapy in 2007, denies any radiation. Had her right mastectomy in 2007. Patient denies ever seeing a gastroenterologist.  Has never had EGD or colonoscopy. Has been having regular bowel movements, brown, no black tarry stools noted. No hematemesis ulcer. Last bowel movement was 2 days ago. \"    Previous GI history:      PAST MEDICAL HISTORY:  Past Medical History:   Diagnosis Date    [facility] ([facility] 2007    CAD (coronary atherosclerotic disease)     Diabetes mellitus type 2 in obese ([facility]     Essential hypertension     Hypothyroidism (acquired)      Past Surgical History:   Procedure Laterality Date    CORONARY ARTERY BYPASS GRAFT  2007    FEMUR FRACTURE SURGERY Left 2/1/2020    [facility]  FEMUR FRACTURE IM NAILING AND LEFT FEMUR BIOPSY (SYNTHES, , C-ARM) performed by Dayton Linda MD at [address] [place] [address]  02/01/2020    nailing    JOINT REPLACEMENT Right 2009    MINDY    MASTECTOMY  2007    TUNNELED VENOUS PORT PLACEMENT      UPPER GASTROINTESTINAL ENDOSCOPY N/A 1/30/2020    EGD BIOPSY performed by Rachid Cameron MD at [address] [place] [address]  1/30/2020    EGD sucralfate  1 g Oral 4 times per day    sodium chloride  20 mL Intravenous Once    insulin lispro  0-18 Units Subcutaneous 4x Daily AC & HS    metoprolol tartrate  50 mg Oral BID    lactulose  20 g Oral TID    sodium chloride  250 mL Intravenous Once    [Held by provider] metFORMIN  1,000 mg Oral BID WC    [Held by provider] linagliptin  5 mg Oral Daily    alteplase  2 mg Intracatheter Once    oyster shell calcium/vitamin D  2 tablet Oral Daily    [Held by provider] morphine  15 mg Oral 2 times per day    levothyroxine  100 mcg Oral Daily    venlafaxine  150 mg Oral Daily    insulin glargine  10 Units Subcutaneous Nightly    docusate sodium  100 mg Oral BID    senna  1 tablet Oral BID    ferrous sulfate  325 mg Oral BID WC    sodium chloride flush  10 mL Intravenous 2 times per day     . Continuous Infusions:   pantoprozole (PROTONIX) infusion 8 mg/hr (02/14/20 1200)    lactated ringers 75 mL/hr at 02/14/20 1200    dextrose       .   PRN Meds:potassium chloride **OR** potassium alternative oral replacement **OR** potassium chloride, fentanNYL, metoprolol, fentaNYL, sodium chloride flush, sodium chloride flush, acetaminophen, sodium chloride flush, magnesium hydroxide, ondansetron, bisacodyl, acetaminophen, glucose, dextrose, glucagon (rDNA), dextrose, oxyCODONE **OR** oxyCODONE  .  SOCIAL HISTORY:     Social History     Socioeconomic History    Marital status:      Spouse name: Not on file    Number of children: Not on file    Years of education: Not on file    Highest education level: Not on file   Occupational History    Not on file   Social Needs    Financial resource strain: Not on file    Food insecurity:     Worry: Not on file     Inability: Not on file    Transportation needs:     Medical: Not on file     Non-medical: Not on file   Tobacco Use    Smoking status: Never Smoker    Smokeless tobacco: Never Used   Substance and Sexual Activity    Alcohol use: Not on file Comment: social    Drug use: Never    Sexual activity: Not on file   Lifestyle    Physical activity:     Days per week: Not on file     Minutes per session: Not on file    Stress: Not on file   Relationships    Social connections:     Talks on phone: Not on file     Gets together: Not on file     Attends Spiritism service: Not on file     Active member of club or organization: Not on file     Attends meetings of clubs or organizations: Not on file     Relationship status: Not on file    Intimate partner violence:     Fear of current or ex partner: Not on file     Emotionally abused: Not on file     Physically abused: Not on file     Forced sexual activity: Not on file   Other Topics Concern    Not on file   Social History Narrative    Not on file       FAMILY HISTORY:   Family History   Problem Relation Age of Onset    Hypertension Mother     Hypertension Father        REVIEW OF SYSTEMS:     Constitutional: No fever, no chills, no lethargy, no weakness. HEENT:  No headache, otalgia, itchy eyes, nasal discharge or sore throat. Cardiac:  No chest pain, dyspnea, orthopnea or PND. Chest:   No cough, phlegm or wheezing. Abdomen:  No abdominal pain, nausea or vomiting. Neuro:  No focal weakness, abnormal movements or seizure like activity. Skin:   No rashes, no itching. :   No hematuria, no pyuria, no dysuria, no flank pain. Extremities:  No swelling or joint pains. ROS was otherwise negative except as mentioned in the [address] PHYSICAL EXAM:    /69   Pulse 97   Temp 98.3 °F (36.8 °C) (Oral)   Resp 21   Ht 5' 2\" (1.575 m)   Wt 178 lb 3.2 oz (80.8 kg)   SpO2 99%   Breastfeeding No   BMI 32.59 kg/m²   . TMAX[24]    General: Well developed, Well nourished, No apparent distress  Head:  Normocephalic, Atraumatic  EENT: EOMI, Sclera not icteric, Oropharynx moist  Neck:  Supple, Trachea midline  Lungs:CTA Bilaterally  Heart: RRR, No murmur, No rub, No gallop, PMI nondisplaced.    Abdomen:Soft, CEA in the last 72 hours. Ca 125:   No results for input(s):  in the last 72 hours. Ca 19-9:     Invalid input(s):   AFP: No results for input(s): AFP in the last 72 hours. ASSESSMENT and PLAN:     Briefly, 76 yo F w/ hx of DM, hypothyroidism, CAD s/p CABG in 2008, breast CA s/p mastectomy and chemotherapy, recurrence of disease with evidence of osseous metastatic disease, transferred from outside facility for s/s of GI hemorrhage and duodenitis, s/p index EGD which revealed duodenitis and duodenal erosions, endoscopic hemostasis provided, was placed on Lovenox, had recurrence of her UGI hemorrhage, underwent EGD x 2 this past week with achievement of hemostasis, most recently appeared low risk on endoscopy yesterday. Recommendation:  -Continue with PPI gtt to complete additional 48 hrs.   -Continue with Carafate 1 g QID, crushed  -Can advance to FLD tomorrow  -Continue to hold AC/AP therapy for additional 48 hrs.   -Continue to trend Hg, q 8-12 hr, transfuse to goal  -At this time, Hg remains stable, no signs of recurrent bleeding, normal bowel movements, hemodynamically stable, improving from GI standpoint, will continue to follow       Thank you for allowing us to take part in the patients care  Contact GI for any remaining questions, we are available.      Electronically signed by:  Kristen Cabello MD   THE [facility] AT [facility]  2/14/2020    3:34 PM

## 2020-02-14 NOTE — PROGRESS NOTES
Orthopedic Progress Note    Patient:  Ashley Collins  YOB: 1950     77 y.o. female    Subjective:  Patient seen and examined. Doing well. Went for second EGD yesterday. Vitals reviewed, afebrile    Objective:   Vitals:    02/14/20 0415   BP: 134/87   Pulse: 98   Resp: 18   Temp: 98.2 °F (36.8 °C)   SpO2: 94%       Gen: NAD, cooperative  Cardiovascular: Tachycardia, no dependent edema, distal pulses 2+  Respiratory: Chest symmetric, no accessory muscle use, normal respirations    LLE: Staples present, removed. No signs of infection, drainage, or dehiscence. Compartments soft and compressible. EHL/FHL/TS/GSC motor complex intact grossly. Sural/saphenous/SP/DP/Plantar sensory nerves SILT grossly. Foot warm and well perfused. Recent Labs     02/11/20  1855  02/13/20  0504 02/13/20 2020   WBC  --    < > 8.0  --    HGB  --    < > 8.5* 8.4*   HCT  --    < > 29.9* 28.4*   PLT  --    < > 520*  --    INR 1.0  --   --   --    NA  --    < > 141  --    K  --    < > 3.9  --    BUN  --    < > 6*  --    CREATININE  --    < > 0.47*  --    GLUCOSE  --    < > 93  --     < > = values in this interval not displayed. Meds: AC held for bleed  See rec for complete list    Impression/plan: 77 y.o. female with multiple pelvic and left femur metastatic breast lesions  -Left femur s/p intramedullary nail on 2/1/20    -Incision inspected, staples removed today  -IM on as primary team  -Oncology following  -Weight bearing as tolerated left lower extremity  -Pain control PO/IV Medication. Attempt to Wean IV medications.    -DVT ppx: Chemical AC per primary  -Follow up with Dr. Yessenia Hurt in 4 weeks  -Please page DO ortho with any questions    ----------------------------------------  Allegra Lujan DO  PGY-2, [facility] [address] [place] [address]

## 2020-02-14 NOTE — PROGRESS NOTES
Physical Therapy  DATE: 2020    NAME: Socrates Collins  MRN: [id number]   : 1950    Patient not seen this date for Physical Therapy due to:  [] Blood transfusion in progress  [] Hemodialysis  []  Patient Declined  [] Spine Precautions   [] Strict Bedrest  [] Surgery/ Procedure  [] Testing      [x] Other  - +DVT on 2/10/2020, per GI, hold anticoagulation for 3 days. Per dept. protocol pt has to be on blood thinners for us to treat the pt with a DVT. We would need an written active order from pt's MD's to ok working with pt w/o being on blood thinners. RN informed. [] PT being discontinued at this time. Patient independent. No further needs. [] PT being discontinued at this time as the patient has been transferred to palliative care. No further needs.     Gunner Garcia, PTA

## 2020-02-14 NOTE — PROGRESS NOTES
taken           Deep 1/2 cm ulcer in the duodenal bulb just beyond the pyloric orifice, with edema causing some gastric outlet narrowing, was covered with large clotted blood iWatch most of the clotted blood, but there is adherent clot to the crater base which I could not clear I could not see any vessel protruding for which we went ahead and injected epinephrine around it 6 cc of the 1-10,000 epi, at this point there is no fresh blood to indicate any more bleeding which indicate that the bleeding has subsided     Retained food in the stomach related to the gastric outlet narrowing    Had iliac biopsy which was positive for metastatic breast ca    On 2/1 had   Procedure(s):  1. Long TFN of the left femur  2. Femoral neck biopsy  For impending femoral neck fracture due to mets        Medications:      Allergies:  No Known Allergies    Current Meds:   Scheduled Meds:    sucralfate  1 g Oral 4 times per day    sodium chloride  20 mL Intravenous Once    insulin lispro  0-18 Units Subcutaneous 4x Daily AC & HS    metoprolol tartrate  50 mg Oral BID    lactulose  20 g Oral TID    sodium chloride  250 mL Intravenous Once    [Held by provider] metFORMIN  1,000 mg Oral BID WC    [Held by provider] linagliptin  5 mg Oral Daily    alteplase  2 mg Intracatheter Once    oyster shell calcium/vitamin D  2 tablet Oral Daily    [Held by provider] morphine  15 mg Oral 2 times per day    levothyroxine  100 mcg Oral Daily    venlafaxine  150 mg Oral Daily    insulin glargine  10 Units Subcutaneous Nightly    docusate sodium  100 mg Oral BID    senna  1 tablet Oral BID    ferrous sulfate  325 mg Oral BID WC    sodium chloride flush  10 mL Intravenous 2 times per day     Continuous Infusions:    pantoprozole (PROTONIX) infusion 8 mg/hr (02/14/20 0651)    lactated ringers 75 mL/hr at 02/14/20 0527    dextrose       PRN Meds: potassium chloride **OR** potassium alternative oral replacement **OR** potassium chloride, fracture of the left hip There is no acute fracture in the pelvis. Detail of the sacrum is markedly limited. There is subtle double density along the margin of the superior pubic ramus on the left without definitive fracture line at patient's pain persists, consider CT exam No acute osseous abnormality of the right femur or the left femur     Mri Thoracic Spine W Wo Contrast    Result Date: 1/29/2020  Osseous metastatic disease with diffuse marrow infiltration of T10. Mild extraosseous extension of tumor suspected along the left neural foramina a T10. Us Gallbladder Ruq    Result Date: 1/27/2020  Minimal amount of free fluid within the upper abdomen. Heterogeneous increased liver echogenicity could be on the basis of hepatocellular disease or hepatic steatosis. Xr Pelvis (min 3 Views)    Result Date: 1/28/2020  There is no acute fracture of the left hip There is no acute fracture in the pelvis. Detail of the sacrum is markedly limited. There is subtle double density along the margin of the superior pubic ramus on the left without definitive fracture line at patient's pain persists, consider CT exam No acute osseous abnormality of the right femur or the left femur     Ct Femur Left Wo Contrast    Result Date: 1/31/2020  Multiple osseous metastatic lesions in the left ischium, likely anterior left acetabular wall, and probably in the intertrochanteric and lesser trochanteric portions of the left femur. These could be further characterized with bone scan or MRI with contrast.     Ct Biopsy Superficial Bone Percutaneous    Addendum Date: 1/28/2020    ADDENDUM: Addendum is for billing purposes only. Automated dose modulation and/ or weight based adjustment of the mA/kv was utilized to reduce the radiation dose to as low as reasonably achievable. Result Date: 1/28/2020  Technically successful CT-guided targeted right iliac bone core biopsy. Mri Brain W Wo Contrast    Result Date: 2/1/2020  1. Multiple calvarial metastases. 2. Abnormal dural thickening enhancement most evident overlying the left parietal and occipital lobes concerning for metastatic involvement given adjacent calvarial lesions. Meningitis is in the differential diagnosis but is thought unlikely. 3. Mild chronic white matter microvascular ischemic changes. No acute infarct or hemorrhage. Review of Systems   Constitutional: Positive for fatigue. Negative for activity change, appetite change, chills, diaphoresis and fever. HENT: Negative for congestion, rhinorrhea, sneezing, sore throat and voice change. Eyes: Negative for pain, discharge, redness and visual disturbance. Respiratory: Negative for cough, chest tightness, shortness of breath and wheezing. Cardiovascular: Negative for chest pain and palpitations. Gastrointestinal: Negative for abdominal distention, abdominal pain, nausea and vomiting. Genitourinary: Negative for difficulty urinating, dysuria, frequency and urgency. Musculoskeletal: Negative for arthralgias, back pain and myalgias. Neurological: Negative for dizziness, tremors, seizures, syncope and light-headedness. Psychiatric/Behavioral: Negative for agitation and sleep disturbance. The patient is not nervous/anxious. Physical Examination:        Physical Exam  Constitutional:       General: She is not in acute distress. Appearance: Normal appearance. She is not ill-appearing, toxic-appearing or diaphoretic. HENT:      Head: Normocephalic and atraumatic. Nose: Nose normal.      Mouth/Throat:      Mouth: Mucous membranes are dry. Eyes:      General: No scleral icterus. Right eye: No discharge. Left eye: No discharge. Conjunctiva/sclera: Conjunctivae normal.   Cardiovascular:      Rate and Rhythm: Normal rate and regular rhythm. Pulses: Normal pulses. Heart sounds: Normal heart sounds.    Pulmonary:      Effort: Pulmonary effort is normal. No respiratory distress. Breath sounds: Normal breath sounds. No wheezing, rhonchi or rales. Chest:      Chest wall: No tenderness. Abdominal:      General: Abdomen is flat. Bowel sounds are normal. There is no distension. Palpations: Abdomen is soft. There is no mass. Tenderness: There is no abdominal tenderness. There is no guarding or rebound. Skin:     Findings: No lesion or rash. Neurological:      General: No focal deficit present. Mental Status: She is oriented to person, place, and time. Mental status is at baseline. Psychiatric:         Mood and Affect: Mood normal.         Assessment:        Hospital Problems           Last Modified POA    * (Principal) Metastatic breast cancer ([facility] 2/2/2020 Yes    Overview Signed 2/2/2020  1:13 PM by Radha Waite, DO     To bones, calvarium, dura,          Anemia 1/30/2020 Yes    Liver lesion 1/26/2020 Yes    History of breast cancer 1/27/2020 Yes    Diabetes mellitus type 2 in obese ([address] (Chronic) 1/29/2020 Yes    Essential hypertension (Chronic) 1/29/2020 Yes    Acquired hypothyroidism (Chronic) 1/29/2020 Yes    Recurrent major depressive disorder, in remission ([address] (Chronic) 1/29/2020 Yes    Bone lesion 1/30/2020 Yes    Iron deficiency 1/30/2020 Yes    Acute duodenal ulcer with gastric outlet obstruction 2/3/2020 Yes    Ulcer of esophagus without bleeding 2/3/2020 Yes    Encounter for palliative care 2/3/2020 Yes    Deep venous thrombosis ([address] 2/10/2020 Yes          Plan:          Principal Problem:  Metastatic breast adenocarcinoma, mets to bone. Hematology oncology on board with plan for chemotherapy and radiotherapy as outpatient. Back pain second to bone lesions. Pain control for now     Active Problems:     Acute RUE Subclavian, Axillary DVT. Per GI, continue to hold anticoagulation for 2 more days. Acute blood loss anemia secondary to bleeding duodenal ulcer. Patient had repeat EGD done today.   No active bleeding noted at this time. Per gastroenterology, continue Protonix drip and Carafate for 48 more hours. No AC for 48 hours. Liver lesion hematology following      Diabetes mellitus type 2 in obese ([facility] uncontrolled insulin sliding scale      Essential hypertension continue home medication      Acquired hypothyroidism levothyroxine      Recurrent major depressive disorder, in remission ([facility] continue home medication    Resolved Problems:    Septic shock developed during hospitalization ([facility] status post IV antibiotics resolved ID recommendation appreciated watch off IV antibiotics.         Romelia Saldivar MD  2/14/2020  9:39 AM

## 2020-02-14 NOTE — PROGRESS NOTES
Oriented x3      ? well appearing      ? Intubated      ? ill appearing      ? Other:    Mental Status: ?x normal mental status exam      ? drowsy      ? Confused      ? Other:     Cardiovascular: ?x  Regular rate/rhythm      ? Arrhythmia      ? Other:     Chest: ?x Effort normal      ?x lungs clear      ? respiratory distress      ? Tachypnea      ? Other:    Abdomen: ?x Soft/non-tender      ?x  Normal appearance      ? Distended      ? Ascites      ? Other:    Neurological: ?x Normal Speech      ?x Normal Sensation      ? Deficits present:      Extremity:  ?x normal skin color/temp      ? clubbing/cyanosis      ?x  No edema      ? Other:     Palliative Performance Scale:  ___60%  Ambulation reduced; Significant disease; Can't do hobbies/housework; intake normal or reduced; occasional assist; LOC full/confusion  ___50%  Mainly sit/lie; Extensive disease; Can't do any work; Considerable assist; intake normal or reduced; LOC full/confusion  __x_40%  Mainly in bed; Extensive disease; Mainly assist; intake normal or reduced; LOC full/confusion   ___30%  Bed Bound; Extensive disease; Total care; intake reduced; LOCfull/confusion  ___20%  Bed Bound; Extensive disease; Total care; intake minimal; Drowsy/coma  ___10%  Bed Bound; Extensive disease;  Total care; Mouth care only; Drowsy/coma  ___0       Death      Plan      Palliative Interaction:  The patient was seen today for continuity of care, along with the medical student Rocco  Patient was alert and oriented, following commands and lying comfortably in the bed in no acute distress    I discussed patient's current medical conditions with her    I again explained the different types of codes to the patient and she stated that she wants to keep her CODE STATUS is DNR CCA with no intubation    I explained to the patient that if time she is not able to make any decisions regarding her health then her  is her legal spokesperson and the patient nodded in acknowledgment    Patient told that she had EGD done yesterday and tolerated the procedure well    I encouraged the patient to take active part in PT/OT therapy    I offered her comfort and emotional support    Education/support to staff  Education/support to family  Education/support to patient  Discharge planning/helping to coordinate care  Communications with primary service  Caregiver support/education  Code status clarified: Full Code  Code status clarified: [facility]  Code status clarified: DNRCCA  Other major issues     Principle Problem/Diagnosis:  Metast[facility] ([facility]    Additional Assessments:  Principal Problem:    Metastatic breast cancer ([facility]  Active Problems:    Anemia    Liver lesion    History of breast cancer    Diabetes mellitus type 2 in obese ([facility]    Essential hypertension    Acquired hypothyroidism    Recurrent major depressive disorder, in remission ([facility]    Bone lesion    Iron deficiency    Acute duodenal ulcer with gastric outlet obstruction    Ulcer of esophagus without bleeding    Encounter for palliative care    Deep venous thrombosis ([facility]  Resolved Problems:    Septic shock ([facility]    1- Symptom management/ pain control     Pain Assessment:  Pain is controlled with current analgesics. Medication(s) being used: acetaminophen, narcotic analgesics including fentanyl   IV, oxycodone   . Anxiety:  fatigue                          Dyspnea:  none                          Fatigue:  Tiredness    We feel the patient symptoms are being controlled. her current regimen is reviewed by myself and discussed with the staff. 2- Goals of care evaluation   The patient goals of care are improve or maintain function/quality of life, accomplish a particular personal goal, spiritual needs, strengthening relationships, preserve independence/autonomy/control and support for family/caregiver   Goals of care discussed with:    ?x Patient independently    ? Patient and Family    ?

## 2020-02-14 NOTE — PROGRESS NOTES
SERAFIN Mitchell CNP        Or    potassium bicarb-citric acid (EFFER-K) effervescent tablet 40 mEq  40 mEq Oral PRN SERAFIN Mitchell CNP        Or    potassium chloride 10 mEq/100 mL IVPB (Peripheral Line)  10 mEq Intravenous PRN SERAFIN Mitchell CNP        pantoprazole (PROTONIX) 80 mg in sodium chloride 0.9 % 100 mL infusion  8 mg/hr Intravenous Continuous SERAFIN Simmons CNP 10 mL/hr at 02/14/20 0651 8 mg/hr at 02/14/20 0651    sucralfate (CARAFATE) tablet 1 g  1 g Oral 4 times per day SERAFIN Shankar CNP   1 g at 02/14/20 0550    0.9 % sodium chloride bolus  20 mL Intravenous Once Huong Chavez MD        fentaNYL (SUBLIMAZE) injection 50 mcg  50 mcg Intravenous Q2H PRN Huong Chavez MD   50 mcg at 02/12/20 1020    lactated ringers infusion   Intravenous Continuous Pao Rodríguez MD 75 mL/hr at 02/14/20 0527      insulin lispro (HUMALOG) injection vial 0-18 Units  0-18 Units Subcutaneous 4x Daily AC & HS Huong Chavez MD   6 Units at 02/14/20 0858    metoprolol (LOPRESSOR) injection 5 mg  5 mg Intravenous Q8H PRN Pao Rodríguez MD        metoprolol tartrate (LOPRESSOR) tablet 50 mg  50 mg Oral BID Huong Chavez MD   50 mg at 02/14/20 0902    lactulose (CHRONULAC) 10 GM/15ML solution 20 g  20 g Oral TID Huong Chavez MD   20 g at 02/14/20 0903    0.9 % sodium chloride bolus  250 mL Intravenous Once Huong Chavez MD        [Held by provider] metFORMIN (GLUCOPHAGE) tablet 1,000 mg  1,000 mg Oral BID  Pao Rodríguez MD   1,000 mg at 02/10/20 1804    [Held by provider] linagliptin (TRADJENTA) tablet 5 mg  5 mg Oral Daily Pao Rodríguez MD   5 mg at 02/10/20 0911    fentaNYL (SUBLIMAZE) injection 25 mcg  25 mcg Intravenous Q5 Min PRN Huong Chavez MD   25 mcg at 02/02/20 1143    sodium chloride flush 0.9 % injection 10 mL  10 mL Intravenous PRN Pao Rodríguez MD        alteplase (CATHFLO) injection 2 mg  2 mg Intracatheter Once Min:97.5 °F (36.4 °C), Max:98.3 °F (36.8 °C)      General appearance -slightly  lethargic but able to answer questions. Eyes - pupils equal and reactive, extraocular eye movements intact   Ears - bilateral TM's and external ear canals normal   Mouth - mucous membranes moist, pharynx normal without lesions   Neck - supple, no significant adenopathy   Lymphatics - no palpable lymphadenopathy, no hepatosplenomegaly   Chest - clear to auscultation, no wheezes, rales or rhonchi, symmetric air entry   Heart - normal rate, regular rhythm, normal S1, S2, no murmurs  Abdomen - soft, nontender, nondistended, no masses or organomegaly   Neurological - alert, oriented, normal speech, no focal findings or movement disorder noted   Musculoskeletal - no joint tenderness, deformity or swelling   Extremities - peripheral pulses normal, no pedal edema, no clubbing or cyanosis   Skin - normal coloration and turgor, no rashes, no suspicious skin lesions noted ,  Breast left-sided mastectomy no signs of local recurrence    DATA:      Labs:     CBC:   Recent Labs     02/13/20  0504  02/14/20  0541 02/14/20  0753   WBC 8.0  --  9.7  --    HGB 8.5*   < > 8.7* 9.1*   HCT 29.9*   < > 28.4* 30.6*   *  --  537*  --     < > = values in this interval not displayed. BMP:   Recent Labs     02/13/20  0504 02/14/20  0541    142   K 3.9 3.5*   CO2 25 26   BUN 6* 4*   CREATININE 0.47* 0.46*   LABGLOM >60 >60   GLUCOSE 93 84     PT/INR:   Recent Labs     02/11/20  1855   PROTIME 10.5   INR 1.0     APTT:  Recent Labs     02/11/20  1855   APTT 25.7     LIVER PROFILE:  No results for input(s): AST, ALT, LABALBU in the last 72 hours. Us Renal Complete    Result Date: 1/26/2020  EXAMINATION: RETROPERITONEAL ULTRASOUND OF THE KIDNEYS AND URINARY BLADDER 1/26/2020 COMPARISON: None HISTORY: ORDERING SYSTEM PROVIDED HISTORY: left sided hydroureteronephrosis, eval kidneys TECHNOLOGIST PROVIDED HISTORY: left sided hydroureteronephrosis, eval kidneys FINDINGS: Kidneys: The right kidney measures 12.5 cm in length and the left kidney measures 12 cm in length. Kidneys demonstrate normal cortical echogenicity. Probable vascular calcifications are noted bilaterally. Mild left hydronephrosis. . Bladder: Bladder is decompressed with a Matt catheter. Mild left hydronephrosis     Ct Abdomen Pelvis W Iv Contrast Additional Contrast? Oral    Result Date: 1/26/2020  EXAMINATION: CT OF THE ABDOMEN AND PELVIS WITH CONTRAST 1/26/2020 5:25 pm TECHNIQUE: CT of the abdomen and pelvis was performed with the administration of intravenous contrast. Multiplanar reformatted images are provided for review. Dose modulation, iterative reconstruction, and/or weight based adjustment of the mA/kV was utilized to reduce the radiation dose to as low as reasonably achievable. COMPARISON: January 25, 2020 CT abdomen and pelvis HISTORY: ORDERING SYSTEM PROVIDED HISTORY: possible duodenitis versus contained bowel perforation seen on CT chest at [address] [place] [address] on 1/25 TECHNOLOGIST PROVIDED HISTORY: possible duodenitis versus contained bowel perforation seen on CT chest at [address] [place] [address] on 1/25 Reason for Exam: possible duodenitis versus contained bowel perforation seen on CT chest at [address] [place] [address] on 1/25 Acuity: Acute Type of Exam: Subsequent/Follow-up FINDINGS: Lower Chest: Cardiomegaly. Calcific coronary artery disease. Pacer wire right heart. Organs: Fat containing umbilical hernia. The liver, spleen, pancreas, and adrenals appear normal.  Distended gallbladder. No radiodense gallstones. Kidneys appear normal. Matt catheter decompresses the bladder. GI/Bowel: The stomach,small bowel, and colon appear normal. Increased stool throughout the colon. Oral contrast is noted in the stomach, small bowel and colon. The appendix is not identified. Pelvis: Uterus normal. Peritoneum/Retroperitoneum: The abdominal aorta and iliac arteries are normal in caliber. There is no pathologic adenopathy. 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 Right total hip arthroplasty. Distended gallbladder. No radiodense gallstones noted but ultrasound is recommended. Extensive metastatic disease is a risk for fracture at T10. Us Gallbladder Ruq    Result Date: 1/27/2020  EXAMINATION: RIGHT UPPER QUADRANT ULTRASOUND 1/27/2020 10:23 am COMPARISON: CT abdomen and pelvis January 26, 2020 HISTORY: ORDERING SYSTEM PROVIDED HISTORY: Rule out liver cirrhosis, concern for aclaclulus cholecystitis TECHNOLOGIST PROVIDED HISTORY: Rule out liver cirrhosis, concern for aclaclulus cholecystitis FINDINGS: Heterogeneous increased liver echotexture. No focal liver lesion. No right-sided hydronephrosis. Nonvisualization of the pancreas. No gallbladder wall thickening. No gallstones. No sonographic Nunez sign. Common bile duct is within normal limits measuring 5 mm in diameter. Minimal amount of free fluid within the right upper quadrant. Minimal amount of free fluid within the upper abdomen. Heterogeneous increased liver echogenicity could be on the basis of hepatocellular disease or hepatic steatosis. Xr Chest Portable    Result Date: 1/26/2020  EXAMINATION: ONE XRAY VIEW OF THE CHEST 1/26/2020 2:53 pm COMPARISON: None. HISTORY: ORDERING SYSTEM PROVIDED HISTORY: PICC line placement TECHNOLOGIST PROVIDED HISTORY: PICC line placement FINDINGS: The lungs are without consolidation effusion. There is no pneumothorax. The heart is prominent. The upper abdomen is unremarkable. The extrathoracic soft tissues are unremarkable. There is a right subclavian port with the tip in the mid SVC. There is a right-sided PICC line with the tip in the distal mid aspect of the SVC. Mild cardiomegaly without acute pulmonary process. Right-sided PICC line with the tip in the distal mid aspect of the SVC.          IMAGING DATA:          IMPRESSION:     Primary the assessment and plan and status of the problem list as documented.                                [facility]                          Cell: [phone]

## 2020-02-14 NOTE — CARE COORDINATION
Transitional planning. Kristan Nelson [address] [place] [address] in [facility], [address] [place] [phone] and left  a message in admissions to please start precert and to call me back.

## 2020-02-15 PROBLEM — J18.9 PNEUMONIA INVOLVING RIGHT LUNG: Status: ACTIVE | Noted: 2020-02-15

## 2020-02-15 PROBLEM — J10.1 INFLUENZA A: Status: ACTIVE | Noted: 2020-02-15

## 2020-02-15 LAB
-: NORMAL
ABSOLUTE EOS #: 0.06 K/UL (ref 0–0.44)
ABSOLUTE IMMATURE GRANULOCYTE: 0.11 K/UL (ref 0–0.3)
ABSOLUTE LYMPH #: 0.85 K/UL (ref 1.1–3.7)
ABSOLUTE MONO #: 1.29 K/UL (ref 0.1–1.2)
ADENOVIRUS PCR: NOT DETECTED
ALBUMIN SERPL-MCNC: 2.6 G/DL (ref 3.5–5.2)
ALBUMIN/GLOBULIN RATIO: 0.6 (ref 1–2.5)
ALLEN TEST: ABNORMAL
ALP BLD-CCNC: 148 U/L (ref 35–104)
ALT SERPL-CCNC: 6 U/L (ref 5–33)
AMMONIA: 54 UMOL/L (ref 11–51)
AMORPHOUS: NORMAL
ANION GAP SERPL CALCULATED.3IONS-SCNC: 13 MMOL/L (ref 9–17)
ANION GAP SERPL CALCULATED.3IONS-SCNC: 16 MMOL/L (ref 9–17)
AST SERPL-CCNC: 13 U/L
BACTERIA: NORMAL
BASOPHILS # BLD: 1 % (ref 0–2)
BASOPHILS ABSOLUTE: 0.12 K/UL (ref 0–0.2)
BILIRUB SERPL-MCNC: 0.31 MG/DL (ref 0.3–1.2)
BILIRUBIN DIRECT: 0.12 MG/DL
BILIRUBIN URINE: NEGATIVE
BILIRUBIN, INDIRECT: 0.19 MG/DL (ref 0–1)
BORDETELLA PARAPERTUSSIS: NOT DETECTED
BORDETELLA PERTUSSIS PCR: NOT DETECTED
BUN BLDV-MCNC: 4 MG/DL (ref 8–23)
BUN BLDV-MCNC: 4 MG/DL (ref 8–23)
BUN/CREAT BLD: ABNORMAL (ref 9–20)
CALCIUM SERPL-MCNC: 8 MG/DL (ref 8.6–10.4)
CALCIUM SERPL-MCNC: 8.4 MG/DL (ref 8.6–10.4)
CARBOXYHEMOGLOBIN: 1.3 % (ref 0–5)
CASTS UA: NORMAL /LPF (ref 0–8)
CHLAMYDIA PNEUMONIAE BY PCR: NOT DETECTED
CHLORIDE BLD-SCNC: 97 MMOL/L (ref 98–107)
CHLORIDE BLD-SCNC: 97 MMOL/L (ref 98–107)
CO2: 24 MMOL/L (ref 20–31)
CO2: 24 MMOL/L (ref 20–31)
COLOR: YELLOW
COMMENT UA: ABNORMAL
CORONAVIRUS 229E PCR: NOT DETECTED
CORONAVIRUS HKU1 PCR: NOT DETECTED
CORONAVIRUS NL63 PCR: NOT DETECTED
CORONAVIRUS OC43 PCR: NOT DETECTED
CREAT SERPL-MCNC: 0.46 MG/DL (ref 0.5–0.9)
CREAT SERPL-MCNC: 0.56 MG/DL (ref 0.5–0.9)
CRYSTALS, UA: NORMAL /HPF
DIFFERENTIAL TYPE: ABNORMAL
EKG ATRIAL RATE: 120 BPM
EKG P-R INTERVAL: 96 MS
EKG Q-T INTERVAL: 430 MS
EKG QRS DURATION: 90 MS
EKG QTC CALCULATION (BAZETT): 607 MS
EKG R AXIS: -5 DEGREES
EKG T AXIS: 0 DEGREES
EKG VENTRICULAR RATE: 120 BPM
EOSINOPHILS RELATIVE PERCENT: 1 % (ref 1–4)
EPITHELIAL CELLS UA: NORMAL /HPF (ref 0–5)
FIO2: ABNORMAL
GFR AFRICAN AMERICAN: >60 ML/MIN
GFR AFRICAN AMERICAN: >60 ML/MIN
GFR NON-AFRICAN AMERICAN: >60 ML/MIN
GFR NON-AFRICAN AMERICAN: >60 ML/MIN
GFR SERPL CREATININE-BSD FRML MDRD: ABNORMAL ML/MIN/{1.73_M2}
GLUCOSE BLD-MCNC: 130 MG/DL (ref 65–105)
GLUCOSE BLD-MCNC: 145 MG/DL (ref 65–105)
GLUCOSE BLD-MCNC: 146 MG/DL (ref 65–105)
GLUCOSE BLD-MCNC: 154 MG/DL (ref 65–105)
GLUCOSE BLD-MCNC: 158 MG/DL (ref 70–99)
GLUCOSE BLD-MCNC: 168 MG/DL (ref 70–99)
GLUCOSE URINE: NEGATIVE
HCO3 VENOUS: 26.7 MMOL/L (ref 24–30)
HCT VFR BLD CALC: 27.8 % (ref 36.3–47.1)
HCT VFR BLD CALC: 28.5 % (ref 36.3–47.1)
HCT VFR BLD CALC: 30.2 % (ref 36.3–47.1)
HEMOGLOBIN: 8.5 G/DL (ref 11.9–15.1)
HEMOGLOBIN: 8.5 G/DL (ref 11.9–15.1)
HEMOGLOBIN: 8.9 G/DL (ref 11.9–15.1)
HUMAN METAPNEUMOVIRUS PCR: NOT DETECTED
IMMATURE GRANULOCYTES: 1 %
INFLUENZA A BY PCR: DETECTED
INFLUENZA A H1 (2009) PCR: DETECTED
INFLUENZA A H1 PCR: NOT DETECTED
INFLUENZA A H3 PCR: NOT DETECTED
INFLUENZA B BY PCR: NOT DETECTED
KETONES, URINE: NEGATIVE
LACTIC ACID, SEPSIS WHOLE BLOOD: 0.9 MMOL/L (ref 0.5–1.9)
LACTIC ACID, SEPSIS WHOLE BLOOD: 1 MMOL/L (ref 0.5–1.9)
LACTIC ACID, SEPSIS WHOLE BLOOD: 1.7 MMOL/L (ref 0.5–1.9)
LACTIC ACID, SEPSIS: NORMAL MMOL/L (ref 0.5–1.9)
LEUKOCYTE ESTERASE, URINE: NEGATIVE
LYMPHOCYTES # BLD: 7 % (ref 24–43)
MAGNESIUM: 1.3 MG/DL (ref 1.6–2.6)
MAGNESIUM: 1.3 MG/DL (ref 1.6–2.6)
MCH RBC QN AUTO: 27.7 PG (ref 25.2–33.5)
MCHC RBC AUTO-ENTMCNC: 29.8 G/DL (ref 28.4–34.8)
MCV RBC AUTO: 92.8 FL (ref 82.6–102.9)
METHEMOGLOBIN: ABNORMAL % (ref 0–1.5)
MODE: ABNORMAL
MONOCYTES # BLD: 11 % (ref 3–12)
MUCUS: NORMAL
MYCOPLASMA PNEUMONIAE PCR: NOT DETECTED
NEGATIVE BASE EXCESS, VEN: ABNORMAL MMOL/L (ref 0–2)
NITRITE, URINE: NEGATIVE
NOTIFICATION TIME: ABNORMAL
NOTIFICATION: ABNORMAL
NRBC AUTOMATED: 0 PER 100 WBC
O2 DEVICE/FLOW/%: ABNORMAL
O2 SAT, VEN: 97.1 % (ref 60–85)
OTHER OBSERVATIONS UA: NORMAL
OXYHEMOGLOBIN: ABNORMAL % (ref 95–98)
PARAINFLUENZA 1 PCR: NOT DETECTED
PARAINFLUENZA 2 PCR: NOT DETECTED
PARAINFLUENZA 3 PCR: NOT DETECTED
PARAINFLUENZA 4 PCR: NOT DETECTED
PATIENT TEMP: 37
PCO2, VEN, TEMP ADJ: ABNORMAL MMHG (ref 39–55)
PCO2, VEN: 30.5 (ref 39–55)
PDW BLD-RTO: 15.8 % (ref 11.8–14.4)
PEEP/CPAP: ABNORMAL
PH UA: 7 (ref 5–8)
PH VENOUS: 7.55 (ref 7.32–7.42)
PH, VEN, TEMP ADJ: ABNORMAL (ref 7.32–7.42)
PLATELET # BLD: 460 K/UL (ref 138–453)
PLATELET ESTIMATE: ABNORMAL
PMV BLD AUTO: 9.4 FL (ref 8.1–13.5)
PO2, VEN, TEMP ADJ: ABNORMAL MMHG (ref 30–50)
PO2, VEN: 84.6 (ref 30–50)
POSITIVE BASE EXCESS, VEN: 4.6 MMOL/L (ref 0–2)
POTASSIUM SERPL-SCNC: 3.3 MMOL/L (ref 3.7–5.3)
POTASSIUM SERPL-SCNC: 3.9 MMOL/L (ref 3.7–5.3)
PROCALCITONIN: 0.16 NG/ML
PROLACTIN: 21.37 UG/L (ref 4.79–23.3)
PROTEIN UA: ABNORMAL
PSV: ABNORMAL
PT. POSITION: ABNORMAL
RBC # BLD: 3.07 M/UL (ref 3.95–5.11)
RBC # BLD: ABNORMAL 10*6/UL
RBC UA: NORMAL /HPF (ref 0–4)
RENAL EPITHELIAL, UA: NORMAL /HPF
RESP SYNCYTIAL VIRUS PCR: NOT DETECTED
RESPIRATORY RATE: ABNORMAL
RHINO/ENTEROVIRUS PCR: NOT DETECTED
SAMPLE SITE: ABNORMAL
SEG NEUTROPHILS: 79 % (ref 36–65)
SEGMENTED NEUTROPHILS ABSOLUTE COUNT: 9.8 K/UL (ref 1.5–8.1)
SET RATE: ABNORMAL
SODIUM BLD-SCNC: 134 MMOL/L (ref 135–144)
SODIUM BLD-SCNC: 137 MMOL/L (ref 135–144)
SPECIFIC GRAVITY UA: 1.01 (ref 1–1.03)
SPECIMEN DESCRIPTION: ABNORMAL
TEXT FOR RESPIRATORY: ABNORMAL
TOTAL HB: ABNORMAL G/DL (ref 12–16)
TOTAL PROTEIN: 7.1 G/DL (ref 6.4–8.3)
TOTAL RATE: ABNORMAL
TRICHOMONAS: NORMAL
TURBIDITY: CLEAR
URINE HGB: NEGATIVE
UROBILINOGEN, URINE: NORMAL
VT: ABNORMAL
WBC # BLD: 12.2 K/UL (ref 3.5–11.3)
WBC # BLD: ABNORMAL 10*3/UL
WBC UA: NORMAL /HPF (ref 0–5)
YEAST: NORMAL

## 2020-02-15 PROCEDURE — 80048 BASIC METABOLIC PNL TOTAL CA: CPT

## 2020-02-15 PROCEDURE — 2580000003 HC RX 258: Performed by: INTERNAL MEDICINE

## 2020-02-15 PROCEDURE — 6370000000 HC RX 637 (ALT 250 FOR IP): Performed by: INTERNAL MEDICINE

## 2020-02-15 PROCEDURE — 99232 SBSQ HOSP IP/OBS MODERATE 35: CPT | Performed by: INTERNAL MEDICINE

## 2020-02-15 PROCEDURE — 81001 URINALYSIS AUTO W/SCOPE: CPT

## 2020-02-15 PROCEDURE — 2060000000 HC ICU INTERMEDIATE R&B

## 2020-02-15 PROCEDURE — 85014 HEMATOCRIT: CPT

## 2020-02-15 PROCEDURE — 83605 ASSAY OF LACTIC ACID: CPT

## 2020-02-15 PROCEDURE — APPNB180 APP NON BILLABLE TIME > 60 MINS: Performed by: NURSE PRACTITIONER

## 2020-02-15 PROCEDURE — 6370000000 HC RX 637 (ALT 250 FOR IP): Performed by: NURSE PRACTITIONER

## 2020-02-15 PROCEDURE — 82947 ASSAY GLUCOSE BLOOD QUANT: CPT

## 2020-02-15 PROCEDURE — C9113 INJ PANTOPRAZOLE SODIUM, VIA: HCPCS | Performed by: NURSE PRACTITIONER

## 2020-02-15 PROCEDURE — 2580000003 HC RX 258: Performed by: NURSE PRACTITIONER

## 2020-02-15 PROCEDURE — 93005 ELECTROCARDIOGRAM TRACING: CPT | Performed by: NURSE PRACTITIONER

## 2020-02-15 PROCEDURE — 36415 COLL VENOUS BLD VENIPUNCTURE: CPT

## 2020-02-15 PROCEDURE — 85018 HEMOGLOBIN: CPT

## 2020-02-15 PROCEDURE — 6360000002 HC RX W HCPCS: Performed by: INTERNAL MEDICINE

## 2020-02-15 PROCEDURE — 85025 COMPLETE CBC W/AUTO DIFF WBC: CPT

## 2020-02-15 PROCEDURE — 6360000002 HC RX W HCPCS: Performed by: NURSE PRACTITIONER

## 2020-02-15 PROCEDURE — 99233 SBSQ HOSP IP/OBS HIGH 50: CPT | Performed by: INTERNAL MEDICINE

## 2020-02-15 PROCEDURE — 83735 ASSAY OF MAGNESIUM: CPT

## 2020-02-15 PROCEDURE — 0100U HC RESPIRPTHGN MULT REV TRANS & AMP PRB TECH 21 TRGT: CPT

## 2020-02-15 PROCEDURE — 80053 COMPREHEN METABOLIC PANEL: CPT

## 2020-02-15 PROCEDURE — 82248 BILIRUBIN DIRECT: CPT

## 2020-02-15 RX ORDER — ALBUTEROL SULFATE 2.5 MG/3ML
2.5 SOLUTION RESPIRATORY (INHALATION) EVERY 4 HOURS PRN
Status: DISCONTINUED | OUTPATIENT
Start: 2020-02-15 | End: 2020-02-19 | Stop reason: HOSPADM

## 2020-02-15 RX ORDER — OSELTAMIVIR PHOSPHATE 75 MG/1
75 CAPSULE ORAL 2 TIMES DAILY
Status: DISCONTINUED | OUTPATIENT
Start: 2020-02-15 | End: 2020-02-19 | Stop reason: HOSPADM

## 2020-02-15 RX ORDER — MAGNESIUM SULFATE 1 G/100ML
1 INJECTION INTRAVENOUS PRN
Status: DISCONTINUED | OUTPATIENT
Start: 2020-02-15 | End: 2020-02-19 | Stop reason: HOSPADM

## 2020-02-15 RX ADMIN — LACTULOSE 20 G: 10 SOLUTION ORAL at 09:20

## 2020-02-15 RX ADMIN — MAGNESIUM SULFATE HEPTAHYDRATE 1 G: 1 INJECTION, SOLUTION INTRAVENOUS at 12:45

## 2020-02-15 RX ADMIN — ACETAMINOPHEN 650 MG: 325 TABLET ORAL at 04:57

## 2020-02-15 RX ADMIN — LEVOTHYROXINE SODIUM 100 MCG: 100 TABLET ORAL at 07:56

## 2020-02-15 RX ADMIN — METOPROLOL TARTRATE 50 MG: 50 TABLET, FILM COATED ORAL at 00:52

## 2020-02-15 RX ADMIN — AZITHROMYCIN MONOHYDRATE 500 MG: 500 INJECTION, POWDER, LYOPHILIZED, FOR SOLUTION INTRAVENOUS at 16:09

## 2020-02-15 RX ADMIN — LACTULOSE 20 G: 10 SOLUTION ORAL at 21:00

## 2020-02-15 RX ADMIN — SODIUM CHLORIDE 8 MG/HR: 9 INJECTION, SOLUTION INTRAVENOUS at 15:18

## 2020-02-15 RX ADMIN — SENNOSIDES 8.6 MG: 8.6 TABLET, FILM COATED ORAL at 09:20

## 2020-02-15 RX ADMIN — VENLAFAXINE HYDROCHLORIDE 150 MG: 150 CAPSULE, EXTENDED RELEASE ORAL at 09:20

## 2020-02-15 RX ADMIN — SODIUM CHLORIDE 500 ML: 0.9 INJECTION, SOLUTION INTRAVENOUS at 00:15

## 2020-02-15 RX ADMIN — SODIUM CHLORIDE 8 MG/HR: 9 INJECTION, SOLUTION INTRAVENOUS at 03:03

## 2020-02-15 RX ADMIN — MAGNESIUM SULFATE HEPTAHYDRATE 1 G: 1 INJECTION, SOLUTION INTRAVENOUS at 10:58

## 2020-02-15 RX ADMIN — METOPROLOL TARTRATE 50 MG: 50 TABLET, FILM COATED ORAL at 21:00

## 2020-02-15 RX ADMIN — METOPROLOL TARTRATE 50 MG: 50 TABLET, FILM COATED ORAL at 09:20

## 2020-02-15 RX ADMIN — SODIUM CHLORIDE, POTASSIUM CHLORIDE, SODIUM LACTATE AND CALCIUM CHLORIDE: 600; 310; 30; 20 INJECTION, SOLUTION INTRAVENOUS at 15:06

## 2020-02-15 RX ADMIN — SODIUM CHLORIDE, PRESERVATIVE FREE 10 ML: 5 INJECTION INTRAVENOUS at 09:22

## 2020-02-15 RX ADMIN — SUCRALFATE 1 G: 1 TABLET ORAL at 00:52

## 2020-02-15 RX ADMIN — CEFTRIAXONE SODIUM 1 G: 1 INJECTION, POWDER, FOR SOLUTION INTRAMUSCULAR; INTRAVENOUS at 15:06

## 2020-02-15 RX ADMIN — MAGNESIUM SULFATE HEPTAHYDRATE 1 G: 1 INJECTION, SOLUTION INTRAVENOUS at 09:26

## 2020-02-15 RX ADMIN — MAGNESIUM SULFATE HEPTAHYDRATE 1 G: 1 INJECTION, SOLUTION INTRAVENOUS at 08:17

## 2020-02-15 RX ADMIN — ACETAMINOPHEN 650 MG: 325 TABLET ORAL at 08:07

## 2020-02-15 RX ADMIN — OSELTAMIVIR PHOSPHATE 75 MG: 75 CAPSULE ORAL at 11:37

## 2020-02-15 RX ADMIN — POTASSIUM BICARBONATE 40 MEQ: 782 TABLET, EFFERVESCENT ORAL at 09:19

## 2020-02-15 RX ADMIN — CALCIUM CARBONATE-CHOLECALCIFEROL TAB 250 MG-125 UNIT 500 MG: 250-125 TAB at 09:20

## 2020-02-15 ASSESSMENT — ENCOUNTER SYMPTOMS
SORE THROAT: 0
SHORTNESS OF BREATH: 0
CHEST TIGHTNESS: 0
EYE REDNESS: 0
ABDOMINAL DISTENTION: 0
EYE PAIN: 0
COUGH: 0
BACK PAIN: 0
EYE DISCHARGE: 0
WHEEZING: 0
VOMITING: 0
NAUSEA: 0
ABDOMINAL PAIN: 0
VOICE CHANGE: 0
RHINORRHEA: 0

## 2020-02-15 ASSESSMENT — PAIN DESCRIPTION - DESCRIPTORS: DESCRIPTORS: HEADACHE

## 2020-02-15 ASSESSMENT — PAIN SCALES - GENERAL
PAINLEVEL_OUTOF10: 5
PAINLEVEL_OUTOF10: 0
PAINLEVEL_OUTOF10: 5
PAINLEVEL_OUTOF10: 0

## 2020-02-15 ASSESSMENT — PAIN DESCRIPTION - ONSET: ONSET: UNABLE TO TELL

## 2020-02-15 ASSESSMENT — PAIN DESCRIPTION - PROGRESSION: CLINICAL_PROGRESSION: RAPIDLY WORSENING

## 2020-02-15 ASSESSMENT — PAIN DESCRIPTION - FREQUENCY: FREQUENCY: INTERMITTENT

## 2020-02-15 ASSESSMENT — PAIN DESCRIPTION - LOCATION: LOCATION: HEAD

## 2020-02-15 NOTE — PROGRESS NOTES
Earlier in the night, around 2200 the patient was lethargic, alert and oriented to self only at this time. Patient was febrile to 101.8, diaphoretic and hot. Patient was lethargic and unable to swallow at the time. Patient was able to follow commands and move all extremities, drowsy, could not answer some questions. Night NP, [facility], came to bedside placed orders. RN removed blankets, turned the temperature down in the room, and noticed the patient had multiple tissue with large thick brown mucous. RN removed the drinks from patient's bedside and straws from her drinks. Patient has been on clear liquid diet. RN gave her medications slow and assisted patient with drinking-monitoring her swallow. Patient remains drowsy, able to follows all commands and answer all the questions correctly. Temperature decreasing to 100.0, warm and dry at 0000. Patient remains coughing large, thick brown mucous in multiple tissues. Patient is diaphoretic and hot, fever is 102.6 0430. RN gave Tylenol 650mg with sips of water. Clinical Support and I gave a full bed bath, changed the sheets, and placed ice bags under her bilateral axilla and groin. RN consulted the Charge RN for a cooling blanket and they are not available in this unit. Patient removed the ice bags and the RN informed the patient the importance of leaving the ice bags in the certain areas. Patient verbalizes understanding and needs reinforcement from the RN to leave the ice bags in the remaining areas. During this time the patient was incontinent of urine and bed sheets and brief were saturated with urine. Patient was unaware so a bed bath, brief change, and new external catheter were placed. If needed I will attempt to obtain a cooling blanket. I also inquire if patient may needed to be assessed by Respiratory for breathing treatments or pulmonary toileting to loosen the thick mucous.

## 2020-02-15 NOTE — SIGNIFICANT EVENT
Asked to evaluate patient for the concerns tachycardia fever confusion    Nursing with concerns of  onset of lethargy/confusion in association with fever tachycardia. Physical Exam  Constitutional:       General: She is not in acute distress. Appearance: She is diaphoretic. Cardiovascular:      Rate and Rhythm: Tachycardia present. Pulmonary:      Effort: No accessory muscle usage. Breath sounds: Examination of the right-lower field reveals rhonchi. Rhonchi present. Comments: Moist congested cough  Mild tachypnea  Neurological:      General: No focal deficit present. Mental Status: She is easily aroused. GCS: GCS eye subscore is 3. GCS verbal subscore is 4. GCS motor subscore is 6. Cranial Nerves: No cranial nerve deficit or dysarthria. Sensory: No sensory deficit. Motor: No weakness. Vitals:    02/14/20 1900 02/14/20 1930 02/14/20 2100 02/14/20 2200   BP:  123/75 133/73 129/89   Pulse: 101 98 106 114   Resp: 24 27 27 25   Temp:  98.4 °F (36.9 °C)  101.8 °F (38.8 °C)   TempSrc:  Oral  Oral   SpO2: 97% 97% 95% 94%   Weight:       Height:           Date 02/14/20 0000 - 02/14/20 2359   Shift 4808-1659 0544-8606 5097-3216 24 Hour Total   INTAKE   P.O.  300 120 420   I. V.(mL/kg/hr) 910(3.6)  1078 1872   Shift Total(mL/kg) 794(9.8) 300(3.7) 2652(46.3) 1519(31.7)   OUTPUT   Urine(mL/kg/hr) 500(0.8) 450(0.7) 350 1300   Shift Total(mL/kg) 500(6.2) 450(5.6) 350(4.3) 1300(16.1)   Weight (kg) 80.8 80.8 80.8 80.8         DISPO/ Recommendations/  Plan    Will check ammonia  We will check chest x-ray  Check EKG  Blood cultures  Prolactin  Pro calcitonin    Okay to stay at current level unless other changes occur please feel free to call if there is continued decline or worsening of lab findings imaging findings.

## 2020-02-15 NOTE — PROGRESS NOTES
Occupational Therapy Not Seen Note    DATE: 2/15/2020  Name: Hector Bowers  : 1950  MRN: [id number]    Patient not available for Occupational Therapy due to: Other: +DVT on 2/10/2020, per GI, hold anticoagulation for 3 days. Per dept. protocol pt has to be on blood thinners for us to treat the pt with a DVT. Need written active order from pt's MD's to ok working with pt w/o being on blood thinners. RN informed. No anti-coagulation on board, no note from MD in chart. Per RN, pt additionally not medically appropriate for therapy on this date d/t +flu and spiking fever last night.      Next Scheduled Treatment: Re-check 2020    Electronically signed by SKYLER Henderson on 2/15/2020 at 10:54 AM

## 2020-02-15 NOTE — PROGRESS NOTES
[facility]    Progress Note    2/15/2020    6:43 PM    Name:   Lilian Collins  MRN:     [id number]     Acct:      [de-identified]   Room:   [facility] Day:  21  Admit Date:  1/26/2020  1:38 PM    PCP:   Cher Nicholas MD  Code Status:  DNR-CCA    Subjective:     C/C: anemia    Interval History Status: Worsened    Overnight NP was called to bedside due to concerns for tachycardia, fever and confusion. Patient was noted to be tachypneic and complaining of a moist cough. Stat x-ray was ordered. Respiratory viral panel was ordered. Patient noted to be influenza A positive. X-ray with new infiltrate concerning for pneumonia. Brief History:     ICU course, per documentation:     \"69 y. o. female with history of diabetes, hypothyroidism, CAD status post CABG in 2008, breast cancer on the left side status post chemotherapy and mastectomy in 2007 that presented to outside hospital [facility] on 1/23/2020 for complaints of generalized weakness and fatigue and flulike illness for about 1 month.  She was admitted there for anemia and hyperglycemia and hyponatremia however developed hypotension and had CT imaging which showed concern for duodenitis versus contained duodenal perforation.  Concern for intra-abdominal infection there and antibiotics were narrowed down to Zosyn, initially was on vancomycin as well.      CT chest showed extensive osteoblastic lesions and numerous soft tissue nodules in the greater omentum and mesentery suspicious for carcinomatosis. Tad Cable showed mild/moderate left side hydroureteronephrosis without any obstructing stone or mass     Ct abdomen-   Distended gallbladder.  No radiodense gallstones noted but ultrasound is   recommended.       Extensive metastatic disease is a risk for fracture at T10.      Had egd 1/30:    Significant inflammation and ulceration of the distal third of the esophagus with white thick discharge suspicious for Candida (path negative for candida)  Although malignancy is less likely but could not be ruled out gentle biopsies were taken           Deep 1/2 cm ulcer in the duodenal bulb just beyond the pyloric orifice, with edema causing some gastric outlet narrowing, was covered with large clotted blood iWatch most of the clotted blood, but there is adherent clot to the crater base which I could not clear I could not see any vessel protruding for which we went ahead and injected epinephrine around it 6 cc of the 1-10,000 epi, at this point there is no fresh blood to indicate any more bleeding which indicate that the bleeding has subsided     Retained food in the stomach related to the gastric outlet narrowing    Had iliac biopsy which was positive for metastatic breast ca    On 2/1 had   Procedure(s):  1. Long TFN of the left femur  2. Femoral neck biopsy  For impending femoral neck fracture due to mets        Medications:      Allergies:  No Known Allergies    Current Meds:   Scheduled Meds:    oseltamivir  75 mg Oral BID    cefTRIAXone (ROCEPHIN) IV  1 g Intravenous Q24H    azithromycin  500 mg Intravenous Q24H    insulin lispro  0-6 Units Subcutaneous TID     insulin lispro  0-3 Units Subcutaneous Nightly    sucralfate  1 g Oral 4 times per day    sodium chloride  20 mL Intravenous Once    metoprolol tartrate  50 mg Oral BID    lactulose  20 g Oral TID    sodium chloride  250 mL Intravenous Once    [Held by provider] metFORMIN  1,000 mg Oral BID     [Held by provider] linagliptin  5 mg Oral Daily    alteplase  2 mg Intracatheter Once    oyster shell calcium/vitamin D  2 tablet Oral Daily    [Held by provider] morphine  15 mg Oral 2 times per day    levothyroxine  100 mcg Oral Daily    venlafaxine  150 mg Oral Daily    docusate sodium  100 mg Oral BID    senna  1 tablet Oral BID    ferrous sulfate  325 mg Oral BID     sodium chloride flush  10 mL Intravenous 2 times per day These could be further characterized with bone scan or MRI with contrast.     Ct Biopsy Superficial Bone Percutaneous    Addendum Date: 1/28/2020    ADDENDUM: Addendum is for billing purposes only. Automated dose modulation and/ or weight based adjustment of the mA/kv was utilized to reduce the radiation dose to as low as reasonably achievable. Result Date: 1/28/2020  Technically successful CT-guided targeted right iliac bone core biopsy. Mri Brain W Wo Contrast    Result Date: 2/1/2020  1. Multiple calvarial metastases. 2. Abnormal dural thickening enhancement most evident overlying the left parietal and occipital lobes concerning for metastatic involvement given adjacent calvarial lesions. Meningitis is in the differential diagnosis but is thought unlikely. 3. Mild chronic white matter microvascular ischemic changes. No acute infarct or hemorrhage. Review of Systems   Constitutional: Positive for fatigue. Negative for activity change, appetite change, chills, diaphoresis and fever. HENT: Negative for congestion, rhinorrhea, sneezing, sore throat and voice change. Eyes: Negative for pain, discharge, redness and visual disturbance. Respiratory: Negative for cough, chest tightness, shortness of breath and wheezing. Cardiovascular: Negative for chest pain and palpitations. Gastrointestinal: Negative for abdominal distention, abdominal pain, nausea and vomiting. Genitourinary: Negative for difficulty urinating, dysuria, frequency and urgency. Musculoskeletal: Negative for arthralgias, back pain and myalgias. Neurological: Negative for dizziness, tremors, seizures, syncope and light-headedness. Psychiatric/Behavioral: Negative for agitation and sleep disturbance. The patient is not nervous/anxious. Physical Examination:        Physical Exam  Constitutional:       General: She is not in acute distress. Appearance: Normal appearance.  She is not ill-appearing, toxic-appearing 2/10/2020 Yes    Influenza A 2/15/2020 Yes    Pneumonia involving right lung 2/15/2020 Yes          Plan:          Principal Problem:  Metastatic breast adenocarcinoma, mets to bone. Hematology oncology on board with plan for chemotherapy and radiotherapy as outpatient. Back pain second to bone lesions. Pain control for now     Active Problems:    PNA right lung. Cef and azithro. Influenza A positive. Cont tamiflu. Acute RUE Subclavian, Axillary DVT. Per GI, continue to hold anticoagulation for 1 more day. Acute blood loss anemia secondary to bleeding duodenal ulcer. Per GI, continue PPI drip to complete additional 48 hours. Continue with Carafate 1 g 4 times daily crushed. Can advance to full liquid diet today. We will continue to hold anticoagulation antiplatelet therapy for an additional 24 hours. Liver lesion hematology following      Diabetes mellitus type 2 in obese ([address] uncontrolled insulin sliding scale      Essential hypertension continue home medication      Acquired hypothyroidism levothyroxine      Recurrent major depressive disorder, in remission ([address] continue home medication    Resolved Problems:    Septic shock developed during hospitalization ([address] status post IV antibiotics resolved ID recommendation appreciated watch off IV antibiotics.         Daryle Mutter, MD  2/15/2020  6:43 PM

## 2020-02-15 NOTE — PROGRESS NOTES
[facility]  Speech Language Pathology    Date: 2/15/2020  Patient Name: Carolyne Mazariegos  YOB: 1950   AGE: 77 y.o. MRN: [id number]        Patient Not Available for Speech Therapy     Due to:  [] Testing  [] Hemodialysis  [] Cancelled by RN  [] Surgery   [] Intubation/Sedation/Pain Medication  [] Medical instability  [x] Other: Attempted evaluation, however pt with nursing for patient care. ST to continue to follow. Next scheduled treatment: 2/16; 2/17    Completed by:  Merlin Peto, M.S. [id number] [facility] [place]

## 2020-02-15 NOTE — PROGRESS NOTES
GI [facility] 21 Gastroenterology  Progress Note       Subjective:    No signs of recurrent bleeding   Hg stable  Normal bowel movements  Denies any abdominal pain or discomfort  Tolerating CLD  Had episode of fever and lethargy overnight, drowsy  Found to be positive for H. Influenza       REASON FOR CONSULTATION:  GI hemorrhage, duodenal ulcer    HISTORY OF PRESENT ILLNESS:         Per initial consultation from 1/27/20:    \"The patient is a 77 y.o. female  who presents with intermittent abdominal pain has been worsening for the past week. Patient also has had recent flulike illness and has been having some GERD-like symptoms since then. Was transferred to ICU due to anemia and infection with concern for sepsis. Leukocytosis 13.5, hemoglobin stable 7.9 after transfusions. Transfused with 3 units red blood cells]. Patient does have a history of breast cancer, was treated with chemotherapy in 2007, denies any radiation. Had her right mastectomy in 2007. Patient denies ever seeing a gastroenterologist.  Has never had EGD or colonoscopy. Has been having regular bowel movements, brown, no black tarry stools noted. No hematemesis ulcer. Last bowel movement was 2 days ago. \"    Previous GI history:      PAST MEDICAL HISTORY:  Past Medical History:   Diagnosis Date    Breast cancer ([facility] 2007    CAD (coronary atherosclerotic disease)     Diabetes mellitus type 2 in obese ([facility]     Essential hypertension     Hypothyroidism (acquired)      Past Surgical History:   Procedure Laterality Date    CORONARY ARTERY BYPASS GRAFT  2007    FEMUR FRACTURE SURGERY Left 2/1/2020    [facility]  FEMUR FRACTURE IM NAILING AND LEFT FEMUR BIOPSY (SYNTHES, , C-ARM) performed by Lexii Nathan MD at [address] [place] [address] Rd  02/01/2020    nailing    JOINT REPLACEMENT Right 2009    MINDY    MASTECTOMY  2007    TUNNELED VENOUS PORT PLACEMENT      UPPER GASTROINTESTINAL ENDOSCOPY N/A 1/30/2020    EGD BIOPSY performed by María Artis MD at [facility]    UPPER GASTROINTESTINAL ENDOSCOPY  1/30/2020    EGD CONTROL HEMORRHAGE performed by María Artis MD at [address] [place] [address]  2/11/2020    EGD CONTROL HEMORRHAGE performed by Neo Sanchez MD at [address] [place] [address]  2/11/2020    EGD ESOPHAGOGASTRODUODENOSCOPY SCLEROTHERAPY performed by Neo Sanchez MD at [address] [place] [address] N/A 2/13/2020    EGD ESOPHAGOGASTRODUODENOSCOPY performed by Neo Sanchez MD at [facility]       ALLERGIES:  No Known Allergies    HOME MEDICATIONS:  Prior to Admission medications    Medication Sig Start Date End Date Taking?  Authorizing Provider   aspirin 81 MG tablet Take 1 tablet by mouth daily 2/13/20  Yes Sharee Bear MD   ferrous sulfate 325 (65 Fe) MG EC tablet Take 1 tablet by mouth 2 times daily (with meals) 2/6/20  Yes Sharee Bear MD   lactulose (CHRONULAC) 10 GM/15ML solution Take 30 mLs by mouth 3 times daily as needed (constipation) 2/6/20  Yes Sharee Bear MD   pantoprazole (PROTONIX) 40 MG tablet Take 1 tablet by mouth 2 times daily (before meals) 2/6/20  Yes Sharee Bear MD   venlafaxine (EFFEXOR XR) 150 MG extended release capsule Take 150 mg by mouth daily   Yes Historical Provider, MD   metFORMIN (GLUCOPHAGE) 1000 MG tablet Take 1,000 mg by mouth 2 times daily (with meals)   Yes Historical Provider, MD   levothyroxine (SYNTHROID) 100 MCG tablet Take 100 mcg by mouth Daily   Yes Historical Provider, MD   glimepiride (AMARYL) 4 MG tablet Take 4 mg by mouth 2 times daily   Yes Historical Provider, MD   lisinopril (PRINIVIL;ZESTRIL) 2.5 MG tablet Take 2.5 mg by mouth daily   Yes Historical Provider, MD   carvedilol (COREG) 6.25 MG tablet Take 6.25 mg by mouth 2 times daily (with meals)   Yes Historical Provider, MD   SITagliptin (JANUVIA) 100 MG tablet Take 100 mg by mouth daily   Yes Historical Provider, MD Kyler Chatterjee CURRENT MEDICATIONS:  Scheduled Meds:   oseltamivir  75 mg Oral BID    cefTRIAXone (ROCEPHIN) IV  1 g Intravenous Q24H    azithromycin  500 mg Intravenous Q24H    insulin lispro  0-6 Units Subcutaneous TID     insulin lispro  0-3 Units Subcutaneous Nightly    sucralfate  1 g Oral 4 times per day    sodium chloride  20 mL Intravenous Once    metoprolol tartrate  50 mg Oral BID    lactulose  20 g Oral TID    sodium chloride  250 mL Intravenous Once    [Held by provider] metFORMIN  1,000 mg Oral BID     [Held by provider] linagliptin  5 mg Oral Daily    alteplase  2 mg Intracatheter Once    oyster shell calcium/vitamin D  2 tablet Oral Daily    [Held by provider] morphine  15 mg Oral 2 times per day    levothyroxine  100 mcg Oral Daily    venlafaxine  150 mg Oral Daily    docusate sodium  100 mg Oral BID    senna  1 tablet Oral BID    ferrous sulfate  325 mg Oral BID WC    sodium chloride flush  10 mL Intravenous 2 times per day     . Continuous Infusions:   pantoprozole (PROTONIX) infusion 8 mg/hr (02/15/20 1518)    lactated ringers 75 mL/hr at 02/15/20 1506    dextrose       .   PRN Meds:magnesium sulfate, albuterol, potassium chloride **OR** potassium alternative oral replacement **OR** potassium chloride, fentanNYL, metoprolol, fentaNYL, sodium chloride flush, sodium chloride flush, acetaminophen, sodium chloride flush, magnesium hydroxide, ondansetron, bisacodyl, acetaminophen, glucose, dextrose, glucagon (rDNA), dextrose, oxyCODONE **OR** oxyCODONE  .  SOCIAL HISTORY:     Social History     Socioeconomic History    Marital status:      Spouse name: Not on file    Number of children: Not on file    Years of education: Not on file    Highest education level: Not on file   Occupational History    Not on file   Social Needs    Financial resource strain: Not on file    Food insecurity:     Worry: Not on file     Inability: Not on file    Transportation needs: results found for: LIVER-KIDNEYMICROSOMALAB    Ceruloplasmin:  No results found for: CERULOPLSM    Celiac panel:  No results found for: TISSTRNTIIGG, TTGIGA, IGA    Cancer Markers:  CEA:    No results for input(s): CEA in the last 72 hours. Ca 125:   No results for input(s):  in the last 72 hours. Ca 19-9:     Invalid input(s):   AFP: No results for input(s): AFP in the last 72 hours. ASSESSMENT and PLAN:     Briefly, 78 yo F w/ hx of DM, hypothyroidism, CAD s/p CABG in 2008, breast CA s/p mastectomy and chemotherapy, recurrence of disease with evidence of osseous metastatic disease, transferred from outside facility for s/s of GI hemorrhage and duodenitis, s/p index EGD which revealed duodenitis and duodenal erosions, endoscopic hemostasis provided, was placed on Lovenox, had recurrence of her UGI hemorrhage, underwent EGD x 2 this past week with achievement of hemostasis, most recently appeared low risk on endoscopy. Patient had episode of lethargy, fatigue, fever, + for influenza on swab. Recommendation:  -Continue with PPI gtt to complete additional 48 hrs.   -Continue with Carafate 1 g QID, crushed  -Can advance to FLD today  -Continue to hold AC/AP therapy for additional 24 hrs. Pt to continue use SCD for DVT ppx   -Continue to trend Hg, q 8-12 hr, transfuse to goal  -At this time, Hg remains stable, no signs of recurrent bleeding, normal bowel movements, hemodynamically stable, improving from GI standpoint, will continue to follow       Thank you for allowing us to take part in the patients care  Contact GI for any remaining questions, we are available.      Electronically signed by:  Yeni Velarde MD   THE [facility] [place] [facility]  2/15/2020    5:16 PM

## 2020-02-15 NOTE — PROGRESS NOTES
Today's Date: 2/15/2020  Patient Name: Lisandro Lucia  Date of admission: 1/26/2020  1:38 PM  Patient's age: 77 y. o., 1950  Admission Dx: [facility] [A41.9, R65.21]    Reason for Consult: management recommendations  Requesting Physician: Chani Babrosa MD    CHIEF COMPLAINT: Abdominal pain. History of breast cancer. Back pain. History Obtained From:  patient, electronic medical record    SUBJECTIVE:   Patient seen and examined. Clinically stable. Had fever last night  Denies any chest pain, shortness of breath, active bleeding. BRIEF CASE HISTORY:    The patient is a 77 y.o.  female who is admitted to the hospital for chief complaints of abdominal pain. According to the records patient has remote history of breast cancer diagnosed in 2007. There are no records available regarding treatment. Per patient she underwent left mastectomy and had around 13 lymph nodes involved. Subsequently patient underwent chemotherapy which she completed in 2008. Patient did not receive any radiation therapy. Patient was then given Arimidex for many years likely 5 years. Patient has not seen a oncologist for quite a few years now. Patient has been having back pain for a while likely many months. Patient started having bowel pain with a lot of nausea and vomiting around [place] time which persisted and got worse eventually patient presented to the ER at [facility] CT scan done per report shows possible duodenitis and concern regarding bowel perforation. Subsequently patient was transferred to [place].  Repeat CT does not show any concerning findings in the duodenum but does show blastic lesions involving bone especially T10. Patient denies any lower extremity weakness. Denies any saddle anesthesia. Denies any loss of control of bowel or bladder. Additional work-up also shows anemia and iron deficiency. Patient has never had a EGD or colonoscopy done.     Past tablet 5 mg  5 mg Oral Daily Pao Rodríguez MD   5 mg at 02/10/20 0911    fentaNYL (SUBLIMAZE) injection 25 mcg  25 mcg Intravenous Q5 Min PRN Neo Sanchez MD   25 mcg at 02/02/20 1143    sodium chloride flush 0.9 % injection 10 mL  10 mL Intravenous PRN Neo Sanchez MD        alteplase (CATHFLO) injection 2 mg  2 mg Intracatheter Once Neo Sanchez MD        oyster shell calcium/vitamin D 250-125 MG-UNIT per tablet 500 mg  2 tablet Oral Daily Neo Sanchez MD   500 mg at 02/15/20 0920    [Held by provider] morphine (MS CONTIN) extended release tablet 15 mg  15 mg Oral 2 times per day Mason Gentile DO   15 mg at 02/02/20 2132    sodium chloride flush 0.9 % injection 10 mL  10 mL Intravenous PRN Neo Sanchez MD   10 mL at 02/08/20 2025    acetaminophen (TYLENOL) tablet 650 mg  650 mg Oral Q4H PRN Neo Sanchez MD   650 mg at 02/15/20 0807    levothyroxine (SYNTHROID) tablet 100 mcg  100 mcg Oral Daily Pao Rodríguez MD   100 mcg at 02/15/20 0756    venlafaxine (EFFEXOR XR) extended release capsule 150 mg  150 mg Oral Daily Neo Sanchez MD   150 mg at 02/15/20 0920    docusate sodium (COLACE) capsule 100 mg  100 mg Oral BID Neo Sanchez MD   100 mg at 02/14/20 0900    senna (SENOKOT) tablet 8.6 mg  1 tablet Oral BID Pao Rodríguez MD   8.6 mg at 02/15/20 0920    ferrous sulfate EC tablet 325 mg  325 mg Oral BID  Neo Sanchez MD   325 mg at 02/14/20 1629    sodium chloride flush 0.9 % injection 10 mL  10 mL Intravenous 2 times per day Neo Sanchez MD   10 mL at 02/15/20 1641    sodium chloride flush 0.9 % injection 10 mL  10 mL Intravenous PRN Neo Sanchez MD   10 mL at 01/30/20 2042    magnesium hydroxide (MILK OF MAGNESIA) 400 MG/5ML suspension 30 mL  30 mL Oral Daily PRN Pao Rodríguez MD        ondansetron (ZOFRAN) injection 4 mg  4 mg Intravenous Q6H PRN Pao Rodríguez MD        bisacodyl (DULCOLAX) suppository 10 mg  10 mg Rectal Daily PRN Pao Rodríguez, MD        acetaminophen (TYLENOL) tablet 650 mg  650 mg Oral Q4H PRN Pao Rodríguez MD   650 mg at 02/15/20 0457    glucose (GLUTOSE) 40 % oral gel 15 g  15 g Oral PRN Pao Rodríguez MD        dextrose 50 % IV solution  12.5 g Intravenous PRN Pao Rodríguez MD        glucagon (rDNA) injection 1 mg  1 mg Intramuscular PRN Pao Rodríguez MD        dextrose 5 % solution  100 mL/hr Intravenous PRN Pao Rodríguez MD        oxyCODONE (ROXICODONE) immediate release tablet 5 mg  5 mg Oral Q4H PRN Pao Rodríguez MD   5 mg at 02/14/20 0550    Or    oxyCODONE (ROXICODONE) immediate release tablet 10 mg  10 mg Oral Q4H PRN El Singh MD   10 mg at 02/13/20 1830       Allergies:  Patient has no known allergies. Social History:   reports that she has never smoked. She has never used smokeless tobacco. She reports that she does not use drugs. Denies smoking. Denies taking alcohol. Family History: Hypertension and diabetes    REVIEW OF SYSTEMS:      Constitutional: No fever or chills. No night sweats, no weight loss   Eyes: No eye discharge, double vision, or eye pain   HEENT: negative for sore mouth, sore throat, hoarseness and voice change   Respiratory: negative for cough , sputum, dyspnea, wheezing, hemoptysis, chest pain   Cardiovascular: negative for chest pain, dyspnea, palpitations, orthopnea, PND   Gastrointestinal: negative for nausea, vomiting, diarrhea, constipation, abdominal pain, Dysphagia, hematemesis and hematochezia   Genitourinary: negative for frequency, dysuria, nocturia, urinary incontinence, and hematuria   Integument: negative for rash, skin lesions, bruises.    Hematologic/Lymphatic: negative for easy bruising, bleeding, lymphadenopathy, or petechiae   Endocrine: negative for heat or cold intolerance,weight changes, change in bowel habits and hair loss   Musculoskeletal: Positive back pain  Neurological: negative for headaches, dizziness, seizures, weakness, numbness    PHYSICAL EXAM:        /80   Pulse 86   Temp 99.1 °F (37.3 °C) (Oral)   Resp 25   Ht 5' 2\" (1.575 m)   Wt 178 lb 3.2 oz (80.8 kg)   SpO2 92%   Breastfeeding No   BMI 32.59 kg/m²    Temp (24hrs), Av.4 °F (38 °C), Min:98.4 °F (36.9 °C), Max:102.6 °F (39.2 °C)      General appearance -slightly  lethargic but able to answer questions. Eyes - pupils equal and reactive, extraocular eye movements intact   Ears - bilateral TM's and external ear canals normal   Mouth - mucous membranes moist, pharynx normal without lesions   Neck - supple, no significant adenopathy   Lymphatics - no palpable lymphadenopathy, no hepatosplenomegaly   Chest - clear to auscultation, no wheezes, rales or rhonchi, symmetric air entry   Heart - normal rate, regular rhythm, normal S1, S2, no murmurs  Abdomen - soft, nontender, nondistended, no masses or organomegaly   Neurological - alert, oriented, normal speech, no focal findings or movement disorder noted   Musculoskeletal - no joint tenderness, deformity or swelling   Extremities - peripheral pulses normal, no pedal edema, no clubbing or cyanosis   Skin - normal coloration and turgor, no rashes, no suspicious skin lesions noted ,  Breast left-sided mastectomy no signs of local recurrence    DATA:      Labs:     CBC:   Recent Labs     20  0541  02/15/20  0648 02/15/20  1022   WBC 9.7  --  12.2*  --    HGB 8.7*   < > 8.5* 8.5*   HCT 28.4*   < > 28.5* 27.8*   *  --  460*  --     < > = values in this interval not displayed. BMP:   Recent Labs     02/15/20  0018 02/15/20  0648    134*   K 3.9 3.3*   CO2 24 24   BUN 4* 4*   CREATININE 0.56 0.46*   LABGLOM >60 >60   GLUCOSE 158* 168*     PT/INR:   No results for input(s): PROTIME, INR in the last 72 hours. APTT:  No results for input(s): APTT in the last 72 hours.   LIVER PROFILE:  Recent Labs     02/15/20  0018   AST 13   ALT 6   LABALBU 2.6*   Us Renal Complete    Result Date: 2020  EXAMINATION: RETROPERITONEAL ULTRASOUND OF THE KIDNEYS AND URINARY BLADDER 1/26/2020 COMPARISON: None HISTORY: ORDERING SYSTEM PROVIDED HISTORY: left sided hydroureteronephrosis, eval kidneys TECHNOLOGIST PROVIDED HISTORY: left sided hydroureteronephrosis, eval kidneys FINDINGS: Kidneys: The right kidney measures 12.5 cm in length and the left kidney measures 12 cm in length. Kidneys demonstrate normal cortical echogenicity. Probable vascular calcifications are noted bilaterally. Mild left hydronephrosis. . Bladder: Bladder is decompressed with a Matt catheter. Mild left hydronephrosis     Ct Abdomen Pelvis W Iv Contrast Additional Contrast? Oral    Result Date: 1/26/2020  EXAMINATION: CT OF THE ABDOMEN AND PELVIS WITH CONTRAST 1/26/2020 5:25 pm TECHNIQUE: CT of the abdomen and pelvis was performed with the administration of intravenous contrast. Multiplanar reformatted images are provided for review. Dose modulation, iterative reconstruction, and/or weight based adjustment of the mA/kV was utilized to reduce the radiation dose to as low as reasonably achievable. COMPARISON: January 25, 2020 CT abdomen and pelvis HISTORY: ORDERING SYSTEM PROVIDED HISTORY: possible duodenitis versus contained bowel perforation seen on CT chest at [address] [place] [address] on 1/25 TECHNOLOGIST PROVIDED HISTORY: possible duodenitis versus contained bowel perforation seen on CT chest at [address] [place] [address] on 1/25 Reason for Exam: possible duodenitis versus contained bowel perforation seen on CT chest at [address] [place] [address] on 1/25 Acuity: Acute Type of Exam: Subsequent/Follow-up FINDINGS: Lower Chest: Cardiomegaly. Calcific coronary artery disease. Pacer wire right heart. Organs: Fat containing umbilical hernia. The liver, spleen, pancreas, and adrenals appear normal.  Distended gallbladder. No radiodense gallstones. Kidneys appear normal. Matt catheter decompresses the bladder. GI/Bowel: The stomach,small bowel, and colon appear normal. Increased stool throughout the colon.   Oral contrast is noted in the stomach, small bowel and colon. The appendix is not identified. Pelvis: Uterus normal. Peritoneum/Retroperitoneum: The abdominal aorta and iliac arteries are normal in caliber. There is no pathologic adenopathy. 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 Right total hip arthroplasty. Distended gallbladder. No radiodense gallstones noted but ultrasound is recommended. Extensive metastatic disease is a risk for fracture at T10. Us Gallbladder Ruq    Result Date: 1/27/2020  EXAMINATION: RIGHT UPPER QUADRANT ULTRASOUND 1/27/2020 10:23 am COMPARISON: CT abdomen and pelvis January 26, 2020 HISTORY: ORDERING SYSTEM PROVIDED HISTORY: Rule out liver cirrhosis, concern for aclaclulus cholecystitis TECHNOLOGIST PROVIDED HISTORY: Rule out liver cirrhosis, concern for aclaclulus cholecystitis FINDINGS: Heterogeneous increased liver echotexture. No focal liver lesion. No right-sided hydronephrosis. Nonvisualization of the pancreas. No gallbladder wall thickening. No gallstones. No sonographic Nunez sign. Common bile duct is within normal limits measuring 5 mm in diameter. Minimal amount of free fluid within the right upper quadrant. Minimal amount of free fluid within the upper abdomen. Heterogeneous increased liver echogenicity could be on the basis of hepatocellular disease or hepatic steatosis. Xr Chest Portable    Result Date: 1/26/2020  EXAMINATION: ONE XRAY VIEW OF THE CHEST 1/26/2020 2:53 pm COMPARISON: None. HISTORY: ORDERING SYSTEM PROVIDED HISTORY: PICC line placement TECHNOLOGIST PROVIDED HISTORY: PICC line placement FINDINGS: The lungs are without consolidation effusion. There is no pneumothorax. The heart is prominent. The upper abdomen is unremarkable. The extrathoracic soft tissues are unremarkable. There is a right subclavian port with the tip in the mid SVC.   There is a right-sided PICC line with intending to generate a document that actually reflects the content of the visit, the document can still have some errors including those of syntax and sound a like substitutions which may escape proof reading. It such instances, actual meaning can be extrapolated by contextual diversion.

## 2020-02-16 LAB
ABSOLUTE EOS #: <0.03 K/UL (ref 0–0.44)
ABSOLUTE IMMATURE GRANULOCYTE: 0.06 K/UL (ref 0–0.3)
ABSOLUTE LYMPH #: 1.06 K/UL (ref 1.1–3.7)
ABSOLUTE MONO #: 0.99 K/UL (ref 0.1–1.2)
ANION GAP SERPL CALCULATED.3IONS-SCNC: 13 MMOL/L (ref 9–17)
BASOPHILS # BLD: 1 % (ref 0–2)
BASOPHILS ABSOLUTE: 0.09 K/UL (ref 0–0.2)
BUN BLDV-MCNC: 8 MG/DL (ref 8–23)
BUN/CREAT BLD: ABNORMAL (ref 9–20)
CALCIUM SERPL-MCNC: 7.7 MG/DL (ref 8.6–10.4)
CHLORIDE BLD-SCNC: 95 MMOL/L (ref 98–107)
CO2: 25 MMOL/L (ref 20–31)
CREAT SERPL-MCNC: 0.51 MG/DL (ref 0.5–0.9)
DIFFERENTIAL TYPE: ABNORMAL
EOSINOPHILS RELATIVE PERCENT: 0 % (ref 1–4)
GFR AFRICAN AMERICAN: >60 ML/MIN
GFR NON-AFRICAN AMERICAN: >60 ML/MIN
GFR SERPL CREATININE-BSD FRML MDRD: ABNORMAL ML/MIN/{1.73_M2}
GFR SERPL CREATININE-BSD FRML MDRD: ABNORMAL ML/MIN/{1.73_M2}
GLUCOSE BLD-MCNC: 106 MG/DL (ref 65–105)
GLUCOSE BLD-MCNC: 106 MG/DL (ref 65–105)
GLUCOSE BLD-MCNC: 112 MG/DL (ref 65–105)
GLUCOSE BLD-MCNC: 120 MG/DL (ref 65–105)
GLUCOSE BLD-MCNC: 122 MG/DL (ref 70–99)
GLUCOSE BLD-MCNC: 127 MG/DL (ref 65–105)
HCT VFR BLD CALC: 27.7 % (ref 36.3–47.1)
HCT VFR BLD CALC: 29.2 % (ref 36.3–47.1)
HEMOGLOBIN: 8.8 G/DL (ref 11.9–15.1)
HEMOGLOBIN: 8.9 G/DL (ref 11.9–15.1)
IMMATURE GRANULOCYTES: 1 %
LYMPHOCYTES # BLD: 10 % (ref 24–43)
MAGNESIUM: 1.9 MG/DL (ref 1.6–2.6)
MCH RBC QN AUTO: 27.6 PG (ref 25.2–33.5)
MCHC RBC AUTO-ENTMCNC: 30.5 G/DL (ref 28.4–34.8)
MCV RBC AUTO: 90.4 FL (ref 82.6–102.9)
MONOCYTES # BLD: 9 % (ref 3–12)
NRBC AUTOMATED: 0 PER 100 WBC
PDW BLD-RTO: 15.9 % (ref 11.8–14.4)
PLATELET # BLD: 465 K/UL (ref 138–453)
PLATELET ESTIMATE: ABNORMAL
PMV BLD AUTO: 9.5 FL (ref 8.1–13.5)
POTASSIUM SERPL-SCNC: 3.5 MMOL/L (ref 3.7–5.3)
RBC # BLD: 3.23 M/UL (ref 3.95–5.11)
RBC # BLD: ABNORMAL 10*6/UL
SEG NEUTROPHILS: 79 % (ref 36–65)
SEGMENTED NEUTROPHILS ABSOLUTE COUNT: 8.38 K/UL (ref 1.5–8.1)
SODIUM BLD-SCNC: 133 MMOL/L (ref 135–144)
WBC # BLD: 10.6 K/UL (ref 3.5–11.3)
WBC # BLD: ABNORMAL 10*3/UL

## 2020-02-16 PROCEDURE — 36415 COLL VENOUS BLD VENIPUNCTURE: CPT

## 2020-02-16 PROCEDURE — 2060000000 HC ICU INTERMEDIATE R&B

## 2020-02-16 PROCEDURE — 99232 SBSQ HOSP IP/OBS MODERATE 35: CPT | Performed by: INTERNAL MEDICINE

## 2020-02-16 PROCEDURE — 6360000002 HC RX W HCPCS: Performed by: NURSE PRACTITIONER

## 2020-02-16 PROCEDURE — 6360000002 HC RX W HCPCS: Performed by: INTERNAL MEDICINE

## 2020-02-16 PROCEDURE — 85025 COMPLETE CBC W/AUTO DIFF WBC: CPT

## 2020-02-16 PROCEDURE — 6370000000 HC RX 637 (ALT 250 FOR IP): Performed by: INTERNAL MEDICINE

## 2020-02-16 PROCEDURE — 2580000003 HC RX 258: Performed by: NURSE PRACTITIONER

## 2020-02-16 PROCEDURE — 2700000000 HC OXYGEN THERAPY PER DAY

## 2020-02-16 PROCEDURE — 94761 N-INVAS EAR/PLS OXIMETRY MLT: CPT

## 2020-02-16 PROCEDURE — 82947 ASSAY GLUCOSE BLOOD QUANT: CPT

## 2020-02-16 PROCEDURE — 2580000003 HC RX 258: Performed by: INTERNAL MEDICINE

## 2020-02-16 PROCEDURE — 6370000000 HC RX 637 (ALT 250 FOR IP): Performed by: NURSE PRACTITIONER

## 2020-02-16 PROCEDURE — 80048 BASIC METABOLIC PNL TOTAL CA: CPT

## 2020-02-16 PROCEDURE — 92523 SPEECH SOUND LANG COMPREHEN: CPT

## 2020-02-16 PROCEDURE — 85018 HEMOGLOBIN: CPT

## 2020-02-16 PROCEDURE — 85014 HEMATOCRIT: CPT

## 2020-02-16 PROCEDURE — APPNB45 APP NON BILLABLE 31-45 MINUTES: Performed by: INTERNAL MEDICINE

## 2020-02-16 PROCEDURE — C9113 INJ PANTOPRAZOLE SODIUM, VIA: HCPCS | Performed by: NURSE PRACTITIONER

## 2020-02-16 PROCEDURE — 99233 SBSQ HOSP IP/OBS HIGH 50: CPT | Performed by: INTERNAL MEDICINE

## 2020-02-16 PROCEDURE — 83735 ASSAY OF MAGNESIUM: CPT

## 2020-02-16 RX ADMIN — SENNOSIDES 8.6 MG: 8.6 TABLET, FILM COATED ORAL at 09:25

## 2020-02-16 RX ADMIN — ACETAMINOPHEN 650 MG: 325 TABLET ORAL at 18:55

## 2020-02-16 RX ADMIN — DOCUSATE SODIUM 100 MG: 100 CAPSULE, LIQUID FILLED ORAL at 09:24

## 2020-02-16 RX ADMIN — CEFTRIAXONE SODIUM 1 G: 1 INJECTION, POWDER, FOR SOLUTION INTRAMUSCULAR; INTRAVENOUS at 15:36

## 2020-02-16 RX ADMIN — OSELTAMIVIR PHOSPHATE 75 MG: 75 CAPSULE ORAL at 09:24

## 2020-02-16 RX ADMIN — LEVOTHYROXINE SODIUM 100 MCG: 100 TABLET ORAL at 09:25

## 2020-02-16 RX ADMIN — AZITHROMYCIN MONOHYDRATE 500 MG: 500 INJECTION, POWDER, LYOPHILIZED, FOR SOLUTION INTRAVENOUS at 16:47

## 2020-02-16 RX ADMIN — OSELTAMIVIR PHOSPHATE 75 MG: 75 CAPSULE ORAL at 19:57

## 2020-02-16 RX ADMIN — SODIUM CHLORIDE 8 MG/HR: 9 INJECTION, SOLUTION INTRAVENOUS at 00:24

## 2020-02-16 RX ADMIN — SUCRALFATE 1 G: 1 TABLET ORAL at 18:27

## 2020-02-16 RX ADMIN — SODIUM CHLORIDE, POTASSIUM CHLORIDE, SODIUM LACTATE AND CALCIUM CHLORIDE: 600; 310; 30; 20 INJECTION, SOLUTION INTRAVENOUS at 08:59

## 2020-02-16 RX ADMIN — METOPROLOL TARTRATE 50 MG: 50 TABLET, FILM COATED ORAL at 19:57

## 2020-02-16 RX ADMIN — LACTULOSE 20 G: 10 SOLUTION ORAL at 09:24

## 2020-02-16 RX ADMIN — CALCIUM CARBONATE-CHOLECALCIFEROL TAB 250 MG-125 UNIT 500 MG: 250-125 TAB at 09:25

## 2020-02-16 RX ADMIN — POTASSIUM CHLORIDE 40 MEQ: 1500 TABLET, EXTENDED RELEASE ORAL at 11:55

## 2020-02-16 RX ADMIN — SODIUM CHLORIDE 8 MG/HR: 9 INJECTION, SOLUTION INTRAVENOUS at 15:07

## 2020-02-16 RX ADMIN — METOPROLOL TARTRATE 50 MG: 50 TABLET, FILM COATED ORAL at 09:26

## 2020-02-16 RX ADMIN — FERROUS SULFATE TAB EC 325 MG (65 MG FE EQUIVALENT) 325 MG: 325 (65 FE) TABLET DELAYED RESPONSE at 19:56

## 2020-02-16 RX ADMIN — FERROUS SULFATE TAB EC 325 MG (65 MG FE EQUIVALENT) 325 MG: 325 (65 FE) TABLET DELAYED RESPONSE at 09:25

## 2020-02-16 RX ADMIN — VENLAFAXINE HYDROCHLORIDE 150 MG: 150 CAPSULE, EXTENDED RELEASE ORAL at 09:27

## 2020-02-16 RX ADMIN — SUCRALFATE 1 G: 1 TABLET ORAL at 01:00

## 2020-02-16 ASSESSMENT — ENCOUNTER SYMPTOMS
ABDOMINAL PAIN: 0
SHORTNESS OF BREATH: 0
BACK PAIN: 0
WHEEZING: 0
VOICE CHANGE: 0
EYE REDNESS: 0
SORE THROAT: 0
RHINORRHEA: 0
VOMITING: 0
CHEST TIGHTNESS: 0
EYE DISCHARGE: 0
ABDOMINAL DISTENTION: 0
COUGH: 0
NAUSEA: 0
EYE PAIN: 0

## 2020-02-16 ASSESSMENT — PAIN SCALES - GENERAL
PAINLEVEL_OUTOF10: 6
PAINLEVEL_OUTOF10: 0

## 2020-02-16 ASSESSMENT — PAIN - FUNCTIONAL ASSESSMENT: PAIN_FUNCTIONAL_ASSESSMENT: 0-10

## 2020-02-16 NOTE — PROGRESS NOTES
THE [facility] [place] [facility] Progress Note    Tammy Jacobo is a 77 y.o. female patient. Hospitalization Day:21      Chief consult reason: Request to re-eval duodenal ulcer prior to [facility] or [facility] for right upper extremity DVT      Subjective: Patient seen and examined while laying in bed. Watching TV. No complaints of abdominal pain. Had bowel movement. No melena or hematochezia. Patient would like to advance diet. VITALS:  /71   Pulse 84   Temp 99 °F (37.2 °C) (Oral)   Resp 17   Ht 5' 2\" (1.575 m)   Wt 178 lb 3.2 oz (80.8 kg)   SpO2 96%   Breastfeeding No   BMI 32.59 kg/m²   TEMPERATURE:  Current - Temp: 99 °F (37.2 °C); Max - Temp  Av.6 °F (37.6 °C)  Min: 98.8 °F (37.1 °C)  Max: 101.1 °F (38.4 °C)    Physical Assessment:  General appearance:  alert, cooperative and no distress  Mental Status:  oriented to person, place and time and normal affect  Lungs:  clear to auscultation bilaterally, normal effort  Heart:  regular rate and rhythm, no murmur  Abdomen:  soft, nontender, nondistended, normal bowel sounds  Extremities:  no edema, redness, tenderness in the calves  Skin:  warm, dry, no gross lesions or rashes    Data Review:  LABS and IMAGING:     CBC  Recent Labs     20  0541  02/14/20  2027 02/15/20  0648 02/15/20  1022 02/15/20  1943 20  0647   WBC 9.7  --   --  12.2*  --   --  10.6   HGB 8.7*   < > 9.5* 8.5* 8.5* 8.9* 8.9*   MCV 90.7  --   --  92.8  --   --  90.4   RDW 15.7*  --   --  15.8*  --   --  15.9*   *  --   --  460*  --   --  465*    < > = values in this interval not displayed. ANEMIA STUDIES  No results for input(s): LABIRON, TIBC, IRON, FERRITIN, [id number], FOLATE, OCCULTBLD in the last 72 hours.     BMP  Recent Labs     02/15/20  0018 02/15/20  0648 20  0647    134* 133*   K 3.9 3.3* 3.5*   CL 97* 97* 95*   CO2 24 24 25   BUN 4* 4* 8   CREATININE 0.56 0.46* 0.51   GLUCOSE 158* 168* 122*   CALCIUM 8.4* 8.0* 7.7*       LFTS  Recent Labs Problems:    Septic shock (HCC)       GI Assessment:    Ms. Shraddha Dunham is a 77 y.o. female followed for management of needing duodenal ulcer. Status post hemostasis. She was found to have DVT. She needs blood thinners. GI issues     1. GI bleed  2. Anemia  3. DVT RU extremity     01/30/2020 - EGD - Deep 1/2 CM duodenal ulcer just beyond the pylorus covered with large blood clot  02/11/2020 - EGD - bleeding duodenal ulcer  02/13/2020 - EGD - healing duodenal ulcers     02/16/2020 -hemoglobin stable. No signs of GI bleeding. Tolerating clear liquid diet      Plan of care:  1. Monitor serial hemoglobin and hematocrit. 2. Continue PPI drip x24 more hours . 3.  Continue Carafate 1 g 4 times daily, crushed and administered and a small amount of water as a slurry  4. Advance to low fiber, dental soft diet  5. Ok to restart AC/AP with caution while trending H&H  6. Stop lactulose     Please feel free to contact me with any questions or concerns. Thank you for allowing me to participate in the care of your patient. SERAFIN Bruno - CNP on 2/16/2020 at 7:34 AM   THE [facility] [place] [facility]    Please note that this note was generated using a voice recognition dictation software. Although every effort was made to ensure the accuracy of this automated transcription, some errors in transcription may have occurred.

## 2020-02-16 NOTE — PROGRESS NOTES
Alex Estes 20    Progress Note    2/16/2020    8:51 AM    Name:   Lilian Collins  MRN:     [id number]     Acct:      [de-identified]   Room:   [address] [place] [address] Day:  21  Admit Date:  1/26/2020  1:38 PM    PCP:   Cher Nicholas MD  Code Status:  DNR-CCA    Subjective:     C/C: anemia    Interval History Status: Improving    Patient's cough and shortness of breath improving. No melena, hematochezia. Hgb stable. Repeat duplex US RUE today. Brief History:     ICU course, per documentation:     \"69 y. o. female with history of diabetes, hypothyroidism, CAD status post CABG in 2008, breast cancer on the left side status post chemotherapy and mastectomy in 2007 that presented to outside hospital [facility] on 1/23/2020 for complaints of generalized weakness and fatigue and flulike illness for about 1 month.  She was admitted there for anemia and hyperglycemia and hyponatremia however developed hypotension and had CT imaging which showed concern for duodenitis versus contained duodenal perforation.  Concern for intra-abdominal infection there and antibiotics were narrowed down to Zosyn, initially was on vancomycin as well.      CT chest showed extensive osteoblastic lesions and numerous soft tissue nodules in the greater omentum and mesentery suspicious for carcinomatosis. Tad Cable showed mild/moderate left side hydroureteronephrosis without any obstructing stone or mass     Ct abdomen-   Distended gallbladder.  No radiodense gallstones noted but ultrasound is   recommended.       Extensive metastatic disease is a risk for fracture at T10.      Had egd 1/30:    Significant inflammation and ulceration of the distal third of the esophagus with white thick discharge suspicious for Candida (path negative for candida)  Although malignancy is less likely but could not be ruled out gentle biopsies were taken           Deep 1/2 cm ulcer in the duodenal bulb just beyond the pyloric orifice, with edema causing some gastric outlet narrowing, was covered with large clotted blood iWatch most of the clotted blood, but there is adherent clot to the crater base which I could not clear I could not see any vessel protruding for which we went ahead and injected epinephrine around it 6 cc of the 1-10,000 epi, at this point there is no fresh blood to indicate any more bleeding which indicate that the bleeding has subsided     Retained food in the stomach related to the gastric outlet narrowing    Had iliac biopsy which was positive for metastatic breast ca    On 2/1 had   Procedure(s):  1. Long TFN of the left femur  2. Femoral neck biopsy  For impending femoral neck fracture due to mets        Medications:      Allergies:  No Known Allergies    Current Meds:   Scheduled Meds:    oseltamivir  75 mg Oral BID    cefTRIAXone (ROCEPHIN) IV  1 g Intravenous Q24H    azithromycin  500 mg Intravenous Q24H    insulin lispro  0-6 Units Subcutaneous TID WC    insulin lispro  0-3 Units Subcutaneous Nightly    sucralfate  1 g Oral 4 times per day    sodium chloride  20 mL Intravenous Once    metoprolol tartrate  50 mg Oral BID    lactulose  20 g Oral TID    sodium chloride  250 mL Intravenous Once    [Held by provider] metFORMIN  1,000 mg Oral BID WC    [Held by provider] linagliptin  5 mg Oral Daily    alteplase  2 mg Intracatheter Once    oyster shell calcium/vitamin D  2 tablet Oral Daily    [Held by provider] morphine  15 mg Oral 2 times per day    levothyroxine  100 mcg Oral Daily    venlafaxine  150 mg Oral Daily    docusate sodium  100 mg Oral BID    senna  1 tablet Oral BID    ferrous sulfate  325 mg Oral BID WC    sodium chloride flush  10 mL Intravenous 2 times per day     Continuous Infusions:    pantoprozole (PROTONIX) infusion 8 mg/hr (02/16/20 0024)    lactated ringers 75 mL/hr at 02/15/20 1506    dextrose       PRN Meds: magnesium sulfate, albuterol, potassium 02/15/20  0648 02/16/20  0647    134* 133*   K 3.9 3.3* 3.5*   CL 97* 97* 95*   CO2 24 24 25   GLUCOSE 158* 168* 122*   BUN 4* 4* 8   CREATININE 0.56 0.46* 0.51   MG 1.3* 1.3* 1.9   ANIONGAP 16 13 13   LABGLOM >60 >60 >60   GFRAA >60 >60 >60   CALCIUM 8.4* 8.0* 7.7*     Recent Labs     02/14/20 2158 02/15/20  0017 02/15/20  0018 02/15/20  0713 02/15/20  1231 02/15/20  1544 02/15/20  2148 02/16/20  0628   PROT  --   --  7.1  --   --   --   --   --    LABALBU  --   --  2.6*  --   --   --   --   --    AST  --   --  13  --   --   --   --   --    ALT  --   --  6  --   --   --   --   --    ALKPHOS  --   --  148*  --   --   --   --   --    BILITOT  --   --  0.31  --   --   --   --   --    BILIDIR  --   --  0.12  --   --   --   --   --    AMMONIA  --  54*  --   --   --   --   --   --    POCGLU 112*  --   --  154* 145* 146* 130* 106*     ABG:  Lab Results   Component Value Date    FIO2 INFORMATION NOT PROVIDED 02/15/2020     Lab Results   Component Value Date/Time    SPECIAL RT AC 1ML 02/15/2020 12:12 AM     Lab Results   Component Value Date/Time    CULTURE NO GROWTH 1 DAY 02/15/2020 12:12 AM       Radiology:  Sven Whitlock Hip Left (2-3 Views)    Result Date: 1/28/2020  There is no acute fracture of the left hip There is no acute fracture in the pelvis. Detail of the sacrum is markedly limited. There is subtle double density along the margin of the superior pubic ramus on the left without definitive fracture line at patient's pain persists, consider CT exam No acute osseous abnormality of the right femur or the left femur     Xr Femur Left (min 2 Views)    Result Date: 2/1/2020  Satisfactory postoperative appearance following ORIF of the left femur. No evident complication. Xr Femur Left (min 2 Views)    Result Date: 1/28/2020  There is no acute fracture of the left hip There is no acute fracture in the pelvis. Detail of the sacrum is markedly limited.   There is subtle double density along the margin of the superior pubic ramus on the left without definitive fracture line at patient's pain persists, consider CT exam No acute osseous abnormality of the right femur or the left femur     Xr Femur Right (min 2 Views)    Result Date: 1/28/2020  There is no acute fracture of the left hip There is no acute fracture in the pelvis. Detail of the sacrum is markedly limited. There is subtle double density along the margin of the superior pubic ramus on the left without definitive fracture line at patient's pain persists, consider CT exam No acute osseous abnormality of the right femur or the left femur     Mri Thoracic Spine W Wo Contrast    Result Date: 1/29/2020  Osseous metastatic disease with diffuse marrow infiltration of T10. Mild extraosseous extension of tumor suspected along the left neural foramina a T10. Us Gallbladder Ruq    Result Date: 1/27/2020  Minimal amount of free fluid within the upper abdomen. Heterogeneous increased liver echogenicity could be on the basis of hepatocellular disease or hepatic steatosis. Xr Pelvis (min 3 Views)    Result Date: 1/28/2020  There is no acute fracture of the left hip There is no acute fracture in the pelvis. Detail of the sacrum is markedly limited. There is subtle double density along the margin of the superior pubic ramus on the left without definitive fracture line at patient's pain persists, consider CT exam No acute osseous abnormality of the right femur or the left femur     Ct Femur Left Wo Contrast    Result Date: 1/31/2020  Multiple osseous metastatic lesions in the left ischium, likely anterior left acetabular wall, and probably in the intertrochanteric and lesser trochanteric portions of the left femur. These could be further characterized with bone scan or MRI with contrast.     Ct Biopsy Superficial Bone Percutaneous    Addendum Date: 1/28/2020    ADDENDUM: Addendum is for billing purposes only.   Automated dose modulation and/ or weight based adjustment of the mA/kv was utilized to reduce the radiation dose to as low as reasonably achievable. Result Date: 1/28/2020  Technically successful CT-guided targeted right iliac bone core biopsy. Mri Brain W Wo Contrast    Result Date: 2/1/2020  1. Multiple calvarial metastases. 2. Abnormal dural thickening enhancement most evident overlying the left parietal and occipital lobes concerning for metastatic involvement given adjacent calvarial lesions. Meningitis is in the differential diagnosis but is thought unlikely. 3. Mild chronic white matter microvascular ischemic changes. No acute infarct or hemorrhage. Review of Systems   Constitutional: Positive for fatigue. Negative for activity change, appetite change, chills, diaphoresis and fever. HENT: Negative for congestion, rhinorrhea, sneezing, sore throat and voice change. Eyes: Negative for pain, discharge, redness and visual disturbance. Respiratory: Negative for cough, chest tightness, shortness of breath and wheezing. Cardiovascular: Negative for chest pain and palpitations. Gastrointestinal: Negative for abdominal distention, abdominal pain, nausea and vomiting. Genitourinary: Negative for difficulty urinating, dysuria, frequency and urgency. Musculoskeletal: Negative for arthralgias, back pain and myalgias. Neurological: Negative for dizziness, tremors, seizures, syncope and light-headedness. Psychiatric/Behavioral: Negative for agitation and sleep disturbance. The patient is not nervous/anxious. Physical Examination:        Physical Exam  Constitutional:       General: She is not in acute distress. Appearance: Normal appearance. She is not ill-appearing, toxic-appearing or diaphoretic. HENT:      Head: Normocephalic and atraumatic. Nose: Nose normal.      Mouth/Throat:      Mouth: Mucous membranes are dry. Eyes:      General: No scleral icterus. Right eye: No discharge. Left eye: No discharge. Conjunctiva/sclera: Conjunctivae normal.   Cardiovascular:      Rate and Rhythm: Normal rate and regular rhythm. Pulses: Normal pulses. Heart sounds: Normal heart sounds. Pulmonary:      Effort: Pulmonary effort is normal. No respiratory distress. Breath sounds: Normal breath sounds. No wheezing, rhonchi or rales. Chest:      Chest wall: No tenderness. Abdominal:      General: Abdomen is flat. Bowel sounds are normal. There is no distension. Palpations: Abdomen is soft. There is no mass. Tenderness: There is no abdominal tenderness. There is no guarding or rebound. Skin:     Findings: No lesion or rash. Neurological:      General: No focal deficit present. Mental Status: She is oriented to person, place, and time. Mental status is at baseline. Psychiatric:         Mood and Affect: Mood normal.         Assessment:        Hospital Problems           Last Modified POA    * (Principal) Metastatic breast cancer ([address] 2/2/2020 Yes    Overview Signed 2/2/2020  1:13 PM by Olivia Dumont Blood, DO     To bones, calvarium, dura,          Anemia 1/30/2020 Yes    Liver lesion 1/26/2020 Yes    History of breast cancer 1/27/2020 Yes    Diabetes mellitus type 2 in obese ([address] (Chronic) 1/29/2020 Yes    Essential hypertension (Chronic) 1/29/2020 Yes    Acquired hypothyroidism (Chronic) 1/29/2020 Yes    Recurrent major depressive disorder, in remission ([address] (Chronic) 1/29/2020 Yes    Bone lesion 1/30/2020 Yes    Iron deficiency 1/30/2020 Yes    Acute duodenal ulcer with gastric outlet obstruction 2/3/2020 Yes    Ulcer of esophagus without bleeding 2/3/2020 Yes    Encounter for palliative care 2/3/2020 Yes    Deep venous thrombosis ([address] 2/10/2020 Yes    Influenza A 2/15/2020 Yes    Pneumonia involving right lung 2/15/2020 Yes    Metastatic carcinoma ([address] 2/15/2020 Yes          Plan:        Principal Problem:  Metastatic breast adenocarcinoma, mets to bone.   Hematology oncology on board with plan for chemotherapy and radiotherapy as outpatient. Back pain second to bone lesions. Pain control for now     Active Problems:    PNA right lung. Cef and azithro. Influenza A positive. Cont tamiflu. Acute RUE Subclavian, Axillary DVT. Getting repeat duplex US RUE. If DVT persists, will start AV for total duration of 3 months. If resolved/resolving, will avoid AC at this time. Per GI, may start AC today if mandated. Acute blood loss anemia secondary to bleeding duodenal ulcer. Per GI, continue PPI drip to complete additional 48 hours. Continue with Carafate 1 g 4 times daily crushed. Can advance to full liquid diet today. We will continue to hold anticoagulation antiplatelet therapy for an additional 24 hours. Liver lesion hematology following      Diabetes mellitus type 2 in obese ([facility] uncontrolled insulin sliding scale      Essential hypertension continue home medication      Acquired hypothyroidism levothyroxine      Recurrent major depressive disorder, in remission ([facility] continue home medication    Resolved Problems:    Septic shock developed during hospitalization ([facility] status post IV antibiotics resolved ID recommendation appreciated watch off IV antibiotics.         Daysi Moser MD  2/16/2020  8:51 AM

## 2020-02-16 NOTE — PROGRESS NOTES
osteoblastic lesion with soft tissue nodules of the greater omentum suspicious for carcinomatosis, as well as a liver lesion,. Biopsy with ORIF was done in February 1, 2020 from bone mets of the left femoral neck as well as prophylactic TFN for pathologic fracture risk. Biopsy of bone did show adenocarcinoma. Patient was followed for a short course by infectious disease who no longer believe needed antibiotic coverage. EGD on 1/30 showed deep half centimeter ulcer of the duodenal bulb just beyond the pyloric orifice which was treated with epinephrine -- this is the same lesion that was treated today. See note from February 11 for gastroenterology findings. Patient was found to have Hematology oncology following -- plan for chemotherapy outpatient. Palliative care following -- patient DNR CCA    Pain:  Pain Assessment  Pain Assessment: 0-10  Pain Level: 0    Assessment: Pt presents with moderate cognitive deficits at time of evaluation characterized by difficulty with immediate & delayed recall, thought organization, safety/judgment, problem solving, verbal sequencing, and word generation tasks. No dysarthria or oral motor deficits noted. ST to follow up to address noted deficits. Results & recommendations reviewed with pt who verbalized understanding. Recommendations:  Requires SLP Intervention: Yes  Duration/Frequency of Treatment: 3-5x per week  D/C Recommendations: Further therapy recommended at discharge. Plan:   Goals:  Short-term Goals  Goal 1: Pt will recall 3-5 units with and without distractions with 90% accuracy. Goal 2: Pt will utilize memory compensatory strategies to improve recall. Goal 3: Pt will complete thought organization tasks with 90% accuracy. Goal 4: Pt will complete safety/judgement & functional problem solving tasks with 90% accuracy. Goal 5: Pt will sequence 4-6 steps of functional activities with 90% accuracy. Goal 6: Pt will name 6-8 items in a category independently.

## 2020-02-16 NOTE — PROGRESS NOTES
Today's Date: 2/16/2020  Patient Name: Klaudia Lipscomb  Date of admission: 1/26/2020  1:38 PM  Patient's age: 77 y. o., 1950  Ad[facility] Dx: [facility] [A41.9, R65.21]    Reason for Consult: management recommendations  Requesting Physician: Coby Dooley MD    CHIEF COMPLAINT: Abdominal pain. History of breast cancer. Back pain. History Obtained From:  patient, electronic medical record    SUBJECTIVE:   Patient seen and examined. Clinically stable. Continues to have fatigue but getting better  No nausea vomiting  Denies any chest pain, shortness of breath, active bleeding. BRIEF CASE HISTORY:    The patient is a 77 y.o.  female who is admitted to the hospital for chief complaints of abdominal pain. According to the records patient has remote history of breast cancer diagnosed in 2007. There are no records available regarding treatment. Per patient she underwent left mastectomy and had around 13 lymph nodes involved. Subsequently patient underwent chemotherapy which she completed in 2008. Patient did not receive any radiation therapy. Patient was then given Arimidex for many years likely 5 years. Patient has not seen a oncologist for quite a few years now. Patient has been having back pain for a while likely many months. Patient started having bowel pain with a lot of nausea and vomiting around Reyna time which persisted and got worse eventually patient presented to the ER at [facility] CT scan done per report shows possible duodenitis and concern regarding bowel perforation. Subsequently patient was transferred to [place].  Repeat CT does not show any concerning findings in the duodenum but does show blastic lesions involving bone especially T10. Patient denies any lower extremity weakness. Denies any saddle anesthesia. Denies any loss of control of bowel or bladder. Additional work-up also shows anemia and iron deficiency.   Patient has mg at 02/10/20 0911    fentaNYL (SUBLIMAZE) injection 25 mcg  25 mcg Intravenous Q5 Min PRN Pao Rodríguez MD   25 mcg at 02/02/20 1143    sodium chloride flush 0.9 % injection 10 mL  10 mL Intravenous PRN El Singh MD        alteplase (CATHFLO) injection 2 mg  2 mg Intracatheter Once El Singh MD        oyster shell calcium/vitamin D 250-125 MG-UNIT per tablet 500 mg  2 tablet Oral Daily El Singh MD   500 mg at 02/16/20 6197    [Held by provider] morphine (MS CONTIN) extended release tablet 15 mg  15 mg Oral 2 times per day Alexandr Pollack DO   15 mg at 02/02/20 2132    sodium chloride flush 0.9 % injection 10 mL  10 mL Intravenous PRN El Singh MD   10 mL at 02/08/20 2025    acetaminophen (TYLENOL) tablet 650 mg  650 mg Oral Q4H PRN El Singh MD   650 mg at 02/15/20 0807    levothyroxine (SYNTHROID) tablet 100 mcg  100 mcg Oral Daily Pao Rodríguez MD   100 mcg at 02/16/20 0925    venlafaxine (EFFEXOR XR) extended release capsule 150 mg  150 mg Oral Daily El Singh MD   150 mg at 02/16/20 9912    docusate sodium (COLACE) capsule 100 mg  100 mg Oral BID El Singh MD   100 mg at 02/16/20 0924    senna (SENOKOT) tablet 8.6 mg  1 tablet Oral BID Pao Rodríguez MD   8.6 mg at 02/16/20 0925    ferrous sulfate EC tablet 325 mg  325 mg Oral BID  El Singh MD   325 mg at 02/16/20 0925    sodium chloride flush 0.9 % injection 10 mL  10 mL Intravenous 2 times per day El Singh MD   10 mL at 02/15/20 0624    sodium chloride flush 0.9 % injection 10 mL  10 mL Intravenous PRN El Singh MD   10 mL at 01/30/20 2042    magnesium hydroxide (MILK OF MAGNESIA) 400 MG/5ML suspension 30 mL  30 mL Oral Daily PRN Pao Rodríguez MD        ondansetron (ZOFRAN) injection 4 mg  4 mg Intravenous Q6H PRN Pao Rodríguez MD        bisacodyl (DULCOLAX) suppository 10 mg  10 mg Rectal Daily PRN El Singh MD        acetaminophen (TYLENOL) tablet 650 mg 650 mg Oral Q4H PRN Gregory Marti MD   650 mg at 02/15/20 0457    glucose (GLUTOSE) 40 % oral gel 15 g  15 g Oral PRN Pao Rodríguez MD        dextrose 50 % IV solution  12.5 g Intravenous PRN Gregory Marti MD        glucagon (rDNA) injection 1 mg  1 mg Intramuscular PRN Pao Rodríguez MD        dextrose 5 % solution  100 mL/hr Intravenous PRN Pao Rodríguez MD        oxyCODONE (ROXICODONE) immediate release tablet 5 mg  5 mg Oral Q4H PRN Pao Rodríguez MD   5 mg at 02/14/20 0550    Or    oxyCODONE (ROXICODONE) immediate release tablet 10 mg  10 mg Oral Q4H PRN Gregory Marti MD   10 mg at 02/13/20 1830       Allergies:  Patient has no known allergies. Social History:   reports that she has never smoked. She has never used smokeless tobacco. She reports that she does not use drugs. Denies smoking. Denies taking alcohol. Family History: Hypertension and diabetes    REVIEW OF SYSTEMS:      Constitutional: No fever or chills. No night sweats, no weight loss   Eyes: No eye discharge, double vision, or eye pain   HEENT: negative for sore mouth, sore throat, hoarseness and voice change   Respiratory: negative for cough , sputum, dyspnea, wheezing, hemoptysis, chest pain   Cardiovascular: negative for chest pain, dyspnea, palpitations, orthopnea, PND   Gastrointestinal: negative for nausea, vomiting, diarrhea, constipation, abdominal pain, Dysphagia, hematemesis and hematochezia   Genitourinary: negative for frequency, dysuria, nocturia, urinary incontinence, and hematuria   Integument: negative for rash, skin lesions, bruises.    Hematologic/Lymphatic: negative for easy bruising, bleeding, lymphadenopathy, or petechiae   Endocrine: negative for heat or cold intolerance,weight changes, change in bowel habits and hair loss   Musculoskeletal: Positive back pain  Neurological: negative for headaches, dizziness, seizures, weakness, numbness    PHYSICAL EXAM:        /88   Pulse 71   Temp 98.4 °F (36.9 °C) (Oral)   Resp 14   Ht 5' 2\" (1.575 m)   Wt 178 lb 3.2 oz (80.8 kg)   SpO2 94%   Breastfeeding No   BMI 32.59 kg/m²    Temp (24hrs), Av.2 °F (37.3 °C), Min:98.4 °F (36.9 °C), Max:100.8 °F (38.2 °C)      General appearance -slightly  lethargic but able to answer questions. Eyes - pupils equal and reactive, extraocular eye movements intact   Ears - bilateral TM's and external ear canals normal   Mouth - mucous membranes moist, pharynx normal without lesions   Neck - supple, no significant adenopathy   Lymphatics - no palpable lymphadenopathy, no hepatosplenomegaly   Chest - clear to auscultation, no wheezes, rales or rhonchi, symmetric air entry   Heart - normal rate, regular rhythm, normal S1, S2, no murmurs  Abdomen - soft, nontender, nondistended, no masses or organomegaly   Neurological - alert, oriented, normal speech, no focal findings or movement disorder noted   Musculoskeletal - no joint tenderness, deformity or swelling   Extremities - peripheral pulses normal, no pedal edema, no clubbing or cyanosis   Skin - normal coloration and turgor, no rashes, no suspicious skin lesions noted ,  Breast left-sided mastectomy no signs of local recurrence    DATA:      Labs:     CBC:   Recent Labs     02/15/20  0648  02/15/20  1943 20  0647   WBC 12.2*  --   --  10.6   HGB 8.5*   < > 8.9* 8.9*   HCT 28.5*   < > 30.2* 29.2*   *  --   --  465*    < > = values in this interval not displayed. BMP:   Recent Labs     02/15/20  0648 20  0647   * 133*   K 3.3* 3.5*   CO2 24 25   BUN 4* 8   CREATININE 0.46* 0.51   LABGLOM >60 >60   GLUCOSE 168* 122*     PT/INR:   No results for input(s): PROTIME, INR in the last 72 hours. APTT:  No results for input(s): APTT in the last 72 hours.   LIVER PROFILE:  Recent Labs     02/15/20  0018   AST 13   ALT 6   LABALBU 2.6*   Us Renal Complete    Result Date: 2020  EXAMINATION: RETROPERITONEAL ULTRASOUND OF THE KIDNEYS AND URINARY BLADDER colon.  The appendix is not identified. Pelvis: Uterus normal. Peritoneum/Retroperitoneum: The abdominal aorta and iliac arteries are normal in caliber. There is no pathologic adenopathy. 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 Right total hip arthroplasty. Distended gallbladder. No radiodense gallstones noted but ultrasound is recommended. Extensive metastatic disease is a risk for fracture at T10. Us Gallbladder Ruq    Result Date: 1/27/2020  EXAMINATION: RIGHT UPPER QUADRANT ULTRASOUND 1/27/2020 10:23 am COMPARISON: CT abdomen and pelvis January 26, 2020 HISTORY: ORDERING SYSTEM PROVIDED HISTORY: Rule out liver cirrhosis, concern for aclaclulus cholecystitis TECHNOLOGIST PROVIDED HISTORY: Rule out liver cirrhosis, concern for aclaclulus cholecystitis FINDINGS: Heterogeneous increased liver echotexture. No focal liver lesion. No right-sided hydronephrosis. Nonvisualization of the pancreas. No gallbladder wall thickening. No gallstones. No sonographic Nunez sign. Common bile duct is within normal limits measuring 5 mm in diameter. Minimal amount of free fluid within the right upper quadrant. Minimal amount of free fluid within the upper abdomen. Heterogeneous increased liver echogenicity could be on the basis of hepatocellular disease or hepatic steatosis. Xr Chest Portable    Result Date: 1/26/2020  EXAMINATION: ONE XRAY VIEW OF THE CHEST 1/26/2020 2:53 pm COMPARISON: None. HISTORY: ORDERING SYSTEM PROVIDED HISTORY: PICC line placement TECHNOLOGIST PROVIDED HISTORY: PICC line placement FINDINGS: The lungs are without consolidation effusion. There is no pneumothorax. The heart is prominent. The upper abdomen is unremarkable. The extrathoracic soft tissues are unremarkable. There is a right subclavian port with the tip in the mid SVC.   There is a right-sided PICC line with the tip in the distal mid aspect of the SVC.     Mild cardiomegaly without acute pulmonary process. Right-sided PICC line with the tip in the distal mid aspect of the SVC. IMAGING DATA:          IMPRESSION:     Primary Problem  Metastatic breast cancer [facility])    Active Hospital Problems    Diagnosis Date Noted    Influenza A [J10.1] 02/15/2020    Pneumonia involving right lung [J18.9] 02/15/2020    Metast[facility] carcinoma ([facility] [C79.9]     [facility] [I82.409]     Encounter for palliative care [Z51.5]     Acute duodenal ulcer with gastric outlet obstruction [K26.3] 01/30/2020    Ulcer of esophagus without bleeding [K22.10] 01/30/2020    Bone lesion [M89.9]     Iron deficiency [E61.1]     Diabetes mellitus type 2 in obese ([facility] [E11.69, E66.9] 01/29/2020    Essential hypertension [I10] 01/29/2020    Acquired hypothyroidism [E03.9] 01/29/2020    Recurrent major depressive disorder, in remission [facility] [F33.40] 01/29/2020    Metastatic breast cancer ([facility] [place] 01/29/2020    History of breast cancer [Z85.3] 01/27/2020    Anemia [D64.9] 01/26/2020    Liver lesion [K76.9] 01/26/2020     History of breast cancer likely hormone receptor positive. Status post left mastectomy and chemotherapy and aromatase inhibitor therapy. Iron deficiency  Hypoproliferative anemia  Bone lesions 2/2 metastatic breast adenocarcinoma. Biopsy results showing adenocarcinoma  Back pain second to bone lesions. RECOMMENDATIONS:  Reviewed results of lab work-up and other relevant clinical data. Status post prophylactic rodding of the left femur  Plan for radiation as an outpatient to be followed by systemic therapy. Plan for [facility] in [facility]. Continue Femara. As per GI okay to restart anticoagulation  [facility] [place]. Needs follow up with radiation in [place]. Lamont Trevino MD  Hematologist/Medical Oncologist    Cell: [phone]    This note is created with the assistance of maritza

## 2020-02-17 LAB
ABSOLUTE EOS #: 0.06 K/UL (ref 0–0.4)
ABSOLUTE IMMATURE GRANULOCYTE: 0 K/UL (ref 0–0.3)
ABSOLUTE LYMPH #: 1.55 K/UL (ref 1–4.8)
ABSOLUTE MONO #: 0.37 K/UL (ref 0.1–0.8)
ANION GAP SERPL CALCULATED.3IONS-SCNC: 11 MMOL/L (ref 9–17)
BASOPHILS # BLD: 0 % (ref 0–2)
BASOPHILS ABSOLUTE: 0 K/UL (ref 0–0.2)
BUN BLDV-MCNC: 8 MG/DL (ref 8–23)
BUN/CREAT BLD: ABNORMAL (ref 9–20)
CALCIUM SERPL-MCNC: 7.9 MG/DL (ref 8.6–10.4)
CHLORIDE BLD-SCNC: 101 MMOL/L (ref 98–107)
CO2: 24 MMOL/L (ref 20–31)
CREAT SERPL-MCNC: 0.47 MG/DL (ref 0.5–0.9)
DIFFERENTIAL TYPE: ABNORMAL
EOSINOPHILS RELATIVE PERCENT: 1 % (ref 1–4)
GFR AFRICAN AMERICAN: >60 ML/MIN
GFR NON-AFRICAN AMERICAN: >60 ML/MIN
GFR SERPL CREATININE-BSD FRML MDRD: ABNORMAL ML/MIN/{1.73_M2}
GFR SERPL CREATININE-BSD FRML MDRD: ABNORMAL ML/MIN/{1.73_M2}
GLUCOSE BLD-MCNC: 115 MG/DL (ref 65–105)
GLUCOSE BLD-MCNC: 129 MG/DL (ref 70–99)
GLUCOSE BLD-MCNC: 149 MG/DL (ref 65–105)
GLUCOSE BLD-MCNC: 190 MG/DL (ref 65–105)
GLUCOSE BLD-MCNC: 217 MG/DL (ref 65–105)
HCT VFR BLD CALC: 30.7 % (ref 36.3–47.1)
HCT VFR BLD CALC: 31.7 % (ref 36.3–47.1)
HEMOGLOBIN: 8.7 G/DL (ref 11.9–15.1)
HEMOGLOBIN: 9.1 G/DL (ref 11.9–15.1)
IMMATURE GRANULOCYTES: 0 %
LV EF: 55 %
LVEF MODALITY: NORMAL
LYMPHOCYTES # BLD: 25 % (ref 24–44)
MCH RBC QN AUTO: 27.2 PG (ref 25.2–33.5)
MCHC RBC AUTO-ENTMCNC: 28.3 G/DL (ref 28.4–34.8)
MCV RBC AUTO: 95.9 FL (ref 82.6–102.9)
MONOCYTES # BLD: 6 % (ref 1–7)
MORPHOLOGY: ABNORMAL
MORPHOLOGY: ABNORMAL
NRBC AUTOMATED: 0 PER 100 WBC
PDW BLD-RTO: 15.8 % (ref 11.8–14.4)
PLATELET # BLD: 500 K/UL (ref 138–453)
PLATELET ESTIMATE: ABNORMAL
PMV BLD AUTO: 9.6 FL (ref 8.1–13.5)
POTASSIUM SERPL-SCNC: 3.8 MMOL/L (ref 3.7–5.3)
RBC # BLD: 3.2 M/UL (ref 3.95–5.11)
RBC # BLD: ABNORMAL 10*6/UL
SEG NEUTROPHILS: 68 % (ref 36–66)
SEGMENTED NEUTROPHILS ABSOLUTE COUNT: 4.22 K/UL (ref 1.8–7.7)
SODIUM BLD-SCNC: 136 MMOL/L (ref 135–144)
WBC # BLD: 6.2 K/UL (ref 3.5–11.3)
WBC # BLD: ABNORMAL 10*3/UL

## 2020-02-17 PROCEDURE — 6370000000 HC RX 637 (ALT 250 FOR IP): Performed by: INTERNAL MEDICINE

## 2020-02-17 PROCEDURE — 97530 THERAPEUTIC ACTIVITIES: CPT

## 2020-02-17 PROCEDURE — 2580000003 HC RX 258: Performed by: INTERNAL MEDICINE

## 2020-02-17 PROCEDURE — 85018 HEMOGLOBIN: CPT

## 2020-02-17 PROCEDURE — 97129 THER IVNTJ 1ST 15 MIN: CPT

## 2020-02-17 PROCEDURE — 99232 SBSQ HOSP IP/OBS MODERATE 35: CPT | Performed by: INTERNAL MEDICINE

## 2020-02-17 PROCEDURE — 82947 ASSAY GLUCOSE BLOOD QUANT: CPT

## 2020-02-17 PROCEDURE — 76937 US GUIDE VASCULAR ACCESS: CPT

## 2020-02-17 PROCEDURE — 6370000000 HC RX 637 (ALT 250 FOR IP): Performed by: NURSE PRACTITIONER

## 2020-02-17 PROCEDURE — 99233 SBSQ HOSP IP/OBS HIGH 50: CPT | Performed by: INTERNAL MEDICINE

## 2020-02-17 PROCEDURE — 6370000000 HC RX 637 (ALT 250 FOR IP): Performed by: STUDENT IN AN ORGANIZED HEALTH CARE EDUCATION/TRAINING PROGRAM

## 2020-02-17 PROCEDURE — 85014 HEMATOCRIT: CPT

## 2020-02-17 PROCEDURE — 97116 GAIT TRAINING THERAPY: CPT

## 2020-02-17 PROCEDURE — 93306 TTE W/DOPPLER COMPLETE: CPT

## 2020-02-17 PROCEDURE — 2060000000 HC ICU INTERMEDIATE R&B

## 2020-02-17 PROCEDURE — 93971 EXTREMITY STUDY: CPT

## 2020-02-17 PROCEDURE — 97535 SELF CARE MNGMENT TRAINING: CPT

## 2020-02-17 PROCEDURE — 36415 COLL VENOUS BLD VENIPUNCTURE: CPT

## 2020-02-17 PROCEDURE — 85025 COMPLETE CBC W/AUTO DIFF WBC: CPT

## 2020-02-17 PROCEDURE — 80048 BASIC METABOLIC PNL TOTAL CA: CPT

## 2020-02-17 PROCEDURE — 97110 THERAPEUTIC EXERCISES: CPT

## 2020-02-17 RX ORDER — PANTOPRAZOLE SODIUM 40 MG/1
40 TABLET, DELAYED RELEASE ORAL
Status: DISCONTINUED | OUTPATIENT
Start: 2020-02-20 | End: 2020-02-17

## 2020-02-17 RX ORDER — PANTOPRAZOLE SODIUM 40 MG/1
40 TABLET, DELAYED RELEASE ORAL
Status: DISCONTINUED | OUTPATIENT
Start: 2020-02-17 | End: 2020-02-18

## 2020-02-17 RX ADMIN — SUCRALFATE 1 G: 1 TABLET ORAL at 23:31

## 2020-02-17 RX ADMIN — INSULIN LISPRO 2 UNITS: 100 INJECTION, SOLUTION INTRAVENOUS; SUBCUTANEOUS at 17:56

## 2020-02-17 RX ADMIN — SENNOSIDES 8.6 MG: 8.6 TABLET, FILM COATED ORAL at 20:59

## 2020-02-17 RX ADMIN — SUCRALFATE 1 G: 1 TABLET ORAL at 17:53

## 2020-02-17 RX ADMIN — VENLAFAXINE HYDROCHLORIDE 150 MG: 150 CAPSULE, EXTENDED RELEASE ORAL at 09:04

## 2020-02-17 RX ADMIN — INSULIN LISPRO 1 UNITS: 100 INJECTION, SOLUTION INTRAVENOUS; SUBCUTANEOUS at 12:30

## 2020-02-17 RX ADMIN — SENNOSIDES 8.6 MG: 8.6 TABLET, FILM COATED ORAL at 11:09

## 2020-02-17 RX ADMIN — SUCRALFATE 1 G: 1 TABLET ORAL at 09:03

## 2020-02-17 RX ADMIN — CALCIUM CARBONATE-CHOLECALCIFEROL TAB 250 MG-125 UNIT 500 MG: 250-125 TAB at 09:02

## 2020-02-17 RX ADMIN — SODIUM CHLORIDE, POTASSIUM CHLORIDE, SODIUM LACTATE AND CALCIUM CHLORIDE: 600; 310; 30; 20 INJECTION, SOLUTION INTRAVENOUS at 01:36

## 2020-02-17 RX ADMIN — OSELTAMIVIR PHOSPHATE 75 MG: 75 CAPSULE ORAL at 09:03

## 2020-02-17 RX ADMIN — PANTOPRAZOLE SODIUM 40 MG: 40 TABLET, DELAYED RELEASE ORAL at 20:58

## 2020-02-17 RX ADMIN — OXYCODONE HYDROCHLORIDE 10 MG: 5 TABLET ORAL at 05:05

## 2020-02-17 RX ADMIN — METOPROLOL TARTRATE 50 MG: 50 TABLET, FILM COATED ORAL at 09:02

## 2020-02-17 RX ADMIN — METOPROLOL TARTRATE 50 MG: 50 TABLET, FILM COATED ORAL at 20:59

## 2020-02-17 RX ADMIN — ACETAMINOPHEN 650 MG: 325 TABLET ORAL at 01:38

## 2020-02-17 RX ADMIN — DOCUSATE SODIUM 100 MG: 100 CAPSULE, LIQUID FILLED ORAL at 09:04

## 2020-02-17 RX ADMIN — OXYCODONE HYDROCHLORIDE 10 MG: 5 TABLET ORAL at 10:30

## 2020-02-17 RX ADMIN — FERROUS SULFATE TAB EC 325 MG (65 MG FE EQUIVALENT) 325 MG: 325 (65 FE) TABLET DELAYED RESPONSE at 09:03

## 2020-02-17 RX ADMIN — SUCRALFATE 1 G: 1 TABLET ORAL at 13:00

## 2020-02-17 RX ADMIN — DOCUSATE SODIUM 100 MG: 100 CAPSULE, LIQUID FILLED ORAL at 20:59

## 2020-02-17 RX ADMIN — LEVOTHYROXINE SODIUM 100 MCG: 100 TABLET ORAL at 07:23

## 2020-02-17 RX ADMIN — INSULIN LISPRO 1 UNITS: 100 INJECTION, SOLUTION INTRAVENOUS; SUBCUTANEOUS at 22:07

## 2020-02-17 RX ADMIN — SUCRALFATE 1 G: 1 TABLET ORAL at 01:11

## 2020-02-17 RX ADMIN — OSELTAMIVIR PHOSPHATE 75 MG: 75 CAPSULE ORAL at 20:59

## 2020-02-17 RX ADMIN — SODIUM CHLORIDE, PRESERVATIVE FREE 10 ML: 5 INJECTION INTRAVENOUS at 21:02

## 2020-02-17 RX ADMIN — FERROUS SULFATE TAB EC 325 MG (65 MG FE EQUIVALENT) 325 MG: 325 (65 FE) TABLET DELAYED RESPONSE at 17:53

## 2020-02-17 ASSESSMENT — ENCOUNTER SYMPTOMS
ABDOMINAL PAIN: 0
COUGH: 0
RHINORRHEA: 0
WHEEZING: 0
SHORTNESS OF BREATH: 0
EYE PAIN: 0
VOICE CHANGE: 0
CHEST TIGHTNESS: 0
SORE THROAT: 0
NAUSEA: 0
EYE DISCHARGE: 0
VOMITING: 0
ABDOMINAL DISTENTION: 0
BACK PAIN: 0
EYE REDNESS: 0

## 2020-02-17 ASSESSMENT — PAIN SCALES - GENERAL
PAINLEVEL_OUTOF10: 0
PAINLEVEL_OUTOF10: 0
PAINLEVEL_OUTOF10: 4
PAINLEVEL_OUTOF10: 8
PAINLEVEL_OUTOF10: 8

## 2020-02-17 NOTE — CARE COORDINATION
Transition planning need updated pt/ot notes. Discussed with dr Daria Garsia needing order indicating ok for therapy to work with d/t pending dopplers rue/dvt. [address] to [facility] admissions will have to review case dt her change in condition will know if excepting today, precert has not been started. [address]  with [employer] has questions regarding chemo treatments. ps dr Scot Bazzi to see when she will start femara & radiation treatments. Response she will f/u with her oncologist in [place] to make arrangements   15:58 david will have to check with clinical team if able to accept if patient requires radiation during rehab treatment. Can accept while taking femara. 16:49 met with patient to discuss dc needs said her oncologist in [place] is not there anymore so will need a new one. Discussed family being able to transport to appointments she said they could. Updated david with [employer] she said she will notify her clinical team. discussed Needing a decision on whether they will accept or not so we can make other arrangements if needed.        17:15 received call from david with [employer] will accept and start precert

## 2020-02-17 NOTE — PROGRESS NOTES
[facility] Progress Note    Fabiano Garcia is a 77 y.o. female patient. Hospitalization Day:22      Chief consult reason: Request to re-eval duodenal ulcer prior to [facility] or [facility] for right upper extremity DVT      Subjective: patient seen and examined bedside, no acute issues overnight, de nies N/V/D. Denies any belly pain. Denies any bleeding per rectum. VITALS:  BP (!) 147/79   Pulse 92   Temp 97.6 °F (36.4 °C) (Oral)   Resp 18   Ht 5' 2\" (1.575 m)   Wt 178 lb 3.2 oz (80.8 kg)   SpO2 97%   Breastfeeding No   BMI 32.59 kg/m²   TEMPERATURE:  Current - Temp: 97.6 °F (36.4 °C); Max - Temp  Av.3 °F (36.8 °C)  Min: 97.5 °F (36.4 °C)  Max: 98.8 °F (37.1 °C)    Physical Assessment:  General appearance:  alert, cooperative and no distress  Mental Status:  oriented to person, place and time and normal affect  Lungs:  clear to auscultation bilaterally, normal effort  Heart:  regular rate and rhythm, no murmur  Abdomen:  soft, nontender, nondistended, normal bowel sounds  Extremities:  no edema, redness, tenderness in the calves  Skin:  warm, dry, no gross lesions or rashes    Data Review:  LABS and IMAGING:     CBC  Recent Labs     02/15/20  0648 02/15/20  1022 02/15/20  1943 20  0647 20  1913 02/17/20  0557   WBC 12.2*  --   --  10.6  --  6.2   HGB 8.5* 8.5* 8.9* 8.9* 8.8* 8.7*   MCV 92.8  --   --  90.4  --  95.9   RDW 15.8*  --   --  15.9*  --  15.8*   *  --   --  465*  --  500*       ANEMIA STUDIES  No results for input(s): LABIRON, TIBC, IRON, FERRITIN, [id number], FOLATE, OCCULTBLD in the last 72 hours.     BMP  Recent Labs     02/15/20  0648 20  0647 20  0557   * 133* 136   K 3.3* 3.5* 3.8   CL 97* 95* 101   CO2 24 25 24   BUN 4* 8 8   CREATININE 0.46* 0.51 0.47*   GLUCOSE 168* 122* 129*   CALCIUM 8.0* 7.7* 7.9*       LFTS  Recent Labs     02/15/20  0018   ALKPHOS 148*   ALT 6   AST 13   BILITOT 0.31   BILIDIR 0.12   LABALBU 2.6*

## 2020-02-17 NOTE — PROGRESS NOTES
[facility]    Progress Note    2/17/2020    9:20 AM    Name:   Kin Collins  MRN:     [id number]     Acct:      [de-identified]   Room:   [address] [place] [address] Day:  25  Admit Date:  1/26/2020  1:38 PM    PCP:   Mindy Alvarado MD  Code Status:  DNR-CCA    Subjective:     C/C: anemia    Interval History Status: Improving    Patient's cough and shortness of breath improving. No longer on any supplemental oxygen. Breathing comfortably and saturating well. No melena, hematochezia. Hgb stable. Repeat duplex US RUE today. Brief History:     Patient is a 62-year-old female who was admitted as a transfer from [facility] on 1/23/2020 because of generalized weakness, fatigue and flulike illness for about a month. She was found to be anemic, hyponatremic, and hyperglycemic. She developed hypotension and imaging revealed concern for duodenitis versus contained duodenal perforation. She was started on vancomycin and Zosyn to cover intra-abdominal infection. CT chest done subsequently showed extensive osteoblastic lesions and numerous soft tissue nodules in the greater omentum and mesentery suspicious for carcinomatosis. Of note patient had history of breast carcinoma. On 1/30/2020, EGD was done which showed significant inflammation and ulceration in the distal third of the esophagus with white thick discharge suspicious for Candida although path was negative for Candida. A deep half centimeter ulcer in the duodenal bulb just beyond the pyloric orifice, with edema causing some gastric outlet narrowing, was covered with large clotted blood. Adherent clot to the greater base was not able to be cleared. On 2/1/2020, orthopedic surgery conducted left femur cephalo-medullary nailing and intraoperative biopsy. Biopsy was positive for metastatic breast carcinoma.     Initial plan was for discharge to skilled nursing facility with Labs     02/15/20  0648  02/16/20  0647 02/16/20  1913 02/17/20  0557   WBC 12.2*  --  10.6  --  6.2   RBC 3.07*  --  3.23*  --  3.20*   HGB 8.5*   < > 8.9* 8.8* 8.7*   HCT 28.5*   < > 29.2* 27.7* 30.7*   MCV 92.8  --  90.4  --  95.9   MCH 27.7  --  27.6  --  27.2   MCHC 29.8  --  30.5  --  28.3*   RDW 15.8*  --  15.9*  --  15.8*   *  --  465*  --  500*   MPV 9.4  --  9.5  --  9.6    < > = values in this interval not displayed. Chemistry:  Recent Labs     02/15/20  0018 02/15/20  0648 02/16/20  0647 02/17/20  0557    134* 133* 136   K 3.9 3.3* 3.5* 3.8   CL 97* 97* 95* 101   CO2 24 24 25 24   GLUCOSE 158* 168* 122* 129*   BUN 4* 4* 8 8   CREATININE 0.56 0.46* 0.51 0.47*   MG 1.3* 1.3* 1.9  --    ANIONGAP 16 13 13 11   LABGLOM >60 >60 >60 >60   GFRAA >60 >60 >60 >60   CALCIUM 8.4* 8.0* 7.7* 7.9*     Recent Labs     02/15/20  0017 02/15/20  0018  02/16/20  0628 02/16/20  0901 02/16/20  1121 02/16/20  1546 02/16/20  2129 02/17/20  0640   PROT  --  7.1  --   --   --   --   --   --   --    LABALBU  --  2.6*  --   --   --   --   --   --   --    AST  --  13  --   --   --   --   --   --   --    ALT  --  6  --   --   --   --   --   --   --    ALKPHOS  --  148*  --   --   --   --   --   --   --    BILITOT  --  0.31  --   --   --   --   --   --   --    BILIDIR  --  0.12  --   --   --   --   --   --   --    AMMONIA 54*  --   --   --   --   --   --   --   --    POCGLU  --   --    < > 106* 106* 120* 127* 112* 115*    < > = values in this interval not displayed. ABG:  Lab Results   Component Value Date    FIO2 INFORMATION NOT PROVIDED 02/15/2020     Lab Results   Component Value Date/Time    SPECIAL RT AC 1ML 02/15/2020 12:12 AM     Lab Results   Component Value Date/Time    CULTURE NO GROWTH 2 DAYS 02/15/2020 12:12 AM       Radiology:  Bruna Fuel Hip Left (2-3 Views)    Result Date: 1/28/2020  There is no acute fracture of the left hip There is no acute fracture in the pelvis.   Detail of the sacrum is markedly limited. There is subtle double density along the margin of the superior pubic ramus on the left without definitive fracture line at patient's pain persists, consider CT exam No acute osseous abnormality of the right femur or the left femur     Xr Femur Left (min 2 Views)    Result Date: 2/1/2020  Satisfactory postoperative appearance following ORIF of the left femur. No evident complication. Xr Femur Left (min 2 Views)    Result Date: 1/28/2020  There is no acute fracture of the left hip There is no acute fracture in the pelvis. Detail of the sacrum is markedly limited. There is subtle double density along the margin of the superior pubic ramus on the left without definitive fracture line at patient's pain persists, consider CT exam No acute osseous abnormality of the right femur or the left femur     Xr Femur Right (min 2 Views)    Result Date: 1/28/2020  There is no acute fracture of the left hip There is no acute fracture in the pelvis. Detail of the sacrum is markedly limited. There is subtle double density along the margin of the superior pubic ramus on the left without definitive fracture line at patient's pain persists, consider CT exam No acute osseous abnormality of the right femur or the left femur     Mri Thoracic Spine W Wo Contrast    Result Date: 1/29/2020  Osseous metastatic disease with diffuse marrow infiltration of T10. Mild extraosseous extension of tumor suspected along the left neural foramina a T10. Us Gallbladder Ruq    Result Date: 1/27/2020  Minimal amount of free fluid within the upper abdomen. Heterogeneous increased liver echogenicity could be on the basis of hepatocellular disease or hepatic steatosis. Xr Pelvis (min 3 Views)    Result Date: 1/28/2020  There is no acute fracture of the left hip There is no acute fracture in the pelvis. Detail of the sacrum is markedly limited.   There is subtle double density along the margin of the superior pubic ramus on the left

## 2020-02-17 NOTE — PROGRESS NOTES
Physical Therapy  Facility/Department: [facility]  Daily Treatment Note  NAME: Jagdish Collins  : 1950  MRN: [id number]    Date of Service: 2020    Discharge Recommendations:  Patient would benefit from continued therapy after discharge     PT Equipment Recommendations  Equipment Needed: No    Assessment     Body structures, Functions, Activity limitations: Decreased functional mobility ; Decreased safe awareness;Decreased balance;Decreased endurance;Decreased ROM; Decreased strength; Increased pain;Decreased cognition;Decreased posture;Decreased sensation  Assessment: Pt able to amb 50ft x2 with RW and CGA for safety, while demonstrating extremely slow joey. Pt would continue to require assistance with any funtional mobility at this time. Would benefit from more PT to address deficit. Prognosis: Fair  PT Education: Transfer Training;Functional Mobility Training;Gait Training;Energy Conservation;General Safety  REQUIRES PT FOLLOW UP: Yes  Activity Tolerance  Activity Tolerance: Patient limited by endurance; Patient limited by pain; Patient limited by fatigue            Patient Diagnosis(es): The primary encounter diagnosis was Liver lesion. A diagnosis of Septic shock ([facility] was also pertinent to this visit. has a past medical history of Breast cancer ([facility] CAD (coronary atherosclerotic disease), Diabetes mellitus type 2 in [facility]), Essential hypertension, and Hypothyroidism (acquired). has a past surgical history that includes Coronary artery bypass graft (); Mastectomy (); Upper gastrointestinal endoscopy (N/A, 2020); Upper gastrointestinal endoscopy (2020); joint replacement (Right, ); Tunneled venous port placement; Femur Surgery (2020); Femur fracture surgery (Left, 2020); Upper gastrointestinal endoscopy (2020);  Upper gastrointestinal endoscopy (2020); and Upper gastrointestinal endoscopy (N/A, 2/13/2020). Restrictions  Restrictions/Precautions  Restrictions/Precautions: Weight Bearing, Up as Tolerated, Fall Risk  Required Braces or Orthoses?: No  Lower Extremity Weight Bearing Restrictions  Left Lower Extremity Weight Bearing: Weight Bearing As Tolerated  Subjective   Pt laying in bed. Pt needing to get up and use the bathroom. Okay for patient to work with PT and OT, despite RUE DVT and no AC at this time. No Contraindication to activity.      Daysi Moser MD  02/16/20  5:36 PM    Pt c/o 8/10 pain in her L hip. RN informed. Orientation    Overall Orientation Status: Within Functional Limits    Objective     Bed mobility  Rolling to Left: Mod. Ind. Supine to Sit: Minimal assistance  Sit to Supine: (pt left seated in recliner)  Scooting: Contact guard assistance  Comment: Increased time to complete, HOB elevated. Pt required assistance for L LE progression  Transfers  Sit to Stand: CGA  Stand to sit: CGA  Comment: Pt requires vc's for hand placement with good carryover  Ambulation  Ambulation?: Yes  Ambulation 1  Surface: level tile  Device: Rolling Walker  Assistance: CGA  Quality of Gait: reliance on RW, slow joey, flexed posture  Gait Deviations: Shuffles;Decreased step length;Decreased step height;Slow Joey  Distance: 50ft x 2  Comments: limited by fatigue and increased pain with mobility  Stairs/Curb  Stairs?: No  Balance  Posture: Fair  Sitting - Static: Good;-  Sitting - Dynamic: Fair;+  Standing - Static: Fair  Standing - Dynamic: Fair;-  Comments: Standing balance assessed with assessed with RW. Exercise    Upper extremity exercises: Bicep curl, shoulder flexion/extension, punches. Reps: 10 x each with 2 lb wt on a SPC. 4 lbs with Biceps. Seated LE exercise program: Long Arc Quads,heel/toe raises, and marches. Reps: 10 x each with 2 lb wt on B LE's.      Goals  Short term goals  Time Frame for Short term goals: 15  Short term goal 1: Pt to perform bed mobility modA   Short term goal 2: Pt to demonstrate functional transfers mod A WBAT on LLE  Short term goal 3: Demonstrate dynamic sitting balance of fair + to decrease fall risk  Short term goal 4: Tolerate 30 minutes of therapy to demo increased endurance  Patient Goals   Patient goals : Did not state    Plan    Plan  Times per week: 5-6x/week  Current Treatment Recommendations: Strengthening, Endurance Training, Transfer Training, Patient/Caregiver Education & Training, ROM, Equipment Evaluation, Education, & procurement, Balance Training, Gait Training, Home Exercise Program, Functional Mobility Training, Stair training, Safety Education & Training  Safety Devices  Type of devices: Gait belt, Nurse notified, Patient at risk for falls, Call light within reach, All fall risk precautions in place, Left in chair  Restraints  Initially in place: No     Therapy Time   Individual Concurrent Group Co-treatment   Time In  945         Time Out  1030         Minutes  [address] [place] [address]

## 2020-02-17 NOTE — PROGRESS NOTES
Speech Language Pathology  Speech Language Pathology  [facility]    Cognitive Treatment Note    Date: 2/17/2020  Patients Name: Matthew Wallace  MRN: [id number]  Patient Active Problem List   Diagnosis Code    Anemia D64.9    Liver lesion K76.9    History of breast cancer Z85.3    Diabetes mellitus type 2 in obese ([facility] E11.69, E66.9    Essential hypertension I10    Acquired hypothyroidism E03.9    Recurrent major depressive disorder, in remission ([facility] F33.40    [facility] C50.919    Bone lesion M89.9    Iron deficiency E61.1    Acute duodenal ulcer with gastric outlet obstruction K26.3    Ulcer of esophagus without bleeding K22.10    Encounter for palliative care Z51.5    Deep venous thrombosis (HCC) I82.409    Influenza A J10.1    Pneumonia involving right lung J18.9    Metastatic carcinoma (HCC) C79.9       Pain: 0/10    Cognitive Treatment    Treatment time: 3029-1445      Subjective: [x] Alert [x] Cooperative     [] Confused     [] Agitated    [] Lethargic      Objective/Assessment:    Recall: Delayed recall 3 units 2 minute delay:  3/3 independently      6 minute delay:  3/3 independently    Word list retention, category inclusion:  65% increased to 100% with multiple repetitions    **Pt. Provided with education re: memory compensatory strategies. Pt. Encouraged to utilize strategies once d/c from the hospital.  Pt. Verbalized understanding. Tip sheet left at bedside.       Organization: Category members, concrete: 60% increased to 100% with min-mod verbal cues              Word Associations:  83% increased to 91% with min verbal cues       Problem Solving/Reasoning: Determining if statements are T/F:  100%     Plan:  [x] Continue  services    [] Discharge from :      Discharge recommendations: [] Inpatient Rehab   [] [place]   [] Outpatient Therapy  [] Follow up at trauma clinic     [x] Other: Further therapy recommended at

## 2020-02-18 ENCOUNTER — TELEPHONE (OUTPATIENT)
Dept: ONCOLOGY | Age: 70
End: 2020-02-18

## 2020-02-18 LAB
GLUCOSE BLD-MCNC: 136 MG/DL (ref 65–105)
GLUCOSE BLD-MCNC: 150 MG/DL (ref 65–105)
GLUCOSE BLD-MCNC: 159 MG/DL (ref 65–105)
GLUCOSE BLD-MCNC: 164 MG/DL (ref 65–105)
GLUCOSE BLD-MCNC: 194 MG/DL (ref 65–105)
GLUCOSE BLD-MCNC: 199 MG/DL (ref 65–105)
HCT VFR BLD CALC: 33.9 % (ref 36.3–47.1)
HEMOGLOBIN: 9.8 G/DL (ref 11.9–15.1)

## 2020-02-18 PROCEDURE — 6370000000 HC RX 637 (ALT 250 FOR IP): Performed by: INTERNAL MEDICINE

## 2020-02-18 PROCEDURE — 36415 COLL VENOUS BLD VENIPUNCTURE: CPT

## 2020-02-18 PROCEDURE — 85014 HEMATOCRIT: CPT

## 2020-02-18 PROCEDURE — 82947 ASSAY GLUCOSE BLOOD QUANT: CPT

## 2020-02-18 PROCEDURE — 99232 SBSQ HOSP IP/OBS MODERATE 35: CPT | Performed by: INTERNAL MEDICINE

## 2020-02-18 PROCEDURE — 2580000003 HC RX 258: Performed by: INTERNAL MEDICINE

## 2020-02-18 PROCEDURE — 99233 SBSQ HOSP IP/OBS HIGH 50: CPT | Performed by: INTERNAL MEDICINE

## 2020-02-18 PROCEDURE — 6370000000 HC RX 637 (ALT 250 FOR IP): Performed by: NURSE PRACTITIONER

## 2020-02-18 PROCEDURE — 2060000000 HC ICU INTERMEDIATE R&B

## 2020-02-18 PROCEDURE — 6370000000 HC RX 637 (ALT 250 FOR IP): Performed by: STUDENT IN AN ORGANIZED HEALTH CARE EDUCATION/TRAINING PROGRAM

## 2020-02-18 PROCEDURE — 6360000002 HC RX W HCPCS: Performed by: INTERNAL MEDICINE

## 2020-02-18 PROCEDURE — 85018 HEMOGLOBIN: CPT

## 2020-02-18 RX ORDER — OSELTAMIVIR PHOSPHATE 75 MG/1
75 CAPSULE ORAL 2 TIMES DAILY
Qty: 8 CAPSULE | Refills: 0 | DISCHARGE
Start: 2020-02-19 | End: 2020-02-23

## 2020-02-18 RX ORDER — PANTOPRAZOLE SODIUM 40 MG/1
40 TABLET, DELAYED RELEASE ORAL
Status: DISCONTINUED | OUTPATIENT
Start: 2020-02-19 | End: 2020-02-19 | Stop reason: HOSPADM

## 2020-02-18 RX ORDER — SUCRALFATE 1 G/1
1 TABLET ORAL 4 TIMES DAILY
Qty: 120 TABLET | Refills: 1 | DISCHARGE
Start: 2020-02-18

## 2020-02-18 RX ORDER — LEVOFLOXACIN 500 MG/1
500 TABLET, FILM COATED ORAL DAILY
Qty: 7 TABLET | Refills: 0 | DISCHARGE
Start: 2020-02-18 | End: 2020-02-25

## 2020-02-18 RX ADMIN — CALCIUM CARBONATE-CHOLECALCIFEROL TAB 250 MG-125 UNIT 500 MG: 250-125 TAB at 09:43

## 2020-02-18 RX ADMIN — OSELTAMIVIR PHOSPHATE 75 MG: 75 CAPSULE ORAL at 09:43

## 2020-02-18 RX ADMIN — LEVOTHYROXINE SODIUM 100 MCG: 100 TABLET ORAL at 05:29

## 2020-02-18 RX ADMIN — SENNOSIDES 8.6 MG: 8.6 TABLET, FILM COATED ORAL at 09:44

## 2020-02-18 RX ADMIN — INSULIN LISPRO 1 UNITS: 100 INJECTION, SOLUTION INTRAVENOUS; SUBCUTANEOUS at 10:37

## 2020-02-18 RX ADMIN — DOCUSATE SODIUM 100 MG: 100 CAPSULE, LIQUID FILLED ORAL at 21:28

## 2020-02-18 RX ADMIN — SUCRALFATE 1 G: 1 TABLET ORAL at 20:55

## 2020-02-18 RX ADMIN — INSULIN LISPRO 1 UNITS: 100 INJECTION, SOLUTION INTRAVENOUS; SUBCUTANEOUS at 16:44

## 2020-02-18 RX ADMIN — SENNOSIDES 8.6 MG: 8.6 TABLET, FILM COATED ORAL at 21:28

## 2020-02-18 RX ADMIN — VENLAFAXINE HYDROCHLORIDE 150 MG: 150 CAPSULE, EXTENDED RELEASE ORAL at 09:44

## 2020-02-18 RX ADMIN — OSELTAMIVIR PHOSPHATE 75 MG: 75 CAPSULE ORAL at 21:28

## 2020-02-18 RX ADMIN — INSULIN LISPRO 1 UNITS: 100 INJECTION, SOLUTION INTRAVENOUS; SUBCUTANEOUS at 21:27

## 2020-02-18 RX ADMIN — RIVAROXABAN 15 MG: 15 TABLET, FILM COATED ORAL at 16:59

## 2020-02-18 RX ADMIN — RIVAROXABAN 15 MG: 15 TABLET, FILM COATED ORAL at 09:44

## 2020-02-18 RX ADMIN — PANTOPRAZOLE SODIUM 40 MG: 40 TABLET, DELAYED RELEASE ORAL at 05:29

## 2020-02-18 RX ADMIN — INSULIN LISPRO 1 UNITS: 100 INJECTION, SOLUTION INTRAVENOUS; SUBCUTANEOUS at 12:47

## 2020-02-18 RX ADMIN — SUCRALFATE 1 G: 1 TABLET ORAL at 12:46

## 2020-02-18 RX ADMIN — FERROUS SULFATE TAB EC 325 MG (65 MG FE EQUIVALENT) 325 MG: 325 (65 FE) TABLET DELAYED RESPONSE at 09:44

## 2020-02-18 RX ADMIN — FERROUS SULFATE TAB EC 325 MG (65 MG FE EQUIVALENT) 325 MG: 325 (65 FE) TABLET DELAYED RESPONSE at 16:40

## 2020-02-18 RX ADMIN — PANTOPRAZOLE SODIUM 40 MG: 40 TABLET, DELAYED RELEASE ORAL at 16:40

## 2020-02-18 RX ADMIN — ACETAMINOPHEN 650 MG: 325 TABLET ORAL at 05:29

## 2020-02-18 RX ADMIN — SUCRALFATE 1 G: 1 TABLET ORAL at 05:29

## 2020-02-18 RX ADMIN — DOCUSATE SODIUM 100 MG: 100 CAPSULE, LIQUID FILLED ORAL at 09:43

## 2020-02-18 RX ADMIN — METOPROLOL TARTRATE 50 MG: 50 TABLET, FILM COATED ORAL at 09:43

## 2020-02-18 ASSESSMENT — PAIN SCALES - GENERAL
PAINLEVEL_OUTOF10: 3
PAINLEVEL_OUTOF10: 0

## 2020-02-18 NOTE — PROGRESS NOTES
[address] [employer]  Speech Language Pathology    Date: 2/18/2020  Patient Name: Omar [facility]  YOB: 1950   AGE: 77 y.o. MRN: [id number]        Patient Not Available for Speech Therapy     Due to:  [] Testing  [] Hemodialysis  [] Cancelled by RN  [] Surgery   [] Intubation/Sedation/Pain Medication  [] Medical instability  [x] Other: Pt refused therapy, reporting that she would be going home today. Next scheduled treatment: 2/18/2020  Completed by Zac Casillas,  Clinician  Co-signed by Osito Sutton. HILLARY/SLP

## 2020-02-18 NOTE — PROGRESS NOTES
Nutrition Assessment    Type and Reason for Visit: Reassess    Nutrition Recommendations:  -Continue dental soft, low fiber diet as tolerated  -Continue ensure clear supplements TID   -Will continue to monitor po intake and weights     Nutrition Assessment: Pt improving from a nutritional standpoint aeb pt is consuming % of her meals/supplements. Pt w/ 1.1% wt loss over 3 wks, not considered significant. Will continue supplements to compliment po intake and monitor wt trends. Malnutrition Assessment:  · Malnutrition Status: Meets the criteria for moderate malnutrition  · Context: Acute illness or injury  · Findings of the 6 clinical characteristics of malnutrition (Minimum of 2 out of 6 clinical characteristics is required to make the diagnosis of moderate or severe Protein Calorie Malnutrition based on AND/ASPEN Guidelines):  1. Energy Intake-(Greater than 50% of est'd energy needs ), Greater than or equal to 5 days    2. Weight Loss-1-2% loss, (x 3 wks )  3. Fat Loss-Mild subcutaneous fat loss, Orbital  4. Muscle Loss-Unable to assess,    5. Fluid Accumulation-Mild fluid accumulation, Extremities, Generalized  6.  Strength-Not measured    Nutrition Risk Level: Moderate    Nutrient Needs:  · Estimated Daily Total Kcal: 5348-8266 kcal/day  · Estimated Daily Protein (g): 60-80 gm pro/day    Nutrition Diagnosis:   · Problem:  Moderate malnutrition, In context of acute illness or injury  · Etiology: related to Insufficient energy/nutrient consumption     Signs and symptoms:  as evidenced by Weight loss, Mild loss of subcutaneous fat, Localized or generalized fluid accumulation(Need for ONS )    Objective Information:  · Wound Type: (Incision)  · Current Nutrition Therapies:  · Oral Diet Orders: Dental Soft, Low Fiber   · Oral Diet intake: 51-75%, %  · Oral Nutrition Supplement (ONS) Orders: Clear Liquid Oral Supplement  · ONS intake: %, 51-75%  · Anthropometric Measures:  · Ht: 5' 2\" (157.5 cm)   · Current Body Wt: 181 lb (82.1 kg)  · Admission Body Wt: 183 lb 13.8 oz (83.4 kg)  · % Weight Change:  ,  1.1% wt loss x 3 wks per EMR   · Ideal Body Wt: 110 lb (49.9 kg), % Ideal Body 165%  · BMI Classification: BMI 30.0 - 34.9 Obese Class I    Nutrition Interventions:   Continue current diet, Continue current ONS  Continued Inpatient Monitoring, Education Not Indicated    Nutrition Evaluation:   · Evaluation: Goal achieved   · Goals: Oral intakes to meet at least 75% of estimated nutrition needs.     · Monitoring: Nutrition Progression, Meal Intake, Supplement Intake, Diet Tolerance, Skin Integrity, I&O, Weight, Pertinent Labs, Monitor Bowel Function      Electronically signed by Darwin Adkins RD, LD on 2/18/20 at 11:21 AM    Contact Number: [phone]

## 2020-02-18 NOTE — PROGRESS NOTES
[facility] Progress Note    Tammy Jacobo is a 77 y.o. female patient. Hospitalization Day:23      Chief consult reason: Request to re-eval duodenal ulcer prior to [facility] or [facility] for right upper extremity DVT      Subjective: patient seen and examined bedside, no acute issues overnight, de nies N/V/D. Denies any belly pain. Denies any bleeding per rectum. VITALS:  /72   Pulse 77   Temp 98.3 °F (36.8 °C) (Oral)   Resp 22   Ht 5' 2\" (1.575 m)   Wt 181 lb 7 oz (82.3 kg)   SpO2 91%   Breastfeeding No   BMI 33.19 kg/m²   TEMPERATURE:  Current - Temp: 98.3 °F (36.8 °C); Max - Temp  Av.5 °F (36.9 °C)  Min: 98.3 °F (36.8 °C)  Max: 98.6 °F (37 °C)    Physical Assessment:  General appearance:  alert, cooperative and no distress  Mental Status:  oriented to person, place and time and normal affect  Lungs:  clear to auscultation bilaterally, normal effort  Heart:  regular rate and rhythm, no murmur  Abdomen:  soft, nontender, nondistended, normal bowel sounds  Extremities:  no edema, redness, tenderness in the calves  Skin:  warm, dry, no gross lesions or rashes    Data Review:  LABS and IMAGING:     CBC  Recent Labs     02/15/20  1943 02/16/20  0647 20  1913 20  0557 20  1931   WBC  --  10.6  --  6.2  --    HGB 8.9* 8.9* 8.8* 8.7* 9.1*   MCV  --  90.4  --  95.9  --    RDW  --  15.9*  --  15.8*  --    PLT  --  465*  --  500*  --        ANEMIA STUDIES  No results for input(s): LABIRON, TIBC, IRON, FERRITIN, PRNQNCQP17, FOLATE, OCCULTBLD in the last 72 hours. BMP  Recent Labs     20  0647 20  0557   * 136   K 3.5* 3.8   CL 95* 101   CO2 25 24   BUN 8 8   CREATININE 0.51 0.47*   GLUCOSE 122* 129*   CALCIUM 7.7* 7.9*       LFTS  No results for input(s): ALKPHOS, ALT, AST, BILITOT, BILIDIR, LABALBU in the last 72 hours. AMYLASE/LIPASE/AMMONIA  No results for input(s): AMYLASE, LIPASE, AMMONIA in the last 72 hours.     Acute Hepatitis Panel   No results found for: HEPBSAG, HEPCAB, HEPBIGM, HEPAIGM    HCV Genotype   No results found for: HEPATITISCGENOTYPE    HCV Quantitative   No results found for: HCVQNT    LIVER WORK UP:    AFP  Lab Results   Component Value Date    AFP 0.8 01/27/2020       Alpha 1 antitrypsin   No results found for: A1A    Anti - Liver/Kidney Ab  No results found for: LIVER-KIDNEYMICROSOMALAB    MONICO  No results found for: MONICO    AMA  No results found for: MITOAB    ASMA  No results found for: SMOOTHMUSCAB    Ceruloplasmin  No results found for: CERULOPLSM    Celiac panel  No results found for: TISSTRNTIIGG, TTGIGA, IGA    IgG  No results found for: IGG    IgM  No results found for: IGM    GGT   No results found for: LABGGT    PT/INR  No results for input(s): PROTIME, INR in the last 72 hours. Cancer Markers:  CEA:  No results for input(s): CEA in the last 72 hours. Ca 125:  No results for input(s):  in the last 72 hours. Ca 19-9:   No results for input(s):  in the last 72 hours. AFP: No results for input(s): AFP in the last 72 hours. Lactic acid:No results for input(s): LACTACIDWB in the last 72 hours. Radiology Review:    No results found. ENDOSCOPY     Principal Problem:    Metastatic breast cancer ([facility]  Active Problems:    Anemia    Liver lesion    History of breast cancer    Diabetes mellitus type 2 in [facility])    Essential hypertension    Acquired hypothyroidism    Recurrent major depressive disorder, in remission ([facility]    Bone lesion    Iron deficiency    Acute duodenal ulcer with gastric outlet obstruction    Ulcer of esophagus without bleeding    Encounter for palliative care    Deep venous thrombosis (HCC)    Influenza A    Pneumonia involving right lung    Metastatic carcinoma (HCC)  Resolved Problems:    Septic shock (HCC)       GI Assessment:    Ms. Aruna Garcia is a 77 y.o. female followed for management of needing duodenal ulcer. Status post hemostasis. She was found to have DVT. She needs blood thinners.

## 2020-02-18 NOTE — CARE COORDINATION
Care Transition  [address] [place] and left message to check on precert with call back number. Santa RN told writer patient does not want to go to SNF she wants to go home. Received call from Verona Acevedo at [place] to obtain more clinical information. Disucssed with Verona Acevedo patient wanted to go home. While on the phone with Verona Acevedo confirmed with patient she does not want to go to facility. Verona Acevedo will pull referral.     Daughter now in room and convinced patient to go to SNF. Called [facility] they will now need a new precert /referral.     Abhay Gave and let them know to submit new precert request that patient now will go there. Received call from [place] [id number] they have precert. Called [facility] dispatch she can transport at [facility]. Met with daughter in room and notified of transfer to [address] [place] [address] at 1927. Message Dr. oNni Barfield that have precert and transportation tentatively scheduled for 1930. [id number] [facility] dispatch calledto move up transport to [address] [place] [address]. PS Dr. Patricia Willingham. Called Dianne at [place] [id number] to notify her of discharge time. Dr. Yair Iraheta on floor, called STEPHANIE Laws NP and Dr Brett Downey to discuss anticoagulants. Per GI notes she was suppose to be on a protonix gtt when starting anticoagulant due to gi bleed history. Dr. Noni Barfield states patient will not be discharged tonight. Called [facility] and cancelled transport. Called Dianne at [place] and left vm discharge was cancelled. Called Stacey at [place] to cancel discharge.

## 2020-02-18 NOTE — TELEPHONE ENCOUNTER
Patient remains in house. Per Dr. Cecilia Norwood note:  Status post prophylactic rodding of the left femur  Plan for radiation as an outpatient to be followed by systemic therapy. Plan for radiation therapy in [facility]. Continue Femara. Restart AC with Eliquis when ok with GI and primary. Pt on pending.

## 2020-02-19 VITALS
HEIGHT: 62 IN | DIASTOLIC BLOOD PRESSURE: 86 MMHG | TEMPERATURE: 97.5 F | HEART RATE: 67 BPM | BODY MASS INDEX: 33.49 KG/M2 | WEIGHT: 182 LBS | RESPIRATION RATE: 18 BRPM | SYSTOLIC BLOOD PRESSURE: 134 MMHG | OXYGEN SATURATION: 98 %

## 2020-02-19 PROBLEM — K31.1 ACUTE DUODENAL ULCER WITH GASTRIC OUTLET OBSTRUCTION: Status: RESOLVED | Noted: 2020-01-30 | Resolved: 2020-02-19

## 2020-02-19 PROBLEM — K22.10 ULCER OF ESOPHAGUS WITHOUT BLEEDING: Status: RESOLVED | Noted: 2020-01-30 | Resolved: 2020-02-19

## 2020-02-19 PROBLEM — K26.3 ACUTE DUODENAL ULCER WITH GASTRIC OUTLET OBSTRUCTION: Status: RESOLVED | Noted: 2020-01-30 | Resolved: 2020-02-19

## 2020-02-19 LAB
ABSOLUTE EOS #: 0 K/UL (ref 0–0.4)
ABSOLUTE IMMATURE GRANULOCYTE: 0 K/UL (ref 0–0.3)
ABSOLUTE LYMPH #: 3.18 K/UL (ref 1–4.8)
ABSOLUTE MONO #: 0.52 K/UL (ref 0.1–0.8)
BASOPHILS # BLD: 0 % (ref 0–2)
BASOPHILS ABSOLUTE: 0 K/UL (ref 0–0.2)
DIFFERENTIAL TYPE: ABNORMAL
EOSINOPHILS RELATIVE PERCENT: 0 % (ref 1–4)
GLUCOSE BLD-MCNC: 162 MG/DL (ref 65–105)
GLUCOSE BLD-MCNC: 177 MG/DL (ref 65–105)
HCT VFR BLD CALC: 31.1 % (ref 36.3–47.1)
HCT VFR BLD CALC: 37.7 % (ref 36.3–47.1)
HEMOGLOBIN: 10.6 G/DL (ref 11.9–15.1)
HEMOGLOBIN: 9.7 G/DL (ref 11.9–15.1)
IMMATURE GRANULOCYTES: 0 %
LYMPHOCYTES # BLD: 37 % (ref 24–44)
MCH RBC QN AUTO: 27.2 PG (ref 25.2–33.5)
MCHC RBC AUTO-ENTMCNC: 28.1 G/DL (ref 28.4–34.8)
MCV RBC AUTO: 96.9 FL (ref 82.6–102.9)
MONOCYTES # BLD: 6 % (ref 1–7)
MORPHOLOGY: ABNORMAL
NRBC AUTOMATED: 0.2 PER 100 WBC
PDW BLD-RTO: 15.8 % (ref 11.8–14.4)
PLATELET # BLD: 449 K/UL (ref 138–453)
PLATELET ESTIMATE: ABNORMAL
PMV BLD AUTO: 9.4 FL (ref 8.1–13.5)
RBC # BLD: 3.89 M/UL (ref 3.95–5.11)
RBC # BLD: ABNORMAL 10*6/UL
SEG NEUTROPHILS: 57 % (ref 36–66)
SEGMENTED NEUTROPHILS ABSOLUTE COUNT: 4.9 K/UL (ref 1.8–7.7)
WBC # BLD: 8.6 K/UL (ref 3.5–11.3)
WBC # BLD: ABNORMAL 10*3/UL

## 2020-02-19 PROCEDURE — 82947 ASSAY GLUCOSE BLOOD QUANT: CPT

## 2020-02-19 PROCEDURE — 6360000002 HC RX W HCPCS: Performed by: NURSE PRACTITIONER

## 2020-02-19 PROCEDURE — 2580000003 HC RX 258: Performed by: INTERNAL MEDICINE

## 2020-02-19 PROCEDURE — 6360000002 HC RX W HCPCS: Performed by: INTERNAL MEDICINE

## 2020-02-19 PROCEDURE — 85025 COMPLETE CBC W/AUTO DIFF WBC: CPT

## 2020-02-19 PROCEDURE — 6370000000 HC RX 637 (ALT 250 FOR IP): Performed by: INTERNAL MEDICINE

## 2020-02-19 PROCEDURE — 36415 COLL VENOUS BLD VENIPUNCTURE: CPT

## 2020-02-19 PROCEDURE — C9113 INJ PANTOPRAZOLE SODIUM, VIA: HCPCS | Performed by: NURSE PRACTITIONER

## 2020-02-19 PROCEDURE — 99238 HOSP IP/OBS DSCHRG MGMT 30/<: CPT | Performed by: NURSE PRACTITIONER

## 2020-02-19 PROCEDURE — 2580000003 HC RX 258: Performed by: NURSE PRACTITIONER

## 2020-02-19 PROCEDURE — 85014 HEMATOCRIT: CPT

## 2020-02-19 PROCEDURE — 99232 SBSQ HOSP IP/OBS MODERATE 35: CPT | Performed by: FAMILY MEDICINE

## 2020-02-19 PROCEDURE — 6370000000 HC RX 637 (ALT 250 FOR IP): Performed by: NURSE PRACTITIONER

## 2020-02-19 PROCEDURE — 85018 HEMOGLOBIN: CPT

## 2020-02-19 RX ORDER — OXYCODONE HYDROCHLORIDE 5 MG/1
5 TABLET ORAL EVERY 6 HOURS PRN
Qty: 12 TABLET | Refills: 0 | Status: SHIPPED | OUTPATIENT
Start: 2020-02-19 | End: 2020-02-22

## 2020-02-19 RX ADMIN — LEVOTHYROXINE SODIUM 100 MCG: 100 TABLET ORAL at 06:41

## 2020-02-19 RX ADMIN — FERROUS SULFATE TAB EC 325 MG (65 MG FE EQUIVALENT) 325 MG: 325 (65 FE) TABLET DELAYED RESPONSE at 08:50

## 2020-02-19 RX ADMIN — SENNOSIDES 8.6 MG: 8.6 TABLET, FILM COATED ORAL at 08:57

## 2020-02-19 RX ADMIN — INSULIN LISPRO 1 UNITS: 100 INJECTION, SOLUTION INTRAVENOUS; SUBCUTANEOUS at 12:00

## 2020-02-19 RX ADMIN — SUCRALFATE 1 G: 1 TABLET ORAL at 00:45

## 2020-02-19 RX ADMIN — SUCRALFATE 1 G: 1 TABLET ORAL at 12:00

## 2020-02-19 RX ADMIN — SODIUM CHLORIDE 8 MG/HR: 9 INJECTION, SOLUTION INTRAVENOUS at 03:03

## 2020-02-19 RX ADMIN — SUCRALFATE 1 G: 1 TABLET ORAL at 06:41

## 2020-02-19 RX ADMIN — OXYCODONE HYDROCHLORIDE 10 MG: 5 TABLET ORAL at 08:48

## 2020-02-19 RX ADMIN — DOCUSATE SODIUM 100 MG: 100 CAPSULE, LIQUID FILLED ORAL at 08:48

## 2020-02-19 RX ADMIN — AZITHROMYCIN MONOHYDRATE 500 MG: 500 INJECTION, POWDER, LYOPHILIZED, FOR SOLUTION INTRAVENOUS at 15:18

## 2020-02-19 RX ADMIN — METOPROLOL TARTRATE 50 MG: 50 TABLET, FILM COATED ORAL at 08:57

## 2020-02-19 RX ADMIN — INSULIN LISPRO 1 UNITS: 100 INJECTION, SOLUTION INTRAVENOUS; SUBCUTANEOUS at 08:39

## 2020-02-19 RX ADMIN — VENLAFAXINE HYDROCHLORIDE 150 MG: 150 CAPSULE, EXTENDED RELEASE ORAL at 08:58

## 2020-02-19 RX ADMIN — CALCIUM CARBONATE-CHOLECALCIFEROL TAB 250 MG-125 UNIT 500 MG: 250-125 TAB at 08:57

## 2020-02-19 RX ADMIN — OSELTAMIVIR PHOSPHATE 75 MG: 75 CAPSULE ORAL at 13:07

## 2020-02-19 RX ADMIN — RIVAROXABAN 15 MG: 15 TABLET, FILM COATED ORAL at 08:51

## 2020-02-19 ASSESSMENT — PAIN DESCRIPTION - LOCATION: LOCATION: HEAD

## 2020-02-19 ASSESSMENT — ENCOUNTER SYMPTOMS
VOMITING: 0
SINUS PRESSURE: 0
ABDOMINAL PAIN: 0
ABDOMINAL DISTENTION: 0
COUGH: 1
BACK PAIN: 1
SHORTNESS OF BREATH: 1
WHEEZING: 0
SINUS PAIN: 0
CONSTIPATION: 0
DIARRHEA: 0

## 2020-02-19 ASSESSMENT — PAIN DESCRIPTION - DESCRIPTORS: DESCRIPTORS: SHARP;HEADACHE

## 2020-02-19 ASSESSMENT — PAIN SCALES - GENERAL
PAINLEVEL_OUTOF10: 7
PAINLEVEL_OUTOF10: 1
PAINLEVEL_OUTOF10: 2

## 2020-02-19 ASSESSMENT — PAIN DESCRIPTION - FREQUENCY: FREQUENCY: INTERMITTENT

## 2020-02-19 ASSESSMENT — PAIN DESCRIPTION - PAIN TYPE: TYPE: CHRONIC PAIN

## 2020-02-19 ASSESSMENT — PAIN - FUNCTIONAL ASSESSMENT: PAIN_FUNCTIONAL_ASSESSMENT: PREVENTS OR INTERFERES SOME ACTIVE ACTIVITIES AND ADLS

## 2020-02-19 ASSESSMENT — PAIN DESCRIPTION - PROGRESSION: CLINICAL_PROGRESSION: RAPIDLY WORSENING

## 2020-02-19 ASSESSMENT — PAIN DESCRIPTION - ONSET: ONSET: ON-GOING

## 2020-02-19 NOTE — PROGRESS NOTES
[facility]    Progress Note    2/18/2020    7:16 PM    Name:   Goldie Collins  MRN:     [id number]     Acct:      [de-identified]   Room:   [address] [place] [address] Day:  21  Admit Date:  1/26/2020  1:38 PM    PCP:   Tita Barnett MD  Code Status:  DNR-CCA    Subjective:     C/C: Anemia    Interval History Status: not changed. Patient up walking with walker to bathroom. Her daughter is present. She was started on Xarelto today on PPI twice daily. She denies any blood in her stool. Discharge was arranged and scheduled to a SNF today and patient extremely agitated that she may not be going. Brief History:     Per my partner:  \"Patient is a 29-year-old female who was admitted as a transfer from [facility] on 1/23/2020 because of generalized weakness, fatigue and flulike illness for about a month. She was found to be anemic, hyponatremic, and hyperglycemic. She developed hypotension and imaging revealed concern for duodenitis versus contained duodenal perforation. She was started on vancomycin and Zosyn to cover intra-abdominal infection.     CT chest done subsequently showed extensive osteoblastic lesions and numerous soft tissue nodules in the greater omentum and mesentery suspicious for carcinomatosis. Of note patient had history of breast carcinoma.     On 1/30/2020, EGD was done which showed significant inflammation and ulceration in the distal third of the esophagus with white thick discharge suspicious for Candida although path was negative for Candida. A deep half centimeter ulcer in the duodenal bulb just beyond the pyloric orifice, with edema causing some gastric outlet narrowing, was covered with large clotted blood.   Adherent clot to the greater base was not able to be cleared.     On 2/1/2020, orthopedic surgery conducted left femur cephalo-medullary nailing and intraoperative biopsy.     Biopsy was positive for metastatic Temp 98.3 °F (36.8 °C) (Oral)   Resp 22   Ht 5' 2\" (1.575 m)   Wt 181 lb 7 oz (82.3 kg)   SpO2 91%   Breastfeeding No   BMI 33.19 kg/m²   Temp (24hrs), Av.5 °F (36.9 °C), Min:98.3 °F (36.8 °C), Max:98.6 °F (37 °C)    Recent Labs     20  0825 20  1140 20  1231 20  1557   POCGLU 159* 199* 194* 150*       I/O (24Hr): Intake/Output Summary (Last 24 hours) at 2020  Last data filed at 2020 1645  Gross per 24 hour   Intake 1390 ml   Output 400 ml   Net 990 ml       Labs:  Hematology:  Recent Labs     20  0647 20  0557 20  1931   WBC 10.6  --  6.2  --    RBC 3.23*  --  3.20*  --    HGB 8.9* 8.8* 8.7* 9.1*   HCT 29.2* 27.7* 30.7* 31.7*   MCV 90.4  --  95.9  --    MCH 27.6  --  27.2  --    MCHC 30.5  --  28.3*  --    RDW 15.9*  --  15.8*  --    *  --  500*  --    MPV 9.5  --  9.6  --      Chemistry:  Recent Labs     20  0647 20  0557   * 136   K 3.5* 3.8   CL 95* 101   CO2 25 24   GLUCOSE 122* 129*   BUN 8 8   CREATININE 0.51 0.47*   MG 1.9  --    ANIONGAP 13 11   LABGLOM >60 >60   GFRAA >60 >60   CALCIUM 7.7* 7.9*     Recent Labs     203 20  0701 20  0825 20  1140 20  1231 20  1557   POCGLU 149* 136* 159* 199* 194* 150*     ABG:  Lab Results   Component Value Date    FIO2 INFORMATION NOT PROVIDED 02/15/2020     Lab Results   Component Value Date/Time    SPECIAL RT AC 1ML 02/15/2020 12:12 AM     Lab Results   Component Value Date/Time    CULTURE NO GROWTH 3 DAYS 02/15/2020 12:12 AM       Radiology:  Xr Knee Left (3 Views)    Result Date: 2020  No acute osseous abnormality or focal bony lesion identified. A femoral intramedullary yesika is now in place.  Soft tissue calcifications are again noted, correlating to a small partially calcified subcutaneous soft tissue mass seen on the prior CT exam.     Xr Chest Portable    Result Date: 2/15/2020  Focal airspace opacity identified right costophrenic angle could be due to focal infiltrate/pneumonia versus small effusion. Follow-up to assure resolution following medical treatment course is recommended. Physical Examination:        General appearance:  alert, cooperative and very upset  Mental Status:  oriented to person, place and time and angry  Lungs:  clear to auscultation bilaterally, normal effort  Heart:  regular rate and rhythm, no murmur  Abdomen:  soft, nontender, nondistended, normal bowel sounds, no masses, hepatomegaly, splenomegaly  Extremities:  no edema, redness, tenderness in the calves  Skin:  no gross lesions, rashes, induration    Assessment:        Hospital Problems           Last Modified POA    * (Principal) Metastatic breast cancer ([facility] 2/2/2020 Yes    Overview Signed 2/2/2020  1:13 PM by Rajesh Waite, DO     To bones, calvarium, dura,          Anemia 1/30/2020 Yes    Liver lesion 1/26/2020 Yes    History of breast cancer 1/27/2020 Yes    Diabetes mellitus type 2 in obese ([address] (Chronic) 1/29/2020 Yes    Essential hypertension (Chronic) 1/29/2020 Yes    Acquired hypothyroidism (Chronic) 1/29/2020 Yes    Recurrent major depressive disorder, in remission ([address] (Chronic) 1/29/2020 Yes    Bone lesion 1/30/2020 Yes    Iron deficiency 1/30/2020 Yes    Acute duodenal ulcer with gastric outlet obstruction 2/3/2020 Yes    Ulcer of esophagus without bleeding 2/3/2020 Yes    Encounter for palliative care 2/3/2020 Yes    Deep venous thrombosis ([address] 2/10/2020 Yes    Influenza A 2/15/2020 Yes    Pneumonia involving right lung 2/15/2020 Yes    Metastatic carcinoma ([address] 2/15/2020 Yes          Plan:        1. Metastatic breast adenocarcinoma, mets to bone-discussed with Dr. Eduardo Feliz, plans for outpatient chemotherapy and radiation. Continue pain control for back pain  2. Influenza A-continue Tamiflu  3. Pneumonia right lung-okay to change to oral Levaquin on discharge  4.  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

## 2020-02-19 NOTE — PROGRESS NOTES
Patient is currently without IV access. Multiple attempts made by staff, but unsuccessful. Patient is a very hard stick and requires an ultrasound. Multiple attempts were made to contact someone with an ultrasound by the CAR 3 night charge nurse, but no one is available. Kieran Archer NP was made aware that the patient's Protonix drip is unable to be started until IV access is obtained. A STAT IV team consult has been placed for the morning.     Electronically signed by Xenia Reese RN on 2/19/2020 at 2:11 AM

## 2020-02-19 NOTE — PROGRESS NOTES
Paged Deepthi Huddleston with GI to notify her that Xarelto was started today and asked it they were ok with discharge. She says should be ok and they will be rounding soon.

## 2020-02-19 NOTE — PROGRESS NOTES
skilled nursing facility with follow-up for both radio therapy as well as chemotherapy.     However on 2/10/2020 she was found to have DVT of the upper extremity, particular in the distal subclavian and axillary veins with partial compressibility.  Gastroenterology was reconsulted to do a repeat EGD prior to starting patient on long-term anticoagulation.     On 2/11/2020 patient had repeat EGD done which showed coffee-ground material in the stomach with small red blood clots as well as a large adherent fresh clot in the duodenal bulb.  Active bleeding was noted.  Patient was then transferred to ICU for close monitoring.  Anticoagulation was held.     On 2/13/2020 she had another EGD done and at that time 2 ulcers were found but no high risk stigmata of bleeding was noted. Fransisco Coil is no evidence of ongoing bleeding.  Patient was transferred back to the floor for PPI drip, Carafate and lack of anticoagulation for 3 days.     2/14/2020 evening patient was noted to be tachycardic, with fever, and confusion.  She was also noted to be diaphoretic and tachypneic.  Respiratory viral panel tested positive for influenza A.  Patient started on Tamiflu.  Chest x-ray showed developing pneumonia of the right lung.  She is started on ceftriaxone and azithromycin.     2/17/2020: Repeat duplex ultrasound of right upper extremity + chronic DVT right axillary veins\"     2/18/2020-Xarelto started, PPI twice daily      Review of Systems:     Review of Systems   Constitutional: Positive for activity change and fatigue. Negative for appetite change, chills and fever. HENT: Positive for congestion. Negative for sinus pressure and sinus pain. Respiratory: Positive for cough and shortness of breath. Negative for wheezing. Cardiovascular: Negative for chest pain, palpitations and leg swelling. Gastrointestinal: Negative for abdominal distention, abdominal pain, constipation, diarrhea and vomiting.    Genitourinary: Positive for Diabetes mellitus type 2 in obese [facility]), Essential hypertension, and Hypothyroidism (acquired). Social History:   reports that she has never smoked. She has never used smokeless tobacco. She reports that she does not use drugs. Family History:   Family History   Problem Relation Age of Onset    Hypertension Mother     Hypertension Father        Vitals:  /86   Pulse 67   Temp 97.5 °F (36.4 °C) (Oral)   Resp 18   Ht 5' 2\" (1.575 m)   Wt 182 lb (82.6 kg)   SpO2 98%   Breastfeeding No   BMI 33.29 kg/m²   Temp (24hrs), Av.9 °F (36.6 °C), Min:97.5 °F (36.4 °C), Max:98.2 °F (36.8 °C)    Recent Labs     20  1557 20  2113 20  0813 20  1153   POCGLU 150* 164* 177* 162*       I/O (24Hr): Intake/Output Summary (Last 24 hours) at 2020 1437  Last data filed at 2020 1200  Gross per 24 hour   Intake 1426 ml   Output --   Net 1426 ml       Labs:  Hematology:  Recent Labs     20  0557  20  1941 20  0333 20  1158   WBC 6.2  --   --  8.6  --    RBC 3.20*  --   --  3.89*  --    HGB 8.7*   < > 9.8* 10.6* 9.7*   HCT 30.7*   < > 33.9* 37.7 31.1*   MCV 95.9  --   --  96.9  --    MCH 27.2  --   --  27.2  --    MCHC 28.3*  --   --  28.1*  --    RDW 15.8*  --   --  15.8*  --    *  --   --  449  --    MPV 9.6  --   --  9.4  --     < > = values in this interval not displayed.      Chemistry:  Recent Labs     20  0557      K 3.8      CO2 24   GLUCOSE 129*   BUN 8   CREATININE 0.47*   ANIONGAP 11   LABGLOM >60   GFRAA >60   CALCIUM 7.9*     Recent Labs     20  1140 20  1231 20  1557 20  2113 20  0813 20  1153   POCGLU 199* 194* 150* 164* 177* 162*     ABG:  Lab Results   Component Value Date    FIO2 INFORMATION NOT PROVIDED 02/15/2020     Lab Results   Component Value Date/Time    SPECIAL RT AC 1ML 02/15/2020 12:12 AM     Lab Results   Component Value Date/Time    CULTURE NO GROWTH 4 DAYS 02/15/2020 Signed 2/2/2020  1:13 PM by Raúl Weisntein Blood, DO     To bones, calvarium, dura,          Anemia 1/30/2020 Yes    Liver lesion 1/26/2020 Yes    History of breast cancer 1/27/2020 Yes    Diabetes mellitus type 2 in obese ([facility] [address] (Chronic) 1/29/2020 Yes    Essential hypertension (Chronic) 1/29/2020 Yes    Acquired hypothyroidism (Chronic) 1/29/2020 Yes    Recurrent major depressive disorder, in remission ([facility] [address] (Chronic) 1/29/2020 Yes    Bone lesion 1/30/2020 Yes    Iron deficiency 1/30/2020 Yes    Acute duodenal ulcer with gastric outlet obstruction 2/3/2020 Yes    Ulcer of esophagus without bleeding 2/3/2020 Yes    Encounter for palliative care 2/3/2020 Yes    Deep venous thrombosis ([facility] [address] 2/10/2020 Yes    Influenza A 2/15/2020 Yes    Pneumonia involving right lung 2/15/2020 Yes    Metastatic carcinoma ([facility] [address] 2/15/2020 Yes          Plan:        1. Metastatic breast cancer, mets to bone-discussed with Dr. Fatemeh Robertson, plans for outpatient chemotherapy and radiation. Continue pain control for back pain  2. Influenza A-continue Tamiflu  3. Pneumonia right lung-okay to change to oral Levaquin on discharge  4. 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 There was some confusion about GIs recommendations, Protonix drip started this afternoon. Given her bleeding duodenal ulcers, Dr. Henry Stevenson and I decided to hold her discharge and monitor H&H for at least 24 hours. Patient agreeable  5. Duodenal ulcers-continue Protonix drip, and Carafate  6. DM2- SSI  7. Essential hypertension BP stable   8. Major depressive disorder-continue home medications  9. PT/OT  10.  Discharge planning    SERAFIN Carballo - NP  2/19/2020  2:37 PM

## 2020-02-19 NOTE — PLAN OF CARE
GI Update:     Will plan for colonoscopy tomorrow once pt finishes all of the prep  NPO MN    Will follow    Kendell Love, [address] [place] [address]
No breaks in skin integrity. NO falls this shift. Up to bathroom x1 assist and walker and did well. Pain controlled with PRN pain medications. Consuming clears well with no nausea/vomiting. All needs met physically and emotionally.
Okay for patient to work with PT and OT, despite RUE DVT and no AC at this time. No Contraindication to activity.      Jennifer Camarillo MD  02/16/20  5:36 PM
Problem: Falls - Risk of:  Goal: Will remain free from falls  Description  Will remain free from falls  Outcome: Met This Shift  Goal: Absence of physical injury  Description  Absence of physical injury  Outcome: Met This Shift     Problem: Risk for Impaired Skin Integrity  Goal: Tissue integrity - skin and mucous membranes  Description  Structural intactness and normal physiological function of skin and  mucous membranes. Outcome: Ongoing     Problem: Pain:  Goal: Pain level will decrease  Description  Pain level will decrease  Outcome: Ongoing  Goal: Control of acute pain  Description  Control of acute pain  Outcome: Ongoing  Goal: Control of chronic pain  Description  Control of chronic pain  Outcome: Ongoing     Problem: Nutrition  Goal: Optimal nutrition therapy  2/3/2020 1451 by Raj Larose RN  Outcome: Ongoing  2/3/2020 1422 by Steve Miller RD, LD  Outcome: Ongoing  Note:   Nutrition Problem: Inadequate oral intake  Intervention: Food and/or Nutrient Delivery: Continue current diet, Continue current ONS  Nutritional Goals: Oral intakes to meet at least 75% of estimated nutrition needs.      Problem: Musculor/Skeletal Functional Status  Goal: Highest potential functional level  Outcome: Ongoing  Goal: Absence of falls  Outcome: Ongoing
Problem: Risk for Impaired Skin Integrity  Goal: Tissue integrity - skin and mucous membranes  Description  Structural intactness and normal physiological function of skin and  mucous membranes.   Outcome: Met This Shift     Problem: Falls - Risk of:  Goal: Will remain free from falls  Description  Will remain free from falls  Outcome: Met This Shift  Goal: Absence of physical injury  Description  Absence of physical injury  Outcome: Met This Shift     Problem: Pain:  Goal: Pain level will decrease  Description  Pain level will decrease  Outcome: Met This Shift  Goal: Control of acute pain  Description  Control of acute pain  Outcome: Met This Shift
Problem: Risk for Impaired Skin Integrity  Goal: Tissue integrity - skin and mucous membranes  Description  Structural intactness and normal physiological function of skin and  mucous membranes.   Outcome: Ongoing
Problem: Risk for Impaired Skin Integrity  Goal: Tissue integrity - skin and mucous membranes  Description  Structural intactness and normal physiological function of skin and  mucous membranes.   Outcome: Ongoing     Problem: Falls - Risk of:  Goal: Will remain free from falls  Description  Will remain free from falls  Outcome: Ongoing  Goal: Absence of physical injury  Description  Absence of physical injury  Outcome: Ongoing     Problem: Discharge Planning:  Goal: Participates in care planning  Description  Participates in care planning  Outcome: Ongoing  Goal: Discharged to appropriate level of care  Description  Discharged to appropriate level of care  Outcome: Ongoing     Problem: Airway Clearance - Ineffective:  Goal: Ability to maintain a clear airway will improve  Description  Ability to maintain a clear airway will improve  Outcome: Ongoing     Problem: Anxiety/Stress:  Goal: Level of anxiety will decrease  Description  Level of anxiety will decrease  Outcome: Ongoing     Problem: Aspiration:  Goal: Absence of aspiration  Description  Absence of aspiration  Outcome: Ongoing     Problem: Cardiac Output - Decreased:  Goal: Hemodynamic stability will improve  Description  Hemodynamic stability will improve  Outcome: Ongoing     Problem: Fluid Volume - Imbalance:  Goal: Absence of imbalanced fluid volume signs and symptoms  Description  Absence of imbalanced fluid volume signs and symptoms  Outcome: Ongoing     Problem: Gas Exchange - Impaired:  Goal: Levels of oxygenation will improve  Description  Levels of oxygenation will improve  Outcome: Ongoing
Problem: Risk for Impaired Skin Integrity  Goal: Tissue integrity - skin and mucous membranes  Description  Structural intactness and normal physiological function of skin and  mucous membranes.   Outcome: Ongoing     Problem: Falls - Risk of:  Goal: Will remain free from falls  Description  Will remain free from falls  Outcome: Ongoing  Goal: Absence of physical injury  Description  Absence of physical injury  Outcome: Ongoing     Problem: Pain:  Goal: Pain level will decrease  Description  Pain level will decrease  Outcome: Ongoing  Goal: Control of acute pain  Description  Control of acute pain  Outcome: Ongoing  Goal: Control of chronic pain  Description  Control of chronic pain  Outcome: Ongoing     Problem: Nutrition  Goal: Optimal nutrition therapy  Outcome: Ongoing
Problem: Risk for Impaired Skin Integrity  Goal: Tissue integrity - skin and mucous membranes  Description  Structural intactness and normal physiological function of skin and  mucous membranes.   Outcome: Ongoing     Problem: Falls - Risk of:  Goal: Will remain free from falls  Description  Will remain free from falls  Outcome: Ongoing  Goal: Absence of physical injury  Description  Absence of physical injury  Outcome: Ongoing     Problem: Pain:  Goal: Pain level will decrease  Description  Pain level will decrease  Outcome: Ongoing  Goal: Control of acute pain  Description  Control of acute pain  Outcome: Ongoing  Goal: Control of chronic pain  Description  Control of chronic pain  Outcome: Ongoing     Problem: Nutrition  Goal: Optimal nutrition therapy  Outcome: Ongoing
Problem: Risk for Impaired Skin Integrity  Goal: Tissue integrity - skin and mucous membranes  Description  Structural intactness and normal physiological function of skin and  mucous membranes.   Outcome: Ongoing     Problem: Falls - Risk of:  Goal: Will remain free from falls  Description  Will remain free from falls  Outcome: Ongoing  Goal: Absence of physical injury  Description  Absence of physical injury  Outcome: Ongoing     Problem: Pain:  Goal: Pain level will decrease  Description  Pain level will decrease  Outcome: Ongoing  Goal: Control of acute pain  Description  Control of acute pain  Outcome: Ongoing  Goal: Control of chronic pain  Description  Control of chronic pain  Outcome: Ongoing     Problem: Nutrition  Goal: Optimal nutrition therapy  Outcome: Ongoing     Problem: Musculor/Skeletal Functional Status  Goal: Highest potential functional level  Outcome: Ongoing  Goal: Absence of falls  Outcome: Ongoing
Problem: Risk for Impaired Skin Integrity  Goal: Tissue integrity - skin and mucous membranes  Description  Structural intactness and normal physiological function of skin and  mucous membranes.   Outcome: Ongoing     Problem: Falls - Risk of:  Goal: Will remain free from falls  Description  Will remain free from falls  Outcome: Ongoing  Goal: Absence of physical injury  Description  Absence of physical injury  Outcome: Ongoing     Problem: Pain:  Goal: Pain level will decrease  Description  Pain level will decrease  Outcome: Ongoing  Goal: Control of acute pain  Description  Control of acute pain  Outcome: Ongoing  Goal: Control of chronic pain  Description  Control of chronic pain  Outcome: Ongoing     Problem: Nutrition  Goal: Optimal nutrition therapy  Outcome: Ongoing     Problem: Musculor/Skeletal Functional Status  Goal: Highest potential functional level  Outcome: Ongoing  Goal: Absence of falls  Outcome: Ongoing
Problem: Risk for Impaired Skin Integrity  Goal: Tissue integrity - skin and mucous membranes  Description  Structural intactness and normal physiological function of skin and  mucous membranes. Outcome: Ongoing     Problem: Falls - Risk of:  Goal: Will remain free from falls  Description  Will remain free from falls  Outcome: Ongoing     Pt turned and repositioned q2hr to maintain skin integrity. Pt has no falls related injuries  this shift.  Mandy Mclaughlin RN
Problem: Risk for Impaired Skin Integrity  Goal: Tissue integrity - skin and mucous membranes  Outcome: Ongoing     Problem: Falls - Risk of:  Goal: Will remain free from falls  Outcome: Ongoing  Goal: Absence of physical injury  Outcome: Ongoing     Problem: Pain:  Goal: Pain level will decrease  Outcome: Ongoing  Goal: Control of acute pain  Outcome: Ongoing  Goal: Control of chronic pain  Outcome: Ongoing     Problem: Discharge Planning:  Goal: Participates in care planning  Outcome: Completed  Goal: Discharged to appropriate level of care  Outcome: Completed     Problem: Airway Clearance - Ineffective:  Goal: Ability to maintain a clear airway will improve  Outcome: Completed     Problem: Anxiety/Stress:  Goal: Level of anxiety will decrease  Outcome: Completed     Problem: Aspiration:  Goal: Absence of aspiration  Outcome: Completed     Problem: Cardiac Output - Decreased:  Goal: Hemodynamic stability will improve  Outcome: Completed     Problem: Fluid Volume - Imbalance:  Goal: Absence of imbalanced fluid volume signs and symptoms  Outcome: Completed     Problem: Gas Exchange - Impaired:  Goal: Levels of oxygenation will improve  Outcome: Completed
Pt sitting in chair most of the day. Up to bathroom with standby assist. Mostlly independent. Pt. With no complaints of pain today. No new breaks in skin integrity. Good appetite. Daughter at bedside. Will cont. POC.
stability will improve  Description  Hemodynamic stability will improve  1/28/2020 2128 by Xenia Talamantes RN  Outcome: Ongoing  1/28/2020 1813 by Brendon Zapien RN  Outcome: Ongoing     Problem: Fluid Volume - Imbalance:  Goal: Absence of imbalanced fluid volume signs and symptoms  Description  Absence of imbalanced fluid volume signs and symptoms  1/28/2020 2128 by Xenia Talamantes RN  Outcome: Ongoing  1/28/2020 1813 by Brendon Zapien RN  Outcome: Ongoing     Problem: Gas Exchange - Impaired:  Goal: Levels of oxygenation will improve  Description  Levels of oxygenation will improve  1/28/2020 2128 by Xenia Talamantes RN  Outcome: Ongoing  1/28/2020 1813 by Brendon Zapien RN  Outcome: Ongoing     Problem: Pain:  Goal: Pain level will decrease  Description  Pain level will decrease  1/28/2020 2128 by Xenia Talamantes RN  Outcome: Ongoing  1/28/2020 1813 by Brendon Zapien RN  Outcome: Ongoing  Goal: Control of acute pain  Description  Control of acute pain  1/28/2020 2128 by Xenia Talamantes RN  Outcome: Ongoing  1/28/2020 1813 by Brendon Zapien RN  Outcome: Ongoing  Goal: Control of chronic pain  Description  Control of chronic pain  1/28/2020 2128 by Xenia Talamantes RN  Outcome: Ongoing  1/28/2020 1813 by Brendon Zapien RN  Outcome: Ongoing

## 2020-02-19 NOTE — DISCHARGE SUMMARY
Alex Bowden [place] [facility]    Discharge Summary     Patient ID: John Arshad  :  1950   MRN: [id number]     ACCOUNT:  [de-identified]   Patient's PCP: Scout Villalta MD  Admit Date: 2020   Discharge Date: 2020    Length of Stay: 24  Code Status:  DNR-CCA  Admitting Physician: Kala Chris MD  Discharge Physician: Lawanda Rodgers, APRN - NP     Active Discharge Diagnoses:     Hospital Problem Lists:  Principal Problem:    Metastatic breast cancer ([facility]  Active Problems:    Anemia    Liver lesion    History of breast cancer    Diabetes mellitus type 2 in [facility])    Essential hypertension    Acquired hypothyroidism    Recurrent major depressive disorder, in remission ([facility] [facility]    Bone lesion    Iron deficiency    Deep venous thrombosis ([facility] [address] [facility]    Influenza A    Pneumonia involving right lung    Metastatic carcinoma ([facility] [address]  Resolved Problems:    Septic shock ([facility] [address]    Acute duodenal ulcer with gastric outlet obstruction    Ulcer of esophagus without bleeding    Encounter for palliative care      Admission Condition:  fair     Discharged Condition: fair    Hospital Stay:     Hospital Course:  John Arshad is a 77 y.o. female who was admitted for the management of   Metastatic breast cancer ([facility] , presented to ER with anemia    Per records:  \"Patient is a 76-year-old female who was admitted as a transfer from [facility] on 2020 because of generalized weakness, fatigue and flulike illness for about a month.  She was found to be anemic, hyponatremic, and hyperglycemic.  She developed hypotension and imaging revealed concern for duodenitis versus contained duodenal perforation.  She was started on vancomycin and Zosyn to cover intra-abdominal infection.     CT chest done subsequently showed extensive osteoblastic lesions and numerous soft tissue nodules in the greater omentum and mesentery suspicious for carcinomatosis.  Of note patient had history of breast carcinoma.     On 1/30/2020, EGD was done which showed significant inflammation and ulceration in the distal third of the esophagus with white thick discharge suspicious for Candida although path was negative for Candida.  A deep half centimeter ulcer in the duodenal bulb just beyond the pyloric orifice, with edema causing some gastric outlet narrowing, was covered with large clotted blood.  Adherent clot to the greater base was not able to be cleared.     On 2/1/2020, orthopedic surgery conducted left femur cephalo-medullary nailing and intraoperative biopsy.     Biopsy was positive for metastatic breast carcinoma.     Initial plan was for discharge to skilled nursing facility with follow-up for both radio therapy as well as chemotherapy.     However on 2/10/2020 she was found to have DVT of the upper extremity, particular in the distal subclavian and axillary veins with partial compressibility.  Gastroenterology was reconsulted to do a repeat EGD prior to starting patient on long-term anticoagulation.     On 2/11/2020 patient had repeat EGD done which showed coffee-ground material in the stomach with small red blood clots as well as a large adherent fresh clot in the duodenal bulb.  Active bleeding was noted.  Patient was then transferred to ICU for close monitoring.  Anticoagulation was held.     On 2/13/2020 she had another EGD done and at that time 2 ulcers were found but no high risk stigmata of bleeding was noted. Sven Antes is no evidence of ongoing bleeding.  Patient was transferred back to the floor for PPI drip, Carafate and lack of anticoagulation for 3 days.     2/14/2020 evening patient was noted to be tachycardic, with fever, and confusion.  She was also noted to be diaphoretic and tachypneic.  Respiratory viral panel tested positive for influenza A.  Patient started on Tamiflu.  Chest x-ray showed developing pneumonia of the right lung.  She is started on ceftriaxone and a visit  when discharged from facility     Cassidy Maldonado MD  [address] [facility]  [phone]    Schedule an appointment as soon as possible for a visit in 1 week  orthopedic f/u    Naida Cortes MD  [address]  [facility] [address] [facility]  [phone]    Schedule an appointment as soon as possible for a visit in 2 weeks  GI follow up    Clarice Dolan MD  [address] [facility] [address] [facility] [address] [facility] [phone]    Schedule an appointment as soon as possible for a visit  f/u [address] [facility], [address] [facility] 12[address] [facility]  [phone]    Schedule an appointment as soon as possible for a visit  oncology f/u     [address]  [phone]           Requiring Further Evaluation/Follow Up POST HOSPITALIZATION/Incidental Findings:     Diet: diabetic diet    Activity: As tolerated    Instructions to Patient: Take all medications as prescribed. Follow up as directed.      Discharge Medications:      Medication List      START taking these medications    ferrous sulfate 325 (65 Fe) MG EC tablet  Take 1 tablet by mouth 2 times daily (with meals)     lactulose 10 GM/15ML solution  Commonly known as:  CHRONULAC  Take 30 mLs by mouth 3 times daily as needed (constipation)     levoFLOXacin 500 MG tablet  Commonly known as:  LEVAQUIN  Take 1 tablet by mouth daily for 7 days     oseltamivir 75 MG capsule  Commonly known as:  TAMIFLU  Take 1 capsule by mouth 2 times daily for 4 days     pantoprazole 40 MG tablet  Commonly known as:  PROTONIX  Take 1 tablet by mouth 2 times daily (before meals)     rivaroxaban 15 MG Tabs tablet  Commonly known as:  XARELTO  Take 1 tablet by mouth 2 times daily (with meals)     sucralfate 1 GM tablet  Commonly known as:  CARAFATE  Take 1 tablet by mouth 4 times daily        CONTINUE taking these medications    aspirin 81 MG tablet  Take 1 tablet by mouth daily     carvedilol 6.25 MG tablet  Commonly known as:  COREG     glimepiride 4 MG tablet  Commonly known as:  AMARYL     levothyroxine 100 MCG tablet  Commonly known as:  SYNTHROID     lisinopril 2.5 MG tablet  Commonly known as:  PRINIVIL;ZESTRIL     metFORMIN 1000 MG tablet  Commonly known as:  GLUCOPHAGE     SITagliptin 100 MG tablet  Commonly known as:  JANUVIA     venlafaxine 150 MG extended release capsule  Commonly known as:  EFFEXOR XR        ASK your doctor about these medications    oxyCODONE 5 MG immediate release tablet  Commonly known as:  ROXICODONE  Take 1 tablet by mouth every 4 hours as needed for Pain for up to 12 doses. Ask about: Should I take this medication? Where to Get Your Medications      These medications were sent to [facility]  [address]  [address]    Phone:  [phone]   · aspirin 81 MG tablet  · ferrous sulfate 325 (65 Fe) MG EC tablet  · lactulose 10 GM/15ML solution  · pantoprazole 40 MG tablet     You can get these medications from any pharmacy    Bring a paper prescription for each of these medications  · oxyCODONE 5 MG immediate release tablet     Information about where to get these medications is not yet available    Ask your nurse or doctor about these medications  · levoFLOXacin 500 MG tablet  · oseltamivir 75 MG capsule  · rivaroxaban 15 MG Tabs tablet  · sucralfate 1 GM tablet         No discharge procedures on file. Time Spent on discharge is  20 mins in patient examination, evaluation, counseling as well as medication reconciliation, prescriptions for required medications, discharge plan and follow up. Discussed with attending   MIRIAN alonso  Electronically signed by   SERAFIN Pena NP  2/19/2020  2:44 PM      Thank you Dr. Autumn Fuentes MD for the opportunity to be involved in this patient's care.

## 2020-02-19 NOTE — PROGRESS NOTES
done.    Past Medical History: Breast cancer    Past Surgical History: Left mastectomy    Medications:    Prior to Admission medications    Medication Sig Start Date End Date Taking?  Authorizing Provider   oseltamivir (TAMIFLU) 75 MG capsule Take 1 capsule by mouth 2 times daily for 4 days 2/19/20 2/23/20 Yes Real Avitia MD   rivaroxaban (XARELTO) 15 MG TABS tablet Take 1 tablet by mouth 2 times daily (with meals) 2/19/20  Yes Real Avitia MD   sucralfate (CARAFATE) 1 GM tablet Take 1 tablet by mouth 4 times daily 2/18/20  Yes Real Avitia MD   levoFLOXacin (LEVAQUIN) 500 MG tablet Take 1 tablet by mouth daily for 7 days 2/18/20 2/25/20 Yes Real Avitia MD   aspirin 81 MG tablet Take 1 tablet by mouth daily 2/13/20  Yes Thee Gonzalez MD   ferrous sulfate 325 (65 Fe) MG EC tablet Take 1 tablet by mouth 2 times daily (with meals) 2/6/20  Yes Thee Gonzalez MD   lactulose (CHRONULAC) 10 GM/15ML solution Take 30 mLs by mouth 3 times daily as needed (constipation) 2/6/20  Yes Thee Gonzalez MD   pantoprazole (PROTONIX) 40 MG tablet Take 1 tablet by mouth 2 times daily (before meals) 2/6/20  Yes Thee Gonzalez MD   venlafaxine (EFFEXOR XR) 150 MG extended release capsule Take 150 mg by mouth daily   Yes Historical Provider, MD   metFORMIN (GLUCOPHAGE) 1000 MG tablet Take 1,000 mg by mouth 2 times daily (with meals)   Yes Historical Provider, MD   levothyroxine (SYNTHROID) 100 MCG tablet Take 100 mcg by mouth Daily   Yes Historical Provider, MD   glimepiride (AMARYL) 4 MG tablet Take 4 mg by mouth 2 times daily   Yes Historical Provider, MD   lisinopril (PRINIVIL;ZESTRIL) 2.5 MG tablet Take 2.5 mg by mouth daily   Yes Historical Provider, MD   carvedilol (COREG) 6.25 MG tablet Take 6.25 mg by mouth 2 times daily (with meals)   Yes Historical Provider, MD   SITagliptin (JANUVIA) 100 MG tablet Take 100 mg by mouth daily   Yes Historical Provider, MD     Current Facility-Administered mcg Intravenous Q2H PRN Chris Pineda MD   50 mcg at 02/12/20 1020    lactated ringers infusion   Intravenous Continuous Pao Rodríguez MD   Stopped at 02/17/20 0600    metoprolol (LOPRESSOR) injection 5 mg  5 mg Intravenous Q8H PRN Pao Rodríguez MD        metoprolol tartrate (LOPRESSOR) tablet 50 mg  50 mg Oral BID Chris Pineda MD   50 mg at 02/18/20 0943    0.9 % sodium chloride bolus  250 mL Intravenous Once Chris Pineda MD        [Held by provider] metFORMIN (GLUCOPHAGE) tablet 1,000 mg  1,000 mg Oral BID WC Chris Pineda MD   1,000 mg at 02/10/20 1804    [Held by provider] linagliptin (TRADJENTA) tablet 5 mg  5 mg Oral Daily Pao Rodríguez MD   5 mg at 02/10/20 0911    fentaNYL (SUBLIMAZE) injection 25 mcg  25 mcg Intravenous Q5 Min PRN Chris Pineda MD   25 mcg at 02/02/20 1143    sodium chloride flush 0.9 % injection 10 mL  10 mL Intravenous PRN Pao Rodríguez MD        alteplase (CATHFLO) injection 2 mg  2 mg Intracatheter Once Chris Pineda MD        oyster shell calcium/vitamin D 250-125 MG-UNIT per tablet 500 mg  2 tablet Oral Daily Chris Pineda MD   500 mg at 02/18/20 0943    [Held by provider] morphine (MS CONTIN) extended release tablet 15 mg  15 mg Oral 2 times per day Refjayne Fiore DO   15 mg at 02/02/20 2132    sodium chloride flush 0.9 % injection 10 mL  10 mL Intravenous PRN Chris Pineda MD   10 mL at 02/08/20 2025    acetaminophen (TYLENOL) tablet 650 mg  650 mg Oral Q4H PRN Pao Rodríguez MD   650 mg at 02/18/20 0529    levothyroxine (SYNTHROID) tablet 100 mcg  100 mcg Oral Daily Pao Rodríguez MD   100 mcg at 02/18/20 0529    venlafaxine (EFFEXOR XR) extended release capsule 150 mg  150 mg Oral Daily Chris Pineda MD   150 mg at 02/18/20 0944    docusate sodium (COLACE) capsule 100 mg  100 mg Oral BID Pao Rodríguez MD   100 mg at 02/18/20 2128    senna (SENOKOT) tablet 8.6 mg  1 tablet Oral BID Pao Rodríguez MD   8.6 mg at 02/18/20 2128    ferrous sulfate EC tablet 325 mg  325 mg Oral BID  Pao Rodríguez MD   325 mg at 02/18/20 1640    sodium chloride flush 0.9 % injection 10 mL  10 mL Intravenous 2 times per day Dariela Beasley MD   10 mL at 02/17/20 2102    sodium chloride flush 0.9 % injection 10 mL  10 mL Intravenous PRN Dariela Beasley MD   10 mL at 01/30/20 2042    magnesium hydroxide (MILK OF MAGNESIA) 400 MG/5ML suspension 30 mL  30 mL Oral Daily PRN Pao Rodríguez MD        ondansetron (ZOFRAN) injection 4 mg  4 mg Intravenous Q6H PRN Pao Rodríguez MD        bisacodyl (DULCOLAX) suppository 10 mg  10 mg Rectal Daily PRN Dariela Beasley MD        acetaminophen (TYLENOL) tablet 650 mg  650 mg Oral Q4H PRN Pao Rodríguez MD   650 mg at 02/15/20 0457    glucose (GLUTOSE) 40 % oral gel 15 g  15 g Oral PRN Pao Rodríguez MD        dextrose 50 % IV solution  12.5 g Intravenous PRN Pao Rodríguez MD        glucagon (rDNA) injection 1 mg  1 mg Intramuscular PRN Pao Rodríguez MD        dextrose 5 % solution  100 mL/hr Intravenous PRN Pao Rodríguez MD        oxyCODONE (ROXICODONE) immediate release tablet 5 mg  5 mg Oral Q4H PRN Pao Rodríguez MD   5 mg at 02/14/20 0550    Or    oxyCODONE (ROXICODONE) immediate release tablet 10 mg  10 mg Oral Q4H PRN Pao Rodríguez MD   10 mg at 02/17/20 1030       Allergies:  Patient has no known allergies. Social History:   reports that she has never smoked. She has never used smokeless tobacco. She reports that she does not use drugs. Denies smoking. Denies taking alcohol. Family History: Hypertension and diabetes    REVIEW OF SYSTEMS:      Constitutional: No fever or chills.  No night sweats, no weight loss   Eyes: No eye discharge, double vision, or eye pain   HEENT: negative for sore mouth, sore throat, hoarseness and voice change   Respiratory: negative for cough , sputum, dyspnea, wheezing, hemoptysis, chest pain   Cardiovascular: negative for chest pain, dyspnea, palpitations, orthopnea, PND   Gastrointestinal: negative for nausea, vomiting, diarrhea, constipation, abdominal pain, Dysphagia, hematemesis and hematochezia   Genitourinary: negative for frequency, dysuria, nocturia, urinary incontinence, and hematuria   Integument: negative for rash, skin lesions, bruises. Hematologic/Lymphatic: negative for easy bruising, bleeding, lymphadenopathy, or petechiae   Endocrine: negative for heat or cold intolerance,weight changes, change in bowel habits and hair loss   Musculoskeletal: Positive back pain  Neurological: negative for headaches, dizziness, seizures, weakness, numbness    PHYSICAL EXAM:        /73   Pulse 78   Temp 97.8 °F (36.6 °C) (Oral)   Resp 22   Ht 5' 2\" (1.575 m)   Wt 181 lb 7 oz (82.3 kg)   SpO2 92%   Breastfeeding No   BMI 33.19 kg/m²    Temp (24hrs), Av.2 °F (36.8 °C), Min:97.8 °F (36.6 °C), Max:98.6 °F (37 °C)      General appearance -slightly  lethargic but able to answer questions.   Eyes - pupils equal and reactive, extraocular eye movements intact   Ears - bilateral TM's and external ear canals normal   Mouth - mucous membranes moist, pharynx normal without lesions   Neck - supple, no significant adenopathy   Lymphatics - no palpable lymphadenopathy, no hepatosplenomegaly   Chest - clear to auscultation, no wheezes, rales or rhonchi, symmetric air entry   Heart - normal rate, regular rhythm, normal S1, S2, no murmurs  Abdomen - soft, nontender, nondistended, no masses or organomegaly   Neurological - alert, oriented, normal speech, no focal findings or movement disorder noted   Musculoskeletal - no joint tenderness, deformity or swelling   Extremities - peripheral pulses normal, no pedal edema, no clubbing or cyanosis   Skin - normal coloration and turgor, no rashes, no suspicious skin lesions noted ,  Breast left-sided mastectomy no signs of local recurrence    DATA:      Labs:     CBC:   Recent Labs     20  0647  20  0518 02/17/20  1931 02/18/20  1941   WBC 10.6  --  6.2  --   --    HGB 8.9*   < > 8.7* 9.1* 9.8*   HCT 29.2*   < > 30.7* 31.7* 33.9*   *  --  500*  --   --     < > = values in this interval not displayed. BMP:   Recent Labs     02/16/20  0647 02/17/20  0557   * 136   K 3.5* 3.8   CO2 25 24   BUN 8 8   CREATININE 0.51 0.47*   LABGLOM >60 >60   GLUCOSE 122* 129*     PT/INR:   No results for input(s): PROTIME, INR in the last 72 hours. APTT:  No results for input(s): APTT in the last 72 hours. LIVER PROFILE:  No results for input(s): AST, ALT, LABALBU in the last 72 hours. Us Renal Complete    Result Date: 1/26/2020  EXAMINATION: RETROPERITONEAL ULTRASOUND OF THE KIDNEYS AND URINARY BLADDER 1/26/2020 COMPARISON: None HISTORY: ORDERING SYSTEM PROVIDED HISTORY: left sided hydroureteronephrosis, eval kidneys TECHNOLOGIST PROVIDED HISTORY: left sided hydroureteronephrosis, eval kidneys FINDINGS: Kidneys: The right kidney measures 12.5 cm in length and the left kidney measures 12 cm in length. Kidneys demonstrate normal cortical echogenicity. Probable vascular calcifications are noted bilaterally. Mild left hydronephrosis. . Bladder: Bladder is decompressed with a Matt catheter. Mild left hydronephrosis     Ct Abdomen Pelvis W Iv Contrast Additional Contrast? Oral    Result Date: 1/26/2020  EXAMINATION: CT OF THE ABDOMEN AND PELVIS WITH CONTRAST 1/26/2020 5:25 pm TECHNIQUE: CT of the abdomen and pelvis was performed with the administration of intravenous contrast. Multiplanar reformatted images are provided for review. Dose modulation, iterative reconstruction, and/or weight based adjustment of the mA/kV was utilized to reduce the radiation dose to as low as reasonably achievable.  COMPARISON: January 25, 2020 CT abdomen and pelvis HISTORY: ORDERING SYSTEM PROVIDED HISTORY: possible duodenitis versus contained bowel perforation seen on CT chest at [address] on 1/25 TECHNOLOGIST PROVIDED HISTORY: possible duodenitis versus contained bowel perforation seen on CT chest at [address] on 1/25 Reason for Exam: possible duodenitis versus contained bowel perforation seen on CT chest at [address] on 1/25 Acuity: Acute Type of Exam: Subsequent/Follow-up FINDINGS: Lower Chest: Cardiomegaly. Calcific coronary artery disease. Pacer wire right heart. Organs: Fat containing umbilical hernia. The liver, spleen, pancreas, and adrenals appear normal.  Distended gallbladder. No radiodense gallstones. Kidneys appear normal. Matt catheter decompresses the bladder. GI/Bowel: The stomach,small bowel, and colon appear normal. Increased stool throughout the colon. Oral contrast is noted in the stomach, small bowel and colon. The appendix is not identified. Pelvis: Uterus normal. Peritoneum/Retroperitoneum: The abdominal aorta and iliac arteries are normal in caliber. There is no pathologic adenopathy. 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 Right total hip arthroplasty. Distended gallbladder. No radiodense gallstones noted but ultrasound is recommended. Extensive metastatic disease is a risk for fracture at T10. Us Gallbladder Ruq    Result Date: 1/27/2020  EXAMINATION: RIGHT UPPER QUADRANT ULTRASOUND 1/27/2020 10:23 am COMPARISON: CT abdomen and pelvis January 26, 2020 HISTORY: ORDERING SYSTEM PROVIDED HISTORY: Rule out liver cirrhosis, concern for aclaclulus cholecystitis TECHNOLOGIST PROVIDED HISTORY: Rule out liver cirrhosis, concern for aclaclulus cholecystitis FINDINGS: Heterogeneous increased liver echotexture. No focal liver lesion. No right-sided hydronephrosis. Nonvisualization of the pancreas. No gallbladder wall thickening. No gallstones. No sonographic Nunez sign. Common bile duct is within normal limits measuring 5 mm in diameter. Minimal amount of free fluid within the right upper quadrant.      Minimal amount of free fluid within the upper abdomen. Heterogeneous increased liver echogenicity could be on the basis of hepatocellular disease or hepatic steatosis. Xr Chest Portable    Result Date: 1/26/2020  EXAMINATION: ONE XRAY VIEW OF THE CHEST 1/26/2020 2:53 pm COMPARISON: None. HISTORY: ORDERING SYSTEM PROVIDED HISTORY: PICC line placement TECHNOLOGIST PROVIDED HISTORY: PICC line placement FINDINGS: The lungs are without consolidation effusion. There is no pneumothorax. The heart is prominent. The upper abdomen is unremarkable. The extrathoracic soft tissues are unremarkable. There is a right subclavian port with the tip in the mid SVC. There is a right-sided PICC line with the tip in the distal mid aspect of the SVC. Mild cardiomegaly without acute pulmonary process. Right-sided PICC line with the tip in the distal mid aspect of the SVC. IMAGING DATA:          IMPRESSION:     Primary Problem  Metastatic breast cancer [facility])    Active Hospital Problems    Diagnosis Date Noted    Influenza A [J10.1] 02/15/2020    Pneumonia involving right lung [J18.9] 02/15/2020    Metastatic carcinoma ([facility] [address] [C79.9]     Deep venous thrombosis ([facility] [address] [I82.409]     Encounter for palliative care [Z51.5]     Acute duodenal ulcer with gastric outlet obstruction [K26.3] 01/30/2020    Ulcer of esophagus without bleeding [K22.10] 01/30/2020    Bone lesion [M89.9]     Iron deficiency [E61.1]     Diabetes mellitus type 2 in obese ([address] [E11.69, E66.9] 01/29/2020    Essential hypertension [I10] 01/29/2020    Acquired hypothyroidism [E03.9] 01/29/2020    Recurrent major depressive disorder, in remission ([facility] [address] [F33.40] 01/29/2020    Metastatic breast cancer ([facility] [address] Angelito Elia 01/29/2020    History of breast cancer [Z85.3] 01/27/2020    Anemia [D64.9] 01/26/2020    Liver lesion [K76.9] 01/26/2020     History of breast cancer likely hormone receptor positive.   Status post left mastectomy and chemotherapy and aromatase inhibitor therapy. Iron deficiency  Hypoproliferative anemia  Bone lesions 2/2 metastatic breast adenocarcinoma. Biopsy results showing adenocarcinoma  Back pain second to bone lesions. RECOMMENDATIONS:  Reviewed results of lab work-up and other relevant clinical data. Status post prophylactic rodding of the left femur  Plan for radiation as an outpatient to be followed by systemic therapy. Plan for radiation therapy in [facility]. Continue Femara. As per GI okay to restart anticoagulation but wants overlap of PPI drip for 24 hrs and close monitoring of [facility]  Approved at [facility]. Needs follow up with radiation in [facility]. Lamont Mcadams MD  Hematologist/Medical Oncologist    Cell: [phone]    This note is created with the assistance of a speech recognition program.  While intending to generate a document that actually reflects the content of the visit, the document can still have some errors including those of syntax and sound a like substitutions which may escape proof reading. It such instances, actual meaning can be extrapolated by contextual diversion.

## 2020-02-19 NOTE — PROGRESS NOTES
Other: Mental Status: ?x normal mental status exam      ? drowsy      ? Confused      ? Other:     Cardiovascular: ?x  Regular rate/rhythm      ? Arrhythmia      ? Other:     Chest: ?x Effort normal      ?x lungs clear      ? respiratory distress      ? Tachypnea      ? Other:    Abdomen: ?x Soft/non-tender      ? x Normal appearance      ? Distended      ? Ascites      ? Other:    Neurological: ?x Normal Speech      ?x Normal Sensation      ? Deficits present:      Extremity:  ?x normal skin color/temp      ? clubbing/cyanosis      ?x + edema      ? Other:     Palliative Performance Scale:  _x__60%  Ambulation reduced; Significant disease; Can't do hobbies/housework; intake normal or reduced; occasional assist; LOC full/confusion  ___50%  Mainly sit/lie; Extensive disease; Can't do any work; Considerable assist; intake normal or reduced; LOC full/confusion  ___40%  Mainly in bed; Extensive disease; Mainly assist; intake normal or reduced; LOC full/confusion   ___30%  Bed Bound; Extensive disease; Total care; intake reduced; LOCfull/confusion  ___20%  Bed Bound; Extensive disease; Total care; intake minimal; Drowsy/coma  ___10%  Bed Bound; Extensive disease;  Total care; Mouth care only; Drowsy/coma  ___0       Death      Plan      Palliative Interaction:  The patient was seen today along with medical student Rocco and was sitting comfortably in the recliner, alert and awake and oriented and following commands    No family member was present in patient's room    I discussed patient's current medical conditions with her and patient stated that she is feeling much better today    I encouraged the patient to take active part in PT and OT therapy    I offered comfort and emotional support to the patient    Education/support to staff  Education/support to family  Education/support to patient  Discharge planning/helping to coordinate care  Communications with primary service  Caregiver support/education     Principle Problem/Diagnosis:  Metastatic breast cancer ([facility]    Additional Assessments:  Principal Problem:    Metastatic breast cancer ([facility]  Active Problems:    Anemia    Liver lesion    History of breast cancer    Diabetes mellitus type 2 in obese [facility])    Essential hypertension    Acquired hypothyroidism    Recurrent major depressive disorder, in remission ([facility]    Bone lesion    Iron deficiency    Deep venous thrombosis (HCC)    Influenza A    Pneumonia involving right lung    Metastatic carcinoma (HCC)  Resolved Problems:    [facility]    Acute duodenal ulcer with gastric outlet obstruction    Ulcer of esophagus without bleeding    Encounter for palliative care      1- Symptom management/ pain control     Pain Assessment:  Pain is controlled with current analgesics. Medication(s) being used: acetaminophen, narcotic analgesics including fentanyl IV, morphine, oxycodone                Anxiety:  fatigue                          Dyspnea:  none                          Fatigue:  Tiredness    We feel the patient symptoms are being controlled. her current regimen is reviewed by myself and discussed with the staff. 2- Goals of care evaluation   The patient goals of care are improve or maintain function/quality of life, accomplish a particular personal goal, spiritual needs, strengthening relationships, preserve independence/autonomy/control and support for family/caregiver   Goals of care discussed with:    ?x Patient independently    ? Patient and Family    ? Family or Healthcare DPOA independently    ? Unable to discuss with patient, family/DPOA not present    3- Code Status  DNR-CCA    4- Other recommendations  - We will continue to provide comfort and support to the patient and the family    Please call with any palliative questions or concerns. Palliative Care Team is available via perfect serve or via phone. Palliative Care will continue to follow Ms. Collins's care as needed.     The note has been

## 2020-02-19 NOTE — PROGRESS NOTES
Patient refusing to wear heart monitor. Patient educated on need to wear monitor and why it is important that she keep it on. Still refuses.     Electronically signed by Sushil Kovacs RN on 2/18/2020 at 10:42 PM

## 2020-02-21 LAB
CULTURE: NORMAL
CULTURE: NORMAL
Lab: NORMAL
Lab: NORMAL
SPECIMEN DESCRIPTION: NORMAL
SPECIMEN DESCRIPTION: NORMAL

## 2020-02-21 NOTE — ADT AUTH CERT
[address] [facility] Adult-Extended Stay by Tez Poole RN         Review Status Review Entered   In Primary 2/21/2020 10:54       Criteria Review   REVIEW SUMMARY     Patient: Carolyn Herrmann  Review Number: [id number]  Review Status: In Primary     Condition Specific: Yes        OUTCOMES  Outcome Type: Primary           REVIEW DETAILS     Product: [facility] Adult  Subset: Extended Stay      (Symptom or finding within 24h)         (Excludes PO medications unless noted)          Select Level of Care, One:              [ ] ACUTE, >= One:                  [ ] General, One:                      [ ] Partial responder, not clinically stable for discharge and requires continued stay, >= One:                          [ ] Anemia or bleeding and, Both:                              [ ] Finding, >= One:                              ~--Admin, IQ Admin Admin on 02- 10:54 AM--~                              the hgb steady at 8.7. pt will have anticoagulant started today. continue to monitor for bleeding.                                                                                               Version: [employer] 2019.1  [employer]  © 2019 [employer] and/or one of its [place]. All Rights Reserved. CPT only © 2018 American Medical Association. All Rights Reserved. Additional Notes   2/17      Tx No acute issues overnight, de nies N/V/D. Denies any belly pain. Denies any bleeding per rectum. HGB is stable. Tolerating diet, soft diet. Ok to start the pt on anticoagulants today.               Vitals BP (!) 147/79   Pulse 92   Temp 97.6 °F (36.4 °C) (Oral)   Resp 18   Ht 5' 2\" (1.575 m)   Wt 178 lb 3.2 oz (80.8 kg)   SpO2 97 on room air.           LABS WBC 6.2    HGB 8.7,    ,    CREAT 0.47. GLUCOSE 129   CALCIUM 7.9,       NO IMAGING       MED    PROTONIX 40 MG BID PO 2/17 STARTED.     XARELTO BID 15 MG STARTED ON 2/18     ZITHROMAX 500 MG IV STARTED 2/15-2/19 ROCEPHIN IV 1 G Q 24 HOURS. COLACE 100 MG PO BID    FERROUS SULFATE  MG PO BID    LANTUS 10 UNITS SC NIGHTLY    CHRONULAC 20 G PO 3 X 3 PER DAY    SYNTHROID  MCG  DAILY    LOPRESSOR 50 MG PO DAILY    TAMIFLU STARTED 75 MG BID    CARAFATE 1 G 4X PER DAY    EFFEXOR  MG PO DAILY    LR AT 75 ML PER HOUR,. PRN     TYLENOL 650 MG PRN USED X 1 ON 2/17    Roxicodone ir 10 mg prn used x 2 on 2/17        PER GI NOTES   02/17/2020- hemoglobin stable. Ok to start on AC/AP       Plan of care:   1.  Monitor serial hemoglobin and hematocrit. 2. Will start on Protonix 40 BID. 3.  Continue Carafate 1 g 4 times daily, crushed and administered and a small amount of water as a slurry   4.  Advanced to low fiber, dental soft diet   5. Ok to restart AC/AP with caution while trending H&H, patient needs to be on PPI for the same time period as [facility]. Recommended bridging with Lovenox/heparin.            LOC:Acute Adult-Extended Stay by Bryce Pina RN         Review Status Review Entered   In Primary 2/21/2020 10:25       Criteria Review   REVIEW SUMMARY     Patient: Cora Gardner  Review Number: [id number]  Review Status:  In Primary     Condition Specific: Yes        OUTCOMES  Outcome Type: Primary           REVIEW DETAILS     Product: [facility] Adult  Subset: Extended Stay      (Symptom or finding within 24h)         (Excludes PO medications unless noted)          [X] Select Level of Care, One:              [X] ACUTE, >= One:                  [X] General, One:                      [X] Partial responder, not clinically stable for discharge and requires continued stay, >= One:                          [X] Anemia or bleeding and, Both:                              [X] Finding, >= One:                                  [X] Hct < 25%(0.25) or Hb < 8.3 g/dL(83 g/L)                                  ~--Admin, IQ Admin Admin on 02- 10:25 AM--~                                  hgb 8.5 down from 9.5 day previous. gi asked to see the patient again. [X] Hct or Hb decreased last 24h                              [X] Intervention, >= One:                                  [X] Requiring Hct or Hb monitoring at least 2x/24h, <= 2d     Version: [employer] 2019.1  Abelino Moore and [employer]®  © 2019 [employer] 6199 and/or one of its [place]. All Rights Reserved. CPT only © 2018 American Medical Association. All Rights Reserved. Additional Notes   2/15        Raimundo Marie remains in ICU post EGD 2/13. Tod Talamantes denies any abdominal pain.  No melena or hematochezia overnight. GI consulted due to had duodenal ulcer. Found to have DVT and needs thinners.  Pt with temp today    Up to > 102. Pt with  Influenza A +. Iv atb started. O2 at 2 liters per nc.        VITALS 142/76, 102.6, 97, 24, 95% ON 2 LITERS PRN          Labs Ref Range & Units 02/15/20 0018   Alb 3.5 - 5.2 g/dL 2.6Low     Albumin/Globulin Ratio 1.0 - 2.5 0.6Low     Alkaline Phosphatase 35 - 104 U/L 148High     ALT 5 - 33 U/L 6    AST <32 U/L 13    Total Bilirubin 0.3 - 1.2 mg/dL 0.31    Bilirubin, Direct <0.31 mg/dL 0.12    Bilirubin, Indirect 0.00 - 1.00 mg/dL 0.19    BUN 8 - 23 mg/dL 4Low     Calcium 8.6 - 10.4 mg/dL 8.4Low     CREATININE 0.50 - 0.90 mg/dL 0.56    Glucose 70 - 99 mg/dL 158High     Total Protein 6.4 - 8.3 g/dL 7.1    Sodium 135 - 144 mmol/L 137    Potassium 3.7 - 5.3 mmol/L 3.9    Chloride 98 - 107 mmol/L 97Low     CO2 20 - 31 mmol/L 24    Anion Gap 9 - 17 mmol/L 16    GFR Non- >60 mL/min >60    GFR African American >60 mL/min >60    Ref Range & Units 02/15/20 0648   WBC 3.5 - 11.3 k/uL 12. 2High     RBC 3.95 - 5.11 m/uL 3.07Low     Hemoglobin 11.9 - 15.1 g/dL 8.5Low     Hematocrit 36.3 - 47.1 % 28.5Low     MCV 82.6 - 102.9 fL 92.8    MCH 25.2 - 33.5 pg 27.7    MCHC 28.4 - 34.8 g/dL 29.8    RDW 11.8 - 14.4 % 15. 8High     Platelets 670 - 468 k/uL 460High     MPV 8.1 - 13.5 fL 9.4    NRBC Automated 0.0 per 100 WBC 0.0    Differential Type NOT REPORTED    Seg Neutrophils 36 - 65 % 79High     Lymphocytes 24 - 43 % 7Low     Monocytes 3 - 12 % 11    Eosinophils % 1 - 4 % 1    Basophils 0 - 2 % 1    Immature Granulocytes 0 % 1High     Segs Absolute 1.50 - 8.10 k/uL 9. 80High     Absolute Lymph # 1.10 - 3.70 k/uL 0.85Low     Absolute Mono # 0.10 - 1.20 k/uL 1. 29High     Absolute Eos # 0.00 - 0.44 k/uL 0.06    Basophils Absolute 0.00 - 0.20 k/uL 0.12    Absolute Immature Granulocyte 0.00 - 0.30 k/uL 0.11    Influenza A by PCR DETEC. .. INfluenza A by PCR Influenza A H1 (2009) PCR DETEC Ref Range & Units 02/15/20 0019   pH, Mychal 7.320 - 7.420 7.550High     pCO2, Mychal 39 - 55 30.5Low     pO2, Mychal 30 - 50 84. 6High     HCO3, Venous 24 - 30 mmol/L 26.7    Positive Base Excess, Mychal 0.0 - 2.0 mmol/L 4.6High     Negative Base Excess, Mychal 0.0 - 2.0 mmol/L NOT REPORTED    O2 Sat, Mychal 60.0 - 85.0 % 97.1High     Total Hb 12.0 - 16.0 g/dl NOT REPORTED    Oxyhemoglobin 95.0 - 98.0 % NOT REPORTED    Carboxyhemoglobin 0 - 5 % 1.3    Comment:            CXR Impression   Focal airspace opacity identified right costophrenic angle could be due to   focal infiltrate/pneumonia versus small effusion.           PROCEDURE DATE: 02/13/20  PROCEDURES : 1) Transoral Upper Endoscopy.  POSTOPERATIVE DIAGNOSIS:  Healing duodenal ulcers     SPECIMENS: none. MEDICATIONS: MAC per anesthesia. EBL: minimal    RECOMMENDATIONS:    1) Transfer back to the floor for further management as per primary care team.    2) continue PPI drip and Carafate. 3) no blood thinners for 3 more days. 4) Clear liquid diet today         MED IV FLUID BOLUS 500 ML    ZITHROMAX 500 MG IV STARTED 2/15-2/19   ROCEPHIN IV 1 G Q 24 HOURS.     COLACE 100 MG PO BID    FERROUS SULFATE  MG PO BID    LANTUS 10 UNITS SC NIGHTLY    CHRONULAC 20 G PO 3 X 3 PER DAY    SYNTHROID  MCG  DAILY    LOPRESSOR 50 MG PO DAILY    TAMIFLU STARTED 75 MG BID    CARAFATE 1 G 4X PER DAY    EFFEXOR  MG PO DAILY    LR AT 75 ML PER HOUR,. PROTONIX DRIP AT 8 MG PER HOUR STOPPED ON 2/16    PRN MAG SULFATE IV 4 G GIVEN 2/15   TYLENOL 650 MG PRN USED X 2 ON 2/15     KCL 40 MEQ PO X 1 GIVEN       Pt et ot evaluation et treat    Diet soft no red dye and low fever.     2 d echo ordered    H /h q 6 hours. Blood cultures following. Up with assistance    Remains in the icu    Tele et cont pulse ox. O2 per nc keep sao2 > 90.           PER GI NOTES   Principal Problem:     Metastatic breast cancer ([facility]   Active Problems:     Anemia     Liver lesion     History of breast cancer     Diabetes mellitus type 2 in obese (HCC)     Essential hypertension     Acquired hypothyroidism     Recurrent major depressive disorder, in remission ([facility]     Bone lesion     Iron deficiency     Acute duodenal ulcer with gastric outlet obstruction     Ulcer of esophagus without bleeding     Encounter for palliative care     Deep venous thrombosis ([facility]   Resolved Problems:     Septic shock ([facility]      GI issues        1. GI bleed   2. Anemia   3. DVT RU extremity          Plan of care:   1.  Monitor hemoglobin and hematocrit.  Transfuse to keep hemoglobin greater than 7 g/dL   2.  We will hold off on EGD today and continue to monitor.  If patient shows any signs of acute GI bleed, precipitous drop in hemoglobin or clinical instability, plan will be for repeat EGD   3. Continue PPI gtt   4.  Cl diet - no red products

## 2020-03-20 PROBLEM — J10.1 INFLUENZA A: Status: RESOLVED | Noted: 2020-02-15 | Resolved: 2020-03-20

## 2020-03-26 ENCOUNTER — TELEPHONE (OUTPATIENT)
Dept: ONCOLOGY | Age: 70
End: 2020-03-26

## 2020-03-26 NOTE — TELEPHONE ENCOUNTER
Called Jeff left message. Reminded her that I am her oncology nurse navigator at [facility] ([facility] [place]). Let her know that I wanted to check in with her, see how she is doing. Reminded her that I am here as a resource, support, and to help facilitate care. Encouraged her to call me. Let her know that I would like to see about rescheduling her appointment with Dr. Peter Mccall. Pt on pending.

## 2020-04-06 ENCOUNTER — TELEPHONE (OUTPATIENT)
Dept: ONCOLOGY | Age: 70
End: 2020-04-06

## 2020-04-06 NOTE — TELEPHONE ENCOUNTER
Per Rachel Sanchez RN, pt needs to be set up for a VV with Dr. Oliver Webb. Pts daughter, Beth Skelton, will be making appointment. LM with daughter stating to call us back to make appt. Pt discharged from [address] [place] [address] on 4/6/2020.

## 2020-04-08 ENCOUNTER — TELEPHONE (OUTPATIENT)
Dept: ONCOLOGY | Age: 70
End: 2020-04-08

## 2020-04-08 NOTE — TELEPHONE ENCOUNTER
Dr. Saba Ruvalcaba messages me asking if patient has been set up for a virtual appointment. Delfina PAEG Had left message for daughter with number to call back to get patient scheduled. Requested a follow up call, which Caro Jung made today and left message again with contact number to call and get a follow up appointment with Dr. Saba Ruvalcaba to determine next steps. Dr. Saba Ruvalcaba notified of status.

## 2020-04-16 ENCOUNTER — TELEPHONE (OUTPATIENT)
Dept: ONCOLOGY | Age: 70
End: 2020-04-16

## 2020-04-16 NOTE — TELEPHONE ENCOUNTER
I called and left a detailed message for [address] and/or [address]. I explained that I am the nurse navigator at [facility] ([facility]) and I work with her doctor Dr. Kenton Boyce. I left message relating that we want to make sure she is getting the recommended treatment and in the easiest location for them. I would be able to set up a virtual appointment so that patient and family could talk to Dr. Evangelista [facility] oncologist and he can let them know what is recommended for [address] as far as treatment goes. I let them know that it would be a good time to talk about whether she would like to keep treatment with [facility] in the [place] area or if a closer to home location would work better for them. I explained that we can get her set up with the treatment she needs or refer her as needed. I let them know that we do not want her to go without needed treatment. Asked them to please call me so I can assist them. Left my name and contact number.

## 2020-04-29 ENCOUNTER — TELEPHONE (OUTPATIENT)
Dept: ONCOLOGY | Age: 70
End: 2020-04-29

## 2021-01-14 ENCOUNTER — TELEPHONE (OUTPATIENT)
Dept: ONCOLOGY | Age: 71
End: 2021-01-14

## 2021-01-14 NOTE — TELEPHONE ENCOUNTER
ASSISTING Rose Mary Manuel RN NAVIGATOR. PATIENT SWITCHED TO [facility] AND [facility] FOR HER ONCOLOGY NEEDS. TOMMY SIGNED OFF NAVIGATION.

## 2021-03-25 NOTE — BRIEF OP NOTE
Brief Postoperative Note  ______________________________________________________________    Patient: Oniel Collins  YOB: 1950  MRN: [id number]  Date of Procedure: 2/1/2020    Pre-Op Diagnosis:   1. Impending left femoral neck fracture  2. Metastatic breast cancer    Post-Op Diagnosis: Same       Procedure(s):  1. Long TFN of the left femur  2. Femoral neck biopsy  3. Intraoperative fluoroscopy    Anesthesia: General    Surgeon(s):  MD Clementina Sood DO  [facility] DO    Estimated Blood Loss (mL): 300 cc    Fluids: 800cc crystalloid, 767FM PRBC    Complications: None    Specimens:   ID Type Source Tests Collected by Time Destination   A : LEFT FEMUR Tissue Leg SURGICAL PATHOLOGY Demond Mcdermott MD 2/1/2020 1101        Implants:  Implant Name Type Inv. Item Serial No.  Lot No. LRB No. Used   NAIL 9MM 130DEG TI JULIO CESAR TFNA 340MM LT Screw/Plate/Nail/Yoel NAIL 9MM 130DEG TI JULIO CESAR TFNA 340MM LT  [employer] [id number] Left 1   IMPL BLADE HELICAL FENEST TFNA 00GM ST Screw/Plate/Nail/Yoel IMPL BLADE HELICAL FENEST TFNA 19YZ ST  [employer] POWER TOOLS [id number] Left 1         Drains:   [REMOVED] Urethral Catheter (Removed)   Catheter Indications Need for fluid management in critically ill patients in a critical care setting not able to be managed by other means such as BSC with hat, bedpan, urinal, condom catheter, or short term intermittent urethral catherization 1/29/2020 12:00 AM   Site Assessment No urethral drainage 1/29/2020 12:00 AM   Urine Color Yellow 1/29/2020 12:00 AM   Urine Appearance Clear 1/29/2020 12:00 AM   Output (mL) 100 mL 1/28/2020  5:15 PM       Findings: See operative report for details.     Jillian Davis DO  Date: 2/1/2020  Time: 12:12 PM COVID19 (no respiratory issues; "felt like the flu")

## (undated) DEVICE — RETRIEVER ENDOSCP UNIV 4X5.5 CM 2.5 MMX230 CM PLAT ROTH NET

## (undated) DEVICE — SVMMC ORTH SPL DRP PK

## (undated) DEVICE — CATHETER ELECHEMSTAS 10 FRX300 CM BPLR STD PLUG GLD PRB

## (undated) DEVICE — SUTURE VCRL SZ 2-0 L27IN ABSRB UD L22MM X-1 1/2 CIR REV CUT J459H

## (undated) DEVICE — BANDAGE,GAUZE,BULKEE II,4.5"X4.1YD,STRL: Brand: MEDLINE

## (undated) DEVICE — GUIDEWIRE ORTH L400MM DIA3.2MM FOR TFN

## (undated) DEVICE — PAD GRND DISP

## (undated) DEVICE — PADDING CAST W6INXL4YD COT LO LINTING WYTEX

## (undated) DEVICE — SOLUTION IV IRRIG POUR BRL 0.9% SODIUM CHL 2F7124

## (undated) DEVICE — FIAPC® PROBE W/ FILTER 2200 A OD 2.3MM/6.9FR; L 2.2M/7.2FT: Brand: ERBE

## (undated) DEVICE — ELECTRODE PT RET AD L9FT HI MOIST COND ADH HYDRGEL CORDED

## (undated) DEVICE — ROD RMR L950MM DIA2.5MM W/ EXTN BALL TIP

## (undated) DEVICE — TOTAL TRAY, 16FR 10ML SIL FOLEY, URN: Brand: MEDLINE

## (undated) DEVICE — FORCEPS BX L240CM WRK CHN 2.8MM STD CAP W/ NDL MIC MESH

## (undated) DEVICE — BIPOLAR ELECTROHEMOSTASIS CATHETER: Brand: INJECTION GOLD PROBE

## (undated) DEVICE — BNDG,ELSTC,MATRIX,STRL,6"X5YD,LF,HOOK&LP: Brand: MEDLINE

## (undated) DEVICE — 6619 2 PTNT ISO SYS INCISE AREA&LT;(&GT;&&LT;)&GT;P: Brand: STERI-DRAPE™ IOBAN™ 2

## (undated) DEVICE — GAUZE,SPONGE,FLUFF,6"X6.75",STRL,5/TRAY: Brand: MEDLINE

## (undated) DEVICE — NEEDLE SCLERO L4MM L200CM OD25GA ODSEC18MM 038IN LOK CLR HUB

## (undated) DEVICE — SUTURE VCRL SZ 0 L27IN ABSRB UD L36MM CT-1 1/2 CIR J260H

## (undated) DEVICE — Device

## (undated) DEVICE — DRESSING TRNSPAR W5XL4.5IN FLM SHT SEMIPERMEABLE WIND

## (undated) DEVICE — GOWN,AURORA,NONRNF,XL,30/CS: Brand: MEDLINE

## (undated) DEVICE — GLOVE,EXAM,NITRILE,RESTORE,OAT SENSE,L: Brand: MEDLINE